# Patient Record
Sex: FEMALE | Race: BLACK OR AFRICAN AMERICAN | NOT HISPANIC OR LATINO | Employment: OTHER | URBAN - METROPOLITAN AREA
[De-identification: names, ages, dates, MRNs, and addresses within clinical notes are randomized per-mention and may not be internally consistent; named-entity substitution may affect disease eponyms.]

---

## 2017-01-04 ENCOUNTER — OFFICE VISIT (OUTPATIENT)
Dept: URGENT CARE | Facility: CLINIC | Age: 82
End: 2017-01-04
Payer: MEDICARE

## 2017-01-04 VITALS
RESPIRATION RATE: 14 BRPM | HEIGHT: 64 IN | HEART RATE: 86 BPM | SYSTOLIC BLOOD PRESSURE: 150 MMHG | DIASTOLIC BLOOD PRESSURE: 86 MMHG | TEMPERATURE: 99.3 F | OXYGEN SATURATION: 98 % | BODY MASS INDEX: 21.85 KG/M2 | WEIGHT: 128 LBS
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
## 2017-01-04 PROCEDURE — 99214 OFFICE O/P EST MOD 30 MIN: CPT | Performed by: FAMILY MEDICINE

## 2017-01-04 RX ORDER — HYDROCHLOROTHIAZIDE 12.5 MG/1
12.5 CAPSULE, GELATIN COATED ORAL DAILY
Status: ON HOLD | COMMUNITY
End: 2018-03-10

## 2017-01-04 RX ORDER — TRIAMCINOLONE ACETONIDE 1 MG/G
OINTMENT TOPICAL
Qty: 45 G | Refills: 0 | Status: ON HOLD | OUTPATIENT
Start: 2017-01-04 | End: 2018-03-10

## 2017-01-04 ASSESSMENT — ENCOUNTER SYMPTOMS
EYE REDNESS: 0
FEVER: 0
SORE THROAT: 0
EYE DISCHARGE: 0
URTICARIA: 1

## 2017-01-04 NOTE — MR AVS SNAPSHOT
"        Livia Corea   2017 12:30 PM   Office Visit   MRN: [id number]    Department:  [facility]   Dept Phone:  [phone]    Description:  Female : 1926   Provider:  Tristan Khoury M.D.           Reason for Visit     Urticaria x 2 days on chest, arms and back      Allergies as of 2017     No Known Allergies      You were diagnosed with     Insect bite, initial encounter   [[id number]]         Vital Signs     Blood Pressure Pulse Temperature Respirations Height Weight    150/86 mmHg 86 37.4 °C (99.3 °F) 14 1.613 m (5' 3.5\") 58.06 kg (128 lb)    Body Mass Index Oxygen Saturation                22.32 kg/m2 98%          Basic Information     Date Of Birth Sex Race Ethnicity Preferred Language    1926 Female Black or  Non- English      Health Maintenance        Date Due Completion Dates    IMM DTaP/Tdap/Td Vaccine (1 - Tdap) 1945 ---    PAP SMEAR 1947 ---    MAMMOGRAM 1966 ---    COLONOSCOPY 1976 ---    IMM ZOSTER VACCINE 1986 ---    BONE DENSITY 1991 ---    IMM PNEUMOCOCCAL 65+ (ADULT) LOW/MEDIUM RISK SERIES (1 of 2 - PCV13) 1991 ---    IMM INFLUENZA (1) 2016 ---            Current Immunizations     No immunizations on file.      Below and/or attached are the medications your provider expects you to take. Review all of your home medications and newly ordered medications with your provider and/or pharmacist. Follow medication instructions as directed by your provider and/or pharmacist. Please keep your medication list with you and share with your provider. Update the information when medications are discontinued, doses are changed, or new medications (including over-the-counter products) are added; and carry medication information at all times in the event of emergency situations     Allergies:  No Known Allergies          Medications  Valid as of: 2017 -  1:22 PM    Generic Name Brand Name Tablet Size " Instructions for use    Albuterol Sulfate (Aero Soln) albuterol 108 (90 BASE) MCG/ACT Inhale 1-2 Puffs by mouth every 6 hours as needed for Shortness of Breath.        AmLODIPine Besylate (Tab) NORVASC 10 MG Take 10 mg by mouth every day.        Aspirin (Tablet Delayed Response) ECOTRIN 81 MG Take 81 mg by mouth every day.        Guaifenesin-Codeine (Solution) ROBITUSSIN -10 mg/5mL Take 5 mL by mouth every four hours as needed for Cough.        HydroCHLOROthiazide (Cap) MICROZIDE 12.5 MG Take 12.5 mg by mouth every day.        LORazepam (Tab) ATIVAN 0.5 MG Take 0.5 mg by mouth every four hours as needed for Anxiety.        Pneumococcal 13-Brittany Conj Vacc (Suspension) PREVNAR 13  0.5 mL by Intramuscular route Once PRN.        Triamcinolone Acetonide (Ointment) KENALOG 0.1 % Apply thin layer to affected area twice daily as needed        .                 Medicines prescribed today were sent to:     [id number] - [place], [place] - [address] [address]    Phone: [phone] Fax: [phone]    Open 24 Hours?: No      Medication refill instructions:       If your prescription bottle indicates you have medication refills left, it is not necessary to call your provider’s office. Please contact your pharmacy and they will refill your medication.    If your prescription bottle indicates you do not have any refills left, you may request refills at any time through one of the following ways: The online [employer] system (except Urgent Care), by calling your provider’s office, or by asking your pharmacy to contact your provider’s office with a refill request. Medication refills are processed only during regular business hours and may not be available until the next business day. Your provider may request additional information or to have a follow-up visit with you prior to refilling your medication.   *Please Note: Medication refills are assigned a new Rx number when refilled  electronically. Your pharmacy may indicate that no refills were authorized even though a new prescription for the same medication is available at the pharmacy. Please request the medicine by name with the pharmacy before contacting your provider for a refill.           [email or web address] Access Code: [email or web address]  Expires: 2/3/2017  1:22 PM    Your email address is not on file at [facility].  Email Addresses are required for you to sign up for [email or web address], please contact [phone] to verify your personal information and to provide your email address prior to attempting to register for [email or web address].    Livia Corea  [address]  [place] [address]    [employer]  A secure, online tool to manage your health information     [facility]’s [employer]® is a secure, online tool that connects you to your personalized health information from the privacy of your home -- day or night - making it very easy for you to manage your healthcare. Once the activation process is completed, you can even access your medical information using the [employer] josiah, which is available for free in the Apple Josiah store or Google Play store.     To learn more about [email or web address], visit [email or web address]    There are two levels of access available (as shown below):   My Chart Features  [facility] Primary Care Doctor [facility]  Specialists [facility]  Urgent  Care Non-[facility] Primary Care Doctor   Email your healthcare team securely and privately 24/7 X X X    Manage appointments: schedule your next appointment; view details of past/upcoming appointments X      Request prescription refills. X      View recent personal medical records, including lab and immunizations X X X X   View health record, including health history, allergies, medications X X X X   Read reports about your outpatient visits, procedures, consult and ER notes X X X X   See your discharge summary, which is a recap of your hospital and/or ER visit that includes your diagnosis, lab results, and care plan X X  X      How to register for [employer]:  Once your e-mail address has been verified, follow the following steps to sign up for [employer].     1. Go to  [email or web address]  2. Click on the Sign Up Now box, which takes you to the New Member Sign Up page. You will need to provide the following information:  a. Enter your [employer] Access Code exactly as it appears at the top of this page. (You will not need to use this code after you’ve completed the sign-up process. If you do not sign up before the expiration date, you must request a new code.)   b. Enter your date of birth.   c. Enter your home email address.   d. Click Submit, and follow the next screen’s instructions.  3. Create a NEHPt ID. This will be your [employer] login ID and cannot be changed, so think of one that is secure and easy to remember.  4. Create a NEHPt password. You can change your password at any time.  5. Enter your Password Reset Question and Answer. This can be used at a later time if you forget your password.   6. Enter your e-mail address. This allows you to receive e-mail notifications when new information is available in [employer].  7. Click Sign Up. You can now view your health information.    For assistance activating your [employer] account, call [phone]

## 2017-01-04 NOTE — PROGRESS NOTES
"Subjective:      Livia Corea is a 89 y.o. female who presents with Urticaria            Urticaria  This is a new problem. Episode onset: 2 days itching rash without clear trigger to trunk/arms/face. No oral swelling. No SOB. No change in medications. No OTC medications. Remote PMH hives.  Pertinent negatives include no fever or sore throat.   No other aggravating or alleviating factors. Recently stayed in a motel.      Review of Systems   Constitutional: Negative for fever and malaise/fatigue.   HENT: Negative for sore throat.    Eyes: Negative for discharge and redness.   Skin: Negative for itching and rash.     .  Medications, Allergies, and current problem list reviewed today in Epic       Objective:     /86 mmHg  Pulse 86  Temp(Src) 37.4 °C (99.3 °F)  Resp 14  Ht 1.613 m (5' 3.5\")  Wt 58.06 kg (128 lb)  BMI 22.32 kg/m2  SpO2 98%     Physical Exam   Constitutional: She appears well-developed and well-nourished. No distress.   HENT:   Head: Normocephalic and atraumatic.   Eyes: Conjunctivae are normal.   Neck: Neck supple.   Cardiovascular: Normal rate, regular rhythm and normal heart sounds.    Pulmonary/Chest: Effort normal and breath sounds normal.   Lymphadenopathy:     She has no cervical adenopathy.   Neurological:   Speech is clear. Patient is appropriate and cooperative.     Skin: Skin is warm and dry.   Multiple pruritic papules in clusters. Round and slightly indurated. No clear central puncture.    No vesicles. No wheals            Assessment/Plan:     1. Insect bite, initial encounter  triamcinolone acetonide (KENALOG) 0.1 % Ointment      High suspicion for bed bug bites   Differential diagnosis, natural history, supportive care, and indications for immediate follow-up discussed at length.     "

## 2017-01-24 ENCOUNTER — HOSPITAL ENCOUNTER (OUTPATIENT)
Dept: RADIOLOGY | Facility: MEDICAL CENTER | Age: 82
End: 2017-01-24
Attending: FAMILY MEDICINE
Payer: MEDICARE

## 2017-01-24 DIAGNOSIS — R60.0 LOCALIZED EDEMA: ICD-10-CM

## 2017-01-24 PROCEDURE — 93971 EXTREMITY STUDY: CPT | Mod: LT

## 2017-09-20 ENCOUNTER — HOSPITAL ENCOUNTER (OUTPATIENT)
Dept: LAB | Facility: MEDICAL CENTER | Age: 82
End: 2017-09-20
Attending: FAMILY MEDICINE
Payer: MEDICARE

## 2017-09-20 PROCEDURE — 82043 UR ALBUMIN QUANTITATIVE: CPT

## 2017-09-20 PROCEDURE — 85025 COMPLETE CBC W/AUTO DIFF WBC: CPT

## 2017-09-20 PROCEDURE — 36415 COLL VENOUS BLD VENIPUNCTURE: CPT

## 2017-09-20 PROCEDURE — 82570 ASSAY OF URINE CREATININE: CPT

## 2017-09-20 PROCEDURE — 80053 COMPREHEN METABOLIC PANEL: CPT

## 2017-09-21 LAB
ALBUMIN SERPL BCP-MCNC: 4.1 G/DL (ref 3.2–4.9)
ALBUMIN/GLOB SERPL: 1.2 G/DL
ALP SERPL-CCNC: 96 U/L (ref 30–99)
ALT SERPL-CCNC: 8 U/L (ref 2–50)
ANION GAP SERPL CALC-SCNC: 12 MMOL/L (ref 0–11.9)
AST SERPL-CCNC: 15 U/L (ref 12–45)
BASOPHILS # BLD AUTO: 1.3 % (ref 0–1.8)
BASOPHILS # BLD: 0.07 K/UL (ref 0–0.12)
BILIRUB SERPL-MCNC: 0.6 MG/DL (ref 0.1–1.5)
BUN SERPL-MCNC: 26 MG/DL (ref 8–22)
CALCIUM SERPL-MCNC: 9.5 MG/DL (ref 8.5–10.5)
CHLORIDE SERPL-SCNC: 105 MMOL/L (ref 96–112)
CO2 SERPL-SCNC: 23 MMOL/L (ref 20–33)
CREAT SERPL-MCNC: 0.98 MG/DL (ref 0.5–1.4)
CREAT UR-MCNC: 156.1 MG/DL
EOSINOPHIL # BLD AUTO: 0.21 K/UL (ref 0–0.51)
EOSINOPHIL NFR BLD: 3.9 % (ref 0–6.9)
ERYTHROCYTE [DISTWIDTH] IN BLOOD BY AUTOMATED COUNT: 45.9 FL (ref 35.9–50)
GFR SERPL CREATININE-BSD FRML MDRD: 53 ML/MIN/1.73 M 2
GLOBULIN SER CALC-MCNC: 3.4 G/DL (ref 1.9–3.5)
GLUCOSE SERPL-MCNC: 86 MG/DL (ref 65–99)
HCT VFR BLD AUTO: 42.5 % (ref 37–47)
HGB BLD-MCNC: 14.1 G/DL (ref 12–16)
IMM GRANULOCYTES # BLD AUTO: 0.02 K/UL (ref 0–0.11)
IMM GRANULOCYTES NFR BLD AUTO: 0.4 % (ref 0–0.9)
LYMPHOCYTES # BLD AUTO: 2.26 K/UL (ref 1–4.8)
LYMPHOCYTES NFR BLD: 42.3 % (ref 22–41)
MCH RBC QN AUTO: 30.3 PG (ref 27–33)
MCHC RBC AUTO-ENTMCNC: 33.2 G/DL (ref 33.6–35)
MCV RBC AUTO: 91.4 FL (ref 81.4–97.8)
MICROALBUMIN UR-MCNC: 3 MG/DL
MICROALBUMIN/CREAT UR: 19 MG/G (ref 0–30)
MONOCYTES # BLD AUTO: 0.58 K/UL (ref 0–0.85)
MONOCYTES NFR BLD AUTO: 10.9 % (ref 0–13.4)
NEUTROPHILS # BLD AUTO: 2.2 K/UL (ref 2–7.15)
NEUTROPHILS NFR BLD: 41.2 % (ref 44–72)
NRBC # BLD AUTO: 0 K/UL
NRBC BLD AUTO-RTO: 0 /100 WBC
PLATELET # BLD AUTO: 234 K/UL (ref 164–446)
PMV BLD AUTO: 10.7 FL (ref 9–12.9)
POTASSIUM SERPL-SCNC: 3.7 MMOL/L (ref 3.6–5.5)
PROT SERPL-MCNC: 7.5 G/DL (ref 6–8.2)
RBC # BLD AUTO: 4.65 M/UL (ref 4.2–5.4)
SODIUM SERPL-SCNC: 140 MMOL/L (ref 135–145)
WBC # BLD AUTO: 5.3 K/UL (ref 4.8–10.8)

## 2018-02-16 ENCOUNTER — HOSPITAL ENCOUNTER (OUTPATIENT)
Dept: LAB | Facility: MEDICAL CENTER | Age: 83
End: 2018-02-16
Attending: ORTHOPAEDIC SURGERY
Payer: MEDICARE

## 2018-02-16 LAB
ALBUMIN SERPL BCP-MCNC: 4.1 G/DL (ref 3.2–4.9)
ALBUMIN/GLOB SERPL: 1.1 G/DL
ALP SERPL-CCNC: 88 U/L (ref 30–99)
ALT SERPL-CCNC: 9 U/L (ref 2–50)
ANION GAP SERPL CALC-SCNC: 9 MMOL/L (ref 0–11.9)
AST SERPL-CCNC: 18 U/L (ref 12–45)
BASOPHILS # BLD AUTO: 1.3 % (ref 0–1.8)
BASOPHILS # BLD: 0.07 K/UL (ref 0–0.12)
BILIRUB SERPL-MCNC: 0.5 MG/DL (ref 0.1–1.5)
BUN SERPL-MCNC: 19 MG/DL (ref 8–22)
CALCIUM SERPL-MCNC: 9.1 MG/DL (ref 8.5–10.5)
CHLORIDE SERPL-SCNC: 106 MMOL/L (ref 96–112)
CO2 SERPL-SCNC: 24 MMOL/L (ref 20–33)
CREAT SERPL-MCNC: 0.83 MG/DL (ref 0.5–1.4)
EOSINOPHIL # BLD AUTO: 0.31 K/UL (ref 0–0.51)
EOSINOPHIL NFR BLD: 5.7 % (ref 0–6.9)
ERYTHROCYTE [DISTWIDTH] IN BLOOD BY AUTOMATED COUNT: 47.7 FL (ref 35.9–50)
EST. AVERAGE GLUCOSE BLD GHB EST-MCNC: 97 MG/DL
GLOBULIN SER CALC-MCNC: 3.9 G/DL (ref 1.9–3.5)
GLUCOSE SERPL-MCNC: 89 MG/DL (ref 65–99)
HBA1C MFR BLD: 5 % (ref 0–5.6)
HCT VFR BLD AUTO: 45.1 % (ref 37–47)
HGB BLD-MCNC: 14.9 G/DL (ref 12–16)
IMM GRANULOCYTES # BLD AUTO: 0.01 K/UL (ref 0–0.11)
IMM GRANULOCYTES NFR BLD AUTO: 0.2 % (ref 0–0.9)
LYMPHOCYTES # BLD AUTO: 2 K/UL (ref 1–4.8)
LYMPHOCYTES NFR BLD: 37 % (ref 22–41)
MCH RBC QN AUTO: 30.1 PG (ref 27–33)
MCHC RBC AUTO-ENTMCNC: 33 G/DL (ref 33.6–35)
MCV RBC AUTO: 91.1 FL (ref 81.4–97.8)
MONOCYTES # BLD AUTO: 0.59 K/UL (ref 0–0.85)
MONOCYTES NFR BLD AUTO: 10.9 % (ref 0–13.4)
NEUTROPHILS # BLD AUTO: 2.42 K/UL (ref 2–7.15)
NEUTROPHILS NFR BLD: 44.9 % (ref 44–72)
NRBC # BLD AUTO: 0 K/UL
NRBC BLD-RTO: 0 /100 WBC
PLATELET # BLD AUTO: 253 K/UL (ref 164–446)
PMV BLD AUTO: 10.1 FL (ref 9–12.9)
POTASSIUM SERPL-SCNC: 3.4 MMOL/L (ref 3.6–5.5)
PROT SERPL-MCNC: 8 G/DL (ref 6–8.2)
RBC # BLD AUTO: 4.95 M/UL (ref 4.2–5.4)
SODIUM SERPL-SCNC: 139 MMOL/L (ref 135–145)
TSH SERPL DL<=0.005 MIU/L-ACNC: 1.5 UIU/ML (ref 0.38–5.33)
WBC # BLD AUTO: 5.4 K/UL (ref 4.8–10.8)

## 2018-02-16 PROCEDURE — 83036 HEMOGLOBIN GLYCOSYLATED A1C: CPT

## 2018-02-16 PROCEDURE — 84443 ASSAY THYROID STIM HORMONE: CPT

## 2018-02-16 PROCEDURE — 85025 COMPLETE CBC W/AUTO DIFF WBC: CPT

## 2018-02-16 PROCEDURE — 80053 COMPREHEN METABOLIC PANEL: CPT

## 2018-02-16 PROCEDURE — 36415 COLL VENOUS BLD VENIPUNCTURE: CPT

## 2018-02-25 ENCOUNTER — HOSPITAL ENCOUNTER (EMERGENCY)
Facility: MEDICAL CENTER | Age: 83
End: 2018-02-26
Attending: EMERGENCY MEDICINE
Payer: MEDICARE

## 2018-02-25 DIAGNOSIS — I10 HYPERTENSION, UNSPECIFIED TYPE: ICD-10-CM

## 2018-02-25 PROCEDURE — 99283 EMERGENCY DEPT VISIT LOW MDM: CPT

## 2018-02-25 RX ORDER — LISINOPRIL 10 MG/1
TABLET ORAL DAILY
Status: ON HOLD | COMMUNITY
End: 2018-03-10

## 2018-02-25 ASSESSMENT — LIFESTYLE VARIABLES
ON A TYPICAL DAY WHEN YOU DRINK ALCOHOL HOW MANY DRINKS DO YOU HAVE: 1
TOTAL SCORE: 0
HAVE PEOPLE ANNOYED YOU BY CRITICIZING YOUR DRINKING: NO
TOTAL SCORE: 0
CONSUMPTION TOTAL: NEGATIVE
AVERAGE NUMBER OF DAYS PER WEEK YOU HAVE A DRINK CONTAINING ALCOHOL: 2
EVER FELT BAD OR GUILTY ABOUT YOUR DRINKING: NO
HAVE YOU EVER FELT YOU SHOULD CUT DOWN ON YOUR DRINKING: NO
EVER HAD A DRINK FIRST THING IN THE MORNING TO STEADY YOUR NERVES TO GET RID OF A HANGOVER: NO
DO YOU DRINK ALCOHOL: YES
TOTAL SCORE: 0
HOW MANY TIMES IN THE PAST YEAR HAVE YOU HAD 5 OR MORE DRINKS IN A DAY: 0

## 2018-02-25 ASSESSMENT — PAIN SCALES - GENERAL
PAINLEVEL_OUTOF10: 0
PAINLEVEL_OUTOF10: 0

## 2018-02-26 VITALS
HEIGHT: 64 IN | SYSTOLIC BLOOD PRESSURE: 183 MMHG | TEMPERATURE: 98.1 F | OXYGEN SATURATION: 94 % | BODY MASS INDEX: 24.28 KG/M2 | RESPIRATION RATE: 16 BRPM | HEART RATE: 79 BPM | WEIGHT: 142.2 LBS | DIASTOLIC BLOOD PRESSURE: 96 MMHG

## 2018-02-26 NOTE — ED PROVIDER NOTES
ED Provider Note    CHIEF COMPLAINT  Chief Complaint   Patient presents with   • Hypertension     pt states BP was 190's systolic around 1800 today; pt has taken lisinopril, norvasc, and hctz today.    • Leg Swelling     bilaterally; pt states both legs started swelling an hour after taking lisinopril; mild swelling noted bilaterally.        HPI  Livia Corea is a 89 y.o. female here for evaluation of guicho blood pressure and leg swelling.  The pt states she added a lisinopril three days ago, to help with her blood pressure.  She states she had some mild ankle swelling, but it did not really help her blood pressure.  She has no cp, no sob, no abdominal pain, no headache, and no vision changes.  She states she recently had 'a lot of blood work done' that was all normal.  Her blood pressure at home was 180 over 90.      PAST MEDICAL HISTORY       SOCIAL HISTORY  Social History     Social History Main Topics   • Smoking status: Former Smoker     Quit date: 2/25/1988   • Smokeless tobacco: Never Used   • Alcohol use Yes      Comment: 1 glass of wine x 2 days a week   • Drug use: No   • Sexual activity: Not on file       SURGICAL HISTORY  patient denies any surgical history    CURRENT MEDICATIONS  Home Medications     Reviewed by Rylee Sweeney, R.N. (Registered Nurse) on 02/25/18 at 2118  Med List Status: Partial   Medication Last Dose Status   albuterol (VENTOLIN OR PROVENTIL) 108 (90 BASE) MCG/ACT Aero Soln inhalation aerosol  Active   amlodipine (NORVASC) 10 MG Tab 2/25/2018 Active   aspirin EC (ECOTRIN) 81 MG Tablet Delayed Response 2/25/2018 Active   guaifenesin-codeine (ROBITUSSIN AC) Solution oral solution 2/13/2016 Active   hydrochlorothiazide (MICROZIDE) 12.5 MG capsule 2/25/2018 Active   lisinopril (PRINIVIL) 10 MG Tab 2/25/2018 Active   lorazepam (ATIVAN) 0.5 MG Tab > Month Active   pneumococcal 13-Brittany Conj Vacc (PREVNAR 13) syringe 2/14/2016 Active   triamcinolone acetonide (KENALOG) 0.1 % Ointment  " Active                ALLERGIES  No Known Allergies    REVIEW OF SYSTEMS  See HPI for further details. Review of systems as above, otherwise all other systems are negative.     PHYSICAL EXAM  VITAL SIGNS: BP (!) 182/90   Pulse 72   Temp 36.7 °C (98.1 °F) (Temporal)   Resp 14   Ht 1.626 m (5' 4\")   Wt 64.5 kg (142 lb 3.2 oz)   SpO2 95%   BMI 24.41 kg/m²     Constitutional: Well developed, well nourished. No acute distress.  HEENT: Normocephalic, atraumatic. MMM  Neck: Supple, Full range of motion   Chest/Pulmonary:  No respiratory distress.  Equal expansion   Musculoskeletal: No deformity, no edema, neurovascular intact.   Neuro: Clear speech, appropriate, cooperative, cranial nerves II-XII grossly intact.  Psych: Normal mood and affect      PROCEDURES     MEDICAL RECORD  I have reviewed patient's medical record and pertinent results are listed above.    COURSE & MEDICAL DECISION MAKING  I have reviewed any medical record information, laboratory studies and radiographic results as noted above.    I you have had any blood pressure issues while here in the emergency department, please see your doctor for a further evaluation or work up.    10:32 PM  The pt is nontoxic appearing, afebrile, and looks well.  She has no medical complaints at this time, and agrees to continue her hctz and  norvasc as prescribed.  She will call her doctor in the am, or return here for any other medical concerns.  Blood work was not done, secondary to the pt having no medical complaints.  She was simply taking her blood pressure at home, and noted it was high.    12:05 AM  Pt has been given her hctz, and we will wait an hour to re check her blood pressure.     This patient presents with high blood pressure.  At this time, I have counseled the patient/family regarding their medications, pain control, and follow up.  They will continue their medications, if any, as prescribed.  They will return immediately for any worsening symptoms " and/or any other medical concerns.  They will see their doctor, or contact the doctor provided, in 1-2 days for follow up.       FINAL IMPRESSION  Hypertension       Electronically signed by: Gabe Hope, 2/25/2018 10:31 PM

## 2018-02-26 NOTE — ED NOTES
All discharge instructions given to patient. Patient verbalized understanding and has no further questions. Patient gathered all belongings and ambulated with steady gait to ER lobby. See vital signs for discharge vitals. Patient educated on dangers of operating a vehicle after administration of narcotics and/or sedatives. Pt to follow up as instructed and to return to the ER if s/sx worsen or do not improve.

## 2018-02-26 NOTE — ED TRIAGE NOTES
Pt amb with walker to room. Agree with triage nurse's assessment. Pt stated that she started taking lisinopril last week after going in to PCP's office concerned about HTN. ERP to see.

## 2018-02-26 NOTE — ED NOTES
Pt sitting in chair. Continues to deny chest pain, SOB, dizziness, headache. NAD noted, resp even and unlabored. Pt updated on POC.

## 2018-02-26 NOTE — DISCHARGE INSTRUCTIONS

## 2018-02-26 NOTE — ED TRIAGE NOTES
.Livia Corea  .89 y.o.  .  Chief Complaint   Patient presents with   • Hypertension     pt states BP was 190's systolic around 1800 today; pt has taken lisinopril, norvasc, and hctz today.    • Leg Swelling     bilaterally; pt states both legs started swelling an hour after taking lisinopril; mild swelling noted bilaterally.      PT ambulatory to triage w/ above complaints; denies numbness/tingling/headache/sob/cp; pt states she has no symptoms at all. Pt A&O X4, speaking in full sentences; responding appropriately to questions.  Pt placed in senior lounge and advised to notify staff of new or worsening symptoms.

## 2018-03-09 PROCEDURE — 99285 EMERGENCY DEPT VISIT HI MDM: CPT

## 2018-03-10 ENCOUNTER — HOSPITAL ENCOUNTER (OUTPATIENT)
Facility: MEDICAL CENTER | Age: 83
End: 2018-03-11
Attending: EMERGENCY MEDICINE | Admitting: HOSPITALIST
Payer: MEDICARE

## 2018-03-10 ENCOUNTER — APPOINTMENT (OUTPATIENT)
Dept: RADIOLOGY | Facility: MEDICAL CENTER | Age: 83
End: 2018-03-10
Attending: EMERGENCY MEDICINE
Payer: MEDICARE

## 2018-03-10 ENCOUNTER — RESOLUTE PROFESSIONAL BILLING HOSPITAL PROF FEE (OUTPATIENT)
Dept: HOSPITALIST | Facility: MEDICAL CENTER | Age: 83
End: 2018-03-10
Payer: MEDICARE

## 2018-03-10 DIAGNOSIS — I16.0 HYPERTENSIVE URGENCY: ICD-10-CM

## 2018-03-10 PROBLEM — Z66 DNR (DO NOT RESUSCITATE): Status: ACTIVE | Noted: 2018-03-10

## 2018-03-10 PROBLEM — Z86.73 HISTORY OF STROKE: Status: ACTIVE | Noted: 2018-03-10

## 2018-03-10 PROBLEM — R79.9 ELEVATED BUN: Status: ACTIVE | Noted: 2018-03-10

## 2018-03-10 PROBLEM — E87.6 HYPOKALEMIA: Status: ACTIVE | Noted: 2018-03-10

## 2018-03-10 LAB
ANION GAP SERPL CALC-SCNC: 12 MMOL/L (ref 0–11.9)
ANION GAP SERPL CALC-SCNC: 9 MMOL/L (ref 0–11.9)
BASOPHILS # BLD AUTO: 0.8 % (ref 0–1.8)
BASOPHILS # BLD: 0.04 K/UL (ref 0–0.12)
BUN SERPL-MCNC: 20 MG/DL (ref 8–22)
BUN SERPL-MCNC: 25 MG/DL (ref 8–22)
CALCIUM SERPL-MCNC: 8.2 MG/DL (ref 8.5–10.5)
CALCIUM SERPL-MCNC: 9.9 MG/DL (ref 8.5–10.5)
CHLORIDE SERPL-SCNC: 101 MMOL/L (ref 96–112)
CHLORIDE SERPL-SCNC: 109 MMOL/L (ref 96–112)
CO2 SERPL-SCNC: 24 MMOL/L (ref 20–33)
CO2 SERPL-SCNC: 25 MMOL/L (ref 20–33)
CREAT SERPL-MCNC: 1.01 MG/DL (ref 0.5–1.4)
CREAT SERPL-MCNC: 1.05 MG/DL (ref 0.5–1.4)
EOSINOPHIL # BLD AUTO: 0.1 K/UL (ref 0–0.51)
EOSINOPHIL NFR BLD: 2.1 % (ref 0–6.9)
ERYTHROCYTE [DISTWIDTH] IN BLOOD BY AUTOMATED COUNT: 44.5 FL (ref 35.9–50)
EST. AVERAGE GLUCOSE BLD GHB EST-MCNC: 105 MG/DL
GLUCOSE SERPL-MCNC: 108 MG/DL (ref 65–99)
GLUCOSE SERPL-MCNC: 93 MG/DL (ref 65–99)
HBA1C MFR BLD: 5.3 % (ref 0–5.6)
HCT VFR BLD AUTO: 43.3 % (ref 37–47)
HGB BLD-MCNC: 14.9 G/DL (ref 12–16)
IMM GRANULOCYTES # BLD AUTO: 0 K/UL (ref 0–0.11)
IMM GRANULOCYTES NFR BLD AUTO: 0 % (ref 0–0.9)
LV EJECT FRACT  99904: 65
LV EJECT FRACT MOD 2C 99903: 56.76
LV EJECT FRACT MOD 4C 99902: 55.19
LV EJECT FRACT MOD BP 99901: 53.98
LYMPHOCYTES # BLD AUTO: 1.47 K/UL (ref 1–4.8)
LYMPHOCYTES NFR BLD: 30.8 % (ref 22–41)
MAGNESIUM SERPL-MCNC: 2.4 MG/DL (ref 1.5–2.5)
MCH RBC QN AUTO: 30.8 PG (ref 27–33)
MCHC RBC AUTO-ENTMCNC: 34.4 G/DL (ref 33.6–35)
MCV RBC AUTO: 89.5 FL (ref 81.4–97.8)
MONOCYTES # BLD AUTO: 0.6 K/UL (ref 0–0.85)
MONOCYTES NFR BLD AUTO: 12.6 % (ref 0–13.4)
NEUTROPHILS # BLD AUTO: 2.56 K/UL (ref 2–7.15)
NEUTROPHILS NFR BLD: 53.7 % (ref 44–72)
NRBC # BLD AUTO: 0 K/UL
NRBC BLD-RTO: 0 /100 WBC
PLATELET # BLD AUTO: 209 K/UL (ref 164–446)
PMV BLD AUTO: 10.8 FL (ref 9–12.9)
POTASSIUM SERPL-SCNC: 3.1 MMOL/L (ref 3.6–5.5)
POTASSIUM SERPL-SCNC: 3.2 MMOL/L (ref 3.6–5.5)
RBC # BLD AUTO: 4.84 M/UL (ref 4.2–5.4)
SODIUM SERPL-SCNC: 138 MMOL/L (ref 135–145)
SODIUM SERPL-SCNC: 142 MMOL/L (ref 135–145)
TROPONIN I SERPL-MCNC: 0.03 NG/ML (ref 0–0.04)
TSH SERPL DL<=0.005 MIU/L-ACNC: 1.55 UIU/ML (ref 0.38–5.33)
WBC # BLD AUTO: 4.8 K/UL (ref 4.8–10.8)

## 2018-03-10 PROCEDURE — G0378 HOSPITAL OBSERVATION PER HR: HCPCS

## 2018-03-10 PROCEDURE — 83735 ASSAY OF MAGNESIUM: CPT

## 2018-03-10 PROCEDURE — 700102 HCHG RX REV CODE 250 W/ 637 OVERRIDE(OP): Performed by: NURSE PRACTITIONER

## 2018-03-10 PROCEDURE — 82088 ASSAY OF ALDOSTERONE: CPT

## 2018-03-10 PROCEDURE — 84484 ASSAY OF TROPONIN QUANT: CPT

## 2018-03-10 PROCEDURE — A9270 NON-COVERED ITEM OR SERVICE: HCPCS | Performed by: NURSE PRACTITIONER

## 2018-03-10 PROCEDURE — 84244 ASSAY OF RENIN: CPT

## 2018-03-10 PROCEDURE — 93306 TTE W/DOPPLER COMPLETE: CPT

## 2018-03-10 PROCEDURE — 93005 ELECTROCARDIOGRAM TRACING: CPT | Performed by: EMERGENCY MEDICINE

## 2018-03-10 PROCEDURE — 96372 THER/PROPH/DIAG INJ SC/IM: CPT | Mod: XU

## 2018-03-10 PROCEDURE — A9270 NON-COVERED ITEM OR SERVICE: HCPCS | Performed by: HOSPITALIST

## 2018-03-10 PROCEDURE — 700111 HCHG RX REV CODE 636 W/ 250 OVERRIDE (IP): Performed by: HOSPITALIST

## 2018-03-10 PROCEDURE — 700101 HCHG RX REV CODE 250: Performed by: HOSPITALIST

## 2018-03-10 PROCEDURE — 93306 TTE W/DOPPLER COMPLETE: CPT | Mod: 26 | Performed by: INTERNAL MEDICINE

## 2018-03-10 PROCEDURE — 700102 HCHG RX REV CODE 250 W/ 637 OVERRIDE(OP): Performed by: HOSPITALIST

## 2018-03-10 PROCEDURE — 80048 BASIC METABOLIC PNL TOTAL CA: CPT

## 2018-03-10 PROCEDURE — 96374 THER/PROPH/DIAG INJ IV PUSH: CPT | Mod: XU

## 2018-03-10 PROCEDURE — 85025 COMPLETE CBC W/AUTO DIFF WBC: CPT

## 2018-03-10 PROCEDURE — 99220 PR INITIAL OBSERVATION CARE,LEVL III: CPT | Performed by: HOSPITALIST

## 2018-03-10 PROCEDURE — 84443 ASSAY THYROID STIM HORMONE: CPT

## 2018-03-10 PROCEDURE — 71046 X-RAY EXAM CHEST 2 VIEWS: CPT

## 2018-03-10 PROCEDURE — 83036 HEMOGLOBIN GLYCOSYLATED A1C: CPT

## 2018-03-10 PROCEDURE — 76770 US EXAM ABDO BACK WALL COMP: CPT

## 2018-03-10 RX ORDER — HYDRALAZINE HYDROCHLORIDE 25 MG/1
50 TABLET, FILM COATED ORAL EVERY 8 HOURS
Status: DISCONTINUED | OUTPATIENT
Start: 2018-03-10 | End: 2018-03-11 | Stop reason: HOSPADM

## 2018-03-10 RX ORDER — LABETALOL HYDROCHLORIDE 5 MG/ML
10 INJECTION, SOLUTION INTRAVENOUS EVERY 4 HOURS PRN
Status: DISCONTINUED | OUTPATIENT
Start: 2018-03-10 | End: 2018-03-11 | Stop reason: HOSPADM

## 2018-03-10 RX ORDER — AMLODIPINE BESYLATE 10 MG/1
10 TABLET ORAL DAILY
Status: DISCONTINUED | OUTPATIENT
Start: 2018-03-11 | End: 2018-03-11 | Stop reason: HOSPADM

## 2018-03-10 RX ORDER — LISINOPRIL 20 MG/1
20 TABLET ORAL DAILY
Status: DISCONTINUED | OUTPATIENT
Start: 2018-03-10 | End: 2018-03-10

## 2018-03-10 RX ORDER — ACETAMINOPHEN 500 MG
500 TABLET ORAL EVERY 6 HOURS PRN
COMMUNITY
End: 2018-05-26

## 2018-03-10 RX ORDER — AMLODIPINE BESYLATE 10 MG/1
10 TABLET ORAL DAILY
Status: DISCONTINUED | OUTPATIENT
Start: 2018-03-10 | End: 2018-03-10

## 2018-03-10 RX ORDER — BISACODYL 10 MG
10 SUPPOSITORY, RECTAL RECTAL
Status: DISCONTINUED | OUTPATIENT
Start: 2018-03-10 | End: 2018-03-11 | Stop reason: HOSPADM

## 2018-03-10 RX ORDER — AMOXICILLIN 250 MG
2 CAPSULE ORAL 2 TIMES DAILY
Status: DISCONTINUED | OUTPATIENT
Start: 2018-03-10 | End: 2018-03-11 | Stop reason: HOSPADM

## 2018-03-10 RX ORDER — ASCORBIC ACID 500 MG
500 TABLET ORAL DAILY
COMMUNITY

## 2018-03-10 RX ORDER — HYDRALAZINE HYDROCHLORIDE 25 MG/1
25 TABLET, FILM COATED ORAL EVERY 8 HOURS
Status: DISCONTINUED | OUTPATIENT
Start: 2018-03-10 | End: 2018-03-10

## 2018-03-10 RX ORDER — POLYETHYLENE GLYCOL 3350 17 G/17G
1 POWDER, FOR SOLUTION ORAL
Status: DISCONTINUED | OUTPATIENT
Start: 2018-03-10 | End: 2018-03-11 | Stop reason: HOSPADM

## 2018-03-10 RX ORDER — HEPARIN SODIUM 5000 [USP'U]/ML
5000 INJECTION, SOLUTION INTRAVENOUS; SUBCUTANEOUS EVERY 8 HOURS
Status: DISCONTINUED | OUTPATIENT
Start: 2018-03-10 | End: 2018-03-11 | Stop reason: HOSPADM

## 2018-03-10 RX ORDER — POTASSIUM CHLORIDE 20 MEQ/1
40 TABLET, EXTENDED RELEASE ORAL ONCE
Status: COMPLETED | OUTPATIENT
Start: 2018-03-10 | End: 2018-03-10

## 2018-03-10 RX ORDER — SODIUM CHLORIDE 9 MG/ML
INJECTION, SOLUTION INTRAVENOUS CONTINUOUS
Status: DISCONTINUED | OUTPATIENT
Start: 2018-03-10 | End: 2018-03-10

## 2018-03-10 RX ORDER — ONDANSETRON 4 MG/1
4 TABLET, ORALLY DISINTEGRATING ORAL EVERY 4 HOURS PRN
Status: DISCONTINUED | OUTPATIENT
Start: 2018-03-10 | End: 2018-03-11 | Stop reason: HOSPADM

## 2018-03-10 RX ORDER — NIFEDIPINE 60 MG/1
60 TABLET, EXTENDED RELEASE ORAL
Status: DISCONTINUED | OUTPATIENT
Start: 2018-03-10 | End: 2018-03-10

## 2018-03-10 RX ORDER — HYDROCHLOROTHIAZIDE 25 MG/1
25 TABLET ORAL DAILY
Status: DISCONTINUED | OUTPATIENT
Start: 2018-03-10 | End: 2018-03-11

## 2018-03-10 RX ORDER — ONDANSETRON 2 MG/ML
4 INJECTION INTRAMUSCULAR; INTRAVENOUS EVERY 4 HOURS PRN
Status: DISCONTINUED | OUTPATIENT
Start: 2018-03-10 | End: 2018-03-11 | Stop reason: HOSPADM

## 2018-03-10 RX ORDER — POTASSIUM CHLORIDE 20 MEQ/1
40 TABLET, EXTENDED RELEASE ORAL ONCE
Status: DISCONTINUED | OUTPATIENT
Start: 2018-03-10 | End: 2018-03-10

## 2018-03-10 RX ORDER — CHOLECALCIFEROL (VITAMIN D3) 125 MCG
500 CAPSULE ORAL DAILY
COMMUNITY

## 2018-03-10 RX ORDER — ENALAPRILAT 1.25 MG/ML
1.25 INJECTION INTRAVENOUS EVERY 6 HOURS PRN
Status: DISCONTINUED | OUTPATIENT
Start: 2018-03-10 | End: 2018-03-11 | Stop reason: HOSPADM

## 2018-03-10 RX ORDER — HYDROCHLOROTHIAZIDE 25 MG/1
25 TABLET ORAL DAILY
Status: ON HOLD | COMMUNITY
End: 2018-03-11

## 2018-03-10 RX ADMIN — METOPROLOL TARTRATE 25 MG: 25 TABLET, FILM COATED ORAL at 12:42

## 2018-03-10 RX ADMIN — HEPARIN SODIUM 5000 UNITS: 5000 INJECTION, SOLUTION INTRAVENOUS; SUBCUTANEOUS at 07:25

## 2018-03-10 RX ADMIN — HYDRALAZINE HYDROCHLORIDE 50 MG: 25 TABLET, FILM COATED ORAL at 14:28

## 2018-03-10 RX ADMIN — METOPROLOL TARTRATE 25 MG: 25 TABLET, FILM COATED ORAL at 21:18

## 2018-03-10 RX ADMIN — HYDRALAZINE HYDROCHLORIDE 25 MG: 25 TABLET, FILM COATED ORAL at 07:20

## 2018-03-10 RX ADMIN — ASPIRIN 81 MG: 81 TABLET, COATED ORAL at 11:19

## 2018-03-10 RX ADMIN — POTASSIUM CHLORIDE 40 MEQ: 1500 TABLET, EXTENDED RELEASE ORAL at 07:26

## 2018-03-10 RX ADMIN — HEPARIN SODIUM 5000 UNITS: 5000 INJECTION, SOLUTION INTRAVENOUS; SUBCUTANEOUS at 21:18

## 2018-03-10 RX ADMIN — LABETALOL HYDROCHLORIDE 10 MG: 5 INJECTION, SOLUTION INTRAVENOUS at 12:06

## 2018-03-10 RX ADMIN — HYDRALAZINE HYDROCHLORIDE 50 MG: 25 TABLET, FILM COATED ORAL at 21:18

## 2018-03-10 RX ADMIN — HYDROCHLOROTHIAZIDE 25 MG: 25 TABLET ORAL at 07:20

## 2018-03-10 RX ADMIN — AMLODIPINE BESYLATE 10 MG: 10 TABLET ORAL at 07:20

## 2018-03-10 ASSESSMENT — PATIENT HEALTH QUESTIONNAIRE - PHQ9
1. LITTLE INTEREST OR PLEASURE IN DOING THINGS: NOT AT ALL
2. FEELING DOWN, DEPRESSED, IRRITABLE, OR HOPELESS: NOT AT ALL
SUM OF ALL RESPONSES TO PHQ9 QUESTIONS 1 AND 2: 0
SUM OF ALL RESPONSES TO PHQ QUESTIONS 1-9: 0

## 2018-03-10 ASSESSMENT — ENCOUNTER SYMPTOMS
BLURRED VISION: 0
DIZZINESS: 0
BRUISES/BLEEDS EASILY: 0
HEARTBURN: 0
FEVER: 0
DOUBLE VISION: 0
COUGH: 0
DEPRESSION: 0
MYALGIAS: 0
HEMOPTYSIS: 0
CHILLS: 0
NAUSEA: 0

## 2018-03-10 ASSESSMENT — COPD QUESTIONNAIRES
DO YOU EVER COUGH UP ANY MUCUS OR PHLEGM?: NO/ONLY WITH OCCASIONAL COLDS OR INFECTIONS
DURING THE PAST 4 WEEKS HOW MUCH DID YOU FEEL SHORT OF BREATH: NONE/LITTLE OF THE TIME
HAVE YOU SMOKED AT LEAST 100 CIGARETTES IN YOUR ENTIRE LIFE: NO/DON'T KNOW
COPD SCREENING SCORE: 2

## 2018-03-10 ASSESSMENT — LIFESTYLE VARIABLES
ALCOHOL_USE: NO
EVER_SMOKED: YES

## 2018-03-10 ASSESSMENT — PAIN SCALES - GENERAL: PAINLEVEL_OUTOF10: 0

## 2018-03-10 NOTE — PROGRESS NOTES
[facility] Hospitalist Progress Note    Date of Service: 3/10/2018    Chief Complaint  89 y.o. female admitted 3/10/2018 with hypertensive urgency.    Interval Problem Update  Feeling okay. Denies any chest pain or shortness of breath. Tolerating by mouth intake well.    Consultants/Specialty  None    Disposition  Likely home        Review of Systems   Constitutional: Negative for chills and fever.   HENT: Negative for hearing loss and tinnitus.    Eyes: Negative for blurred vision and double vision.   Respiratory: Negative for cough and hemoptysis.    Cardiovascular: Negative for chest pain.   Gastrointestinal: Negative for heartburn and nausea.   Genitourinary: Negative for dysuria.   Musculoskeletal: Negative for myalgias.   Skin: Negative for rash.   Neurological: Negative for dizziness.   Endo/Heme/Allergies: Does not bruise/bleed easily.   Psychiatric/Behavioral: Negative for depression.      Physical Exam  Laboratory/Imaging   Hemodynamics  Temp (24hrs), Av.8 °C (98.2 °F), Min:36.6 °C (97.9 °F), Max:37.1 °C (98.8 °F)   Temperature: 37.1 °C (98.8 °F)  Pulse  Av.8  Min: 71  Max: 89 Heart Rate (Monitored): 66  Blood Pressure : (!) 176/78, NIBP: (!) 169/83      Respiratory      Respiration: 19, Pulse Oximetry: 96 %             Fluids  No intake or output data in the 24 hours ending 03/10/18 1258    Nutrition  Orders Placed This Encounter   Procedures   • Diet Order     Standing Status:   Standing     Number of Occurrences:   1     Order Specific Question:   Diet:     Answer:   2 Gram Sodium [7]     Physical Exam   Constitutional: She is oriented to person, place, and time. She appears well-developed. No distress.   HENT:   Head: Normocephalic and atraumatic.   Eyes: Pupils are equal, round, and reactive to light. No scleral icterus.   Neck: Normal range of motion. No thyromegaly present.   Cardiovascular: Normal rate and regular rhythm.  Exam reveals no friction rub.    Pulmonary/Chest: Effort normal. No  respiratory distress.   Abdominal: Soft. She exhibits no distension.   Musculoskeletal: Normal range of motion. She exhibits no edema.   Neurological: She is alert and oriented to person, place, and time.   Skin: Skin is warm. No erythema.   Psychiatric: She has a normal mood and affect.       Recent Labs      03/10/18   0432   WBC  4.8   RBC  4.84   HEMOGLOBIN  14.9   HEMATOCRIT  43.3   MCV  89.5   MCH  30.8   MCHC  34.4   RDW  44.5   PLATELETCT  209   MPV  10.8     Recent Labs      03/10/18   0432   SODIUM  138   POTASSIUM  3.2*   CHLORIDE  101   CO2  25   GLUCOSE  108*   BUN  25*   CREATININE  1.01   CALCIUM  9.9                      Assessment/Plan     * Hypertensive urgency   Assessment & Plan    In setting of stopping clonodine currently asymptomatic  Blood pressure medications adjusted.  Continue to monitor closely.        History of stroke- (present on admission)   Assessment & Plan    In early 2000. Currently not on medications. Use walker for ambulation.        Elevated BUN   Assessment & Plan    IVF repeat lab         DNR (do not resuscitate)   Assessment & Plan    Discussed at the time of admission         Hypokalemia   Assessment & Plan    On replacement.        Plan of care discussed with RN   Quality-Core Measures   Reviewed items::  Labs reviewed, Medications reviewed and Radiology images reviewed  Pulliam catheter::  No Pulliam  DVT prophylaxis - mechanical:  SCDs

## 2018-03-10 NOTE — ED NOTES
Pt resting in room. Pt states feeling anxious. Talked with pt about status so far, and pt verbalizes understanding.

## 2018-03-10 NOTE — ED NOTES
Med rec updated and complete  Allergies reviewed and updated.  Met with pt at bedside and dicussed current medications   And last doses taken.  Pt stopped taking her lisinopril do to swelling in lower extremities .  Lisinopril added to allergy list.  Pt stopped taking her clorpres because her heart  Rate dropped to below 45.  Removed medication from med rec.

## 2018-03-10 NOTE — ED NOTES
Pt transferred to T208 with receiving RN, via wheelchair. All belongings accounted for on transfer.

## 2018-03-10 NOTE — ASSESSMENT & PLAN NOTE
In setting of stopping clonodine currently asymptomatic  Blood pressure medications adjusted.  Continue to monitor closely.

## 2018-03-10 NOTE — ED PROVIDER NOTES
"ED Provider Note    Scribed for Shreyas Tabares M.D. by Josue Polo. 3/10/2018, 4:02 AM.    Primary care provider: Jaye Ibarra M.D.  Means of arrival: walk-in  History obtained from: patient  History limited by: none    CHIEF COMPLAINT  Chief Complaint   Patient presents with   • Blood Pressure Problem     HTN, hx of, asymptomatic        HPI  Livia Corea is a 89 y.o. female with a history of hypertension who presents to the Emergency Department for evaluation of increased blood pressure onset yesterday at 3:00 PM. Patient confirms she takes Norvasc 10 mg and hydrochlorothiazide 25 mg for her hypertension. The patient reports her blood pressures were normal yesterday morning. She states since 3:00 PM, her blood pressure has been increasing beyond her normal 130 systolic blood pressure. She states her maximum systolic blood pressure was 195. The patient adds she checks her blood pressure every 2 hours. She does not report any exacerbating or alleviating factors. Patient notes she stopped taking Lisinopril 1 week ago secondary to leg edema and was switched to Clonidine 0.1 mg BID, but she states she has not taken it since Wednesday because it \"makes [her] heart rate decrease to 46\". She states that since discontinuing her lisinopril, her leg swelling has improved. The patient endorses associated anxiety. She denies shortness of breath, fever, chest pain, dizziness, light headedness.     REVIEW OF SYSTEMS  Pertinent positives include hypertension, anxiety. Pertinent negatives include no shortness of breath, fever, chest pain, dizziness, light-headedness. As above, all other systems reviewed and are negative.   See HPI for further details. C.     PAST MEDICAL HISTORY   has a past medical history of Circulation problem (01/2018) and Hypertension.    SURGICAL HISTORY  patient denies any surgical history    SOCIAL HISTORY  Social History   Substance Use Topics   • Smoking status: Former Smoker     Quit " "date: 2/25/1988   • Smokeless tobacco: Never Used   • Alcohol use Yes      Comment: 1 glass of wine x 2 days a week      History   Drug Use No       FAMILY HISTORY  History reviewed. No pertinent family history.    CURRENT MEDICATIONS  Home Medications     Reviewed by Kimmy Caro R.N. (Registered Nurse) on 03/10/18 at 0307  Med List Status: Not Addressed   Medication Last Dose Status   albuterol (VENTOLIN OR PROVENTIL) 108 (90 BASE) MCG/ACT Aero Soln inhalation aerosol  Active   amlodipine (NORVASC) 10 MG Tab 2/25/2018 Active   aspirin EC (ECOTRIN) 81 MG Tablet Delayed Response 2/25/2018 Active   clonidine-chlorthalidone (CLORPRES) 0.1-15 MG per tablet  Active   guaifenesin-codeine (ROBITUSSIN AC) Solution oral solution 2/13/2016 Active   hydrochlorothiazide (MICROZIDE) 12.5 MG capsule 2/25/2018 Active   lisinopril (PRINIVIL) 10 MG Tab 2/25/2018 Active   lorazepam (ATIVAN) 0.5 MG Tab > Month Active   pneumococcal 13-Brittany Conj Vacc (PREVNAR 13) syringe 2/14/2016 Active   triamcinolone acetonide (KENALOG) 0.1 % Ointment  Active                ALLERGIES  No Known Allergies    PHYSICAL EXAM  VITAL SIGNS: BP (!) 189/74   Pulse 71   Temp 36.7 °C (98 °F)   Resp 18   Ht 1.626 m (5' 4\")   Wt 59.9 kg (132 lb)   SpO2 96%   BMI 22.66 kg/m²   Vitals reviewed.  Constitutional: Alert in no apparent distress.  HENT: No signs of trauma, Bilateral external ears normal, Nose normal.   Eyes: Pupils are equal and reactive, Conjunctiva normal, Non-icteric.   Neck: Normal range of motion, No tenderness, Supple, No stridor.   Lymphatic: No lymphadenopathy noted.   Cardiovascular: Regular rate, irregular rhythm, no murmurs.   Thorax & Lungs: Normal breath sounds, No respiratory distress, No wheezing, No chest tenderness.   Abdomen: Bowel sounds normal, Soft, No tenderness, No peritoneal signs, No masses, No pulsatile masses.   Skin: Warm, Dry, No erythema, No rash.   Back: No bony tenderness, No CVA tenderness.   Extremities: " Intact distal pulses, No edema, No tenderness, No cyanosis  Musculoskeletal: Good range of motion in all major joints. No tenderness to palpation or major deformities noted.   Neurologic: Alert , Normal motor function, Normal sensory function, No focal deficits noted.   Psychiatric: Affect normal, Judgment normal, Mood normal.     DIAGNOSTIC STUDIES / PROCEDURES    LABS  Labs Reviewed   CBC WITH DIFFERENTIAL   BASIC METABOLIC PANEL   TROPONIN      All labs reviewed by me.    EKG Interpretation:  Interpreted by me    12 Lead EKG interpreted by me to show:  Normal sinus rhythm  Rate 68  Axis: Normal  Intervals: Prolonged QRS, otherwise normal intervals  T wave inversions in leads III, aVF, and V1  Normal ST segments  My impression of this EKG: No STEMI. Potential ischemia.     RADIOLOGY  DX-CHEST-2 VIEWS    (Results Pending)     The radiologist's interpretation of all radiological studies have been reviewed by me.    COURSE & MEDICAL DECISION MAKING  Nursing notes, VS, PMSFHx reviewed in chart.  Differential diagnoses include but not limited to: hypertensive urgency, hypertensive emergency, medication noncompliance, less likely aortic dissection or ACS.      4:02 AM Patient seen and examined at bedside. Patient arrives hypertensive with otherwise normal vital signs. Patient appears well hydrated and non-toxic. The physical exam is largely unremarkable. Ordered for chest x-ray, BMP, Troponin, CBC with differential, EKG to evaluate.  I discussed the plan of care with the patient. She understood and verbalized agreement.      EKG suggests potential ischemia with TWI in contiguous leads III and avF. Troponin detectable but not technically positive. Blood pressure remains elevated in the ER to the 160s to 170s. Given the concerning EKG with equivocal labs and the fact that her PCP is out of town until 6 days from now, she requires an impatient admission to trend her troponin and manage her BP meds.    Discussed with  hospitalist, Dr. Marcano, who agrees to accept the patient. Admitted to the hospitalist in guarded condition.      FINAL IMPRESSION  1. Hypertensive urgency          IJosue (Scribe), am scribing for, and in the presence of, Shreyas Tabares M.D..    Electronically signed by: Josue Polo (Scribe), 3/10/2018    IShreyas M.D. personally performed the services described in this documentation, as scribed by Josue Polo in my presence, and it is both accurate and complete.    The note accurately reflects work and decisions made by me.  Shreyas Tabares  3/10/2018  7:17 AM

## 2018-03-10 NOTE — H&P
Hospital Medicine History and Physical    Date of Service  3/10/2018    Chief Complaint  Chief Complaint   Patient presents with   • Blood Pressure Problem     HTN, hx of, asymptomatic        History of Presenting Illness  89 y.o. female who presented 3/10/2018 with hypertension because of increased Bp's. Patient is asymptomatic. Yesterday noted elevated blood pressure around 3 PM he takes Norvasc hydrochlorothiazide for hypertension. Her blood pressures were normal yesterday morning she states since 3 PM. Increasing blood pressure with systolic blood pressure 195. She takes her blood pressure every 2 hours unknown reasons. No alleviating or exacerbating factors. She stopped taking lisinopril 1 week ago secondary to leg edema and was switched to clonidine 0.1 mg twice a day she has not taken this since Wednesday because it makes her heart race. Since then her leg swelling has improved and blood pressure has been uncontrolled.    Primary Care Physician  Jaye Ibarra M.D.    Consultants  none    Code Status  dnr    Review of Systems  ROS  Pertinent positives include hypertension, anxiety. Pertinent negatives include no shortness of breath, fever, chest pain, dizziness, light-headedness. As above, all other systems reviewed and are negative.   See HPI for further details.    Past Medical History  Past Medical History:   Diagnosis Date   • Circulation problem 01/2018   • Hypertension        Surgical History  Reviewed and noncontributory    Medications  No current facility-administered medications on file prior to encounter.      Current Outpatient Prescriptions on File Prior to Encounter   Medication Sig Dispense Refill   • lisinopril (PRINIVIL) 10 MG Tab Take  by mouth every day.     • hydrochlorothiazide (MICROZIDE) 12.5 MG capsule Take 12.5 mg by mouth every day.     • triamcinolone acetonide (KENALOG) 0.1 % Ointment Apply thin layer to affected area twice daily as needed 45 g 0   • guaifenesin-codeine  (ROBITUSSIN AC) Solution oral solution Take 5 mL by mouth every four hours as needed for Cough.     • pneumococcal 13-Brittany Conj Vacc (PREVNAR 13) syringe 0.5 mL by Intramuscular route Once PRN.     • amlodipine (NORVASC) 10 MG Tab Take 10 mg by mouth every day.     • lorazepam (ATIVAN) 0.5 MG Tab Take 0.5 mg by mouth every four hours as needed for Anxiety.     • aspirin EC (ECOTRIN) 81 MG Tablet Delayed Response Take 81 mg by mouth every day.     • albuterol (VENTOLIN OR PROVENTIL) 108 (90 BASE) MCG/ACT Aero Soln inhalation aerosol Inhale 1-2 Puffs by mouth every 6 hours as needed for Shortness of Breath. 8.5 g 0       Family History  History reviewed. No pertinent family history.    Social History  Social History   Substance Use Topics   • Smoking status: Former Smoker     Quit date: 1988   • Smokeless tobacco: Never Used   • Alcohol use Yes      Comment: 1 glass of wine x 2 days a week       Allergies  No Known Allergies     Physical Exam  Laboratory   Hemodynamics  Temp (24hrs), Av.7 °C (98 °F), Min:36.6 °C (97.9 °F), Max:36.7 °C (98 °F)   Temperature: 36.7 °C (98 °F)  Pulse  Av.3  Min: 71  Max: 89 Heart Rate (Monitored): 66  Blood Pressure : (!) 189/74, NIBP: (!) 169/83      Respiratory      Respiration: 17, Pulse Oximetry: 95 %             Physical Exam  Vitals reviewed.  Constitutional: Alert in no apparent distress.  HENT: No signs of trauma, Bilateral external ears normal, Nose normal.   Eyes: Pupils are equal and reactive, Conjunctiva normal, Non-icteric.   Neck: Normal range of motion, No tenderness, Supple, No stridor.   Lymphatic: No lymphadenopathy noted.   Cardiovascular: Regular rate, irregular rhythm, no murmurs.   Thorax & Lungs: Normal breath sounds, No respiratory distress, No wheezing, No chest tenderness.   Abdomen: Bowel sounds normal, Soft, No tenderness, No peritoneal signs, No masses, No pulsatile masses.   Skin: Warm, Dry, No erythema, No rash.   Back: No bony tenderness, No  CVA tenderness.   Extremities: Intact distal pulses, No edema, No tenderness, No cyanosis  Musculoskeletal: Good range of motion in all major joints. No tenderness to palpation or major deformities noted.   Neurologic: Alert , Normal motor function, Normal sensory function, No focal deficits noted.   Psychiatric: Affect normal, Judgment normal, Mood normal.   Recent Labs      03/10/18   0432   WBC  4.8   RBC  4.84   HEMOGLOBIN  14.9   HEMATOCRIT  43.3   MCV  89.5   MCH  30.8   MCHC  34.4   RDW  44.5   PLATELETCT  209   MPV  10.8     Recent Labs      03/10/18   0432   SODIUM  138   POTASSIUM  3.2*   CHLORIDE  101   CO2  25   GLUCOSE  108*   BUN  25*   CREATININE  1.01   CALCIUM  9.9     Recent Labs      03/10/18   0432   GLUCOSE  108*                 Lab Results   Component Value Date    TROPONINI 0.03 03/10/2018     Urinalysis:    Lab Results  Component Value Date/Time   SPECGRAVITY 1.015 09/13/2016 1441   GLUCOSEUR Negative 09/13/2016 1441   KETONES Negative 09/13/2016 1441   NITRITE Negative 09/13/2016 1441   WBCURINE 2-5 09/13/2016 1441   BACTERIA Rare (A) 09/13/2016 1441   EPITHELCELL Few 09/13/2016 1441        Imaging  reviewed   Assessment/Plan     I anticipate this patient is appropriate for observation status at this time.    * Hypertensive urgency   Assessment & Plan    In setting of stopping clonodine currently asymptomatic  Will add Prn medications for sbp>180  Continue home amlodipine and hctz  Will add hydralazine to med regimine   Monitor for symptomatic hypertension   Check aldosterone renin ratio  Renal doppler for stenosis        Elevated BUN   Assessment & Plan    IVF repeat lab         DNR (do not resuscitate)   Assessment & Plan    Discussed at the time of admission         Hypokalemia   Assessment & Plan    kdur   Repeat lab             VTE prophylaxis: heparin

## 2018-03-10 NOTE — PROGRESS NOTES
Patient admitted earlier this morning with hypertensive urgency with /81. Patient has been adjusting/eliminating her BP meds at home. After discussion with her, this is what she has been doing the past couple of days:  NO Clonidine since Wednesday 3/7  NO Lisinopril for about a week - it was making her legs swell  HCTZ 12.5 mg PO BID  She has been taking Norvasc for 7-8 years  She no longer takes Ativan  Renal artery ultrasound shows atherosclerotic change of the aorta without aneurysm and no elevated flow velocities within the renal arteries bilaterally to suggest renal artery stenosis.  ECHO pending  She has no chest pain, SOB, or neurological changes.   She walks with a walker at baseline.  DEVIKA Fry.

## 2018-03-10 NOTE — ED TRIAGE NOTES
"Chief Complaint   Patient presents with   • Blood Pressure Problem     HTN, hx of, asymptomatic      Pt ambulatory to triage w/ walker, Pt states she recently stopped taking a blood pressure medication due to having a low HR, BP began to creep back up. Pt has no symptoms or complaints at this time. Pt is scheduled for a renal US on the 14th. Pt placed in [facility].     Blood pressure (!) 209/81, pulse 88, temperature 36.6 °C (97.9 °F), temperature source Temporal, resp. rate 18, height 1.626 m (5' 4\"), weight 59.9 kg (132 lb), SpO2 99 %.      "

## 2018-03-10 NOTE — DISCHARGE PLANNING
Care Transition Team Assessment    Information Source  Information Given By: Patient         Elopement Risk  Legal Hold: No  Ambulatory or Self Mobile in Wheelchair: No-Not an Elopement Risk  Elopement Risk: Not at Risk for Elopement    Interdisciplinary Discharge Planning  Does Admitting Nurse Feel This Could be a Complex Discharge?: No  Lives with - Patient's Self Care Capacity: Alone and Able to Care For Self  Patient or legal guardian wants to designate a caregiver (see row info): No  Support Systems: Friends / Neighbors  Housing / Facility: [address]  Do You Take your Prescribed Medications Regularly: Yes  Able to Return to Previous ADL's: Yes  Mobility Issues: Yes  Prior Services: None  Patient Expects to be Discharged to:: home  Assistance Needed: No  Durable Medical Equipment: Walker                   Vision / Hearing Impairment  Vision Impairment : Yes  Hearing Impairment : No    Values / Beliefs / Concerns  Values / Beliefs Concerns : No  Special Hospitalization Concerns: none         Domestic Abuse  Have you ever been the victim of abuse or violence?: No  Physical Abuse or Sexual Abuse: No  Verbal Abuse or Emotional Abuse: No  Possible Abuse Reported to:: Not Applicable

## 2018-03-10 NOTE — PROGRESS NOTES
Received pt from ED, in bed awake a&ox4,no c/o chest pain,head ache or sob,tele with multiple pvc's,occational bigeminies,pt with high blood pressure,no iv access,will place an iv,pt stated did not take her evening dose of medicines,am antihypertensives given early,POC explained to pt,will recheck the blood pressure.

## 2018-03-11 ENCOUNTER — PATIENT OUTREACH (OUTPATIENT)
Dept: HEALTH INFORMATION MANAGEMENT | Facility: OTHER | Age: 83
End: 2018-03-11

## 2018-03-11 VITALS
HEART RATE: 60 BPM | RESPIRATION RATE: 18 BRPM | HEIGHT: 64 IN | DIASTOLIC BLOOD PRESSURE: 70 MMHG | BODY MASS INDEX: 23.11 KG/M2 | OXYGEN SATURATION: 96 % | SYSTOLIC BLOOD PRESSURE: 159 MMHG | TEMPERATURE: 98 F | WEIGHT: 135.36 LBS

## 2018-03-11 PROBLEM — E87.6 HYPOKALEMIA: Status: RESOLVED | Noted: 2018-03-10 | Resolved: 2018-03-11

## 2018-03-11 PROBLEM — I16.0 HYPERTENSIVE URGENCY: Status: RESOLVED | Noted: 2018-03-10 | Resolved: 2018-03-11

## 2018-03-11 LAB
ALBUMIN SERPL BCP-MCNC: 3.9 G/DL (ref 3.2–4.9)
ALBUMIN/GLOB SERPL: 1.1 G/DL
ALP SERPL-CCNC: 83 U/L (ref 30–99)
ALT SERPL-CCNC: 5 U/L (ref 2–50)
ANION GAP SERPL CALC-SCNC: 9 MMOL/L (ref 0–11.9)
AST SERPL-CCNC: 13 U/L (ref 12–45)
BASOPHILS # BLD AUTO: 1.2 % (ref 0–1.8)
BASOPHILS # BLD: 0.07 K/UL (ref 0–0.12)
BILIRUB SERPL-MCNC: 0.6 MG/DL (ref 0.1–1.5)
BUN SERPL-MCNC: 25 MG/DL (ref 8–22)
CALCIUM SERPL-MCNC: 9.6 MG/DL (ref 8.5–10.5)
CHLORIDE SERPL-SCNC: 105 MMOL/L (ref 96–112)
CHOLEST SERPL-MCNC: 184 MG/DL (ref 100–199)
CO2 SERPL-SCNC: 24 MMOL/L (ref 20–33)
CREAT SERPL-MCNC: 1.13 MG/DL (ref 0.5–1.4)
EOSINOPHIL # BLD AUTO: 0.2 K/UL (ref 0–0.51)
EOSINOPHIL NFR BLD: 3.3 % (ref 0–6.9)
ERYTHROCYTE [DISTWIDTH] IN BLOOD BY AUTOMATED COUNT: 46.2 FL (ref 35.9–50)
GLOBULIN SER CALC-MCNC: 3.4 G/DL (ref 1.9–3.5)
GLUCOSE SERPL-MCNC: 107 MG/DL (ref 65–99)
HCT VFR BLD AUTO: 42.3 % (ref 37–47)
HDLC SERPL-MCNC: 54 MG/DL
HGB BLD-MCNC: 14.1 G/DL (ref 12–16)
IMM GRANULOCYTES # BLD AUTO: 0.01 K/UL (ref 0–0.11)
IMM GRANULOCYTES NFR BLD AUTO: 0.2 % (ref 0–0.9)
LDLC SERPL CALC-MCNC: 113 MG/DL
LYMPHOCYTES # BLD AUTO: 2.48 K/UL (ref 1–4.8)
LYMPHOCYTES NFR BLD: 41.1 % (ref 22–41)
MCH RBC QN AUTO: 29.9 PG (ref 27–33)
MCHC RBC AUTO-ENTMCNC: 33.3 G/DL (ref 33.6–35)
MCV RBC AUTO: 89.8 FL (ref 81.4–97.8)
MONOCYTES # BLD AUTO: 0.66 K/UL (ref 0–0.85)
MONOCYTES NFR BLD AUTO: 10.9 % (ref 0–13.4)
NEUTROPHILS # BLD AUTO: 2.62 K/UL (ref 2–7.15)
NEUTROPHILS NFR BLD: 43.3 % (ref 44–72)
NRBC # BLD AUTO: 0 K/UL
NRBC BLD-RTO: 0 /100 WBC
PLATELET # BLD AUTO: 257 K/UL (ref 164–446)
PMV BLD AUTO: 10.1 FL (ref 9–12.9)
POTASSIUM SERPL-SCNC: 3.4 MMOL/L (ref 3.6–5.5)
PROT SERPL-MCNC: 7.3 G/DL (ref 6–8.2)
RBC # BLD AUTO: 4.71 M/UL (ref 4.2–5.4)
SODIUM SERPL-SCNC: 138 MMOL/L (ref 135–145)
TRIGL SERPL-MCNC: 87 MG/DL (ref 0–149)
WBC # BLD AUTO: 6 K/UL (ref 4.8–10.8)

## 2018-03-11 PROCEDURE — 700102 HCHG RX REV CODE 250 W/ 637 OVERRIDE(OP): Performed by: HOSPITALIST

## 2018-03-11 PROCEDURE — 700111 HCHG RX REV CODE 636 W/ 250 OVERRIDE (IP): Performed by: HOSPITALIST

## 2018-03-11 PROCEDURE — G0378 HOSPITAL OBSERVATION PER HR: HCPCS

## 2018-03-11 PROCEDURE — A9270 NON-COVERED ITEM OR SERVICE: HCPCS | Performed by: NURSE PRACTITIONER

## 2018-03-11 PROCEDURE — A9270 NON-COVERED ITEM OR SERVICE: HCPCS | Performed by: HOSPITALIST

## 2018-03-11 PROCEDURE — 700102 HCHG RX REV CODE 250 W/ 637 OVERRIDE(OP): Performed by: NURSE PRACTITIONER

## 2018-03-11 PROCEDURE — 96372 THER/PROPH/DIAG INJ SC/IM: CPT

## 2018-03-11 PROCEDURE — 99217 PR OBSERVATION CARE DISCHARGE: CPT | Performed by: HOSPITALIST

## 2018-03-11 PROCEDURE — 80061 LIPID PANEL: CPT

## 2018-03-11 PROCEDURE — 85025 COMPLETE CBC W/AUTO DIFF WBC: CPT

## 2018-03-11 PROCEDURE — 96375 TX/PRO/DX INJ NEW DRUG ADDON: CPT

## 2018-03-11 PROCEDURE — 80053 COMPREHEN METABOLIC PANEL: CPT

## 2018-03-11 RX ORDER — HYDROCHLOROTHIAZIDE 12.5 MG/1
12.5 TABLET ORAL DAILY
Status: DISCONTINUED | OUTPATIENT
Start: 2018-03-12 | End: 2018-03-11 | Stop reason: HOSPADM

## 2018-03-11 RX ORDER — HYDROCHLOROTHIAZIDE 12.5 MG/1
12.5 CAPSULE, GELATIN COATED ORAL DAILY
Qty: 30 CAP | Refills: 3 | Status: ON HOLD | OUTPATIENT
Start: 2018-03-11 | End: 2018-03-19

## 2018-03-11 RX ORDER — HYDROCHLOROTHIAZIDE 12.5 MG/1
12.5 TABLET ORAL DAILY
Qty: 30 TAB | Refills: 3 | Status: SHIPPED | OUTPATIENT
Start: 2018-03-12 | End: 2018-03-11

## 2018-03-11 RX ORDER — CHLORAL HYDRATE 500 MG
1000 CAPSULE ORAL
Qty: 90 CAP | Refills: 3 | Status: SHIPPED | OUTPATIENT
Start: 2018-03-11 | End: 2018-03-11

## 2018-03-11 RX ORDER — HYDROCHLOROTHIAZIDE 25 MG/1
12.5 TABLET ORAL DAILY
Qty: 30 TAB | Refills: 3 | Status: SHIPPED | OUTPATIENT
Start: 2018-03-11 | End: 2018-03-11

## 2018-03-11 RX ORDER — HYDRALAZINE HYDROCHLORIDE 50 MG/1
50 TABLET, FILM COATED ORAL EVERY 8 HOURS
Qty: 90 TAB | Refills: 3 | Status: ON HOLD | OUTPATIENT
Start: 2018-03-11 | End: 2018-03-19

## 2018-03-11 RX ORDER — POTASSIUM CHLORIDE 20 MEQ/1
40 TABLET, EXTENDED RELEASE ORAL ONCE
Status: COMPLETED | OUTPATIENT
Start: 2018-03-11 | End: 2018-03-11

## 2018-03-11 RX ORDER — CHLORAL HYDRATE 500 MG
1000 CAPSULE ORAL
Status: DISCONTINUED | OUTPATIENT
Start: 2018-03-11 | End: 2018-03-11

## 2018-03-11 RX ORDER — CHLORAL HYDRATE 500 MG
2000 CAPSULE ORAL DAILY
Status: DISCONTINUED | OUTPATIENT
Start: 2018-03-11 | End: 2018-03-11 | Stop reason: HOSPADM

## 2018-03-11 RX ORDER — CHLORAL HYDRATE 500 MG
2000 CAPSULE ORAL DAILY
Qty: 90 CAP | Refills: 3 | Status: SHIPPED | OUTPATIENT
Start: 2018-03-11 | End: 2019-06-05

## 2018-03-11 RX ORDER — POTASSIUM CHLORIDE 20 MEQ/1
20 TABLET, EXTENDED RELEASE ORAL DAILY
Status: DISCONTINUED | OUTPATIENT
Start: 2018-03-11 | End: 2018-03-11

## 2018-03-11 RX ADMIN — HYDRALAZINE HYDROCHLORIDE 50 MG: 25 TABLET, FILM COATED ORAL at 06:26

## 2018-03-11 RX ADMIN — ASPIRIN 81 MG: 81 TABLET, COATED ORAL at 08:20

## 2018-03-11 RX ADMIN — POTASSIUM CHLORIDE 40 MEQ: 1500 TABLET, EXTENDED RELEASE ORAL at 10:24

## 2018-03-11 RX ADMIN — HEPARIN SODIUM 5000 UNITS: 5000 INJECTION, SOLUTION INTRAVENOUS; SUBCUTANEOUS at 06:26

## 2018-03-11 RX ADMIN — Medication 2000 MG: at 10:25

## 2018-03-11 RX ADMIN — AMLODIPINE BESYLATE 10 MG: 10 TABLET ORAL at 08:20

## 2018-03-11 RX ADMIN — METOPROLOL TARTRATE 25 MG: 25 TABLET, FILM COATED ORAL at 08:20

## 2018-03-11 RX ADMIN — HYDROCHLOROTHIAZIDE 25 MG: 25 TABLET ORAL at 08:20

## 2018-03-11 RX ADMIN — STANDARDIZED SENNA CONCENTRATE AND DOCUSATE SODIUM 2 TABLET: 8.6; 5 TABLET, FILM COATED ORAL at 08:20

## 2018-03-11 RX ADMIN — ENALAPRILAT 1.25 MG: 1.25 INJECTION INTRAVENOUS at 04:33

## 2018-03-11 ASSESSMENT — LIFESTYLE VARIABLES: EVER_SMOKED: YES

## 2018-03-11 ASSESSMENT — PAIN SCALES - GENERAL: PAINLEVEL_OUTOF10: 0

## 2018-03-11 NOTE — PROGRESS NOTES
Pt's blood pressure was taken on both the right and left arms. RN notified.   Left arm: 158/69  Right arm: 193/81

## 2018-03-11 NOTE — DISCHARGE SUMMARY
CHIEF COMPLAINT ON ADMISSION  Chief Complaint   Patient presents with   • Blood Pressure Problem     HTN, hx of, asymptomatic        CODE STATUS  Prior    HPI & HOSPITAL COURSE  This is a 89 y.o. female with past medical history significant for hypertension here with hypertensive urgency. Her initial blood pressure on admission was 209/81. She reports taking her blood pressure every 2 hours at home. She was recently seen by her PCP and was started on Lisinopril that she took for a brief time, but stopped it due to leg swelling. She was given Clonidine to take twice a day, but stopped taking it because it was making her heart race. She was prescribed to take HCTZ 12.5 daily, but states she has been taking it BID. She has been on Norvasc for about 7-8 years.  Hospital workup included CBC that was unremarkable. BMP initially showed hypokalemia with a potassium of 3.2 and magnesium 2.4. She was given KCL replacement. TSH was 1.55. Renal artery duplex showed atherosclerotic change of the aorta without aneurysm and no elevated flow velocities within the renal arteries bilaterally to suggest renal artery stenosis.  Her blood pressure at discharge was 154/70. She is ambulatory independently with no chest pain, shortness of breath, dizziness, palpitations or weakness. She is tolerating a diet without any nausea, vomiting or diarrhea. She was educated on her new antihypertensive medication regimen which includes: Norvasc 10 mg by mouth daily, Hydralazine 50 mg PO TID, HCTZ 12.5 mg PO daily and Metoprolol 12.5 PO BID. She will also take Fish Oil 2,000 mg PO daily.     Therefore, she is discharged in good and stable condition with close outpatient follow-up.      DISCHARGE PROBLEM LIST  Principal Problem (Resolved):    Hypertensive urgency POA: Yes  Active Problems:    DNR (do not resuscitate) POA: Yes    Elevated BUN POA: Yes    History of stroke POA: Yes  Resolved Problems:    Hypokalemia POA: Yes      FOLLOW UP  Future  Appointments  Date Time Provider Department Center   3/14/2018 10:45 AM [facility]  [address]     Jaye Ibarra M.D.  [address] [facility] [address]  [place] [address]  [phone]    Call on 3/12/2018  [facility]  unable to [place]a office due to weekend. Please call to schedule your appointment. Thank you       MEDICATIONS ON DISCHARGE     Medication List      START taking these medications      Instructions   fish oil 1000 MG Caps capsule   Take 2 Caps by mouth every day.  Dose:  2000 mg     hydrALAZINE 50 MG Tabs  Commonly known as:  APRESOLINE   Take 1 Tab by mouth every 8 hours.  Dose:  50 mg     metoprolol 25 MG Tabs  Commonly known as:  LOPRESSOR   Take 0.5 Tabs by mouth 2 Times a Day.  Dose:  12.5 mg        CHANGE how you take these medications      Instructions   hydrochlorothiazide 12.5 MG capsule  What changed:  Another medication with the same name was removed. Continue taking this medication, and follow the directions you see here.  Commonly known as:  MICROZIDE   Take 1 Cap by mouth every day.  Dose:  12.5 mg        CONTINUE taking these medications      Instructions   acetaminophen 500 MG Tabs  Commonly known as:  TYLENOL   Take 1,000 mg by mouth every 48 hours.  Dose:  1000 mg     amLODIPine 10 MG Tabs  Commonly known as:  NORVASC   Take 10 mg by mouth every day.  Dose:  10 mg     ascorbic acid 500 MG Tabs  Commonly known as:  ascorbic acid   Take 500 mg by mouth every day.  Dose:  500 mg     aspirin EC 81 MG Tbec  Commonly known as:  ECOTRIN   Take 81 mg by mouth every day.  Dose:  81 mg     cyanocobalamin 500 MCG Tabs  Commonly known as:  VITAMIN B-12   Take 500 mcg by mouth every day.  Dose:  500 mcg          DIET  As tolerated.    ACTIVITY  As tolerated.  Weight bearing as tolerated      CONSULTATIONS  NA    PROCEDURES  NA    LABORATORY  Lab Results   Component Value Date/Time    SODIUM 138 03/11/2018 04:27 AM    POTASSIUM 3.4 (L) 03/11/2018 04:27 AM    CHLORIDE 105 03/11/2018 04:27 AM     CO2 24 03/11/2018 04:27 AM    GLUCOSE 107 (H) 03/11/2018 04:27 AM    BUN 25 (H) 03/11/2018 04:27 AM    CREATININE 1.13 03/11/2018 04:27 AM        Lab Results   Component Value Date/Time    WBC 6.0 03/11/2018 04:27 AM    HEMOGLOBIN 14.1 03/11/2018 04:27 AM    HEMATOCRIT 42.3 03/11/2018 04:27 AM    PLATELETCT 257 03/11/2018 04:27 AM        Total time of the discharge process exceeds 34 minutes.  DEVIKA Fry.

## 2018-03-11 NOTE — PROGRESS NOTES
Assumed patient care, very pleasant. Bed alarm is on. Discuss to patient about plan of care. Call light with in reach.

## 2018-03-11 NOTE — DISCHARGE INSTRUCTIONS
Discharge Instructions    Discharged to home by car with self. Discharged via walking, hospital escort: Yes.  Special equipment needed: Walker    Be sure to schedule a follow-up appointment with your primary care doctor or any specialists as instructed.     Discharge Plan:   Diet Plan: Discussed  Activity Level: Discussed  Confirmed Follow up Appointment: Patient to Call and Schedule Appointment  Confirmed Symptoms Management: Discussed  Medication Reconciliation Updated: Yes  Influenza Vaccine Indication: Not indicated: Previously immunized this influenza season and > 8 years of age    I understand that a diet low in cholesterol, fat, and sodium is recommended for good health. Unless I have been given specific instructions below for another diet, I accept this instruction as my diet prescription.   Other diet: Regular Diet    Special Instructions: None    · Is patient discharged on Warfarin / Coumadin?   No     Depression / Suicide Risk    As you are discharged from this [facility] facility, it is important to learn how to keep safe from harming yourself.    Recognize the warning signs:  · Abrupt changes in personality, positive or negative- including increase in energy   · Giving away possessions  · Change in eating patterns- significant weight changes-  positive or negative  · Change in sleeping patterns- unable to sleep or sleeping all the time   · Unwillingness or inability to communicate  · Depression  · Unusual sadness, discouragement and loneliness  · Talk of wanting to die  · Neglect of personal appearance   · Rebelliousness- reckless behavior  · Withdrawal from people/activities they love  · Confusion- inability to concentrate     If you or a loved one observes any of these behaviors or has concerns about self-harm, here's what you can do:  · Talk about it- your feelings and reasons for harming yourself  · Remove any means that you might use to hurt yourself (examples: pills, rope, extension cords,  firearm)  · Get professional help from the community (Mental Health, Substance Abuse, psychological counseling)  · Do not be alone:Call your Safe Contact- someone whom you trust who will be there for you.  · Call your local CRISIS HOTLINE 662-7157 or 555-074-4249  · Call your local Children's Mobile Crisis Response Team [place] (585) 517-3887 or www.Placemeter  · Call the toll free National Suicide Prevention Hotlines   · National Suicide Prevention Lifeline 840-511-QYWS (5137)  · National Hope Line Network 800-SUICIDE (779-9741)

## 2018-03-12 LAB — ALDOST SERPL-MCNC: 13.3 NG/DL

## 2018-03-13 LAB — RENIN PLAS-CCNC: 0.7 NG/ML/HR

## 2018-03-14 ENCOUNTER — APPOINTMENT (OUTPATIENT)
Dept: RADIOLOGY | Facility: MEDICAL CENTER | Age: 83
End: 2018-03-14
Attending: FAMILY MEDICINE
Payer: MEDICARE

## 2018-03-17 ENCOUNTER — RESOLUTE PROFESSIONAL BILLING HOSPITAL PROF FEE (OUTPATIENT)
Dept: HOSPITALIST | Facility: MEDICAL CENTER | Age: 83
End: 2018-03-17
Payer: MEDICARE

## 2018-03-17 ENCOUNTER — APPOINTMENT (OUTPATIENT)
Dept: RADIOLOGY | Facility: MEDICAL CENTER | Age: 83
End: 2018-03-17
Attending: EMERGENCY MEDICINE
Payer: MEDICARE

## 2018-03-17 ENCOUNTER — HOSPITAL ENCOUNTER (OUTPATIENT)
Facility: MEDICAL CENTER | Age: 83
End: 2018-03-19
Attending: EMERGENCY MEDICINE | Admitting: INTERNAL MEDICINE
Payer: MEDICARE

## 2018-03-17 DIAGNOSIS — R06.00 DYSPNEA AND RESPIRATORY ABNORMALITIES: ICD-10-CM

## 2018-03-17 DIAGNOSIS — R06.89 DYSPNEA AND RESPIRATORY ABNORMALITIES: ICD-10-CM

## 2018-03-17 PROBLEM — R06.02 SOB (SHORTNESS OF BREATH): Status: ACTIVE | Noted: 2018-03-17

## 2018-03-17 PROBLEM — R53.1 WEAKNESS: Status: ACTIVE | Noted: 2018-03-17

## 2018-03-17 PROBLEM — I10 HYPERTENSION: Status: ACTIVE | Noted: 2018-03-17

## 2018-03-17 LAB
ALBUMIN SERPL BCP-MCNC: 4.4 G/DL (ref 3.2–4.9)
ALBUMIN/GLOB SERPL: 1.3 G/DL
ALP SERPL-CCNC: 89 U/L (ref 30–99)
ALT SERPL-CCNC: 9 U/L (ref 2–50)
ANION GAP SERPL CALC-SCNC: 10 MMOL/L (ref 0–11.9)
APTT PPP: 33 SEC (ref 24.7–36)
AST SERPL-CCNC: 16 U/L (ref 12–45)
BASOPHILS # BLD AUTO: 1 % (ref 0–1.8)
BASOPHILS # BLD: 0.05 K/UL (ref 0–0.12)
BILIRUB SERPL-MCNC: 0.8 MG/DL (ref 0.1–1.5)
BNP SERPL-MCNC: 24 PG/ML (ref 0–100)
BUN SERPL-MCNC: 27 MG/DL (ref 8–22)
CALCIUM SERPL-MCNC: 9.8 MG/DL (ref 8.5–10.5)
CHLORIDE SERPL-SCNC: 105 MMOL/L (ref 96–112)
CO2 SERPL-SCNC: 22 MMOL/L (ref 20–33)
CREAT SERPL-MCNC: 0.88 MG/DL (ref 0.5–1.4)
EOSINOPHIL # BLD AUTO: 0.22 K/UL (ref 0–0.51)
EOSINOPHIL NFR BLD: 4.4 % (ref 0–6.9)
ERYTHROCYTE [DISTWIDTH] IN BLOOD BY AUTOMATED COUNT: 45.8 FL (ref 35.9–50)
GLOBULIN SER CALC-MCNC: 3.5 G/DL (ref 1.9–3.5)
GLUCOSE SERPL-MCNC: 105 MG/DL (ref 65–99)
HCT VFR BLD AUTO: 44.5 % (ref 37–47)
HGB BLD-MCNC: 14.9 G/DL (ref 12–16)
IMM GRANULOCYTES # BLD AUTO: 0.01 K/UL (ref 0–0.11)
IMM GRANULOCYTES NFR BLD AUTO: 0.2 % (ref 0–0.9)
INR PPP: 0.96 (ref 0.87–1.13)
LIPASE SERPL-CCNC: 22 U/L (ref 11–82)
LYMPHOCYTES # BLD AUTO: 1.99 K/UL (ref 1–4.8)
LYMPHOCYTES NFR BLD: 39.5 % (ref 22–41)
MCH RBC QN AUTO: 30.5 PG (ref 27–33)
MCHC RBC AUTO-ENTMCNC: 33.5 G/DL (ref 33.6–35)
MCV RBC AUTO: 91 FL (ref 81.4–97.8)
MONOCYTES # BLD AUTO: 0.65 K/UL (ref 0–0.85)
MONOCYTES NFR BLD AUTO: 12.9 % (ref 0–13.4)
NEUTROPHILS # BLD AUTO: 2.12 K/UL (ref 2–7.15)
NEUTROPHILS NFR BLD: 42 % (ref 44–72)
NRBC # BLD AUTO: 0 K/UL
NRBC BLD-RTO: 0 /100 WBC
PLATELET # BLD AUTO: 245 K/UL (ref 164–446)
PMV BLD AUTO: 9.9 FL (ref 9–12.9)
POTASSIUM SERPL-SCNC: 3.1 MMOL/L (ref 3.6–5.5)
PROT SERPL-MCNC: 7.9 G/DL (ref 6–8.2)
PROTHROMBIN TIME: 12.5 SEC (ref 12–14.6)
RBC # BLD AUTO: 4.89 M/UL (ref 4.2–5.4)
SODIUM SERPL-SCNC: 137 MMOL/L (ref 135–145)
TROPONIN I SERPL-MCNC: <0.01 NG/ML (ref 0–0.04)
WBC # BLD AUTO: 5 K/UL (ref 4.8–10.8)

## 2018-03-17 PROCEDURE — 700102 HCHG RX REV CODE 250 W/ 637 OVERRIDE(OP): Performed by: EMERGENCY MEDICINE

## 2018-03-17 PROCEDURE — 71045 X-RAY EXAM CHEST 1 VIEW: CPT

## 2018-03-17 PROCEDURE — 99285 EMERGENCY DEPT VISIT HI MDM: CPT

## 2018-03-17 PROCEDURE — 83690 ASSAY OF LIPASE: CPT

## 2018-03-17 PROCEDURE — 84484 ASSAY OF TROPONIN QUANT: CPT

## 2018-03-17 PROCEDURE — A9270 NON-COVERED ITEM OR SERVICE: HCPCS | Performed by: INTERNAL MEDICINE

## 2018-03-17 PROCEDURE — 80053 COMPREHEN METABOLIC PANEL: CPT

## 2018-03-17 PROCEDURE — G0378 HOSPITAL OBSERVATION PER HR: HCPCS

## 2018-03-17 PROCEDURE — 93005 ELECTROCARDIOGRAM TRACING: CPT | Performed by: EMERGENCY MEDICINE

## 2018-03-17 PROCEDURE — 83880 ASSAY OF NATRIURETIC PEPTIDE: CPT

## 2018-03-17 PROCEDURE — A9270 NON-COVERED ITEM OR SERVICE: HCPCS | Performed by: EMERGENCY MEDICINE

## 2018-03-17 PROCEDURE — 700102 HCHG RX REV CODE 250 W/ 637 OVERRIDE(OP): Performed by: INTERNAL MEDICINE

## 2018-03-17 PROCEDURE — 85730 THROMBOPLASTIN TIME PARTIAL: CPT

## 2018-03-17 PROCEDURE — 85610 PROTHROMBIN TIME: CPT

## 2018-03-17 PROCEDURE — 85025 COMPLETE CBC W/AUTO DIFF WBC: CPT

## 2018-03-17 PROCEDURE — 99220 PR INITIAL OBSERVATION CARE,LEVL III: CPT | Performed by: INTERNAL MEDICINE

## 2018-03-17 RX ORDER — ACETAMINOPHEN 325 MG/1
650 TABLET ORAL EVERY 6 HOURS PRN
Status: DISCONTINUED | OUTPATIENT
Start: 2018-03-17 | End: 2018-03-19 | Stop reason: HOSPADM

## 2018-03-17 RX ORDER — ENALAPRILAT 1.25 MG/ML
1.25 INJECTION INTRAVENOUS EVERY 6 HOURS PRN
Status: DISCONTINUED | OUTPATIENT
Start: 2018-03-17 | End: 2018-03-19 | Stop reason: HOSPADM

## 2018-03-17 RX ORDER — ONDANSETRON 2 MG/ML
4 INJECTION INTRAMUSCULAR; INTRAVENOUS EVERY 4 HOURS PRN
Status: DISCONTINUED | OUTPATIENT
Start: 2018-03-17 | End: 2018-03-19 | Stop reason: HOSPADM

## 2018-03-17 RX ORDER — POLYETHYLENE GLYCOL 3350 17 G/17G
1 POWDER, FOR SOLUTION ORAL
Status: DISCONTINUED | OUTPATIENT
Start: 2018-03-17 | End: 2018-03-19 | Stop reason: HOSPADM

## 2018-03-17 RX ORDER — ASCORBIC ACID 500 MG
500 TABLET ORAL DAILY
Status: DISCONTINUED | OUTPATIENT
Start: 2018-03-18 | End: 2018-03-19 | Stop reason: HOSPADM

## 2018-03-17 RX ORDER — HYDROCHLOROTHIAZIDE 12.5 MG/1
12.5 TABLET ORAL
Status: DISCONTINUED | OUTPATIENT
Start: 2018-03-18 | End: 2018-03-19

## 2018-03-17 RX ORDER — LABETALOL HYDROCHLORIDE 5 MG/ML
10 INJECTION, SOLUTION INTRAVENOUS EVERY 4 HOURS PRN
Status: DISCONTINUED | OUTPATIENT
Start: 2018-03-17 | End: 2018-03-19 | Stop reason: HOSPADM

## 2018-03-17 RX ORDER — ASPIRIN 81 MG/1
81 TABLET, CHEWABLE ORAL ONCE
Status: COMPLETED | OUTPATIENT
Start: 2018-03-17 | End: 2018-03-17

## 2018-03-17 RX ORDER — POTASSIUM CHLORIDE 20 MEQ/1
40 TABLET, EXTENDED RELEASE ORAL DAILY
Status: DISCONTINUED | OUTPATIENT
Start: 2018-03-17 | End: 2018-03-19 | Stop reason: HOSPADM

## 2018-03-17 RX ORDER — CHOLECALCIFEROL (VITAMIN D3) 125 MCG
500 CAPSULE ORAL DAILY
Status: DISCONTINUED | OUTPATIENT
Start: 2018-03-18 | End: 2018-03-19 | Stop reason: HOSPADM

## 2018-03-17 RX ORDER — CHLORAL HYDRATE 500 MG
2000 CAPSULE ORAL DAILY
Status: DISCONTINUED | OUTPATIENT
Start: 2018-03-18 | End: 2018-03-19 | Stop reason: HOSPADM

## 2018-03-17 RX ORDER — POTASSIUM CHLORIDE 20 MEQ/1
10 TABLET, EXTENDED RELEASE ORAL ONCE
Status: COMPLETED | OUTPATIENT
Start: 2018-03-17 | End: 2018-03-17

## 2018-03-17 RX ORDER — POTASSIUM CHLORIDE 7.45 MG/ML
10 INJECTION INTRAVENOUS ONCE
Status: DISCONTINUED | OUTPATIENT
Start: 2018-03-17 | End: 2018-03-17

## 2018-03-17 RX ORDER — POTASSIUM CHLORIDE 20 MEQ/1
20 TABLET, EXTENDED RELEASE ORAL ONCE
Status: COMPLETED | OUTPATIENT
Start: 2018-03-17 | End: 2018-03-17

## 2018-03-17 RX ORDER — HYDRALAZINE HYDROCHLORIDE 25 MG/1
50 TABLET, FILM COATED ORAL EVERY 8 HOURS
Status: DISCONTINUED | OUTPATIENT
Start: 2018-03-17 | End: 2018-03-19

## 2018-03-17 RX ORDER — AMLODIPINE BESYLATE 10 MG/1
10 TABLET ORAL DAILY
Status: DISCONTINUED | OUTPATIENT
Start: 2018-03-18 | End: 2018-03-19 | Stop reason: HOSPADM

## 2018-03-17 RX ORDER — BISACODYL 10 MG
10 SUPPOSITORY, RECTAL RECTAL
Status: DISCONTINUED | OUTPATIENT
Start: 2018-03-17 | End: 2018-03-19 | Stop reason: HOSPADM

## 2018-03-17 RX ORDER — ONDANSETRON 4 MG/1
4 TABLET, ORALLY DISINTEGRATING ORAL EVERY 4 HOURS PRN
Status: DISCONTINUED | OUTPATIENT
Start: 2018-03-17 | End: 2018-03-19 | Stop reason: HOSPADM

## 2018-03-17 RX ORDER — AMOXICILLIN 250 MG
2 CAPSULE ORAL 2 TIMES DAILY
Status: DISCONTINUED | OUTPATIENT
Start: 2018-03-17 | End: 2018-03-19 | Stop reason: HOSPADM

## 2018-03-17 RX ADMIN — HYDRALAZINE HYDROCHLORIDE 50 MG: 50 TABLET, FILM COATED ORAL at 21:10

## 2018-03-17 RX ADMIN — POTASSIUM CHLORIDE 20 MEQ: 1500 TABLET, EXTENDED RELEASE ORAL at 11:15

## 2018-03-17 RX ADMIN — POTASSIUM CHLORIDE 10 MEQ: 1500 TABLET, EXTENDED RELEASE ORAL at 11:30

## 2018-03-17 RX ADMIN — HYDRALAZINE HYDROCHLORIDE 50 MG: 50 TABLET, FILM COATED ORAL at 12:57

## 2018-03-17 RX ADMIN — METOPROLOL TARTRATE 12.5 MG: 25 TABLET, FILM COATED ORAL at 20:06

## 2018-03-17 RX ADMIN — POTASSIUM CHLORIDE 40 MEQ: 1500 TABLET, EXTENDED RELEASE ORAL at 20:07

## 2018-03-17 RX ADMIN — ASPIRIN 81 MG: 81 TABLET, CHEWABLE ORAL at 22:20

## 2018-03-17 ASSESSMENT — PATIENT HEALTH QUESTIONNAIRE - PHQ9
2. FEELING DOWN, DEPRESSED, IRRITABLE, OR HOPELESS: NOT AT ALL
1. LITTLE INTEREST OR PLEASURE IN DOING THINGS: NOT AT ALL
SUM OF ALL RESPONSES TO PHQ QUESTIONS 1-9: 0
2. FEELING DOWN, DEPRESSED, IRRITABLE, OR HOPELESS: NOT AT ALL
1. LITTLE INTEREST OR PLEASURE IN DOING THINGS: NOT AT ALL
SUM OF ALL RESPONSES TO PHQ9 QUESTIONS 1 AND 2: 0
SUM OF ALL RESPONSES TO PHQ9 QUESTIONS 1 AND 2: 0
SUM OF ALL RESPONSES TO PHQ QUESTIONS 1-9: 0

## 2018-03-17 ASSESSMENT — PAIN SCALES - GENERAL
PAINLEVEL_OUTOF10: 0
PAINLEVEL_OUTOF10: 0

## 2018-03-17 ASSESSMENT — ENCOUNTER SYMPTOMS
FOCAL WEAKNESS: 0
WEIGHT LOSS: 0
COUGH: 0
DIARRHEA: 0
DIZZINESS: 0
VOMITING: 0
HEADACHES: 0
WEAKNESS: 1
DOUBLE VISION: 0
BLURRED VISION: 0
ABDOMINAL PAIN: 0
PALPITATIONS: 1
NAUSEA: 0
SHORTNESS OF BREATH: 1
CHILLS: 0
FEVER: 0

## 2018-03-17 ASSESSMENT — LIFESTYLE VARIABLES
ALCOHOL_USE: NO
EVER_SMOKED: YES
DO YOU DRINK ALCOHOL: NO

## 2018-03-17 NOTE — ED NOTES
"Per ERP request, the patient walked around [place] with a walker. Initially the patient stated she felt great and was walking \"normal, like at home.\" Half way around the pod the patient stated she started to feel \"dizzy and short of breath, similar to when she called 9-1-1.\" The patient was sat down and a repeat EKG was performed per ERP request.  "

## 2018-03-17 NOTE — PROGRESS NOTES
Picked patient from ER via [place], did ambulate with a walker when we transfer from [place] to bed. Patient is pleasant. Discuss about Fall precaution.

## 2018-03-17 NOTE — DISCHARGE PLANNING
Care Transition Team Assessment    Information Source  Orientation : Oriented x 4  Information Given By: Patient  Who is responsible for making decisions for patient? : Patient    Readmission Evaluation  Is this a readmission?: Yes - unplanned readmission  Why do you think you were readmitted?: weak  Was an appointment arranged for you prior to discharge?: No  Were there new prescriptions you were supposed to fill after you were discharged?: Yes, prescriptions filled  Did you understand your discharge instructions?: Yes  Did you have enough support after your last discharge?: Yes    Elopement Risk  Legal Hold: No    Interdisciplinary Discharge Planning  Does Admitting Nurse Feel This Could be a Complex Discharge?: No  Primary Care Physician: [place] with [facility]  Lives with - Patient's Self Care Capacity: Alone and Able to Care For Self  Support Systems: Friends / Neighbors  Housing / Facility: 1 Story Apartment / Condo  Do You Take your Prescribed Medications Regularly: Yes  Able to Return to Previous ADL's: Future Time w/Therapy  Mobility Issues: Yes  Prior Services: None  Patient Expects to be Discharged to:: home  Assistance Needed: No  Durable Medical Equipment: Not Applicable    Discharge Preparedness  What are your discharge supports?: Other (comment)  Prior Functional Level: Uses Walker  Difficulity with ADLs: Walking  Difficulity with IADLs: None    Functional Assesment  Prior Functional Level: Uses Walker    Finances  Prescription Coverage: Yes                             Discharge Risks or Barriers  Discharge risks or barriers?: No    Anticipated Discharge Information  Anticipated discharge disposition: Home  Discharge Address: [address]  Discharge Contact Phone Number: [phone]

## 2018-03-17 NOTE — ED NOTES
Med rec updated and complete.  Allergies reviewed.  Interviewed pt at bedside.  Pt only took some of her medications  This morning.  No antibiotic use since last admit to hospital  1 week ago.

## 2018-03-17 NOTE — H&P
Hospital Medicine History and Physical    Date of Service  3/17/2018    Chief Complaint  Chief Complaint   Patient presents with   • Shortness of Breath       History of Presenting Illness  89 y.o. female who presented 3/17/2018 with shortness of breath.     Patient was home this morning sitting on her bed [place] collar on her dog when she developed acute shortness of breath and palpitations. She tried walking to her couch but felt very weak. She continued to feel shortness of breath and weakness and called for EMS. She denies dizziness, vision changes, chest pain, nausea, diaphoresis, focal weakness. Shortness of breath resolved in the ED. Her workup showed normal troponin, normal BNP, potassium was 3.1. Chest x-ray was negative for significant acute changes. EKG showed sinus rhythm. She ambulated in the ED but continued to feel weak. She was called consulted for hypokalemia and weakness. Patient was recently hospitalized for hypertension and adjustment of her medications. She currently takes HCTZ 12.5 mg once a day.    Priconsulted for hypokalemia. [facility] Physician  Jaye Ibarra M.D.    Consultants  none    Code Status  DNR    Review of Systems  Review of Systems   Constitutional: Negative for chills, fever and weight loss.   Eyes: Negative for blurred vision and double vision.   Respiratory: Positive for shortness of breath. Negative for cough.    Cardiovascular: Positive for palpitations. Negative for chest pain and leg swelling.   Gastrointestinal: Negative for abdominal pain, diarrhea, nausea and vomiting.   Genitourinary: Negative for dysuria.   Skin: Negative for rash.   Neurological: Positive for weakness. Negative for dizziness, focal weakness and headaches.        Past Medical History  Past Medical History:   Diagnosis Date   • Circulation problem 01/2018   • Hypertension        Surgical History  No past surgical history on file.    Medications  No current facility-administered medications  on file prior to encounter.      Current Outpatient Prescriptions on File Prior to Encounter   Medication Sig Dispense Refill   • hydrALAZINE (APRESOLINE) 50 MG Tab Take 1 Tab by mouth every 8 hours. 90 Tab 3   • metoprolol (LOPRESSOR) 25 MG Tab Take 0.5 Tabs by mouth 2 Times a Day. 60 Tab 3   • Omega-3 Fatty Acids (FISH OIL) 1000 MG Cap capsule Take 2 Caps by mouth every day. 90 Cap 3   • hydrochlorothiazide (MICROZIDE) 12.5 MG capsule Take 1 Cap by mouth every day. 30 Cap 3   • ascorbic acid (ASCORBIC ACID) 500 MG Tab Take 500 mg by mouth every day.     • cyanocobalamin (VITAMIN B-12) 500 MCG Tab Take 500 mcg by mouth every day.     • acetaminophen (TYLENOL) 500 MG Tab Take 1,000 mg by mouth every 48 hours.     • amlodipine (NORVASC) 10 MG Tab Take 10 mg by mouth every day.     • aspirin EC (ECOTRIN) 81 MG Tablet Delayed Response Take 81 mg by mouth every day.         Family History  History reviewed. No pertinent family history.    Social History  Social History   Substance Use Topics   • Smoking status: Former Smoker     Quit date: 1988   • Smokeless tobacco: Never Used   • Alcohol use Yes      Comment: 1 glass of wine x 2 days a week       Allergies  Allergies   Allergen Reactions   • Lisinopril Swelling     Of lower extremities         Physical Exam  Laboratory   Hemodynamics  Temp (24hrs), Av.5 °C (97.7 °F), Min:36.5 °C (97.7 °F), Max:36.5 °C (97.7 °F)   Temperature: 36.5 °C (97.7 °F)  Pulse  Av.3  Min: 72  Max: 79 Heart Rate (Monitored): 73  NIBP: (!) 184/87      Respiratory      Respiration: 18, Pulse Oximetry: 95 %             Physical Exam   Constitutional: She is oriented to person, place, and time. She is cooperative.   Thin, pleasant   HENT:   Head: Normocephalic and atraumatic.   Eyes: Conjunctivae and EOM are normal.   Cardiovascular: Normal rate, regular rhythm and normal heart sounds.    Pulmonary/Chest: Effort normal and breath sounds normal. She has no wheezes. She has no rales.    Abdominal: Soft. She exhibits no distension. There is no tenderness. There is no rebound and no guarding.   Musculoskeletal: She exhibits no edema.   Neurological: She is alert and oriented to person, place, and time.   Bilateral resting and intention tremors to hands. 5/5 strength UE and LE . No facial droop, normal speech.   Skin: Skin is warm and dry.   Nursing note and vitals reviewed.      Recent Labs      03/17/18   1002   WBC  5.0   RBC  4.89   HEMOGLOBIN  14.9   HEMATOCRIT  44.5   MCV  91.0   MCH  30.5   MCHC  33.5*   RDW  45.8   PLATELETCT  245   MPV  9.9     Recent Labs      03/17/18   1002   SODIUM  137   POTASSIUM  3.1*   CHLORIDE  105   CO2  22   GLUCOSE  105*   BUN  27*   CREATININE  0.88   CALCIUM  9.8     Recent Labs      03/17/18   1002   ALTSGPT  9   ASTSGOT  16   ALKPHOSPHAT  89   TBILIRUBIN  0.8   LIPASE  22   GLUCOSE  105*     Recent Labs      03/17/18   1002   APTT  33.0   INR  0.96     Recent Labs      03/17/18   1002   BNPBTYPENAT  24         Lab Results   Component Value Date    TROPONINI <0.01 03/17/2018     Urinalysis:    Lab Results  Component Value Date/Time   SPECGRAVITY 1.015 09/13/2016 1441   GLUCOSEUR Negative 09/13/2016 1441   KETONES Negative 09/13/2016 1441   NITRITE Negative 09/13/2016 1441   WBCURINE 2-5 09/13/2016 1441   BACTERIA Rare (A) 09/13/2016 1441   EPITHELCELL Few 09/13/2016 1441        Imaging  DX-CHEST-LIMITED (1 VIEW)   Final Result      Minimal basilar atelectasis.      Stable mild cardiomegaly.      Prominent atherosclerotic plaque.              Assessment/Plan     I anticipate this patient is appropriate for observation status at this time.    * SOB (shortness of breath)   Assessment & Plan    Associated with palpitations and generalized weakness. Shortness of breath is currently resolved. Chest x-ray did not show pneumonia or pulmonary edema. BNP and troponin were normal. Patient is not hypoxic. Well score is low for PE . No wheezing on exam.  She was  hypokalemic which can predispose her to a arrhythmia. Will monitor on telemetry.  Given she is on HCTZ will start her on daily potassium  Recheck BMP tomorrow   Re-ambulate in the morning          Hypertension   Assessment & Plan    Patient is hypertensive in the ED. She was recently hospitalized for poorly controlled hypertension. Will continue her home medications for now.  IV PRNS ordered        History of stroke- (present on admission)   Assessment & Plan    No focal neuro deficits noted. Stable. Continue aspirin        DNR (do not resuscitate)- (present on admission)   Assessment & Plan    DNR            VTE prophylaxis: lovenox.

## 2018-03-17 NOTE — ASSESSMENT & PLAN NOTE
Associated with palpitations and generalized weakness. Recurrent episodes of shortness of breath. Chest x-ray did not show pneumonia or pulmonary edema.   BNP and troponin were normal.   Well score is low for PE .   Hypokalemia with replacemtn  Recheck BMP tomorrow   Tele monitoring, EKG and troponins  Stress test this am

## 2018-03-17 NOTE — ASSESSMENT & PLAN NOTE
Patient is hypertensive in the ED. She was recently hospitalized for poorly controlled hypertension. Will continue her home medications for now.  IV PRNS ordered

## 2018-03-17 NOTE — ED PROVIDER NOTES
ED Provider Note    Scribed for Ronald Russell M.D. by Damion Andres. 3/17/2018  10:02 AM    Primary care provider: Jaye Ibarra M.D.  Means of arrival: EMS  History obtained from: Patient  History limited by: None    CHIEF COMPLAINT  Chief Complaint   Patient presents with   • Shortness of Breath       HPI  Livia Corea is a 89 y.o. female who presents to the Emergency Department complaining of palpitations and shortness of breath that began this morning while sitting on the bed putting a collar on her dog. Patient was unable to stand or ambulate secondary to the severity of her symptoms. This episode only lasted for short period of time and her symptoms have resolved. Patient also experienced a short episode of shortness of breath yesterday. She denies chest pain. The patient reports a two inch limb length discrepancy of the left lower extremity, but she already opted out of an operation. The patient lives alone.    REVIEW OF SYSTEMS  Pertinent positives include palpitations, shortness of breath, and difficulty standing and ambulating. Pertinent negatives include no chest pain.  All other systems reviewed and negative.  C    PAST MEDICAL HISTORY   has a past medical history of Circulation problem (01/2018) and Hypertension. limb length discrepancy of the left lower extremity    SURGICAL HISTORY  patient denies any surgical history    SOCIAL HISTORY  Social History   Substance Use Topics   • Smoking status: Former Smoker     Quit date: 2/25/1988   • Smokeless tobacco: Never Used   • Alcohol use Yes      Comment: 1 glass of wine x 2 days a week      History   Drug Use No       FAMILY HISTORY  History reviewed. No pertinent family history.    CURRENT MEDICATIONS  No current facility-administered medications on file prior to encounter.      Current Outpatient Prescriptions on File Prior to Encounter   Medication Sig Dispense Refill   • hydrALAZINE (APRESOLINE) 50 MG Tab Take 1 Tab by mouth  "every 8 hours. 90 Tab 3   • metoprolol (LOPRESSOR) 25 MG Tab Take 0.5 Tabs by mouth 2 Times a Day. 60 Tab 3   • Omega-3 Fatty Acids (FISH OIL) 1000 MG Cap capsule Take 2 Caps by mouth every day. 90 Cap 3   • hydrochlorothiazide (MICROZIDE) 12.5 MG capsule Take 1 Cap by mouth every day. 30 Cap 3   • ascorbic acid (ASCORBIC ACID) 500 MG Tab Take 500 mg by mouth every day.     • cyanocobalamin (VITAMIN B-12) 500 MCG Tab Take 500 mcg by mouth every day.     • acetaminophen (TYLENOL) 500 MG Tab Take 1,000 mg by mouth every 48 hours.     • amlodipine (NORVASC) 10 MG Tab Take 10 mg by mouth every day.     • aspirin EC (ECOTRIN) 81 MG Tablet Delayed Response Take 81 mg by mouth every day.         ALLERGIES  Allergies   Allergen Reactions   • Lisinopril Swelling     Of lower extremities        PHYSICAL EXAM  VITAL SIGNS: Pulse 72   Temp 36.5 °C (97.7 °F)   Resp 20   Ht 1.626 m (5' 4\")   Wt 59.9 kg (132 lb)   BMI 22.66 kg/m²     Vital signs reviewed.  Constitutional:  Pleasant elderly female  Head: Atraumatic  Eyes: EOMs intact. Conjunctiva normal.  Mouth/Throat: Oropharynx is moist and pink  Neck: Normal range of motion, non tender  Cardiovascular: Regular rate and rhythm. Pulses intact distally.  Pulmonary/Chest: Clear to auscultation bilaterally  Abdominal: Soft, non tender  Musculoskeletal: No edema. left lower extremity   Lymphadenopathy: No lymphadenopathy noted.  Neurological: Strength and sensation intact. Cranial nerves II-XII intact.  Skin: Warm and dry    LABS  Results for orders placed or performed during the hospital encounter of 03/17/18   Troponin   Result Value Ref Range    Troponin I <0.01 0.00 - 0.04 ng/mL   Btype Natriuretic Peptide   Result Value Ref Range    B Natriuretic Peptide 24 0 - 100 pg/mL   CBC with Differential   Result Value Ref Range    WBC 5.0 4.8 - 10.8 K/uL    RBC 4.89 4.20 - 5.40 M/uL    Hemoglobin 14.9 12.0 - 16.0 g/dL    Hematocrit 44.5 37.0 - 47.0 %    MCV 91.0 81.4 - 97.8 fL    MCH " 30.5 27.0 - 33.0 pg    MCHC 33.5 (L) 33.6 - 35.0 g/dL    RDW 45.8 35.9 - 50.0 fL    Platelet Count 245 164 - 446 K/uL    MPV 9.9 9.0 - 12.9 fL    Neutrophils-Polys 42.00 (L) 44.00 - 72.00 %    Lymphocytes 39.50 22.00 - 41.00 %    Monocytes 12.90 0.00 - 13.40 %    Eosinophils 4.40 0.00 - 6.90 %    Basophils 1.00 0.00 - 1.80 %    Immature Granulocytes 0.20 0.00 - 0.90 %    Nucleated RBC 0.00 /100 WBC    Neutrophils (Absolute) 2.12 2.00 - 7.15 K/uL    Lymphs (Absolute) 1.99 1.00 - 4.80 K/uL    Monos (Absolute) 0.65 0.00 - 0.85 K/uL    Eos (Absolute) 0.22 0.00 - 0.51 K/uL    Baso (Absolute) 0.05 0.00 - 0.12 K/uL    Immature Granulocytes (abs) 0.01 0.00 - 0.11 K/uL    NRBC (Absolute) 0.00 K/uL   Complete Metabolic Panel (CMP)   Result Value Ref Range    Sodium 137 135 - 145 mmol/L    Potassium 3.1 (L) 3.6 - 5.5 mmol/L    Chloride 105 96 - 112 mmol/L    Co2 22 20 - 33 mmol/L    Anion Gap 10.0 0.0 - 11.9    Glucose 105 (H) 65 - 99 mg/dL    Bun 27 (H) 8 - 22 mg/dL    Creatinine 0.88 0.50 - 1.40 mg/dL    Calcium 9.8 8.5 - 10.5 mg/dL    AST(SGOT) 16 12 - 45 U/L    ALT(SGPT) 9 2 - 50 U/L    Alkaline Phosphatase 89 30 - 99 U/L    Total Bilirubin 0.8 0.1 - 1.5 mg/dL    Albumin 4.4 3.2 - 4.9 g/dL    Total Protein 7.9 6.0 - 8.2 g/dL    Globulin 3.5 1.9 - 3.5 g/dL    A-G Ratio 1.3 g/dL   Prothrombin Time   Result Value Ref Range    PT 12.5 12.0 - 14.6 sec    INR 0.96 0.87 - 1.13   APTT   Result Value Ref Range    APTT 33.0 24.7 - 36.0 sec   Lipase   Result Value Ref Range    Lipase 22 11 - 82 U/L   ESTIMATED GFR   Result Value Ref Range    GFR If African American >60 >60 mL/min/1.73 m 2    GFR If Non African American >60 >60 mL/min/1.73 m 2   EKG (ER)   Result Value Ref Range    Report       [facility]    Test Date:  2018-03-17  Pt Name:    GOE MIESHA                 Department: ER  MRN:        [id number]                      Room:        06  Gender:     Female                       Technician:  [id number]  :        [id number]                   Requested By:JAZMINE ORELLANA  Order #:    [id number]                    Reading MD:    Measurements  Intervals                                Axis  Rate:       72                           P:  MT:                                      QRS:        35  QRSD:       118                          T:          167  QT:         424  QTc:        465    Interpretive Statements  ATRIAL FIBRILLATION  NONSPECIFIC INTRAVENTRICULAR CONDUCTION DELAY  MINIMAL ST DEPRESSION, LATERAL LEADS  Compared to ECG 03/10/2018 04:53:57  ST (T wave) deviation now present  Sinus rhythm no longer present     EKG (NOW)   Result Value Ref Range    Report       [facility] Emergency Dept.    Test Date:  2018  Pt Name:    GEO WHITESIDE                 Department: ER  MRN:        [id number]                      Room:       [facility]  Gender:     Female                       Technician: [id number]  :        [id number]                   Requested By:JAZMINE ORELLANA  Order #:    [id number]                    Reading MD:    Measurements  Intervals                                Axis  Rate:       71                           P:          55  MT:         164                          QRS:        25  QRSD:       118                          T:          -41  QT:         408  QTc:        444    Interpretive Statements  SINUS RHYTHM  PROBABLE LEFT ATRIAL ABNORMALITY  NONSPECIFIC INTRAVENTRICULAR CONDUCTION DELAY  Compared to ECG 2018 09:58:10  Atrial fibrillation no longer present  ST (T wave) deviation no longer present       All labs reviewed by me.    EKG  12 Lead EKG interpreted by me to show Normal sinus rhythm at 70. Normal P waves. Abnormal QRS with non specific intraventricular conduction delay. Wandering baseline. Normal ST segments. Normal T waves. Abnormal EKG. No acute change compared to EKG taken on 3/10/2018.    Repeat EKG taken at 1157 reveals:  12 Lead EKG interpreted by me to show  Normal sinus rhythm at 70. Normal P waves. Abnormal QRS with non specific intraventricular conduction delay. Normal ST segments. Normal T waves. Abnormal EKG. No acute change compared to prior EKG.    RADIOLOGY  DX-CHEST-LIMITED (1 VIEW)   Final Result      Minimal basilar atelectasis.      Stable mild cardiomegaly.      Prominent atherosclerotic plaque.           The radiologist's interpretation of all radiological studies have been reviewed by me.    COURSE & MEDICAL DECISION MAKING  Pertinent Labs & Imaging studies reviewed. (See chart for details) The patient's [facility] and past medical records were reviewed and revealed that she was seen here on 3/10/2018 for a hypertensive urgency.     10:02 AM - Patient seen and examined at bedside. Ordered DX chest, Troponin, BNP, CBC with differential, CMP, PTT, APTT, Lipase, and EKG to evaluate her symptoms. The differential diagnoses include but are not limited to: atrial fibrillation vs SVT vs arrhythmia    10:56 AM Review of laboratory results reveal a potassium of 3.1. I ordered potassium chloride SA tablet 20 mEq.    11:25 AM - Re-examined; The patient is resting in bed comfortably. I discussed her above findings and plans for discharge after a road test. Patient understands and agrees.    11:53 AM Nursing informed me that the patient felt very dizzy during her road test and experienced similar symptoms to those experienced this morning. I ordered a repeat EKG. patient was unable to ambulate. She feels quite weak. This may be her hypokalemia at this point    11:57 AM Paged [facility] Hospitalist.    12:07 PM I discussed the patient's case and the above findings with Dr. Ram ([facility] Hospitalist) who agrees to admit and will transfer care of the patient at this time.    DISPOSITION:  Patient will be admitted to Dr. Ram ([facility] Hospitalist) in guarded condition.    FINAL IMPRESSION  1. Dyspnea and respiratory abnormalities    2. Hypokalemia  3. Rule out arrhythmia     I,  Damion Andres (Scribe), am scribing for, and in the presence of, Ronald Russell M.D..    Electronically signed by: Damion Andres (Kevin), 3/17/2018    I, Ronald Russell M.D. personally performed the services described in this documentation, as scribed by Damion Andres in my presence, and it is both accurate and complete.    The note accurately reflects work and decisions made by me.  Ronald Russell  3/17/2018  1:23 PM

## 2018-03-17 NOTE — PROGRESS NOTES
Called [employer] for assistance. Patient denies any chest pain. Will check BP after giving normal Oral medication.

## 2018-03-17 NOTE — ED TRIAGE NOTES
"Pt presents to ED from home via EMS with c/o SOB and \"chest pounding\"; pt reports sitting when sxs occurred; pt reports \"chest pounding\" resolved on arrival to ED  "

## 2018-03-18 ENCOUNTER — APPOINTMENT (OUTPATIENT)
Dept: RADIOLOGY | Facility: MEDICAL CENTER | Age: 83
End: 2018-03-18
Attending: INTERNAL MEDICINE
Payer: MEDICARE

## 2018-03-18 ENCOUNTER — PATIENT OUTREACH (OUTPATIENT)
Dept: HEALTH INFORMATION MANAGEMENT | Facility: OTHER | Age: 83
End: 2018-03-18

## 2018-03-18 LAB
ANION GAP SERPL CALC-SCNC: 7 MMOL/L (ref 0–11.9)
BASOPHILS # BLD AUTO: 1.5 % (ref 0–1.8)
BASOPHILS # BLD: 0.08 K/UL (ref 0–0.12)
BUN SERPL-MCNC: 22 MG/DL (ref 8–22)
CALCIUM SERPL-MCNC: 9.2 MG/DL (ref 8.5–10.5)
CHLORIDE SERPL-SCNC: 110 MMOL/L (ref 96–112)
CO2 SERPL-SCNC: 21 MMOL/L (ref 20–33)
CREAT SERPL-MCNC: 0.93 MG/DL (ref 0.5–1.4)
EKG IMPRESSION: NORMAL
EKG IMPRESSION: NORMAL
EOSINOPHIL # BLD AUTO: 0.17 K/UL (ref 0–0.51)
EOSINOPHIL NFR BLD: 3.3 % (ref 0–6.9)
ERYTHROCYTE [DISTWIDTH] IN BLOOD BY AUTOMATED COUNT: 47.2 FL (ref 35.9–50)
GLUCOSE SERPL-MCNC: 102 MG/DL (ref 65–99)
HCT VFR BLD AUTO: 39 % (ref 37–47)
HGB BLD-MCNC: 12.9 G/DL (ref 12–16)
IMM GRANULOCYTES # BLD AUTO: 0.01 K/UL (ref 0–0.11)
IMM GRANULOCYTES NFR BLD AUTO: 0.2 % (ref 0–0.9)
LYMPHOCYTES # BLD AUTO: 2.01 K/UL (ref 1–4.8)
LYMPHOCYTES NFR BLD: 38.7 % (ref 22–41)
MCH RBC QN AUTO: 30.2 PG (ref 27–33)
MCHC RBC AUTO-ENTMCNC: 33.1 G/DL (ref 33.6–35)
MCV RBC AUTO: 91.3 FL (ref 81.4–97.8)
MONOCYTES # BLD AUTO: 0.54 K/UL (ref 0–0.85)
MONOCYTES NFR BLD AUTO: 10.4 % (ref 0–13.4)
NEUTROPHILS # BLD AUTO: 2.39 K/UL (ref 2–7.15)
NEUTROPHILS NFR BLD: 45.9 % (ref 44–72)
NRBC # BLD AUTO: 0 K/UL
NRBC BLD-RTO: 0 /100 WBC
PLATELET # BLD AUTO: 229 K/UL (ref 164–446)
PMV BLD AUTO: 9.8 FL (ref 9–12.9)
POTASSIUM SERPL-SCNC: 3.6 MMOL/L (ref 3.6–5.5)
RBC # BLD AUTO: 4.27 M/UL (ref 4.2–5.4)
SODIUM SERPL-SCNC: 138 MMOL/L (ref 135–145)
TROPONIN I SERPL-MCNC: 0.01 NG/ML (ref 0–0.04)
WBC # BLD AUTO: 5.2 K/UL (ref 4.8–10.8)

## 2018-03-18 PROCEDURE — 84484 ASSAY OF TROPONIN QUANT: CPT

## 2018-03-18 PROCEDURE — 96374 THER/PROPH/DIAG INJ IV PUSH: CPT

## 2018-03-18 PROCEDURE — 93010 ELECTROCARDIOGRAM REPORT: CPT | Performed by: INTERNAL MEDICINE

## 2018-03-18 PROCEDURE — 80048 BASIC METABOLIC PNL TOTAL CA: CPT

## 2018-03-18 PROCEDURE — 700111 HCHG RX REV CODE 636 W/ 250 OVERRIDE (IP): Performed by: INTERNAL MEDICINE

## 2018-03-18 PROCEDURE — 700111 HCHG RX REV CODE 636 W/ 250 OVERRIDE (IP)

## 2018-03-18 PROCEDURE — A9502 TC99M TETROFOSMIN: HCPCS

## 2018-03-18 PROCEDURE — 36415 COLL VENOUS BLD VENIPUNCTURE: CPT

## 2018-03-18 PROCEDURE — A9270 NON-COVERED ITEM OR SERVICE: HCPCS | Performed by: INTERNAL MEDICINE

## 2018-03-18 PROCEDURE — 96372 THER/PROPH/DIAG INJ SC/IM: CPT

## 2018-03-18 PROCEDURE — 99226 PR SUBSEQUENT OBSERVATION CARE,LEVEL III: CPT | Performed by: INTERNAL MEDICINE

## 2018-03-18 PROCEDURE — G0378 HOSPITAL OBSERVATION PER HR: HCPCS

## 2018-03-18 PROCEDURE — 93005 ELECTROCARDIOGRAM TRACING: CPT | Performed by: INTERNAL MEDICINE

## 2018-03-18 PROCEDURE — 85025 COMPLETE CBC W/AUTO DIFF WBC: CPT

## 2018-03-18 PROCEDURE — 700102 HCHG RX REV CODE 250 W/ 637 OVERRIDE(OP): Performed by: INTERNAL MEDICINE

## 2018-03-18 RX ORDER — LORAZEPAM 1 MG/1
0.5 TABLET ORAL ONCE
Status: DISCONTINUED | OUTPATIENT
Start: 2018-03-18 | End: 2018-03-19 | Stop reason: HOSPADM

## 2018-03-18 RX ORDER — REGADENOSON 0.08 MG/ML
INJECTION, SOLUTION INTRAVENOUS
Status: COMPLETED
Start: 2018-03-18 | End: 2018-03-18

## 2018-03-18 RX ADMIN — ENALAPRILAT 1.25 MG: 1.25 INJECTION INTRAVENOUS at 09:31

## 2018-03-18 RX ADMIN — METOPROLOL TARTRATE 12.5 MG: 25 TABLET, FILM COATED ORAL at 08:30

## 2018-03-18 RX ADMIN — Medication 500 MCG: at 08:30

## 2018-03-18 RX ADMIN — OMEGA-3 FATTY ACIDS CAP 1000 MG 2000 MG: 1000 CAP at 08:30

## 2018-03-18 RX ADMIN — OXYCODONE HYDROCHLORIDE AND ACETAMINOPHEN 500 MG: 500 TABLET ORAL at 08:31

## 2018-03-18 RX ADMIN — REGADENOSON 0.4 MG: 0.08 INJECTION, SOLUTION INTRAVENOUS at 10:36

## 2018-03-18 RX ADMIN — HYDRALAZINE HYDROCHLORIDE 50 MG: 50 TABLET, FILM COATED ORAL at 21:12

## 2018-03-18 RX ADMIN — STANDARDIZED SENNA CONCENTRATE AND DOCUSATE SODIUM 2 TABLET: 8.6; 5 TABLET, FILM COATED ORAL at 08:30

## 2018-03-18 RX ADMIN — ENOXAPARIN SODIUM 40 MG: 100 INJECTION SUBCUTANEOUS at 08:30

## 2018-03-18 RX ADMIN — AMLODIPINE BESYLATE 10 MG: 10 TABLET ORAL at 08:30

## 2018-03-18 RX ADMIN — POTASSIUM CHLORIDE 40 MEQ: 1500 TABLET, EXTENDED RELEASE ORAL at 20:06

## 2018-03-18 RX ADMIN — HYDRALAZINE HYDROCHLORIDE 50 MG: 50 TABLET, FILM COATED ORAL at 13:28

## 2018-03-18 RX ADMIN — HYDROCHLOROTHIAZIDE 12.5 MG: 12.5 TABLET ORAL at 08:30

## 2018-03-18 RX ADMIN — HYDRALAZINE HYDROCHLORIDE 50 MG: 50 TABLET, FILM COATED ORAL at 05:23

## 2018-03-18 RX ADMIN — ASPIRIN 81 MG: 81 TABLET, COATED ORAL at 08:30

## 2018-03-18 ASSESSMENT — ENCOUNTER SYMPTOMS
COUGH: 0
BLURRED VISION: 0
NAUSEA: 0
FEVER: 0
NECK PAIN: 0
PALPITATIONS: 1
ABDOMINAL PAIN: 0
VOMITING: 0
DOUBLE VISION: 0
DIZZINESS: 0
SINUS PAIN: 0
DEPRESSION: 0
WEAKNESS: 1
HEADACHES: 0
SHORTNESS OF BREATH: 1
SORE THROAT: 0
DIAPHORESIS: 1
MYALGIAS: 0

## 2018-03-18 ASSESSMENT — PAIN SCALES - GENERAL
PAINLEVEL_OUTOF10: 0
PAINLEVEL_OUTOF10: 0

## 2018-03-18 NOTE — PROGRESS NOTES
"A/o,respirations are even and unlabored, per pt she states\" I am feeling on and off feeling of hot sensation in my back, legs, chest , stomach and my neck feel clammy\". Denies any chest pain , numbness, tingling, and n/v, assessment completed, vital signs stable except elevated /69, medicated per MAR, will recheck later,   SR on the monitor, updated communication board,  poc discussed and understood, verbalized understanding, box meal given, all questions answered at this time , fall precautions in place, call button within reach, will continue to monitor  "

## 2018-03-18 NOTE — PROGRESS NOTES
Pt requesting to take home med Aspirin, per pt she did not take aspirin dose  today. Spoke to K[place]on, per Pharmacist okay to take now.

## 2018-03-18 NOTE — PROGRESS NOTES
"[facility] Hospitalist Progress Note    Date of Service: 3/18/2018    Chief Complaint  89 y.o. female admitted 3/17/2018 with Shortness of breath. Patient states she noticed sudden onset of shortness of breath while at home at rest accompanied with palpitations and weakness.     Interval Problem Update  3/18- Patient continues to have intermittent episodes of shortness of breath accompanied by a \"hot feeling\". Ekg have remained normal, troponin negative. Patient to have stress test. Blood pressure continues to be elevated, will treat with PRN antihypertensives. Continue to monitor on tele. Patient hypokalemic, replacement given, recheck labs in am. PT/OT pending     Consultants/Specialty  None    Disposition  Likely home tomorrow if stable         Review of Systems   Constitutional: Positive for diaphoresis and malaise/fatigue. Negative for fever.   HENT: Negative for congestion, sinus pain and sore throat.    Eyes: Negative for blurred vision and double vision.   Respiratory: Positive for shortness of breath. Negative for cough.    Cardiovascular: Positive for palpitations. Negative for chest pain.   Gastrointestinal: Negative for abdominal pain, nausea and vomiting.   Genitourinary: Negative for dysuria, frequency and urgency.   Musculoskeletal: Negative for myalgias and neck pain.   Skin: Negative for rash.   Neurological: Positive for weakness. Negative for dizziness and headaches.   Psychiatric/Behavioral: Negative for depression.      Physical Exam  Laboratory/Imaging   Hemodynamics  Temp (24hrs), Av.9 °C (98.5 °F), Min:36.5 °C (97.7 °F), Max:37.3 °C (99.2 °F)   Temperature: 37 °C (98.6 °F)  Pulse  Av.1  Min: 55  Max: 79 Heart Rate (Monitored): 73  Blood Pressure : (!) 194/78, NIBP: (!) 179/76      Respiratory      Respiration: 20, Pulse Oximetry: 90 %             Fluids  No intake or output data in the 24 hours ending 18 0942    Nutrition  Orders Placed This Encounter   Procedures   • DIET ORDER    "  Standing Status:   Standing     Number of Occurrences:   1     Order Specific Question:   Diet:     Answer:   Regular [1]     Order Specific Question:   Miscellaneous modifications:     Answer:   No Decaf, No Caffeine(for test) [11]     Comments:   Protocol 1313 Patient to have no caffeine for 12 hours prior to exam (decaf, coffee, cola, tea, chocolate)     Physical Exam   Constitutional: She is oriented to person, place, and time. She appears well-developed. She is cooperative. No distress. Nasal cannula in place.   Thin frail    HENT:   Head: Normocephalic and atraumatic.   Eyes: Conjunctivae and lids are normal.   Neck: Neck supple. No tracheal tenderness present.   Cardiovascular: Regular rhythm and normal heart sounds.  Bradycardia present.    Pulmonary/Chest: Effort normal and breath sounds normal. No respiratory distress. She has no decreased breath sounds. She has no wheezes.   Abdominal: Soft. Normal appearance and bowel sounds are normal. She exhibits no distension. There is no tenderness.   Musculoskeletal:   Strength 5/5 all extremities    Neurological: She is alert and oriented to person, place, and time.   Skin: Skin is warm and dry. She is not diaphoretic.   Psychiatric: Her speech is normal and behavior is normal. Her mood appears anxious.   Nursing note and vitals reviewed.      Recent Labs      03/17/18   1002  03/18/18   0153   WBC  5.0  5.2   RBC  4.89  4.27   HEMOGLOBIN  14.9  12.9   HEMATOCRIT  44.5  39.0   MCV  91.0  91.3   MCH  30.5  30.2   MCHC  33.5*  33.1*   RDW  45.8  47.2   PLATELETCT  245  229   MPV  9.9  9.8     Recent Labs      03/17/18   1002  03/18/18   0153   SODIUM  137  138   POTASSIUM  3.1*  3.6   CHLORIDE  105  110   CO2  22  21   GLUCOSE  105*  102*   BUN  27*  22   CREATININE  0.88  0.93   CALCIUM  9.8  9.2     Recent Labs      03/17/18   1002   APTT  33.0   INR  0.96     Recent Labs      03/17/18   1002   BNPBTYPENAT  24              Assessment/Plan     * SOB (shortness of  breath)- (present on admission)   Assessment & Plan    Associated with palpitations and generalized weakness. Recurrent episodes of shortness of breath. Chest x-ray did not show pneumonia or pulmonary edema.   BNP and troponin were normal.   Well score is low for PE .   Hypokalemia with replacemtn  Recheck BMP tomorrow   Tele monitoring, EKG and troponins  Stress test this am           Hypertension- (present on admission)   Assessment & Plan    Patient is hypertensive in the ED. She was recently hospitalized for poorly controlled hypertension. Will continue her home medications for now.  IV PRNS ordered        History of stroke- (present on admission)   Assessment & Plan    No focal neuro deficits noted. Stable. Continue aspirin        DNR (do not resuscitate)- (present on admission)   Assessment & Plan    DNR          Quality-Core Measures   Reviewed items::  Labs reviewed and Medications reviewed  Pulliam catheter::  No Pulliam  DVT prophylaxis pharmacological::  Enoxaparin (Lovenox)  Ulcer Prophylaxis::  Not indicated  Assessed for rehabilitation services:  Patient was assess for and/or received rehabilitation services during this hospitalization

## 2018-03-19 ENCOUNTER — PATIENT OUTREACH (OUTPATIENT)
Dept: HEALTH INFORMATION MANAGEMENT | Facility: OTHER | Age: 83
End: 2018-03-19

## 2018-03-19 VITALS
OXYGEN SATURATION: 95 % | RESPIRATION RATE: 20 BRPM | BODY MASS INDEX: 22.88 KG/M2 | DIASTOLIC BLOOD PRESSURE: 61 MMHG | TEMPERATURE: 98 F | WEIGHT: 134.04 LBS | HEIGHT: 64 IN | SYSTOLIC BLOOD PRESSURE: 109 MMHG | HEART RATE: 85 BPM

## 2018-03-19 PROBLEM — R06.02 SOB (SHORTNESS OF BREATH): Status: RESOLVED | Noted: 2018-03-17 | Resolved: 2018-03-19

## 2018-03-19 LAB
ANION GAP SERPL CALC-SCNC: 8 MMOL/L (ref 0–11.9)
BUN SERPL-MCNC: 17 MG/DL (ref 8–22)
CALCIUM SERPL-MCNC: 9.7 MG/DL (ref 8.5–10.5)
CHLORIDE SERPL-SCNC: 108 MMOL/L (ref 96–112)
CO2 SERPL-SCNC: 20 MMOL/L (ref 20–33)
CREAT SERPL-MCNC: 1.04 MG/DL (ref 0.5–1.4)
ERYTHROCYTE [DISTWIDTH] IN BLOOD BY AUTOMATED COUNT: 46.4 FL (ref 35.9–50)
GLUCOSE SERPL-MCNC: 107 MG/DL (ref 65–99)
HCT VFR BLD AUTO: 41.8 % (ref 37–47)
HGB BLD-MCNC: 14.1 G/DL (ref 12–16)
MCH RBC QN AUTO: 30.7 PG (ref 27–33)
MCHC RBC AUTO-ENTMCNC: 33.7 G/DL (ref 33.6–35)
MCV RBC AUTO: 91.1 FL (ref 81.4–97.8)
PLATELET # BLD AUTO: 229 K/UL (ref 164–446)
PMV BLD AUTO: 9.7 FL (ref 9–12.9)
POTASSIUM SERPL-SCNC: 3.9 MMOL/L (ref 3.6–5.5)
RBC # BLD AUTO: 4.59 M/UL (ref 4.2–5.4)
SODIUM SERPL-SCNC: 136 MMOL/L (ref 135–145)
WBC # BLD AUTO: 5.7 K/UL (ref 4.8–10.8)

## 2018-03-19 PROCEDURE — G8987 SELF CARE CURRENT STATUS: HCPCS | Mod: CH

## 2018-03-19 PROCEDURE — 97165 OT EVAL LOW COMPLEX 30 MIN: CPT

## 2018-03-19 PROCEDURE — G0378 HOSPITAL OBSERVATION PER HR: HCPCS

## 2018-03-19 PROCEDURE — G8988 SELF CARE GOAL STATUS: HCPCS | Mod: CH

## 2018-03-19 PROCEDURE — A9270 NON-COVERED ITEM OR SERVICE: HCPCS | Performed by: NURSE PRACTITIONER

## 2018-03-19 PROCEDURE — G8978 MOBILITY CURRENT STATUS: HCPCS | Mod: CI

## 2018-03-19 PROCEDURE — A9270 NON-COVERED ITEM OR SERVICE: HCPCS | Performed by: INTERNAL MEDICINE

## 2018-03-19 PROCEDURE — G8980 MOBILITY D/C STATUS: HCPCS | Mod: CI

## 2018-03-19 PROCEDURE — G8979 MOBILITY GOAL STATUS: HCPCS | Mod: CI

## 2018-03-19 PROCEDURE — 36415 COLL VENOUS BLD VENIPUNCTURE: CPT

## 2018-03-19 PROCEDURE — 80048 BASIC METABOLIC PNL TOTAL CA: CPT

## 2018-03-19 PROCEDURE — 700111 HCHG RX REV CODE 636 W/ 250 OVERRIDE (IP): Performed by: INTERNAL MEDICINE

## 2018-03-19 PROCEDURE — 96372 THER/PROPH/DIAG INJ SC/IM: CPT

## 2018-03-19 PROCEDURE — 99217 PR OBSERVATION CARE DISCHARGE: CPT | Performed by: INTERNAL MEDICINE

## 2018-03-19 PROCEDURE — 85027 COMPLETE CBC AUTOMATED: CPT

## 2018-03-19 PROCEDURE — 700102 HCHG RX REV CODE 250 W/ 637 OVERRIDE(OP): Performed by: INTERNAL MEDICINE

## 2018-03-19 PROCEDURE — G8989 SELF CARE D/C STATUS: HCPCS | Mod: CH

## 2018-03-19 PROCEDURE — 700102 HCHG RX REV CODE 250 W/ 637 OVERRIDE(OP): Performed by: NURSE PRACTITIONER

## 2018-03-19 PROCEDURE — 97161 PT EVAL LOW COMPLEX 20 MIN: CPT

## 2018-03-19 RX ORDER — HYDRALAZINE HYDROCHLORIDE 25 MG/1
75 TABLET, FILM COATED ORAL EVERY 8 HOURS
Qty: 90 TAB | Refills: 1 | Status: SHIPPED | OUTPATIENT
Start: 2018-03-19 | End: 2018-04-03

## 2018-03-19 RX ORDER — HYDRALAZINE HYDROCHLORIDE 25 MG/1
25 TABLET, FILM COATED ORAL ONCE
Status: COMPLETED | OUTPATIENT
Start: 2018-03-19 | End: 2018-03-19

## 2018-03-19 RX ORDER — ISOSORBIDE MONONITRATE 30 MG/1
30 TABLET, EXTENDED RELEASE ORAL
Status: DISCONTINUED | OUTPATIENT
Start: 2018-03-19 | End: 2018-03-19 | Stop reason: HOSPADM

## 2018-03-19 RX ORDER — HYDRALAZINE HYDROCHLORIDE 25 MG/1
75 TABLET, FILM COATED ORAL EVERY 8 HOURS
Status: DISCONTINUED | OUTPATIENT
Start: 2018-03-19 | End: 2018-03-19 | Stop reason: HOSPADM

## 2018-03-19 RX ORDER — ISOSORBIDE MONONITRATE 30 MG/1
30 TABLET, EXTENDED RELEASE ORAL DAILY
Qty: 30 TAB | Refills: 1 | Status: SHIPPED | OUTPATIENT
Start: 2018-03-20 | End: 2018-04-12

## 2018-03-19 RX ADMIN — ISOSORBIDE MONONITRATE 30 MG: 30 TABLET ORAL at 10:09

## 2018-03-19 RX ADMIN — Medication 500 MCG: at 07:36

## 2018-03-19 RX ADMIN — HYDRALAZINE HYDROCHLORIDE 25 MG: 25 TABLET, FILM COATED ORAL at 09:12

## 2018-03-19 RX ADMIN — METOPROLOL TARTRATE 12.5 MG: 25 TABLET, FILM COATED ORAL at 07:37

## 2018-03-19 RX ADMIN — ENOXAPARIN SODIUM 40 MG: 100 INJECTION SUBCUTANEOUS at 07:35

## 2018-03-19 RX ADMIN — HYDROCHLOROTHIAZIDE 12.5 MG: 12.5 TABLET ORAL at 07:41

## 2018-03-19 RX ADMIN — AMLODIPINE BESYLATE 10 MG: 10 TABLET ORAL at 07:36

## 2018-03-19 RX ADMIN — STANDARDIZED SENNA CONCENTRATE AND DOCUSATE SODIUM 2 TABLET: 8.6; 5 TABLET, FILM COATED ORAL at 07:36

## 2018-03-19 RX ADMIN — OMEGA-3 FATTY ACIDS CAP 1000 MG 2000 MG: 1000 CAP at 07:36

## 2018-03-19 RX ADMIN — HYDRALAZINE HYDROCHLORIDE 50 MG: 50 TABLET, FILM COATED ORAL at 05:45

## 2018-03-19 RX ADMIN — ASPIRIN 81 MG: 81 TABLET, COATED ORAL at 07:37

## 2018-03-19 RX ADMIN — OXYCODONE HYDROCHLORIDE AND ACETAMINOPHEN 500 MG: 500 TABLET ORAL at 07:37

## 2018-03-19 ASSESSMENT — GAIT ASSESSMENTS
DEVIATION: DECREASED BASE OF SUPPORT;BRADYKINETIC;DECREASED HEEL STRIKE;DECREASED TOE OFF
ASSISTIVE DEVICE: FRONT WHEEL WALKER
DISTANCE (FEET): 150
GAIT LEVEL OF ASSIST: STAND BY ASSIST

## 2018-03-19 ASSESSMENT — COGNITIVE AND FUNCTIONAL STATUS - GENERAL
SUGGESTED CMS G CODE MODIFIER DAILY ACTIVITY: CH
SUGGESTED CMS G CODE MODIFIER MOBILITY: CJ
CLIMB 3 TO 5 STEPS WITH RAILING: A LITTLE
WALKING IN HOSPITAL ROOM: A LITTLE
MOBILITY SCORE: 22
DAILY ACTIVITIY SCORE: 24

## 2018-03-19 ASSESSMENT — LIFESTYLE VARIABLES: EVER_SMOKED: YES

## 2018-03-19 ASSESSMENT — ACTIVITIES OF DAILY LIVING (ADL): TOILETING: INDEPENDENT

## 2018-03-19 ASSESSMENT — PAIN SCALES - GENERAL: PAINLEVEL_OUTOF10: 0

## 2018-03-19 NOTE — PROGRESS NOTES
"Pt feeling anxious again, offered ativan, per pt  She states, \" I decided not to take that med\". Pt educated.   "

## 2018-03-19 NOTE — PROGRESS NOTES
A/o,  respirations are even and unlabored ,assessment completed, vital signs stable except for elevated /70,  SB on the monitor, denies any chest pain , n/v, dizziness, numbness and tingling, updated communication board,  poc discussed and understood, verbalized understanding, box meal given, all questions answered at this time , fall precautions in place, call button within reach, will continue to monitor

## 2018-03-19 NOTE — DISCHARGE PLANNING
When discharged home, patient's friend will provide ride.  Patient will be picked up then off to [facility] to  her pet.  Patient eager to go home, states she is feeling much better.

## 2018-03-19 NOTE — THERAPY
"Occupational Therapy Evaluation completed.   Functional Status:  Supervision supine to sit, sit to stand.  Pt walked to bathroom using FWW supervised.  Pt completed toilet transfer and toileting supervised.  Pt able to complete LB dressing mod I/supervised.  Pt reports she feels back to baseline and has no concerns with self-care at this time.  Plan of Care: Patient with no further skilled OT needs in the acute care setting at this time  Discharge Recommendations:  Equipment: No Equipment Needed. Post-acute therapy Currently anticipate no further skilled therapy needs once patient is discharged from the inpatient setting.    See \"Rehab Therapy-Acute\" Patient Summary Report for complete documentation.    "

## 2018-03-19 NOTE — DISCHARGE INSTRUCTIONS
Discharge Instructions    Discharged to home by car with friend. Discharged via walking, hospital escort: Yes.  Special equipment needed: Walker    Be sure to schedule a follow-up appointment with your primary care doctor or any specialists as instructed.     Discharge Plan:   Diet Plan: Discussed  Activity Level: Discussed  Confirmed Follow up Appointment: Patient to Call and Schedule Appointment  Confirmed Symptoms Management: Discussed  Medication Reconciliation Updated: Yes  Influenza Vaccine Indication: Not indicated: Previously immunized this influenza season and > 8 years of age    I understand that a diet low in cholesterol, fat, and sodium is recommended for good health. Unless I have been given specific instructions below for another diet, I accept this instruction as my diet prescription.   Other diet: Regular Diet    Special Instructions: None    · Is patient discharged on Warfarin / Coumadin?   No     Depression / Suicide Risk    As you are discharged from this [facility] facility, it is important to learn how to keep safe from harming yourself.    Recognize the warning signs:  · Abrupt changes in personality, positive or negative- including increase in energy   · Giving away possessions  · Change in eating patterns- significant weight changes-  positive or negative  · Change in sleeping patterns- unable to sleep or sleeping all the time   · Unwillingness or inability to communicate  · Depression  · Unusual sadness, discouragement and loneliness  · Talk of wanting to die  · Neglect of personal appearance   · Rebelliousness- reckless behavior  · Withdrawal from people/activities they love  · Confusion- inability to concentrate     If you or a loved one observes any of these behaviors or has concerns about self-harm, here's what you can do:  · Talk about it- your feelings and reasons for harming yourself  · Remove any means that you might use to hurt yourself (examples: pills, rope, extension cords,  firearm)  · Get professional help from the community (Mental Health, Substance Abuse, psychological counseling)  · Do not be alone:Call your Safe Contact- someone whom you trust who will be there for you.  · Call your local CRISIS HOTLINE 709-2143 or 404-549-7543  · Call your local Children's Mobile Crisis Response Team [place] (746) 259-1079 or www.[employer]  · Call the toll free National Suicide Prevention Hotlines   · National Suicide Prevention Lifeline 965-629-OUHK (1258)  · National Hope Line Network 800-SUICIDE (980-2920)

## 2018-03-19 NOTE — DISCHARGE SUMMARY
Hospital Medicine Discharge Note     Patient ID:  Livia Corea  [id number]  89 y.o.female  11/21/1926    Admit Date:  3/17/2018       Discharge Date:   3/19/2018    Primary Care Provider: Estuardo Carrero M.D.    Admitting Physician: Marco A Perdue M.D.  Discharging Physician: Marco A Perdue M.D.    Chief Complaint: Shortness of breath     Discharge Diagnoses:   Principal Problem:    SOB (shortness of breath)- resolved   Active Problems:    DNR (do not resuscitate)- continue     History of stroke- history of, follow up with PCP     Hypertension- Change in medications please follow up with PCP, keep log of BP at home      Chronic Medical Problems:  Past Medical History:   Diagnosis Date   • Circulation problem 01/2018   • Hypertension        Code Status: DNR    Hospital Summary:       Please refer to H&P by Marco A Perdue M.D. on 3/17/2018 for full details.      In brief, Livia Corea is a 89 y.o. female who was admitted 3/17/2018 for shortness of breath. Patient is very pleasant young appearing 89-year-old with only known medical history circulation issues and hypertension. Patient states she was trying to place without color on him sitting on the side of the bed and realized that she was extremely short of breath and felt very weak with a warm sensation. When this episode continued she called EMS and presented to the emergency room for further evaluation.  Patient was admitted to CDU for further workup and monitoring. Patient was placed on oxygen therapy as needed. Patient was placed on telemetry monitoring with no acute cardiac events noted. EKG obtained shows sinus bradycardia with no acute ST changes or T-wave abnormalities. Chest x-ray was obtained which shows minimal basilar atelectasis with stable mild cardiomegaly no acute findings noted. CBC and BMP were essentially benign with the exception of hypokalemia and elevated glucose on arrival. Patient was given potassium supplementation. BNP was  within normal limits troponins remain negative. Patient underwent nuclear medicine cardiac stress test. Patient had one episode of shortness of breath with associated warm feeling while in hospital and was significantly less then she experienced at home. Patient was hypertensive at the time, her hypertensive medications have been adjusted. Patient states no further episodes.   On exam today patient states she has no chest pain, shortness of breath, dizziness, nausea or vomiting. Patient is maintaining oxygen saturations on room air. Patient's lungs are clear bilaterally on auscultation. Patient states no pain on palpation of chest wall. Patient is able to ambulate on her own independently. Patient's blood pressure medications have been adjusted and she verbalized understanding of this. Patient will follow-up with PCP as well as cardiology for continued management of her blood pressure medicines. I suggested patient to keep a log of her blood pressures at home and take this with her to her appointment.     Therefore, she is discharged in good and stable condition with close outpatient follow-up.    Consultants:      None     Studies:    Imaging/ Testing:      NM-CARDIAC STRESS TEST   Final Result   Report   NUCLEAR IMAGING INTERPRETATION   Small area of mild apical ischemia is not excluded.    Fixed inferior wall photopenia may be related to attenuation artifact or    prior infarction.    There is quite mild inferior wall hypokinesis.    Left ventricle ejection fraction is 62%   ECG INTERPRETATION   Negative stress ECG for ischemia.   DX-CHEST-LIMITED (1 VIEW)   Final Result      Minimal basilar atelectasis.      Stable mild cardiomegaly.      Prominent atherosclerotic plaque.               Laboratory:   Recent Labs      03/17/18   1002  03/18/18   0153  03/19/18   0236   WBC  5.0  5.2  5.7   RBC  4.89  4.27  4.59   HEMOGLOBIN  14.9  12.9  14.1   HEMATOCRIT  44.5  39.0  41.8   MCV  91.0  91.3  91.1   MCH  30.5  30.2   30.7   MCHC  33.5*  33.1*  33.7   RDW  45.8  47.2  46.4   PLATELETCT  245  229  229   MPV  9.9  9.8  9.7       Recent Labs      03/17/18   1002  03/18/18   0153  03/19/18   0236   SODIUM  137  138  136   POTASSIUM  3.1*  3.6  3.9   CHLORIDE  105  110  108   CO2  22  21  20   GLUCOSE  105*  102*  107*   BUN  27*  22  17   CREATININE  0.88  0.93  1.04   CALCIUM  9.8  9.2  9.7       Recent Labs      03/17/18   1002  03/18/18   0153  03/19/18   0236   ALTSGPT  9   --    --    ASTSGOT  16   --    --    ALKPHOSPHAT  89   --    --    TBILIRUBIN  0.8   --    --    LIPASE  22   --    --    GLUCOSE  105*  102*  107*        Recent Labs      03/17/18   1002   BNPBTYPENAT  24             Recent Labs      03/17/18   1002  03/18/18   0810   TROPONINI  <0.01  0.01       Procedures/Surgeries:        NM Cardiac Stress Test     Disposition:    Discharge home    Condition:  Stable    Instructions:   Activity: As tolerated.  Diet: Cardiac   Followup: With PCP   Instructions:  -Please try to stay hydrated and continue adequate food intake   -Given instructions to return to the ER if any new or worsening symptoms, worsening condition, or failure to improve  -Call PCP for followup  -No smoking, no alcohol, no caffeine  -Encourage risk factor reduction with tobacco and alcohol abstinence, diet changes, weight loss, and exercise.     Follow-Up  Estuardo Carrero M.D.  [address] [place] [id number]  [phone]    Go on 3/26/2018  Please arrive at 9:50 AM for your appointment. If you are uable to make it please call to cancel or reschedule. Thank you         Discharge Medications:           Medication List      START taking these medications      Instructions   isosorbide mononitrate SR 30 MG Tb24  Start taking on:  3/20/2018  Commonly known as:  IMDUR   Take 1 Tab by mouth every day.  Dose:  30 mg        CHANGE how you take these medications      Instructions   hydrALAZINE 25 MG Tabs  What changed:  · medication strength  · how  much to take  Commonly known as:  APRESOLINE   Take 3 Tabs by mouth every 8 hours.  Dose:  75 mg        CONTINUE taking these medications      Instructions   acetaminophen 500 MG Tabs  Commonly known as:  TYLENOL   Take 1,000 mg by mouth every 48 hours.  Dose:  1000 mg     amLODIPine 10 MG Tabs  Commonly known as:  NORVASC   Take 10 mg by mouth every day.  Dose:  10 mg     ascorbic acid 500 MG Tabs  Commonly known as:  ascorbic acid   Take 500 mg by mouth every day.  Dose:  500 mg     aspirin EC 81 MG Tbec  Commonly known as:  ECOTRIN   Take 81 mg by mouth every day.  Dose:  81 mg     cyanocobalamin 500 MCG Tabs  Commonly known as:  VITAMIN B-12   Take 500 mcg by mouth every day.  Dose:  500 mcg     fish oil 1000 MG Caps capsule   Take 2 Caps by mouth every day.  Dose:  2000 mg        STOP taking these medications    hydrochlorothiazide 12.5 MG capsule  Commonly known as:  MICROZIDE     metoprolol 25 MG Tabs  Commonly known as:  LOPRESSOR                Please CC the above physicians    PATITO [email or web address]  3/19/2018  11:01 AM        '

## 2018-03-19 NOTE — PROGRESS NOTES
Pt anxious,per pt she is having the episode of feeling hot in neck, back and stomach, on call paged, waiting for call back

## 2018-03-19 NOTE — PROGRESS NOTES
Ate well for dinner. Discuss to patient about plan of care. Stay over night and Lab for tomorrow. BP much better.

## 2018-03-19 NOTE — PROGRESS NOTES
Seen by MD this morning and this afternoon. Discharged to home. Calling her friend for her . discuss discharged instruction.

## 2018-03-19 NOTE — THERAPY
"Physical Therapy Evaluation completed.   Bed Mobility:  Supine to Sit: Supervised  Transfers: Sit to Stand: Supervised  Gait: Level Of Assist: Stand by Assist with Front-Wheel Walker       Plan of Care: Patient with no further skilled PT needs in the acute care setting at this time  Discharge Recommendations: Equipment: No Equipment Needed. Post-acute therapy Discharge to home with outpatient or home health for additional skilled therapy services.    Pt is a 89 year old female admitted to the hospital for heart palpitations and SOB. Pt found to have hypokalemia and weakness. Pt reports this is the first time she has experienced these symptoms, normally pt active and independent with all mobility and ADL's. At time of initial evaluation, pt's B UE and LE strength WNL's. Pt does state L LE is \"my bad leg\" stating she suffered a fracture of the L hip last year but refused surgery. Pt states that she now has a leg length discrepancy and uses 4WW or FWW to ambulate at all times. Pt performed bed mobility and transfers at [facility] level. Pt ambulated around unit with FWW, SBA. Multiple gait deviations present including short step and stride length, inconsistent step through gait pattern, maintaining L hip and knee flexed throughout gait cycle due to leg length discrepancy, and no heel strike or toe off present on the L. Pt did not have any overt LOB with ambulation. Pt denies wearing custom shoe or having lift tin shoe to accommodate for leg length discrepancy and reports she does not fee like she needs one. At this time, pt is at baseline in terms of mobility. Pt educated on basic cardiac precautions with activity including \"talk test\" and monitoring symptoms of heart palpitations and SOB. Pt advised to stop activity if she is experiencing any of these symptoms. At this time, pt does not demonstrate need for ongoing PT intervention while in the acute care setting. Pt clear for DC home, pt does have appropriate equipment at " "home but would benefit from post acute transitional care with home health     See \"Rehab Therapy-Acute\" Patient Summary Report for complete documentation.     "

## 2018-03-19 NOTE — PROGRESS NOTES
Discuss to patient about her medications and marked on her paper. 3 BP medications and instructions.

## 2018-03-29 LAB
EKG IMPRESSION: NORMAL
EKG IMPRESSION: NORMAL

## 2018-03-30 ENCOUNTER — HOSPITAL ENCOUNTER (OUTPATIENT)
Dept: LAB | Facility: MEDICAL CENTER | Age: 83
End: 2018-03-30
Attending: FAMILY MEDICINE
Payer: MEDICARE

## 2018-03-30 PROCEDURE — 36415 COLL VENOUS BLD VENIPUNCTURE: CPT

## 2018-03-30 PROCEDURE — 82088 ASSAY OF ALDOSTERONE: CPT

## 2018-03-30 PROCEDURE — 84244 ASSAY OF RENIN: CPT

## 2018-04-01 LAB — ALDOST SERPL-MCNC: 30 NG/DL

## 2018-04-02 LAB — RENIN PLAS-CCNC: 1.5 NG/ML/HR

## 2018-04-03 ENCOUNTER — HOSPITAL ENCOUNTER (EMERGENCY)
Facility: MEDICAL CENTER | Age: 83
End: 2018-04-03
Attending: EMERGENCY MEDICINE
Payer: MEDICARE

## 2018-04-03 VITALS
OXYGEN SATURATION: 94 % | WEIGHT: 131.84 LBS | TEMPERATURE: 97.6 F | SYSTOLIC BLOOD PRESSURE: 150 MMHG | HEART RATE: 65 BPM | HEIGHT: 65 IN | RESPIRATION RATE: 27 BRPM | BODY MASS INDEX: 21.97 KG/M2 | DIASTOLIC BLOOD PRESSURE: 83 MMHG

## 2018-04-03 DIAGNOSIS — I10 ASYMPTOMATIC HYPERTENSION: ICD-10-CM

## 2018-04-03 LAB
ALBUMIN SERPL BCP-MCNC: 4.3 G/DL (ref 3.2–4.9)
ALBUMIN/GLOB SERPL: 1 G/DL
ALP SERPL-CCNC: 103 U/L (ref 30–99)
ALT SERPL-CCNC: 9 U/L (ref 2–50)
ANION GAP SERPL CALC-SCNC: 12 MMOL/L (ref 0–11.9)
AST SERPL-CCNC: 17 U/L (ref 12–45)
BASOPHILS # BLD AUTO: 1 % (ref 0–1.8)
BASOPHILS # BLD: 0.05 K/UL (ref 0–0.12)
BILIRUB SERPL-MCNC: 0.7 MG/DL (ref 0.1–1.5)
BUN SERPL-MCNC: 21 MG/DL (ref 8–22)
CALCIUM SERPL-MCNC: 10 MG/DL (ref 8.5–10.5)
CHLORIDE SERPL-SCNC: 99 MMOL/L (ref 96–112)
CO2 SERPL-SCNC: 27 MMOL/L (ref 20–33)
CREAT SERPL-MCNC: 1.02 MG/DL (ref 0.5–1.4)
EOSINOPHIL # BLD AUTO: 0.07 K/UL (ref 0–0.51)
EOSINOPHIL NFR BLD: 1.5 % (ref 0–6.9)
ERYTHROCYTE [DISTWIDTH] IN BLOOD BY AUTOMATED COUNT: 45 FL (ref 35.9–50)
GLOBULIN SER CALC-MCNC: 4.2 G/DL (ref 1.9–3.5)
GLUCOSE SERPL-MCNC: 102 MG/DL (ref 65–99)
HCT VFR BLD AUTO: 41.9 % (ref 37–47)
HGB BLD-MCNC: 14.3 G/DL (ref 12–16)
IMM GRANULOCYTES # BLD AUTO: 0.01 K/UL (ref 0–0.11)
IMM GRANULOCYTES NFR BLD AUTO: 0.2 % (ref 0–0.9)
LYMPHOCYTES # BLD AUTO: 1.68 K/UL (ref 1–4.8)
LYMPHOCYTES NFR BLD: 34.9 % (ref 22–41)
MCH RBC QN AUTO: 30.9 PG (ref 27–33)
MCHC RBC AUTO-ENTMCNC: 34.1 G/DL (ref 33.6–35)
MCV RBC AUTO: 90.5 FL (ref 81.4–97.8)
MONOCYTES # BLD AUTO: 0.68 K/UL (ref 0–0.85)
MONOCYTES NFR BLD AUTO: 14.1 % (ref 0–13.4)
NEUTROPHILS # BLD AUTO: 2.33 K/UL (ref 2–7.15)
NEUTROPHILS NFR BLD: 48.3 % (ref 44–72)
NRBC # BLD AUTO: 0 K/UL
NRBC BLD-RTO: 0 /100 WBC
PLATELET # BLD AUTO: 283 K/UL (ref 164–446)
PMV BLD AUTO: 9.9 FL (ref 9–12.9)
POTASSIUM SERPL-SCNC: 3.1 MMOL/L (ref 3.6–5.5)
PROT SERPL-MCNC: 8.5 G/DL (ref 6–8.2)
RBC # BLD AUTO: 4.63 M/UL (ref 4.2–5.4)
SODIUM SERPL-SCNC: 138 MMOL/L (ref 135–145)
TROPONIN I SERPL-MCNC: 0.01 NG/ML (ref 0–0.04)
WBC # BLD AUTO: 4.8 K/UL (ref 4.8–10.8)

## 2018-04-03 PROCEDURE — 700102 HCHG RX REV CODE 250 W/ 637 OVERRIDE(OP): Performed by: EMERGENCY MEDICINE

## 2018-04-03 PROCEDURE — 84484 ASSAY OF TROPONIN QUANT: CPT

## 2018-04-03 PROCEDURE — 80053 COMPREHEN METABOLIC PANEL: CPT

## 2018-04-03 PROCEDURE — 85025 COMPLETE CBC W/AUTO DIFF WBC: CPT

## 2018-04-03 PROCEDURE — 99284 EMERGENCY DEPT VISIT MOD MDM: CPT

## 2018-04-03 PROCEDURE — A9270 NON-COVERED ITEM OR SERVICE: HCPCS | Performed by: EMERGENCY MEDICINE

## 2018-04-03 RX ORDER — HYDRALAZINE HYDROCHLORIDE 25 MG/1
50 TABLET, FILM COATED ORAL EVERY 8 HOURS
Qty: 60 TAB | Refills: 1 | Status: ON HOLD | OUTPATIENT
Start: 2018-04-03 | End: 2018-04-14

## 2018-04-03 RX ORDER — HYDRALAZINE HYDROCHLORIDE 50 MG/1
50 TABLET, FILM COATED ORAL ONCE
Status: COMPLETED | OUTPATIENT
Start: 2018-04-03 | End: 2018-04-03

## 2018-04-03 RX ORDER — SPIRONOLACTONE 25 MG/1
25 TABLET ORAL DAILY
COMMUNITY
End: 2018-04-03

## 2018-04-03 RX ORDER — HYDROCHLOROTHIAZIDE 12.5 MG/1
12.5 CAPSULE, GELATIN COATED ORAL DAILY
Status: ON HOLD | COMMUNITY
End: 2018-04-14

## 2018-04-03 RX ADMIN — HYDRALAZINE HYDROCHLORIDE 50 MG: 50 TABLET, FILM COATED ORAL at 14:55

## 2018-04-03 ASSESSMENT — PAIN SCALES - GENERAL: PAINLEVEL_OUTOF10: 0

## 2018-04-03 NOTE — DISCHARGE INSTRUCTIONS
Hypertension  Hypertension, commonly called high blood pressure, is when the force of blood pumping through your arteries is too strong. Your arteries are the blood vessels that carry blood from your heart throughout your body. A blood pressure reading consists of a higher number over a lower number, such as 110/72. The higher number (systolic) is the pressure inside your arteries when your heart pumps. The lower number (diastolic) is the pressure inside your arteries when your heart relaxes. Ideally you want your blood pressure below 120/80.  Hypertension forces your heart to work harder to pump blood. Your arteries may become narrow or stiff. Having untreated or uncontrolled hypertension can cause heart attack, stroke, kidney disease, and other problems.  What increases the risk?  Some risk factors for high blood pressure are controllable. Others are not.  Risk factors you cannot control include:  · Race. You may be at higher risk if you are .  · Age. Risk increases with age.  · Gender. Men are at higher risk than women before age 45 years. After age 65, women are at higher risk than men.  Risk factors you can control include:  · Not getting enough exercise or physical activity.  · Being overweight.  · Getting too much fat, sugar, calories, or salt in your diet.  · Drinking too much alcohol.  What are the signs or symptoms?  Hypertension does not usually cause signs or symptoms. Extremely high blood pressure (hypertensive crisis) may cause headache, anxiety, shortness of breath, and nosebleed.  How is this diagnosed?  To check if you have hypertension, your health care provider will measure your blood pressure while you are seated, with your arm held at the level of your heart. It should be measured at least twice using the same arm. Certain conditions can cause a difference in blood pressure between your right and left arms. A blood pressure reading that is higher than normal on one occasion does  not mean that you need treatment. If it is not clear whether you have high blood pressure, you may be asked to return on a different day to have your blood pressure checked again. Or, you may be asked to monitor your blood pressure at home for 1 or more weeks.  How is this treated?  Treating high blood pressure includes making lifestyle changes and possibly taking medicine. Living a healthy lifestyle can help lower high blood pressure. You may need to change some of your habits.  Lifestyle changes may include:  · Following the DASH diet. This diet is high in fruits, vegetables, and whole grains. It is low in salt, red meat, and added sugars.  · Keep your sodium intake below 2,300 mg per day.  · Getting at least 30-45 minutes of aerobic exercise at least 4 times per week.  · Losing weight if necessary.  · Not smoking.  · Limiting alcoholic beverages.  · Learning ways to reduce stress.  Your health care provider may prescribe medicine if lifestyle changes are not enough to get your blood pressure under control, and if one of the following is true:  · You are 18-59 years of age and your systolic blood pressure is above 140.  · You are 60 years of age or older, and your systolic blood pressure is above 150.  · Your diastolic blood pressure is above 90.  · You have diabetes, and your systolic blood pressure is over 140 or your diastolic blood pressure is over 90.  · You have kidney disease and your blood pressure is above 140/90.  · You have heart disease and your blood pressure is above 140/90.  Your personal target blood pressure may vary depending on your medical conditions, your age, and other factors.  Follow these instructions at home:  · Have your blood pressure rechecked as directed by your health care provider.  · Take medicines only as directed by your health care provider. Follow the directions carefully. Blood pressure medicines must be taken as prescribed. The medicine does not work as well when you skip  doses. Skipping doses also puts you at risk for problems.  · Do not smoke.  · Monitor your blood pressure at home as directed by your health care provider.  Contact a health care provider if:  · You think you are having a reaction to medicines taken.  · You have recurrent headaches or feel dizzy.  · You have swelling in your ankles.  · You have trouble with your vision.  Get help right away if:  · You develop a severe headache or confusion.  · You have unusual weakness, numbness, or feel faint.  · You have severe chest or abdominal pain.  · You vomit repeatedly.  · You have trouble breathing.  This information is not intended to replace advice given to you by your health care provider. Make sure you discuss any questions you have with your health care provider.  Document Released: 12/18/2006 Document Revised: 05/25/2017 Document Reviewed: 10/10/2014  [employer] Interactive Patient Education © 2017 [employer]

## 2018-04-03 NOTE — ED PROVIDER NOTES
ED Provider Note    CHIEF COMPLAINT  Chief Complaint   Patient presents with   • Hypertension     Between 190-225 systolic at home since yesterday, 197/78 in triage, seen here two weeks ago with same complaint, recent medication changes, denies HA, denies chest pain       HPI  Livia Corea is a 89 y.o. female who presents for evaluation of hypertension, history of the same with recent admissions in the hospital which time she had shortness of breath. This time she states she has no symptoms whatsoever, no chest pain, no headache no shortness of breath. The patient routinely checks her blood pressure many times a day, upon waking up this morning it was 225 systolic. Throughout the day she states it never got below 195. Recent medication changes, she tells me that while she was in the hospital couple weeks ago she was treated with oral hydralazine successfully but she states that she was not prescribed this medication, and states takes Norvasc, hydrochlorothiazide and a newly added spironolactone. No abdominal pain, no back pain    REVIEW OF SYSTEMS  Negative for fever, rash, chest pain, dyspnea, abdominal pain, nausea, vomiting, diarrhea, headache, focal weakness, focal numbness, focal tingling, back pain. All other systems are negative.     PAST MEDICAL HISTORY  Past Medical History:   Diagnosis Date   • Circulation problem 01/2018   • Hypertension        FAMILY HISTORY  No family history on file.    SOCIAL HISTORY  Social History   Substance Use Topics   • Smoking status: Former Smoker     Quit date: 2/25/1988   • Smokeless tobacco: Never Used   • Alcohol use Yes      Comment: 1 glass of wine x 2 days a week       SURGICAL HISTORY  No past surgical history on file.    CURRENT MEDICATIONS  I personally reviewed the medication list in the charting documentation.     ALLERGIES  Allergies   Allergen Reactions   • Lisinopril Swelling     Of lower extremities        MEDICAL RECORD  I have reviewed patient's  "medical record and pertinent results are listed above.      PHYSICAL EXAM  VITAL SIGNS: /83   Pulse (!) 59   Temp 36.4 °C (97.6 °F) (Temporal)   Resp 17   Ht 1.638 m (5' 4.5\")   Wt 59.8 kg (131 lb 13.4 oz)   SpO2 95%   BMI 22.28 kg/m²    Constitutional: Elderly but overall well appearing patient in no acute distress.  Not toxic, nor ill in appearance.  HENT: Mucus membranes moist.    Eyes: No scleral icterus. Normal conjunctiva   Neck: Supple, comfortable, nonpainful range of motion.   Cardiovascular: Regular heart rate and rhythm.   Thorax & Lungs: Chest is nontender.  Lungs are clear to auscultation with good air movement bilaterally.  No wheeze, rhonchi, nor rales.   Abdomen: Soft, with no tenderness, rebound nor guarding.  No mass or pulsatile mass appreciated.  Skin: Warm, dry. No rash appreciated  Extremities/Musculoskeletal: No sign of trauma. No asymmetric calf tenderness, erythema or edema. Normal range of motion   Neurologic: Alert & oriented. No focal deficits observed.   Psychiatric: Normal affect appropriate for the clinical situation.    DIAGNOSTIC STUDIES / PROCEDURES    LABS  Results for orders placed or performed during the hospital encounter of 04/03/18   CBC w/ Differential   Result Value Ref Range    WBC 4.8 4.8 - 10.8 K/uL    RBC 4.63 4.20 - 5.40 M/uL    Hemoglobin 14.3 12.0 - 16.0 g/dL    Hematocrit 41.9 37.0 - 47.0 %    MCV 90.5 81.4 - 97.8 fL    MCH 30.9 27.0 - 33.0 pg    MCHC 34.1 33.6 - 35.0 g/dL    RDW 45.0 35.9 - 50.0 fL    Platelet Count 283 164 - 446 K/uL    MPV 9.9 9.0 - 12.9 fL    Neutrophils-Polys 48.30 44.00 - 72.00 %    Lymphocytes 34.90 22.00 - 41.00 %    Monocytes 14.10 (H) 0.00 - 13.40 %    Eosinophils 1.50 0.00 - 6.90 %    Basophils 1.00 0.00 - 1.80 %    Immature Granulocytes 0.20 0.00 - 0.90 %    Nucleated RBC 0.00 /100 WBC    Neutrophils (Absolute) 2.33 2.00 - 7.15 K/uL    Lymphs (Absolute) 1.68 1.00 - 4.80 K/uL    Monos (Absolute) 0.68 0.00 - 0.85 K/uL    Eos " (Absolute) 0.07 0.00 - 0.51 K/uL    Baso (Absolute) 0.05 0.00 - 0.12 K/uL    Immature Granulocytes (abs) 0.01 0.00 - 0.11 K/uL    NRBC (Absolute) 0.00 K/uL   Complete Metabolic Panel (CMP)   Result Value Ref Range    Sodium 138 135 - 145 mmol/L    Potassium 3.1 (L) 3.6 - 5.5 mmol/L    Chloride 99 96 - 112 mmol/L    Co2 27 20 - 33 mmol/L    Anion Gap 12.0 (H) 0.0 - 11.9    Glucose 102 (H) 65 - 99 mg/dL    Bun 21 8 - 22 mg/dL    Creatinine 1.02 0.50 - 1.40 mg/dL    Calcium 10.0 8.5 - 10.5 mg/dL    AST(SGOT) 17 12 - 45 U/L    ALT(SGPT) 9 2 - 50 U/L    Alkaline Phosphatase 103 (H) 30 - 99 U/L    Total Bilirubin 0.7 0.1 - 1.5 mg/dL    Albumin 4.3 3.2 - 4.9 g/dL    Total Protein 8.5 (H) 6.0 - 8.2 g/dL    Globulin 4.2 (H) 1.9 - 3.5 g/dL    A-G Ratio 1.0 g/dL   Troponin STAT   Result Value Ref Range    Troponin I 0.01 0.00 - 0.04 ng/mL   ESTIMATED GFR   Result Value Ref Range    GFR If African American >60 >60 mL/min/1.73 m 2    GFR If Non  51 (A) >60 mL/min/1.73 m 2       COURSE & MEDICAL DECISION MAKING  I have reviewed any medical record information, laboratory studies and radiographic results as noted above.    Encounter Summary: This is a 89 y.o. female with history of hypertension, difficult to control with multiple medication changes recently, comes in with asymptomatic hypertension. Looks well on exam, troponin negative. Her EKG is nonischemic. He tells he lost she was in the hospital she was taking 75 mg of hydralazine with successful resolution of her blood pressure but apparently was never prescribed this. She recently was started on spironolactone with no improvement in her blood pressure. Will treat her with oral hydralazine and wait for a reduction in her blood pressure, will prescribe her a 50 mg dose of hydralazine and she will continue to follow-up with her primary care physician for further evaluation  /83   Pulse 65   Temp 36.4 °C (97.6 °F) (Temporal)   Resp (!) 27   Ht 1.638 m  "(5' 4.5\")   Wt 59.8 kg (131 lb 13.4 oz)   SpO2 94%   BMI 22.28 kg/m²       DISPOSITION: Discharged home in stable condition      FINAL IMPRESSION  1. Asymptomatic hypertension           This dictation was created using voice recognition software. The accuracy of the dictation is limited to the abilities of the software. I expect there may be some errors of grammar and possibly content. The nursing notes were reviewed and certain aspects of this information were incorporated into this note.    Electronically signed by: Nicko Temple, 4/3/2018 2:29 PM      "

## 2018-04-03 NOTE — ED TRIAGE NOTES
".  Chief Complaint   Patient presents with   • Hypertension     Between 190-225 systolic at home since yesterday, 197/78 in triage, seen here two weeks ago with same complaint, recent medication changes, denies HA, denies chest pain     .BP (!) 197/78   Pulse 66   Temp 36.8 °C (98.2 °F) (Temporal)   Resp 17   Ht 1.638 m (5' 4.5\")   Wt 59.8 kg (131 lb 13.4 oz)   SpO2 97%   BMI 22.28 kg/m²     Ambulatory to triage with above complaints, educated on triage process, placed in lobby, told to inform staff of any changes in condition.    "

## 2018-04-03 NOTE — PROGRESS NOTES
Pt verbalized understanding of discharge instructions.   Pt ambulated from ED without complications.   Pt scripts sent to pharmacy.

## 2018-04-03 NOTE — ED NOTES
"V/s reassessed and PT  Rounded on. Pt advised about time and  Acuity. Pt stated \"my blood pressure when I came here is 190/58. Pt was advised that blood pressure is now 150/83. Pt denies any tinnitus,n/v, -blurred vision at this time.  "

## 2018-04-08 ENCOUNTER — HOSPITAL ENCOUNTER (EMERGENCY)
Facility: MEDICAL CENTER | Age: 83
End: 2018-04-08
Attending: EMERGENCY MEDICINE
Payer: MEDICARE

## 2018-04-08 VITALS
WEIGHT: 130.07 LBS | HEIGHT: 64 IN | TEMPERATURE: 97.5 F | DIASTOLIC BLOOD PRESSURE: 59 MMHG | RESPIRATION RATE: 16 BRPM | BODY MASS INDEX: 22.21 KG/M2 | HEART RATE: 94 BPM | OXYGEN SATURATION: 98 % | SYSTOLIC BLOOD PRESSURE: 172 MMHG

## 2018-04-08 DIAGNOSIS — S50.02XA CONTUSION OF LEFT ELBOW, INITIAL ENCOUNTER: ICD-10-CM

## 2018-04-08 DIAGNOSIS — S50.11XA CONTUSION OF RIGHT FOREARM, INITIAL ENCOUNTER: ICD-10-CM

## 2018-04-08 LAB
APTT PPP: 35.6 SEC (ref 24.7–36)
ERYTHROCYTE [DISTWIDTH] IN BLOOD BY AUTOMATED COUNT: 45.1 FL (ref 35.9–50)
HCT VFR BLD AUTO: 39.2 % (ref 37–47)
HGB BLD-MCNC: 13.6 G/DL (ref 12–16)
INR PPP: 1.02 (ref 0.87–1.13)
MCH RBC QN AUTO: 31.1 PG (ref 27–33)
MCHC RBC AUTO-ENTMCNC: 34.7 G/DL (ref 33.6–35)
MCV RBC AUTO: 89.5 FL (ref 81.4–97.8)
PLATELET # BLD AUTO: 255 K/UL (ref 164–446)
PMV BLD AUTO: 9.7 FL (ref 9–12.9)
PROTHROMBIN TIME: 13.1 SEC (ref 12–14.6)
RBC # BLD AUTO: 4.38 M/UL (ref 4.2–5.4)
WBC # BLD AUTO: 3.7 K/UL (ref 4.8–10.8)

## 2018-04-08 PROCEDURE — 99283 EMERGENCY DEPT VISIT LOW MDM: CPT

## 2018-04-08 PROCEDURE — 85027 COMPLETE CBC AUTOMATED: CPT

## 2018-04-08 PROCEDURE — 36415 COLL VENOUS BLD VENIPUNCTURE: CPT

## 2018-04-08 PROCEDURE — 85730 THROMBOPLASTIN TIME PARTIAL: CPT

## 2018-04-08 PROCEDURE — 85610 PROTHROMBIN TIME: CPT

## 2018-04-08 NOTE — ED TRIAGE NOTES
"Chief Complaint   Patient presents with   • Bleeding/Bruising     Pt states she noticed some bruising on her arms. Denies trauma.      Pt denies being on blood thinners.   BP (!) 172/59   Pulse 94   Temp 36.4 °C (97.5 °F) (Temporal)   Resp 16   Ht 1.626 m (5' 4\")   Wt 59 kg (130 lb 1.1 oz)   SpO2 98%   BMI 22.33 kg/m²   Pt placed back in lobby, educated on triage process, and told to inform staff of any change in condition.     "

## 2018-04-08 NOTE — ED NOTES
Pt not in room when enter to discharge.  Pt not found in unit. Assume pt left after told by ERP she can go home.

## 2018-04-08 NOTE — ED NOTES
Pt ambulated to room using FWW w/steady gait. No active bleeding noted in her forearm. States that she just noticed it this morning and not exactly sure what happened. Denies fall/trauma.

## 2018-04-09 NOTE — ED PROVIDER NOTES
ED Provider Note    CHIEF COMPLAINT  Chief Complaint   Patient presents with   • Bleeding/Bruising     Pt states she noticed some bruising on her arms. Denies trauma.        HPI  Livia Corea is a 89 y.o. female who presents to the emergency department complaining of bruises on the right forearm and left elbow. The patient says that she just noticed these this morning she has no idea what happened and denies any recent trauma. She has no history of easy bleeding or bruisability she does take aspirin but no other anticoagulant medications.    REVIEW OF SYSTEMS the patient denies any trauma no headache no chest pain no back pain or black or bloody stool or emesis    PAST MEDICAL HISTORY  Past Medical History:   Diagnosis Date   • Circulation problem 01/2018   • Hypertension        FAMILY HISTORY  No family history on file.    SOCIAL HISTORY  Social History     Social History   • Marital status: Single     Spouse name: N/A   • Number of children: N/A   • Years of education: N/A     Social History Main Topics   • Smoking status: Former Smoker     Quit date: 2/25/1988   • Smokeless tobacco: Never Used   • Alcohol use Yes      Comment: 1 glass of wine x 2 days a week   • Drug use: No   • Sexual activity: Not on file     Other Topics Concern   • Not on file     Social History Narrative   • No narrative on file       SURGICAL HISTORY  History reviewed. No pertinent surgical history.    CURRENT MEDICATIONS  Home Medications     Reviewed by Vanessa Pitt R.N. (Registered Nurse) on 04/08/18 at 1051  Med List Status: Complete   Medication Last Dose Status   acetaminophen (TYLENOL) 500 MG Tab PRN Active   amlodipine (NORVASC) 10 MG Tab taking Active   ascorbic acid (ASCORBIC ACID) 500 MG Tab taking Active   aspirin EC (ECOTRIN) 81 MG Tablet Delayed Response taking Active   cyanocobalamin (VITAMIN B-12) 500 MCG Tab taking Active   hydrALAZINE (APRESOLINE) 25 MG Tab taking Active   hydrochlorothiazide (MICROZIDE) 12.5  "MG capsule taking Active   isosorbide mononitrate SR (IMDUR) 30 MG TABLET SR 24 HR NOT TAKING Active   Omega-3 Fatty Acids (FISH OIL) 1000 MG Cap capsule taking Active                ALLERGIES  Allergies   Allergen Reactions   • Lisinopril Swelling     Of lower extremities        PHYSICAL EXAM  VITAL SIGNS: BP (!) 172/59   Pulse 94   Temp 36.4 °C (97.5 °F) (Temporal)   Resp 16   Ht 1.626 m (5' 4\")   Wt 59 kg (130 lb 1.1 oz)   SpO2 98%   BMI 22.33 kg/m²    Oxygen saturation is interpreted as adequate  Constitutional: Awake verbal anxious but otherwise nontoxic-appearing  HENT: I do not see any sign of trauma to the head  Eyes: No erythema or discharge or jaundice  Neck: Taking midline no JVD C-spine nontender  Cardiovascular: Regular rate and rhythm  Lungs: Clear and equal bilaterally  Abdomen/Back: I do not see any bruising or contusions on the torso or back  Skin: Warm and dry  Musculoskeletal: There are 2 bruises on the right forearm there both about 2 inches in diameter and they appear old and they have a variation of color indicating that there has been some time since the initial bruising to place there's no breaks in the skin or acute bony deformity. There is a similar old looking bruise over the posterior aspect of the left elbow there is no bony deformity the patient has good range of motion in the upper extremities  Neurologic: Awake verbal moving all extremities and she is ambulatory with her walker without difficulty    Laboratory  CBC showed a normal platelet count of 255 the INR is normal at 1.02 and the PTT is normal at 36.6    MEDICAL DECISION MAKING and DISPOSITION  I have advised the patient that these bruises do not look acute, her platelet count and coags are normal. She does not appear to have any fracture or orthopedic injury. The patient is quite upset that I cannot reconstruct exactly why she has the bruises and she seems quite anxious but I tried to reassure her that I don't think " anything dangerous is happening. The patient is instructed to follow-up with her primary care doctor during the week for recheck and she may return here if she feels that she is experiencing new or worsening symptoms    IMPRESSION  1. Subacute bruises to the right forearm and left elbow      Electronically signed by: Jose Raul Ram, 4/9/2018 11:14 AM

## 2018-04-12 ENCOUNTER — APPOINTMENT (OUTPATIENT)
Dept: RADIOLOGY | Facility: MEDICAL CENTER | Age: 83
End: 2018-04-12
Attending: EMERGENCY MEDICINE
Payer: MEDICARE

## 2018-04-12 ENCOUNTER — HOSPITAL ENCOUNTER (OUTPATIENT)
Facility: MEDICAL CENTER | Age: 83
End: 2018-04-14
Attending: EMERGENCY MEDICINE | Admitting: FAMILY MEDICINE
Payer: MEDICARE

## 2018-04-12 DIAGNOSIS — I10 HYPERTENSION, UNSPECIFIED TYPE: ICD-10-CM

## 2018-04-12 DIAGNOSIS — R55 NEAR SYNCOPE: ICD-10-CM

## 2018-04-12 LAB
ALBUMIN SERPL BCP-MCNC: 3.9 G/DL (ref 3.2–4.9)
ALBUMIN/GLOB SERPL: 1.1 G/DL
ALP SERPL-CCNC: 77 U/L (ref 30–99)
ALT SERPL-CCNC: 5 U/L (ref 2–50)
ANION GAP SERPL CALC-SCNC: 9 MMOL/L (ref 0–11.9)
APPEARANCE UR: CLEAR
AST SERPL-CCNC: 16 U/L (ref 12–45)
BASOPHILS # BLD AUTO: 1.3 % (ref 0–1.8)
BASOPHILS # BLD: 0.07 K/UL (ref 0–0.12)
BILIRUB SERPL-MCNC: 0.4 MG/DL (ref 0.1–1.5)
BILIRUB UR QL STRIP.AUTO: NEGATIVE
BNP SERPL-MCNC: 44 PG/ML (ref 0–100)
BUN SERPL-MCNC: 24 MG/DL (ref 8–22)
CALCIUM SERPL-MCNC: 9.6 MG/DL (ref 8.5–10.5)
CHLORIDE SERPL-SCNC: 104 MMOL/L (ref 96–112)
CO2 SERPL-SCNC: 24 MMOL/L (ref 20–33)
COLOR UR: YELLOW
CREAT SERPL-MCNC: 0.92 MG/DL (ref 0.5–1.4)
CULTURE IF INDICATED INDCX: NO UA CULTURE
EKG IMPRESSION: NORMAL
EOSINOPHIL # BLD AUTO: 0.21 K/UL (ref 0–0.51)
EOSINOPHIL NFR BLD: 3.9 % (ref 0–6.9)
ERYTHROCYTE [DISTWIDTH] IN BLOOD BY AUTOMATED COUNT: 46.5 FL (ref 35.9–50)
GLOBULIN SER CALC-MCNC: 3.6 G/DL (ref 1.9–3.5)
GLUCOSE SERPL-MCNC: 90 MG/DL (ref 65–99)
GLUCOSE UR STRIP.AUTO-MCNC: NEGATIVE MG/DL
HCT VFR BLD AUTO: 39 % (ref 37–47)
HGB BLD-MCNC: 13.3 G/DL (ref 12–16)
IMM GRANULOCYTES # BLD AUTO: 0 K/UL (ref 0–0.11)
IMM GRANULOCYTES NFR BLD AUTO: 0 % (ref 0–0.9)
KETONES UR STRIP.AUTO-MCNC: NEGATIVE MG/DL
LACTATE BLD-SCNC: 2.2 MMOL/L (ref 0.5–2)
LEUKOCYTE ESTERASE UR QL STRIP.AUTO: NEGATIVE
LYMPHOCYTES # BLD AUTO: 2.16 K/UL (ref 1–4.8)
LYMPHOCYTES NFR BLD: 39.8 % (ref 22–41)
MCH RBC QN AUTO: 31 PG (ref 27–33)
MCHC RBC AUTO-ENTMCNC: 34.1 G/DL (ref 33.6–35)
MCV RBC AUTO: 90.9 FL (ref 81.4–97.8)
MICRO URNS: ABNORMAL
MONOCYTES # BLD AUTO: 0.54 K/UL (ref 0–0.85)
MONOCYTES NFR BLD AUTO: 9.9 % (ref 0–13.4)
NEUTROPHILS # BLD AUTO: 2.45 K/UL (ref 2–7.15)
NEUTROPHILS NFR BLD: 45.1 % (ref 44–72)
NITRITE UR QL STRIP.AUTO: NEGATIVE
NRBC # BLD AUTO: 0 K/UL
NRBC BLD-RTO: 0 /100 WBC
PH UR STRIP.AUTO: 8.5 [PH]
PLATELET # BLD AUTO: 269 K/UL (ref 164–446)
PMV BLD AUTO: 9.7 FL (ref 9–12.9)
POTASSIUM SERPL-SCNC: 3.9 MMOL/L (ref 3.6–5.5)
PROT SERPL-MCNC: 7.5 G/DL (ref 6–8.2)
PROT UR QL STRIP: NEGATIVE MG/DL
RBC # BLD AUTO: 4.29 M/UL (ref 4.2–5.4)
RBC UR QL AUTO: NEGATIVE
SODIUM SERPL-SCNC: 137 MMOL/L (ref 135–145)
SP GR UR STRIP.AUTO: 1.01
TROPONIN I SERPL-MCNC: 0.01 NG/ML (ref 0–0.04)
UROBILINOGEN UR STRIP.AUTO-MCNC: 0.2 MG/DL
WBC # BLD AUTO: 5.4 K/UL (ref 4.8–10.8)

## 2018-04-12 PROCEDURE — 700105 HCHG RX REV CODE 258: Performed by: FAMILY MEDICINE

## 2018-04-12 PROCEDURE — 36415 COLL VENOUS BLD VENIPUNCTURE: CPT

## 2018-04-12 PROCEDURE — A9270 NON-COVERED ITEM OR SERVICE: HCPCS | Performed by: FAMILY MEDICINE

## 2018-04-12 PROCEDURE — 96374 THER/PROPH/DIAG INJ IV PUSH: CPT

## 2018-04-12 PROCEDURE — 93005 ELECTROCARDIOGRAM TRACING: CPT | Performed by: EMERGENCY MEDICINE

## 2018-04-12 PROCEDURE — 81003 URINALYSIS AUTO W/O SCOPE: CPT

## 2018-04-12 PROCEDURE — 93005 ELECTROCARDIOGRAM TRACING: CPT

## 2018-04-12 PROCEDURE — G0378 HOSPITAL OBSERVATION PER HR: HCPCS

## 2018-04-12 PROCEDURE — 80053 COMPREHEN METABOLIC PANEL: CPT

## 2018-04-12 PROCEDURE — 99285 EMERGENCY DEPT VISIT HI MDM: CPT

## 2018-04-12 PROCEDURE — 84484 ASSAY OF TROPONIN QUANT: CPT

## 2018-04-12 PROCEDURE — 87040 BLOOD CULTURE FOR BACTERIA: CPT | Mod: 91

## 2018-04-12 PROCEDURE — 700102 HCHG RX REV CODE 250 W/ 637 OVERRIDE(OP): Performed by: FAMILY MEDICINE

## 2018-04-12 PROCEDURE — 700111 HCHG RX REV CODE 636 W/ 250 OVERRIDE (IP): Performed by: FAMILY MEDICINE

## 2018-04-12 PROCEDURE — 85025 COMPLETE CBC W/AUTO DIFF WBC: CPT

## 2018-04-12 PROCEDURE — 71045 X-RAY EXAM CHEST 1 VIEW: CPT

## 2018-04-12 PROCEDURE — 83880 ASSAY OF NATRIURETIC PEPTIDE: CPT

## 2018-04-12 PROCEDURE — 83605 ASSAY OF LACTIC ACID: CPT

## 2018-04-12 RX ORDER — LOSARTAN POTASSIUM 50 MG/1
25 TABLET ORAL
Status: DISCONTINUED | OUTPATIENT
Start: 2018-04-12 | End: 2018-04-14 | Stop reason: HOSPADM

## 2018-04-12 RX ORDER — ISOSORBIDE MONONITRATE 30 MG/1
30 TABLET, EXTENDED RELEASE ORAL
Status: DISCONTINUED | OUTPATIENT
Start: 2018-04-12 | End: 2018-04-14 | Stop reason: HOSPADM

## 2018-04-12 RX ORDER — AMOXICILLIN 250 MG
2 CAPSULE ORAL 2 TIMES DAILY
Status: DISCONTINUED | OUTPATIENT
Start: 2018-04-12 | End: 2018-04-14 | Stop reason: HOSPADM

## 2018-04-12 RX ORDER — ISOSORBIDE MONONITRATE 30 MG/1
30 TABLET, EXTENDED RELEASE ORAL
Status: ON HOLD | COMMUNITY
End: 2018-05-28

## 2018-04-12 RX ORDER — AMLODIPINE BESYLATE 10 MG/1
10 TABLET ORAL DAILY
Status: DISCONTINUED | OUTPATIENT
Start: 2018-04-13 | End: 2018-04-14 | Stop reason: HOSPADM

## 2018-04-12 RX ORDER — ACETAMINOPHEN 325 MG/1
650 TABLET ORAL EVERY 6 HOURS PRN
Status: DISCONTINUED | OUTPATIENT
Start: 2018-04-12 | End: 2018-04-14 | Stop reason: HOSPADM

## 2018-04-12 RX ORDER — BISACODYL 10 MG
10 SUPPOSITORY, RECTAL RECTAL
Status: DISCONTINUED | OUTPATIENT
Start: 2018-04-12 | End: 2018-04-14 | Stop reason: HOSPADM

## 2018-04-12 RX ORDER — POLYETHYLENE GLYCOL 3350 17 G/17G
1 POWDER, FOR SOLUTION ORAL
Status: DISCONTINUED | OUTPATIENT
Start: 2018-04-12 | End: 2018-04-14 | Stop reason: HOSPADM

## 2018-04-12 RX ORDER — HYDRALAZINE HYDROCHLORIDE 50 MG/1
50 TABLET, FILM COATED ORAL EVERY 8 HOURS
Status: DISCONTINUED | OUTPATIENT
Start: 2018-04-12 | End: 2018-04-12

## 2018-04-12 RX ORDER — HYDRALAZINE HYDROCHLORIDE 20 MG/ML
10 INJECTION INTRAMUSCULAR; INTRAVENOUS EVERY 4 HOURS PRN
Status: DISCONTINUED | OUTPATIENT
Start: 2018-04-12 | End: 2018-04-13

## 2018-04-12 RX ORDER — ENALAPRILAT 1.25 MG/ML
1.25 INJECTION INTRAVENOUS EVERY 6 HOURS PRN
Status: DISCONTINUED | OUTPATIENT
Start: 2018-04-12 | End: 2018-04-14 | Stop reason: HOSPADM

## 2018-04-12 RX ORDER — SODIUM CHLORIDE 9 MG/ML
500 INJECTION, SOLUTION INTRAVENOUS ONCE
Status: COMPLETED | OUTPATIENT
Start: 2018-04-12 | End: 2018-04-12

## 2018-04-12 RX ADMIN — LOSARTAN POTASSIUM 25 MG: 50 TABLET, FILM COATED ORAL at 21:26

## 2018-04-12 RX ADMIN — ISOSORBIDE MONONITRATE 30 MG: 30 TABLET ORAL at 23:22

## 2018-04-12 RX ADMIN — SODIUM CHLORIDE 500 ML: 9 INJECTION, SOLUTION INTRAVENOUS at 21:26

## 2018-04-12 RX ADMIN — ASPIRIN 81 MG: 81 TABLET, COATED ORAL at 21:26

## 2018-04-12 RX ADMIN — ENALAPRILAT 1.25 MG: 1.25 INJECTION INTRAVENOUS at 23:07

## 2018-04-12 ASSESSMENT — LIFESTYLE VARIABLES: DO YOU DRINK ALCOHOL: NO

## 2018-04-12 ASSESSMENT — PAIN SCALES - GENERAL: PAINLEVEL_OUTOF10: 0

## 2018-04-13 ENCOUNTER — APPOINTMENT (OUTPATIENT)
Dept: RADIOLOGY | Facility: MEDICAL CENTER | Age: 83
End: 2018-04-13
Attending: FAMILY MEDICINE
Payer: MEDICARE

## 2018-04-13 LAB
LACTATE BLD-SCNC: 1.3 MMOL/L (ref 0.5–2)
TROPONIN I SERPL-MCNC: 0.01 NG/ML (ref 0–0.04)

## 2018-04-13 PROCEDURE — 700102 HCHG RX REV CODE 250 W/ 637 OVERRIDE(OP): Performed by: FAMILY MEDICINE

## 2018-04-13 PROCEDURE — G0378 HOSPITAL OBSERVATION PER HR: HCPCS

## 2018-04-13 PROCEDURE — 84484 ASSAY OF TROPONIN QUANT: CPT

## 2018-04-13 PROCEDURE — 74150 CT ABDOMEN W/O CONTRAST: CPT

## 2018-04-13 PROCEDURE — 82384 ASSAY THREE CATECHOLAMINES: CPT

## 2018-04-13 PROCEDURE — A9270 NON-COVERED ITEM OR SERVICE: HCPCS | Performed by: FAMILY MEDICINE

## 2018-04-13 PROCEDURE — 83605 ASSAY OF LACTIC ACID: CPT

## 2018-04-13 RX ORDER — DOXAZOSIN MESYLATE 1 MG/1
1 TABLET ORAL
Status: DISCONTINUED | OUTPATIENT
Start: 2018-04-13 | End: 2018-04-14 | Stop reason: HOSPADM

## 2018-04-13 RX ADMIN — DOXAZOSIN 1 MG: 1 TABLET ORAL at 20:06

## 2018-04-13 RX ADMIN — AMLODIPINE BESYLATE 10 MG: 10 TABLET ORAL at 09:21

## 2018-04-13 RX ADMIN — LOSARTAN POTASSIUM 25 MG: 50 TABLET, FILM COATED ORAL at 09:21

## 2018-04-13 RX ADMIN — ISOSORBIDE MONONITRATE 30 MG: 30 TABLET ORAL at 20:06

## 2018-04-13 RX ADMIN — ASPIRIN 81 MG: 81 TABLET, COATED ORAL at 09:21

## 2018-04-13 ASSESSMENT — PAIN SCALES - GENERAL
PAINLEVEL_OUTOF10: 0
PAINLEVEL_OUTOF10: 0

## 2018-04-13 NOTE — DISCHARGE PLANNING
This CM spoke to the pt, and pt's friend was in the room at the time.  Advance Directives packet was given.  No safety or other issues identified at this time.  Pt's friend did state that pt sometimes gets anxiety and asked if an anxiolytic could be written upon d/c.  I told her that her PCP should be the one ordering these medications, as pt may need to be titrated.  This does not appear to be a difficult discharge.    Care Transition Team Assessment    Information Source  Orientation : Oriented x 4  Information Given By: Patient    Readmission Evaluation  Is this a readmission?: No    Elopement Risk  Legal Hold: No  Ambulatory or Self Mobile in Wheelchair: Yes  Elopement Risk: Not at Risk for Elopement    Interdisciplinary Discharge Planning  Does Admitting Nurse Feel This Could be a Complex Discharge?: No  Lives with - Patient's Self Care Capacity: Alone and Able to Care For Self  Support Systems: Friends / Neighbors  Housing / Facility: 1 Story Apartment / Condo  Do You Take your Prescribed Medications Regularly: Yes  Able to Return to Previous ADL's: Yes  Mobility Issues: No  Patient Expects to be Discharged to:: Home  Assistance Needed: No  Durable Medical Equipment: Walker    Discharge Preparedness  What is your plan after discharge?: Home with help  What are your discharge supports?: Other (comment) (Friends)  Prior Functional Level: Ambulatory, Independent with Activities of Daily Living  Difficulity with ADLs: None    Functional Assesment  Prior Functional Level: Ambulatory, Independent with Activities of Daily Living    Finances  Financial Barriers to Discharge: No  Prescription Coverage: Yes         Values / Beliefs / Concerns  Values / Beliefs Concerns : No    Advance Directive  Advance Directive?: None  Advance Directive offered?: AD Booklet given              Discharge Risks or Barriers  Discharge risks or barriers?: No    Anticipated Discharge Information  Anticipated discharge disposition:  Home

## 2018-04-13 NOTE — FACE TO FACE
Face to Face Supporting Documentation - Home Health    The encounter with this patient was in whole or in part the primary reason for home health admission.    Date of encounter:   Patient:                    MRN:                       YOB: 2018  Livia Corea  [id number]  11/21/1926     Home health to see patient for:  Home health aide    Skilled need for:  Medication Management of blood pressure    Skilled nursing interventions to include:  Comment: Assistance with managment of blood pressure    Homebound status evidenced by:  Needs the assistance of another person in order to leave the home. Leaving home requires a considerable and taxing effort. There is a normal inability to leave the home.    Community Physician to provide follow up care: Estuardo Carrero M.D.     Optional Interventions? No      I certify the face to face encounter for this home health care referral meets the CMS requirements and the encounter/clinical assessment with the patient was, in whole, or in part, for the medical condition(s) listed above, which is the primary reason for home health care. Based on my clinical findings: the service(s) are medically necessary, support the need for home health care, and the homebound criteria are met.  I certify that this patient has had a face to face encounter by myself.  Molly Oliver M.D. - NPI: [id number]

## 2018-04-13 NOTE — H&P
"[facility] [place] [facility] [place] [facility] [place]     PATIENT ID:  NAME:  Livia Corea  MRN:               [id number]  YOB: 1926    Date of Admission: 4/12/2018     Attending: Alka Campa MD    Resident: Susie Xavier MD    Primary Care Physician:  Estuardo Carrero MD    CC:  Shortness of breath    HPI: Livia Corea is a 89 y.o. female who presented with sudden onset of shortness of breath while she was sitting up from a supine position on the couch earlier this evening.  She states she was resting on the couch watching TV, when she went to stand up and suddenly felt very short of breath.  She says she felt a little dizzy and that she \"might pass out\", but did not lose consciousness at any point.  She also felt a \"ping\" under her left breast that resolved instantaneously, but she is unable to state if this was a palpitation.  She denies chest pain during, before or after the event.  She called EMS and the episode resolved prior to arrival in the ED.  She reports that she came to the ER 3 weeks ago with a similar symptom.  Per chart review, she was admitted by the hospitalist for the exact symptom.  She had a stress test which was normal.  She experienced her symptom while she was inpatient and this was associated with an episode of high blood pressure.  She had her medication regimen adjusted at that time.    Workup in the ED, including CXR, EKG, numerous labs were all within normal limits.  Slight elevation of lactic acid, but patient denies recent illness, fevers, nausea, vomiting, cough, abdominal pain.  At this point in time, she reports feeling back to baseline.    Of note, patient is very anxious about her blood pressures.  She checks them very regularly and reports 190s systolic almost every morning.  She regularly runs in the 160s systolic.  She states she regularly stops taking medications due to various side effects.  This was most recently with hydralazine, which she states caused palpitations, and " "lisinopril, which she states caused leg swelling.  During the history and exam, her blood pressure was 160/66.    REVIEW OF SYSTEMS:   Ten systems reviewed and were negative except as noted in the HPI.                PAST MEDICAL HISTORY:  Past Medical History:   Diagnosis Date   • Circulation problem 01/2018   • Hypertension        PAST SURGICAL HISTORY:  No past surgical history on file.    FAMILY HISTORY:  No family history on file.    SOCIAL HISTORY:   Pt lives in [place] and lives alone.  Has family/friends nearby who check on her.  Smoking: Remote history  Etoh use: \"2 glasses of wine twice per week.\"  Drug use None    DIET:   Orders Placed This Encounter   Procedures   • Diet Order     Standing Status:   Standing     Number of Occurrences:   1     Order Specific Question:   Diet:     Answer:   Regular [1]       ALLERGIES:  Allergies   Allergen Reactions   • Lisinopril Swelling     Of lower extremities        OUTPATIENT MEDICATIONS:    Current Facility-Administered Medications:   •  amLODIPine (NORVASC) tablet 10 mg, 10 mg, Oral, DAILY, Susie Xavier M.D.  •  aspirin EC (ECOTRIN) tablet 81 mg, 81 mg, Oral, DAILY, Susie Xavier M.D.  •  isosorbide mononitrate SR (IMDUR) tablet 30 mg, 30 mg, Oral, QHS, Susie Xavier M.D.  •  senna-docusate (PERICOLACE or SENOKOT S) 8.6-50 MG per tablet 2 Tab, 2 Tab, Oral, BID **AND** polyethylene glycol/lytes (MIRALAX) PACKET 1 Packet, 1 Packet, Oral, QDAY PRN **AND** magnesium hydroxide (MILK OF MAGNESIA) suspension 30 mL, 30 mL, Oral, QDAY PRN **AND** bisacodyl (DULCOLAX) suppository 10 mg, 10 mg, Rectal, QDAY PRN, Suise Xavier M.D.  •  acetaminophen (TYLENOL) tablet 650 mg, 650 mg, Oral, Q6HRS PRN, Susie Xavier M.D.  •  NS (BOLUS) infusion 500 mL, 500 mL, Intravenous, Once, Susie Xavier M.D.    Current Outpatient Prescriptions:   •  isosorbide mononitrate SR (IMDUR) 30 MG TABLET SR 24 HR, Take 30 mg by mouth every bedtime., Disp: , Rfl:   •  Garlic 10 MG Cap, Take 2 Caps by " "mouth every day., Disp: , Rfl:   •  hydrochlorothiazide (MICROZIDE) 12.5 MG capsule, Take 12.5 mg by mouth every day., Disp: , Rfl:   •  hydrALAZINE (APRESOLINE) 25 MG Tab, Take 2 Tabs by mouth every 8 hours., Disp: 60 Tab, Rfl: 1  •  Omega-3 Fatty Acids (FISH OIL) 1000 MG Cap capsule, Take 2 Caps by mouth every day., Disp: 90 Cap, Rfl: 3  •  ascorbic acid (ASCORBIC ACID) 500 MG Tab, Take 500 mg by mouth every day., Disp: , Rfl:   •  cyanocobalamin (VITAMIN B-12) 500 MCG Tab, Take 500 mcg by mouth every day., Disp: , Rfl:   •  acetaminophen (TYLENOL) 500 MG Tab, Take 500 mg by mouth every 6 hours as needed for Mild Pain., Disp: , Rfl:   •  amlodipine (NORVASC) 10 MG Tab, Take 10 mg by mouth every day., Disp: , Rfl:   •  aspirin EC (ECOTRIN) 81 MG Tablet Delayed Response, Take 81 mg by mouth every day., Disp: , Rfl:     PHYSICAL EXAM:  Vitals:    18 1734 18 1736 18 1800 18 1900   BP: (!) 176/81      Pulse: 81 80 76 62   Resp: (!) 24      Temp: 37.1 °C (98.8 °F)      SpO2: 96% 95% 95% 92%   Weight: 60.5 kg (133 lb 6.1 oz)      Height: 1.638 m (5' 4.5\")      , Temp (24hrs), Av.1 °C (98.8 °F), Min:37.1 °C (98.8 °F), Max:37.1 °C (98.8 °F)  , Pulse Oximetry: 92 %    General: Pt resting in NAD, cooperative   Skin:  Warm and dry.  No rashes.  Few old bruises noted on forearms.  HEENT: NC/AT. PERRL. EOMI. MMM. No nasal discharge. Oropharynx nonerythematous without exudate/plaques  Neck:  Supple without lymphadenopathy or rigidity. No JVD   Lungs:  Symmetrical.  CTAB with no W/R/R.  Good air movement   Cardiovascular:  S1/S2 RRR without M/R/G.  Abdomen:  Abdomen is soft NT/ND. +BS. No masses noted.  Extremities:  Full range of motion. No gross deformities noted. 2+ pulses in all extremities. No C/C/E   Spine:  Straight without vertebral anomalies.  CNS:  Muscle tone is normal. Cranial nerves II-XII grossly intact. 2+ DTRs.       LAB TESTS:   Recent Labs      18   1735   WBC  5.4   RBC  4.29 " "  HEMOGLOBIN  13.3   HEMATOCRIT  39.0   MCV  90.9   MCH  31.0   RDW  46.5   PLATELETCT  269   MPV  9.7   NEUTSPOLYS  45.10   LYMPHOCYTES  39.80   MONOCYTES  9.90   EOSINOPHILS  3.90   BASOPHILS  1.30     Recent Labs      04/12/18   1735   TROPONINI  0.01   BNPBTYPENAT  44     Recent Labs      04/12/18   1735   SODIUM  137   POTASSIUM  3.9   CHLORIDE  104   CO2  24   BUN  24*   CREATININE  0.92   CALCIUM  9.6   ALBUMIN  3.9       CULTURES:   Results     Procedure Component Value Units Date/Time    URINALYSIS CULTURE, IF INDICATED [[id number]]  (Abnormal) Collected:  04/12/18 1844    Order Status:  Completed Specimen:  Urine Updated:  04/12/18 1851     Color Yellow     Character Clear     Specific Gravity 1.009     Ph 8.5 (A)     Glucose Negative mg/dL      Ketones Negative mg/dL      Protein Negative mg/dL      Bilirubin Negative     Urobilinogen, Urine 0.2     Nitrite Negative     Leukocyte Esterase Negative     Occult Blood Negative     Micro Urine Req see below     Comment: Microscopic examination not performed when specimen is clear  and chemically negative for protein, blood, leukocyte esterase  and nitrite.          Culture Indicated No UA Culture     BLOOD CULTURE [[id number]] Collected:  04/12/18 1755    Order Status:  Completed Specimen:  Blood from Peripheral Updated:  04/12/18 1820    Narrative:       Per Hospital Policy: Only change Specimen Src: to \"Line\" if  specified by physician order.    BLOOD CULTURE [[id number]] Collected:  04/12/18 1750    Order Status:  Completed Specimen:  Blood from Peripheral Updated:  04/12/18 1806    Narrative:       Per Hospital Policy: Only change Specimen Src: to \"Line\" if  specified by physician order.          IMAGES:  CXR: WNL    CONSULTS:   None    ASSESSMENT/PLAN: 89 y.o. female admitted for shortness of breath and hypertension.    #Shortness of breath: Most likely etiology based on recent inpatient stay and extensive workup is uncontrolled hypertension with possible " "anxiety component.  From history, not on very stable regimen of medications.  Normal stress test during last hospitalization.  -Tele monitoring/admit for observation  -Will restart home meds  -Enalapril and hydralazine ordered IV PRN--likely very safe considering reactions not true allergies  -Will likely need adjustment to home medication list, but adherence may be an issue  -Will repeat one additional troponin    #Hypertension  -Continue home Norvasc and isosorbide  -Per patient, she did not like taking hydrochlorothiazide, so she stopped taking it, along with lisinopril and hydralazine.  -Will try low-dose losartan since lisinopril \"leg swelling\" not a true allergy.  -PRNs per above    #Lactic acidosis: Very unlikely from infectious process...possible dehydration?  -Will give small bolus of IVF--500cc  -Repeat in AM    FEN/GI  -Regular diet  -Small bolus per above, no MIV at this time    "

## 2018-04-13 NOTE — ED TRIAGE NOTES
Liviaalonso LucasNolasco Wilson  89 y.o. Female  BIB EMS with   Chief Complaint   Patient presents with   • Shortness of Breath     Pt c/o sudden onset of SOB at 1645. Pt was laying down when episode occured. Pt states s/s are better than when she called EMS. Pt reports she was seen in ED 3wks ago for same CC but unsure of diagnosis.     Pt in NAD, speaking full sentences. Pt amb to restroom using personal FWW upon arrival to ED. No increased SOB noted with ambulation.  Labs drawn, labeled at bedside and sent to lab.   Bedside report to Rosio ARANA.   Call light within reach.

## 2018-04-13 NOTE — PROGRESS NOTES
Assessment done, Pt educated on plan of care. Waiting on dr rounds  Patient resting in bed, no signs of distress,  no complains of pain at this time. Call light within reach,  side rails up, will monitor. Condition stable

## 2018-04-13 NOTE — ED NOTES
Pt in nad. resp equal and unlabored. Denies current sob. Denies pain. Pt notified of plans for admission. Pt verbalized understanding and agreement. Pt denies any further needs. A &o x3.

## 2018-04-13 NOTE — ED NOTES
Pt in nad. Up, ambulatory to bathroom with walker. Pt denies any further needs. Awaiting bed to be cleaned upstairs for admit.

## 2018-04-13 NOTE — ED NOTES
Pt ambulatory to bathroom with walker with steady gait. Cleansed self off with wipes and urine collected in hat. Sample sent per orders. Ongoing monitoring.

## 2018-04-13 NOTE — PROGRESS NOTES
"[facility] [place] [facility] [place]GRESS NOTE     Attending: Dr. Campa  Resident: Dr. Oliver    PATIENT: Livia Corea; [id number]; 11/21/1926; Hospital Day: 2    ID: 89 y.o. female admitted for shortness of breath and HTN    SUBJECTIVE: No acute events overnight. CM met with patient and friend yesterday evening and patients friend states she often gets anxious and requested an anxiolytic be written for patient upon discharge. Pt  denies symptoms of chest pain, palpitations, SOB since admission. She has been up out of bed independently but is unsteady. She has a normal appetite, is voiding and stooling appropriately.    OBJECTIVE: /61   Pulse 67   Temp 36.7 °C (98 °F)   Resp 15   Ht 1.638 m (5' 4.5\")   Wt 60.4 kg (133 lb 2.5 oz)   SpO2 95%   Breastfeeding? No   BMI 22.50 kg/m²   Vitals:    04/12/18 2310 04/13/18 0011 04/13/18 0127 04/13/18 0400   BP: (!) 167/124 140/65 129/60 125/61   Pulse: 64 62 (!) 53 67   Resp:  17  15   Temp:  36.7 °C (98 °F)  36.7 °C (98 °F)   SpO2:  94%  95%   Weight:       Height:       Pulse Oximetry: 95 %, O2 (LPM): 0, O2 Delivery: None (Room Air)    No intake or output data in the 24 hours ending 04/13/18 0634   DIET:   Orders Placed This Encounter   Procedures   • Diet Order     Standing Status:   Standing     Number of Occurrences:   1     Order Specific Question:   Diet:     Answer:   Regular [1]       MEDS:  Medications   amLODIPine (NORVASC) tablet 10 mg (not administered)   aspirin EC (ECOTRIN) tablet 81 mg (81 mg Oral Given 4/12/18 2126)   isosorbide mononitrate SR (IMDUR) tablet 30 mg (30 mg Oral Given 4/12/18 2322)   senna-docusate (PERICOLACE or SENOKOT S) 8.6-50 MG per tablet 2 Tab (2 Tabs Oral Refused 4/12/18 2100)     And   polyethylene glycol/lytes (MIRALAX) PACKET 1 Packet (not administered)     And   magnesium hydroxide (MILK OF MAGNESIA) suspension 30 mL (not administered)     And   bisacodyl (DULCOLAX) suppository 10 mg (not administered)   acetaminophen (TYLENOL) " tablet 650 mg (not administered)   hydrALAZINE (APRESOLINE) injection 10 mg ( Intravenous Canceled Entry 4/12/18 2257)   enalaprilat (VASOTEC) injection 1.25 mg (1.25 mg Intravenous Given 4/12/18 2307)   losartan (COZAAR) tablet 25 mg (25 mg Oral Given 4/12/18 2126)   NS (BOLUS) infusion 500 mL (500 mL Intravenous Given 4/12/18 2126)       PE:  General: Pt resting in NAD, cooperative   Skin:  Warm and dry.  No rashes.  Few old bruises noted on forearms.  HEENT: NC/AT. PERRL. EOMI. MMM. No nasal discharge. Oropharynx nonerythematous without exudate/plaques  Neck:  Supple without lymphadenopathy or rigidity. No JVD   Lungs:  Symmetrical.  CTAB with no W/R/R.  Good air movement   Cardiovascular:  S1/S2 RRR without M/R/G.  Abdomen:  Abdomen is soft NT/ND. +BS. No masses noted.  Extremities:  Full range of motion. No gross deformities noted. 2+ pulses in all extremities. No C/C/E   Spine:  Straight without vertebral anomalies.  CNS:  Muscle tone is normal. Cranial nerves II-XII grossly intact. 2+ DTRs.       LABS:  Lab Results   Component Value Date    WBC 5.4 04/12/2018    HEMOGLOBIN 13.3 04/12/2018    HEMATOCRIT 39.0 04/12/2018    PLATELETCT 269 04/12/2018    MCV 90.9 04/12/2018     Lab Results   Component Value Date    SODIUM 137 04/12/2018    POTASSIUM 3.9 04/12/2018    CHLORIDE 104 04/12/2018    CO2 24 04/12/2018    BUN 24 (H) 04/12/2018    CREATININE 0.92 04/12/2018    GLUCOSE 90 04/12/2018     No results for input(s): POCGLUCOSE in the last 72 hours.    MICROBIOLOGY:  No results for input(s): BLOODCULTU in the last 72 hours.     IMAGING:  DX-CHEST-PORTABLE (1 VIEW)   Final Result      1.  No evidence of acute cardiopulmonary process.            ASSESSMENT/PLAN:  Livia Corea is a 89 y.o. female with admitted for shortness of breath and hypertension.     #Shortness of breath  #Hypertension  -Most likely etiology is uncontrolled hypertension with an associated anxiety component   -Home regimen is Imdur 30  mg qd, Amlodipine 10 mg qd, and HCTZ 12.5 mg daily  -D/C'ed from admittance in 3/18 for same. At that visit she had a normal stress test and renal vascular US that was significant for no renal artery stenosis  -Most recently had renin and aldosterone levels ordered by PCP which showed a slightly elevated aldosterone level. PAC/PRA 20. Thus, an adrenal CT to evaluate for hyperaldosteronism is pending.     Plan:  -Tele monitoring/admit for observation  -Continue Amlodipine 10 mg qd and Imdur 30 mg qd, will trial Losartan 25 mg qd  -Enalapril and hydralazine ordered IV PRN-  -consider work-up for secondary causes of hypertension with plasma and urine metanephrine's, and adrenal CT    #Lactic acidosis, resolved  -Improved after saline bolus    Core Measures:  Lines: PIV  Tubes: none  Diet: regular  ABX: none  DVT PPX: SCD's  CODE STATUS: Full code     Dispo: Maintain inpatient for further evaluation of secondary causes of HTN. Consider discharging to group home.

## 2018-04-13 NOTE — ED PROVIDER NOTES
"ED Provider Note    CHIEF COMPLAINT  Chief Complaint   Patient presents with   • Shortness of Breath     Pt c/o sudden onset of SOB at 1645. Pt was laying down when episode occured. Pt states s/s are better than when she called EMS. Pt reports she was seen in ED 3wks ago for same CC but unsure of diagnosis.       HPI  Livia Corea is a 89 y.o. female who presents to the emergency department stating that she is feeling weak and at home tonight she felt short of breath and thought that she might pass out. The patient says she is now feeling much much better now that she is \"around people who can take care of me\" the patient was just here a couple of days ago with bruises on her right forearm and left elbow and was evaluated and no etiology was discovered and she was discharged home.    REVIEW OF SYSTEMS no fever no hemoptysis no vomiting. All other systems negative    PAST MEDICAL HISTORY  Past Medical History:   Diagnosis Date   • Circulation problem 01/2018   • Hypertension        FAMILY HISTORY  No family history on file.    SOCIAL HISTORY  Social History     Social History   • Marital status: Single     Spouse name: N/A   • Number of children: N/A   • Years of education: N/A     Social History Main Topics   • Smoking status: Former Smoker     Quit date: 2/25/1988   • Smokeless tobacco: Never Used   • Alcohol use Yes      Comment: 1 glass of wine x 2 days a week   • Drug use: No   • Sexual activity: Not on file     Other Topics Concern   • Not on file     Social History Narrative   • No narrative on file       SURGICAL HISTORY  No past surgical history on file.    CURRENT MEDICATIONS  Home Medications     Reviewed by Mikki Sims, Pharmacy Intern (Pharmacy Intern) on 04/12/18 at 1915  Med List Status: Complete   Medication Last Dose Status   acetaminophen (TYLENOL) 500 MG Tab WEEK AGO Active   amlodipine (NORVASC) 10 MG Tab 4/12/2018 Active   ascorbic acid (ASCORBIC ACID) 500 MG Tab 4/11/2018 Active " "  aspirin EC (ECOTRIN) 81 MG Tablet Delayed Response 4/11/2018 Active   cyanocobalamin (VITAMIN B-12) 500 MCG Tab 4/11/2018 Active   Garlic 10 MG Cap 4/12/2018 Active   hydrALAZINE (APRESOLINE) 25 MG Tab STOPPED TAKING Active   hydrochlorothiazide (MICROZIDE) 12.5 MG capsule 4/12/2018 Active   isosorbide mononitrate SR (IMDUR) 30 MG TABLET SR 24 HR 4/11/2018 Active   Omega-3 Fatty Acids (FISH OIL) 1000 MG Cap capsule 4/11/2018 Active                ALLERGIES  Allergies   Allergen Reactions   • Lisinopril Swelling     Of lower extremities        PHYSICAL EXAM  VITAL SIGNS: BP (!) 176/81   Pulse 62   Temp 37.1 °C (98.8 °F)   Resp (!) 24   Ht 1.638 m (5' 4.5\")   Wt 60.5 kg (133 lb 6.1 oz)   SpO2 92%   BMI 22.54 kg/m²    Oxygen saturation is interpreted as adequate  Constitutional: Awake and talkative and well-appearing individual in no distress  HENT: No sign of trauma to the head  Eyes: Pupils round extraocular motion present  Neck: Trachea midline no JVD  Cardiovascular: Regular rate and rhythm  Lungs: Clear and equal bilaterally  Abdomen/Back: Soft nondistended nontender no peritoneal findings  Skin: Warm and dry  Musculoskeletal: No acute bony deformity no leg edema or calf tenderness  Neurologic: Awake verbal moving all extremities    Laboratory  TBC shows white blood cell count of 5.4 hemoglobin and crit 13.3 patient metabolic panels unremarkable troponin is normal BMP is normal LFTs are unremarkable lactic acid level is minimally elevated at 2.2 urinalysis is negative for nitrite and leukocyte esterase and blood    EKG interpretation  SINUS RHYTHM  NONSPECIFIC INTRAVENTRICULAR CONDUCTION DELAY  Compared to ECG 03/18/2018 08:13:44  Sinus bradycardia no longer present      Radiology  DX-CHEST-PORTABLE (1 VIEW)   Final Result      1.  No evidence of acute cardiopulmonary process.            MEDICAL DECISION MAKING and DISPOSITION  In the emergency department patient clinically looks well however she has had " 2 ER visits in the last week and says that she felt like she might pass out tonight and therefore I have reviewed the case with the family practice resident and the patient will be admitted for further evaluation and treatment    IMPRESSION  1. Near syncopal episode  2. Minimally elevated lactic acid level         Electronically signed by: Jose Raul Ram, 4/12/2018 7:44 PM

## 2018-04-13 NOTE — ED NOTES
Assumed care of pt from SUMIT Burk. Pt changed to gowtushar, hooked to monitor - VSS. Lungs clear. Labs sent. Fall precautions in place. Call bell in reach. Awaiting MD eval/orders.

## 2018-04-14 ENCOUNTER — PATIENT OUTREACH (OUTPATIENT)
Dept: HEALTH INFORMATION MANAGEMENT | Facility: OTHER | Age: 83
End: 2018-04-14

## 2018-04-14 VITALS
SYSTOLIC BLOOD PRESSURE: 128 MMHG | WEIGHT: 133.16 LBS | BODY MASS INDEX: 22.19 KG/M2 | OXYGEN SATURATION: 94 % | RESPIRATION RATE: 20 BRPM | HEIGHT: 65 IN | DIASTOLIC BLOOD PRESSURE: 58 MMHG | TEMPERATURE: 97.8 F | HEART RATE: 60 BPM

## 2018-04-14 PROCEDURE — A9270 NON-COVERED ITEM OR SERVICE: HCPCS | Performed by: FAMILY MEDICINE

## 2018-04-14 PROCEDURE — G0378 HOSPITAL OBSERVATION PER HR: HCPCS

## 2018-04-14 PROCEDURE — 700102 HCHG RX REV CODE 250 W/ 637 OVERRIDE(OP): Performed by: FAMILY MEDICINE

## 2018-04-14 RX ORDER — LOSARTAN POTASSIUM 25 MG/1
25 TABLET ORAL DAILY
Qty: 30 TAB | Refills: 0 | Status: SHIPPED | OUTPATIENT
Start: 2018-04-15 | End: 2018-05-15

## 2018-04-14 RX ORDER — DOXAZOSIN MESYLATE 1 MG/1
1 TABLET ORAL
Qty: 30 TAB | Refills: 0 | Status: SHIPPED | OUTPATIENT
Start: 2018-04-14 | End: 2018-05-14

## 2018-04-14 RX ADMIN — ASPIRIN 81 MG: 81 TABLET, COATED ORAL at 09:28

## 2018-04-14 RX ADMIN — LOSARTAN POTASSIUM 25 MG: 50 TABLET, FILM COATED ORAL at 09:28

## 2018-04-14 RX ADMIN — AMLODIPINE BESYLATE 10 MG: 10 TABLET ORAL at 09:28

## 2018-04-14 ASSESSMENT — PAIN SCALES - GENERAL: PAINLEVEL_OUTOF10: 0

## 2018-04-14 NOTE — DISCHARGE PLANNING
Choice form faxed to [facility] [place] for [facility].  Pt verbalizes no other needs for dc. Pt lives alone independently and is having a friend pick her up on dc.

## 2018-04-14 NOTE — DISCHARGE INSTRUCTIONS
Discharge Instructions    Discharged to home by car with friend. Discharged via wheelchair, hospital escort: Yes.  Special equipment needed: Not Applicable    Be sure to schedule a follow-up appointment with your primary care doctor or any specialists as instructed.     Discharge Plan:   Diet Plan: Discussed  Activity Level: Discussed  Confirmed Follow up Appointment: Patient to Call and Schedule Appointment  Confirmed Symptoms Management: Discussed  Medication Reconciliation Updated: Yes  Influenza Vaccine Indication: Not indicated: Previously immunized this influenza season and > 8 years of age    I understand that a diet low in cholesterol, fat, and sodium is recommended for good health. Unless I have been given specific instructions below for another diet, I accept this instruction as my diet prescription.   Other diet: LOW FAT    Special Instructions: None    · Is patient discharged on Warfarin / Coumadin?   No     Depression / Suicide Risk    As you are discharged from this [facility] facility, it is important to learn how to keep safe from harming yourself.    Recognize the warning signs:  · Abrupt changes in personality, positive or negative- including increase in energy   · Giving away possessions  · Change in eating patterns- significant weight changes-  positive or negative  · Change in sleeping patterns- unable to sleep or sleeping all the time   · Unwillingness or inability to communicate  · Depression  · Unusual sadness, discouragement and loneliness  · Talk of wanting to die  · Neglect of personal appearance   · Rebelliousness- reckless behavior  · Withdrawal from people/activities they love  · Confusion- inability to concentrate     If you or a loved one observes any of these behaviors or has concerns about self-harm, here's what you can do:  · Talk about it- your feelings and reasons for harming yourself  · Remove any means that you might use to hurt yourself (examples: pills, rope, extension  cords, firearm)  · Get professional help from the community (Mental Health, Substance Abuse, psychological counseling)  · Do not be alone:Call your Safe Contact- someone whom you trust who will be there for you.  · Call your local CRISIS HOTLINE 733-3614 or 954-458-7153  · Call your local Children's Mobile Crisis Response Team [place] (439) 645-2803 or www.EpiGaN  · Call the toll free National Suicide Prevention Hotlines   · National Suicide Prevention Lifeline 026-659-YCUX (0545)  · National Hope Line Network 800-SUICIDE (861-1458)

## 2018-04-14 NOTE — NON-PROVIDER
[facility] FAMILY MEDICINE PROGRESS NOTE     Attending: Michael     Resident: Jose Raul    PATIENT: Livia Corea; [id number]; 11/21/1926    ID: 89 y.o. female admitted for SOB + HTN    SUBJECTIVE: No acute events overnight. BP improved w/ range of 116-159/60-79 (except for one measurement of 183/32 at 0717) w/ pt on amlodipine, doxazosin, Imdur, and losartan. Pt tolerating med changes well. Home health ordered for pt. Reports to be asymptomatic.    OBJECTIVE:     Vitals:    04/13/18 2034 04/13/18 2304 04/14/18 0409 04/14/18 0743   BP: 125/61 142/67 138/61 134/62   Pulse: 60 73 71 65   Resp: 16 18 16 20   Temp: 37 °C (98.6 °F) 36.7 °C (98.1 °F) 37.4 °C (99.3 °F) 36.9 °C (98.4 °F)   SpO2: 95% 93% 95% 93%   Weight:       Height:         No intake or output data in the 24 hours ending 04/14/18 0846    PE:  General: No acute distress, resting comfortably in bed.  HEENT: NC/AT. EOMI. MMM. Oropharynx clear. No carotid bruits appreciated  Cardiovascular: RRR with no M/R/G.  Respiratory: Symmetrical chest. CTAB with no W/R/R  Abdomen: BS normoactive  EXT:  GAMBINO, %/5 strength, No C/C/E 2+ radial pulses, DP Pulses 1+  Neuro: non focal with no numbness, tingling or changes in sensation    LABS:  Recent Labs      04/12/18   1735   WBC  5.4   RBC  4.29   HEMOGLOBIN  13.3   HEMATOCRIT  39.0   MCV  90.9   MCH  31.0   RDW  46.5   PLATELETCT  269   MPV  9.7   NEUTSPOLYS  45.10   LYMPHOCYTES  39.80   MONOCYTES  9.90   EOSINOPHILS  3.90   BASOPHILS  1.30     Recent Labs      04/12/18   1735   SODIUM  137   POTASSIUM  3.9   CHLORIDE  104   CO2  24   BUN  24*   CREATININE  0.92   CALCIUM  9.6   ALBUMIN  3.9     Estimated GFR/CRCL = Estimated Creatinine Clearance: 35.1 mL/min (by C-G formula based on SCr of 0.92 mg/dL).  Recent Labs      04/12/18   1735   GLUCOSE  90     Recent Labs      04/12/18   1735   ASTSGOT  16   ALTSGPT  5   TBILIRUBIN  0.4   ALKPHOSPHAT  77   GLOBULIN  3.6*     Recent Labs      04/12/18   1735  04/13/18   0411   TROPONINI   0.01  0.01   BNPBTYPENAT  44   --            Invalid input(s): [id number]  No results for input(s): INR, APTT, FIBRINOGEN in the last 72 hours.    Invalid input(s): DIMER    MICROBIOLOGY: BCx NGTD    IMAGING: CT abdomen:   1.  Minimal enlargement of the body of the left adrenal gland with mean Hounsfield unit density = 15 consistent with benign adrenal lipophilic adenoma.    2.  No retroperitoneal mass.    3.  Echogenic sludge or small calcified gallstones in the dependent portion of the gallbladder. No biliary dilatation.    4.  Minimal bilateral lower lobe atelectasis or pneumonia and minimal bilateral pleural effusions.  MEDS:  Current Facility-Administered Medications   Medication Last Dose   • doxazosin (CARDURA) tablet 1 mg 1 mg at 04/13/18 2006   • amLODIPine (NORVASC) tablet 10 mg 10 mg at 04/13/18 0921   • aspirin EC (ECOTRIN) tablet 81 mg 81 mg at 04/13/18 0921   • isosorbide mononitrate SR (IMDUR) tablet 30 mg 30 mg at 04/13/18 2006   • senna-docusate (PERICOLACE or SENOKOT S) 8.6-50 MG per tablet 2 Tab      And   • polyethylene glycol/lytes (MIRALAX) PACKET 1 Packet      And   • magnesium hydroxide (MILK OF MAGNESIA) suspension 30 mL      And   • bisacodyl (DULCOLAX) suppository 10 mg     • acetaminophen (TYLENOL) tablet 650 mg     • enalaprilat (VASOTEC) injection 1.25 mg 1.25 mg at 04/12/18 2307   • losartan (COZAAR) tablet 25 mg 25 mg at 04/13/18 0921       PROBLEM LIST:  No problems updated.    ASSESSMENT/PLAN: Livia Corea is a 89 yo F admitted w/ SOB and HTN    #SOB  #HTN  -Pt reports w/ incident of sudden onset SOB upon sitting up, along with BP elevated readings in the 190's every morning  -Home regimen is Imdur 30 mg qd, Amlodipine 10 mg qd, and HCTZ 12.5 mg daily; changed while inpatient  -Admitted 3/18 for similar event. At that time stress test and renal vascular US were wnl  -Renin and aldosterone levels ordered by PCP which showed a slightly elevated aldosterone level. PAC/PRA 20. So  ordered adrenal CT to evaluate for hyperaldosteronism. It showed minimal Left adrenal gland body enlargement c/w benign adrenal lipophilic adenoma, no retroperitoneal mass.   -Continue Amlodipine 10 mg qd and Imdur 30 mg qd, will trial Losartan 25 mg qd and Doxazosin 1mg qhs  - Home health ordered  - Urine/plasma metanephrines pending  - F/U outpatient    #Lactic Acidosis, resolved  -In ED LA 2.2, yesterday after bolus of 1.3      CODE STATUS: FULL    Dispo: DC in pm w/ home health f/u and to f/u with PCP on metanephrine studies

## 2018-04-14 NOTE — PROGRESS NOTES
[facility] FAMILY MEDICINE PROGRESS NOTE     Attending: Dr. Rodriguez    Resident: Dr. Blunt    PATIENT: Livia Corea; [id number]; 11/21/1926    ID: 89 y.o. female admitted for HTN and shortness of breath    SUBJECTIVE: No acute events overnight, blood pressures improved, remained wnl except 1 elevated reading 183/83. Home health ordered yesterday and remains to be set up. Patient tolerating medication changes well.     OBJECTIVE:     Vitals:    04/13/18 2001 04/13/18 2034 04/13/18 2304 04/14/18 0409   BP: 129/61 125/61 142/67 138/61   Pulse: 66 60 73 71   Resp: 16 16 18 16   Temp:  37 °C (98.6 °F) 36.7 °C (98.1 °F) 37.4 °C (99.3 °F)   SpO2: 96% 95% 93% 95%   Weight:       Height:         No intake or output data in the 24 hours ending 04/14/18 0646    PE:  General: No acute distress, resting comfortably in bed.  HEENT: NC/AT. PERRLA. EOMI. MMM  Cardiovascular: RRR with no M/R/G.  Respiratory: Symmetrical chest. CTAB with no W/R/R  Abdomen: soft, NT/ND, no masses, +BS   EXT:  GAMBINO, 5/5 strength, No C/C/E 2+ pulses   Neuro: non focal with no numbness, tingling or changes in sensation    LABS:  Recent Labs      04/12/18   1735   WBC  5.4   RBC  4.29   HEMOGLOBIN  13.3   HEMATOCRIT  39.0   MCV  90.9   MCH  31.0   RDW  46.5   PLATELETCT  269   MPV  9.7   NEUTSPOLYS  45.10   LYMPHOCYTES  39.80   MONOCYTES  9.90   EOSINOPHILS  3.90   BASOPHILS  1.30     Recent Labs      04/12/18   1735   SODIUM  137   POTASSIUM  3.9   CHLORIDE  104   CO2  24   BUN  24*   CREATININE  0.92   CALCIUM  9.6   ALBUMIN  3.9     Estimated GFR/CRCL = Estimated Creatinine Clearance: 35.1 mL/min (by C-G formula based on SCr of 0.92 mg/dL).  Recent Labs      04/12/18   1735   GLUCOSE  90     Recent Labs      04/12/18   1735   ASTSGOT  16   ALTSGPT  5   TBILIRUBIN  0.4   ALKPHOSPHAT  77   GLOBULIN  3.6*     Recent Labs      04/12/18   1735  04/13/18   0411   TROPONINI  0.01  0.01   BNPBTYPENAT  44   --            Invalid input(s): [id number]  No results  for input(s): INR, APTT, FIBRINOGEN in the last 72 hours.    Invalid input(s): DIMER    MICROBIOLOGY: 2/2 blood cx pending    IMAGING: CT abdomen:   1.  Minimal enlargement of the body of the left adrenal gland with mean Hounsfield unit density = 15 consistent with benign adrenal lipophilic adenoma.    2.  No retroperitoneal mass.    3.  Echogenic sludge or small calcified gallstones in the dependent portion of the gallbladder. No biliary dilatation.    4.  Minimal bilateral lower lobe atelectasis or pneumonia and minimal bilateral pleural effusions.    MEDS:  Current Facility-Administered Medications   Medication Last Dose   • doxazosin (CARDURA) tablet 1 mg 1 mg at 04/13/18 2006   • amLODIPine (NORVASC) tablet 10 mg 10 mg at 04/13/18 0921   • aspirin EC (ECOTRIN) tablet 81 mg 81 mg at 04/13/18 0921   • isosorbide mononitrate SR (IMDUR) tablet 30 mg 30 mg at 04/13/18 2006   • senna-docusate (PERICOLACE or SENOKOT S) 8.6-50 MG per tablet 2 Tab      And   • polyethylene glycol/lytes (MIRALAX) PACKET 1 Packet      And   • magnesium hydroxide (MILK OF MAGNESIA) suspension 30 mL      And   • bisacodyl (DULCOLAX) suppository 10 mg     • acetaminophen (TYLENOL) tablet 650 mg     • enalaprilat (VASOTEC) injection 1.25 mg 1.25 mg at 04/12/18 2307   • losartan (COZAAR) tablet 25 mg 25 mg at 04/13/18 0921       PROBLEM LIST:  No problems updated.    ASSESSMENT/PLAN: Livia Corea is a 89 y.o. female with admitted for shortness of breath and hypertension.     #Shortness of breath  #Hypertension  -Most likely etiology is uncontrolled hypertension with an associated anxiety component   -Home regimen is Imdur 30 mg qd, Amlodipine 10 mg qd, and HCTZ 12.5 mg daily; changed while inpatient  -D/C'ed from admittance in 3/18 for same. At that visit she had a normal stress test and renal vascular US that was significant for no renal artery stenosis  -Most recently had renin and aldosterone levels ordered by PCP which showed a  slightly elevated aldosterone level. PAC/PRA 20. Thus, an adrenal CT to evaluate for hyperaldosteronism showed minimal Left adrenal gland body enlargement c/w benign adrenal lipophilic adenoma, no retroperitoneal mass.      Plan:  - Continue Amlodipine 10 mg qd and Imdur 30 mg qd, will trial Losartan 25 mg qd and Doxazosin 1mg qhs  - discharge home today as BP's wnl  - Home health order placed  - plasma and urine metanephrine results to be followed outpatient     Core Measures:  Lines: PIV  Tubes: none  Diet: regular  ABX: none  DVT PPX: SCD's    CODE STATUS: Full code      Dispo:  Discharge today with detailed list of new medications and when they should be taken after home health set up.  Advised to return to hospital if worsening symptoms, fainting, persistent fever or any further concerns.

## 2018-04-16 LAB
DOPAMINE SERPL-MCNC: <20 PG/ML (ref 0–20)
EPINEPH PLAS-MCNC: 37 PG/ML (ref 10–200)
NOREPINEPH PLAS-MCNC: 420 PG/ML (ref 80–520)

## 2018-04-17 LAB
BACTERIA BLD CULT: NORMAL
BACTERIA BLD CULT: NORMAL
CATECHOLS UR-IMP: NORMAL
COLLECT DURATION TIME SPEC: NORMAL HRS
CREAT 24H UR-MCNC: 51 MG/DL
CREAT 24H UR-MRATE: NORMAL MG/D (ref 400–1300)
DOPAMINE 24H UR-MRATE: NORMAL UG/D (ref 56–272)
DOPAMINE UR-MCNC: 123 UG/L
DOPAMINE/CREAT UR: 241 UG/G CRT (ref 0–250)
EPINEPH 24H UR-MRATE: NORMAL UG/D (ref 1–5)
EPINEPH UR-MCNC: 2 UG/L
EPINEPH/CREAT UR: 4 UG/G CRT (ref 0–20)
NOREPINEPH 24H UR-MRATE: NORMAL UG/D (ref 11–60)
NOREPINEPH UR-MCNC: 21 UG/L
NOREPINEPH/CREAT UR: 41 UG/G CRT (ref 0–45)
SIGNIFICANT IND 70042: NORMAL
SIGNIFICANT IND 70042: NORMAL
SITE SITE: NORMAL
SITE SITE: NORMAL
SOURCE SOURCE: NORMAL
SOURCE SOURCE: NORMAL
SPECIMEN VOL ?TM UR: NORMAL ML

## 2018-04-30 ENCOUNTER — HOSPITAL ENCOUNTER (OUTPATIENT)
Dept: LAB | Facility: MEDICAL CENTER | Age: 83
End: 2018-04-30
Attending: FAMILY MEDICINE
Payer: MEDICARE

## 2018-04-30 LAB — URATE SERPL-MCNC: 6.1 MG/DL (ref 1.9–8.2)

## 2018-04-30 PROCEDURE — 36415 COLL VENOUS BLD VENIPUNCTURE: CPT

## 2018-04-30 PROCEDURE — 84550 ASSAY OF BLOOD/URIC ACID: CPT

## 2018-05-16 ENCOUNTER — HOSPITAL ENCOUNTER (EMERGENCY)
Facility: MEDICAL CENTER | Age: 83
End: 2018-05-16
Attending: EMERGENCY MEDICINE
Payer: MEDICARE

## 2018-05-16 ENCOUNTER — APPOINTMENT (OUTPATIENT)
Dept: RADIOLOGY | Facility: MEDICAL CENTER | Age: 83
End: 2018-05-16
Attending: EMERGENCY MEDICINE
Payer: MEDICARE

## 2018-05-16 VITALS
BODY MASS INDEX: 22.47 KG/M2 | RESPIRATION RATE: 18 BRPM | TEMPERATURE: 96.6 F | WEIGHT: 132.94 LBS | DIASTOLIC BLOOD PRESSURE: 63 MMHG | OXYGEN SATURATION: 96 % | SYSTOLIC BLOOD PRESSURE: 158 MMHG | HEART RATE: 79 BPM

## 2018-05-16 DIAGNOSIS — I10 HYPERTENSION, UNSPECIFIED TYPE: ICD-10-CM

## 2018-05-16 LAB
ALBUMIN SERPL BCP-MCNC: 4.3 G/DL (ref 3.2–4.9)
ALBUMIN/GLOB SERPL: 1.1 G/DL
ALP SERPL-CCNC: 103 U/L (ref 30–99)
ALT SERPL-CCNC: 6 U/L (ref 2–50)
ANION GAP SERPL CALC-SCNC: 10 MMOL/L (ref 0–11.9)
AST SERPL-CCNC: 16 U/L (ref 12–45)
BASOPHILS # BLD AUTO: 1 % (ref 0–1.8)
BASOPHILS # BLD: 0.05 K/UL (ref 0–0.12)
BILIRUB SERPL-MCNC: 0.6 MG/DL (ref 0.1–1.5)
BUN SERPL-MCNC: 19 MG/DL (ref 8–22)
CALCIUM SERPL-MCNC: 9.9 MG/DL (ref 8.5–10.5)
CHLORIDE SERPL-SCNC: 108 MMOL/L (ref 96–112)
CO2 SERPL-SCNC: 20 MMOL/L (ref 20–33)
CREAT SERPL-MCNC: 0.99 MG/DL (ref 0.5–1.4)
EKG IMPRESSION: NORMAL
EKG IMPRESSION: NORMAL
EOSINOPHIL # BLD AUTO: 0.17 K/UL (ref 0–0.51)
EOSINOPHIL NFR BLD: 3.3 % (ref 0–6.9)
ERYTHROCYTE [DISTWIDTH] IN BLOOD BY AUTOMATED COUNT: 49.1 FL (ref 35.9–50)
GLOBULIN SER CALC-MCNC: 3.8 G/DL (ref 1.9–3.5)
GLUCOSE SERPL-MCNC: 97 MG/DL (ref 65–99)
HCT VFR BLD AUTO: 40.4 % (ref 37–47)
HGB BLD-MCNC: 12.9 G/DL (ref 12–16)
IMM GRANULOCYTES # BLD AUTO: 0.01 K/UL (ref 0–0.11)
IMM GRANULOCYTES NFR BLD AUTO: 0.2 % (ref 0–0.9)
LYMPHOCYTES # BLD AUTO: 1.98 K/UL (ref 1–4.8)
LYMPHOCYTES NFR BLD: 38 % (ref 22–41)
MCH RBC QN AUTO: 29.9 PG (ref 27–33)
MCHC RBC AUTO-ENTMCNC: 31.9 G/DL (ref 33.6–35)
MCV RBC AUTO: 93.5 FL (ref 81.4–97.8)
MONOCYTES # BLD AUTO: 0.48 K/UL (ref 0–0.85)
MONOCYTES NFR BLD AUTO: 9.2 % (ref 0–13.4)
NEUTROPHILS # BLD AUTO: 2.52 K/UL (ref 2–7.15)
NEUTROPHILS NFR BLD: 48.3 % (ref 44–72)
NRBC # BLD AUTO: 0 K/UL
NRBC BLD-RTO: 0 /100 WBC
PLATELET # BLD AUTO: 231 K/UL (ref 164–446)
PMV BLD AUTO: 9.7 FL (ref 9–12.9)
POTASSIUM SERPL-SCNC: 4.2 MMOL/L (ref 3.6–5.5)
PROT SERPL-MCNC: 8.1 G/DL (ref 6–8.2)
RBC # BLD AUTO: 4.32 M/UL (ref 4.2–5.4)
SODIUM SERPL-SCNC: 138 MMOL/L (ref 135–145)
TROPONIN I SERPL-MCNC: 0.03 NG/ML (ref 0–0.04)
WBC # BLD AUTO: 5.2 K/UL (ref 4.8–10.8)

## 2018-05-16 PROCEDURE — 93005 ELECTROCARDIOGRAM TRACING: CPT | Performed by: EMERGENCY MEDICINE

## 2018-05-16 PROCEDURE — 700102 HCHG RX REV CODE 250 W/ 637 OVERRIDE(OP): Performed by: EMERGENCY MEDICINE

## 2018-05-16 PROCEDURE — 84484 ASSAY OF TROPONIN QUANT: CPT

## 2018-05-16 PROCEDURE — 99284 EMERGENCY DEPT VISIT MOD MDM: CPT

## 2018-05-16 PROCEDURE — 81002 URINALYSIS NONAUTO W/O SCOPE: CPT

## 2018-05-16 PROCEDURE — 80053 COMPREHEN METABOLIC PANEL: CPT

## 2018-05-16 PROCEDURE — 85025 COMPLETE CBC W/AUTO DIFF WBC: CPT

## 2018-05-16 PROCEDURE — 70450 CT HEAD/BRAIN W/O DYE: CPT

## 2018-05-16 PROCEDURE — A9270 NON-COVERED ITEM OR SERVICE: HCPCS | Performed by: EMERGENCY MEDICINE

## 2018-05-16 RX ORDER — VERAPAMIL HYDROCHLORIDE 80 MG/1
80 TABLET ORAL 3 TIMES DAILY
Qty: 9 TAB | Refills: 0 | Status: SHIPPED | OUTPATIENT
Start: 2018-05-16 | End: 2018-05-19

## 2018-05-16 RX ORDER — VERAPAMIL HYDROCHLORIDE 80 MG/1
80 TABLET ORAL ONCE
Status: COMPLETED | OUTPATIENT
Start: 2018-05-16 | End: 2018-05-16

## 2018-05-16 RX ADMIN — VERAPAMIL HYDROCHLORIDE 80 MG: 80 TABLET, FILM COATED ORAL at 23:02

## 2018-05-16 NOTE — ED TRIAGE NOTES
"89 y/o female ambulatory to triage with c/o HTN uncontrolled by her at home hypertensive medications. Pt was advised by her pcp to come to the ED. Denies headache but states she is feeling \"a little\" dizzy because of her elevated bp.   "

## 2018-05-16 NOTE — ED NOTES
Pt reassessed in triage.  Pt symptoms are unchanged.  RN apologizes for wait.  Protocol started.

## 2018-05-17 LAB
APPEARANCE UR: CLEAR
COLOR UR AUTO: YELLOW
GLUCOSE UR QL STRIP.AUTO: NEGATIVE MG/DL
KETONES UR QL STRIP.AUTO: ABNORMAL MG/DL
LEUKOCYTE ESTERASE UR QL STRIP.AUTO: NEGATIVE
NITRITE UR QL STRIP.AUTO: NEGATIVE
PH UR STRIP.AUTO: 7 [PH]
PROT UR QL STRIP: NEGATIVE MG/DL
RBC UR QL AUTO: ABNORMAL
SP GR UR: 1.02

## 2018-05-17 NOTE — ED PROVIDER NOTES
ED Provider Note    ER PROVIDER NOTE        CHIEF COMPLAINT  Chief Complaint   Patient presents with   • Sent by MD   • Hypertension       HPI  Livia Corea is a 89 y.o. female who presents to the emergency department complaining of difficulty controlling her blood pressure.  Patient reports that over the last 6 months, and especially the last 3 months her blood pressure has been difficult to control and she has been on multiple different medications.  Over the last week she has had readings 180s-200.  She denies any chest pain or shortness of breath, does have some intermittent bilateral lower extremity swelling but states this has been ongoing for months as well.  States she has a slight headache now.  No visual symptoms, no focal weakness numbness or tingling.  No nausea or vomiting, no other complaints    Patient is currently on losartan 25 and spironolactone    REVIEW OF SYSTEMS  Pertinent positives include headache. Pertinent negatives include no chest pain. See HPI for details. All other systems reviewed and are negative.    PAST MEDICAL HISTORY   has a past medical history of Circulation problem (01/2018) and Hypertension.    SURGICAL HISTORY   has a past surgical history that includes gyn surgery.    FAMILY HISTORY  History reviewed. No pertinent family history.    SOCIAL HISTORY  Social History     Social History   • Marital status: Single     Spouse name: N/A   • Number of children: N/A   • Years of education: N/A     Social History Main Topics   • Smoking status: Former Smoker     Quit date: 2/25/1988   • Smokeless tobacco: Never Used   • Alcohol use Yes      Comment: 1 glass of wine x 2 days a week   • Drug use: No   • Sexual activity: Not on file     Other Topics Concern   • Not on file     Social History Narrative   • No narrative on file      History   Drug Use No       CURRENT MEDICATIONS  Home Medications    **Home medications have not yet been reviewed for this encounter**          ALLERGIES  Allergies   Allergen Reactions   • Lisinopril Swelling     Of lower extremities        PHYSICAL EXAM  VITAL SIGNS: /63   Pulse 79   Temp 35.9 °C (96.6 °F)   Resp 18   Wt 60.3 kg (132 lb 15 oz)   SpO2 96%   BMI 22.47 kg/m²   Pulse ox interpretation: I interpret this pulse ox as normal.    Constitutional: Alert in no apparent distress.  HENT: No signs of trauma, Bilateral external ears normal, Nose normal.   Eyes: Pupils are equal and reactive, Conjunctiva normal, Non-icteric.   Neck: Normal range of motion, No tenderness, Supple, No stridor.   Lymphatic: No lymphadenopathy noted.   Cardiovascular: Regular rate and rhythm, no murmurs.   Thorax & Lungs: Normal breath sounds, No respiratory distress, No wheezing, No chest tenderness.   Abdomen: Bowel sounds normal, Soft, No tenderness, No masses, No pulsatile masses. No peritoneal signs.  Skin: Warm, Dry, No erythema, No rash.   Back: No bony tenderness, No CVA tenderness.   Extremities: Intact distal pulses, trace edema, No tenderness, No cyanosis, .  Musculoskeletal: Good range of motion in all major joints. No tenderness to palpation or major deformities noted.   Neurologic: Alert , Normal motor function, Normal sensory function, No focal deficits noted.   Psychiatric: Affect normal, Judgment normal, Mood normal.     DIAGNOSTIC STUDIES / PROCEDURES    Results for orders placed or performed during the hospital encounter of 05/16/18   CBC WITH DIFFERENTIAL   Result Value Ref Range    WBC 5.2 4.8 - 10.8 K/uL    RBC 4.32 4.20 - 5.40 M/uL    Hemoglobin 12.9 12.0 - 16.0 g/dL    Hematocrit 40.4 37.0 - 47.0 %    MCV 93.5 81.4 - 97.8 fL    MCH 29.9 27.0 - 33.0 pg    MCHC 31.9 (L) 33.6 - 35.0 g/dL    RDW 49.1 35.9 - 50.0 fL    Platelet Count 231 164 - 446 K/uL    MPV 9.7 9.0 - 12.9 fL    Neutrophils-Polys 48.30 44.00 - 72.00 %    Lymphocytes 38.00 22.00 - 41.00 %    Monocytes 9.20 0.00 - 13.40 %    Eosinophils 3.30 0.00 - 6.90 %    Basophils 1.00  0.00 - 1.80 %    Immature Granulocytes 0.20 0.00 - 0.90 %    Nucleated RBC 0.00 /100 WBC    Neutrophils (Absolute) 2.52 2.00 - 7.15 K/uL    Lymphs (Absolute) 1.98 1.00 - 4.80 K/uL    Monos (Absolute) 0.48 0.00 - 0.85 K/uL    Eos (Absolute) 0.17 0.00 - 0.51 K/uL    Baso (Absolute) 0.05 0.00 - 0.12 K/uL    Immature Granulocytes (abs) 0.01 0.00 - 0.11 K/uL    NRBC (Absolute) 0.00 K/uL   COMP METABOLIC PANEL   Result Value Ref Range    Sodium 138 135 - 145 mmol/L    Potassium 4.2 3.6 - 5.5 mmol/L    Chloride 108 96 - 112 mmol/L    Co2 20 20 - 33 mmol/L    Anion Gap 10.0 0.0 - 11.9    Glucose 97 65 - 99 mg/dL    Bun 19 8 - 22 mg/dL    Creatinine 0.99 0.50 - 1.40 mg/dL    Calcium 9.9 8.5 - 10.5 mg/dL    AST(SGOT) 16 12 - 45 U/L    ALT(SGPT) 6 2 - 50 U/L    Alkaline Phosphatase 103 (H) 30 - 99 U/L    Total Bilirubin 0.6 0.1 - 1.5 mg/dL    Albumin 4.3 3.2 - 4.9 g/dL    Total Protein 8.1 6.0 - 8.2 g/dL    Globulin 3.8 (H) 1.9 - 3.5 g/dL    A-G Ratio 1.1 g/dL   ESTIMATED GFR   Result Value Ref Range    GFR If African American >60 >60 mL/min/1.73 m 2    GFR If Non  53 (A) >60 mL/min/1.73 m 2   TROPONIN   Result Value Ref Range    Troponin I 0.03 0.00 - 0.04 ng/mL   EKG   Result Value Ref Range    Report       [facility] Emergency Dept.    Test Date:  2018  Pt Name:    GEO WHITESIDE                 Department: ER  MRN:        [id number]                      Room:        22  Gender:     Female                       Technician: [id number]  :        [id number]                   Requested By:JAZMINE LOZA  Order #:    [id number]                    Erica MD: JAZMINE LOZA MD    Measurements  Intervals                                Axis  Rate:       69                           P:          57  GA:         192                          QRS:        45  QRSD:       116                          T:          13  QT:         424  QTc:        455    Interpretive Statements  SINUS RHYTHM  INCOMPLETE  LEFT BUNDLE BRANCH BLOCK  Compared to ECG 04/12/2018 17:29:29  Left bundle-branch block now present  Intraventricular conduction delay no longer present    Electronically Signed On 5- 21:06:29 PDT by JAZMINE LOZA MD         All labs reviewed by me.    RADIOLOGY  CT-HEAD W/O   Final Result      1.  Diffuse atrophy and white matter changes.   2.  No acute intracranial hemorrhage or territorial infarct.           The radiologist's interpretation of all radiological studies have been reviewed by me.    COURSE & MEDICAL DECISION MAKING  Nursing notes, VS, PMSFHx reviewed in chart.    8:30 PM Patient seen and examined at bedside.. Ordered for ECG, CT, labs to evaluate her symptoms.     930 PMPatient reevaluated, asymptomatic at this time, pending results    Discussed case with [facility] family medicine.  Will arrange for clinic follow-up tomorrow    11:16 PM  Patient reevaluated, she is asymptomatic at this time.  Updated on results and plan for discharge        Decision Making:  This is a 89 y.o. female presenting with poorly controlled hypertension. No findings consistent with hypertensive emergency.  There is no chest pain, shortness of breath, palpitations, ANN or other cardiopulmonary sx and along with the unremarkable ECG there is no evidence of end organ damage.  There is no neurologic deficit or CT findings or other neurologic symptoms to suggest end organ damage.  Additionally normal creatinine without evidence of renal injury from hypertension.  Given recommendations for triple therapy will start on verapamil in consultation with her primary care as well, and she will follow-up in the clinic tomorrow        The patient will return for new or worsening symptoms and is stable at the time of discharge.    The patient is referred to a primary physician for blood pressure management, diabetic screening, and for all other preventative health concerns.      DISPOSITION:  Patient will be discharged home in stable  condition.    FOLLOW UP:  Estuardo Carrero M.D.  [address] [address]  [phone]    In 1 day        OUTPATIENT MEDICATIONS:  New Prescriptions    VERAPAMIL (ISOPTIN) 80 MG TAB    Take 1 Tab by mouth 3 times a day for 3 days.         FINAL IMPRESSION  1. Hypertension, unspecified type      The note accurately reflects work and decisions made by me.  Ronald Rodriguez  5/16/2018  11:28 PM

## 2018-05-17 NOTE — ED NOTES
Pt c/o HTN, says BP medications have not been working for last 6 months and BP got worse over last 2 weeks

## 2018-05-17 NOTE — ED NOTES
Discharge instructions given to patient, prescriptions provided, a verbal understanding of all instructions was stated. IV removed, cathlon intact, site without s/s of infection. Pt preferred to walk out accompanied by self VSS,  all belongings accounted for.

## 2018-05-26 ENCOUNTER — HOSPITAL ENCOUNTER (OUTPATIENT)
Facility: MEDICAL CENTER | Age: 83
End: 2018-05-28
Attending: EMERGENCY MEDICINE | Admitting: INTERNAL MEDICINE
Payer: MEDICARE

## 2018-05-26 ENCOUNTER — APPOINTMENT (OUTPATIENT)
Dept: RADIOLOGY | Facility: MEDICAL CENTER | Age: 83
End: 2018-05-26
Attending: EMERGENCY MEDICINE
Payer: MEDICARE

## 2018-05-26 DIAGNOSIS — R07.9 CHEST PAIN, UNSPECIFIED TYPE: ICD-10-CM

## 2018-05-26 LAB
ALBUMIN SERPL BCP-MCNC: 4.1 G/DL (ref 3.2–4.9)
ALBUMIN/GLOB SERPL: 1.1 G/DL
ALP SERPL-CCNC: 84 U/L (ref 30–99)
ALT SERPL-CCNC: 7 U/L (ref 2–50)
ANION GAP SERPL CALC-SCNC: 10 MMOL/L (ref 0–11.9)
APTT PPP: 34.9 SEC (ref 24.7–36)
AST SERPL-CCNC: 14 U/L (ref 12–45)
BASOPHILS # BLD AUTO: 1.2 % (ref 0–1.8)
BASOPHILS # BLD: 0.05 K/UL (ref 0–0.12)
BILIRUB SERPL-MCNC: 0.6 MG/DL (ref 0.1–1.5)
BNP SERPL-MCNC: 24 PG/ML (ref 0–100)
BUN SERPL-MCNC: 21 MG/DL (ref 8–22)
CALCIUM SERPL-MCNC: 9.9 MG/DL (ref 8.5–10.5)
CHLORIDE SERPL-SCNC: 103 MMOL/L (ref 96–112)
CO2 SERPL-SCNC: 22 MMOL/L (ref 20–33)
CREAT SERPL-MCNC: 1.08 MG/DL (ref 0.5–1.4)
EOSINOPHIL # BLD AUTO: 0.13 K/UL (ref 0–0.51)
EOSINOPHIL NFR BLD: 3.1 % (ref 0–6.9)
ERYTHROCYTE [DISTWIDTH] IN BLOOD BY AUTOMATED COUNT: 49.1 FL (ref 35.9–50)
GLOBULIN SER CALC-MCNC: 3.7 G/DL (ref 1.9–3.5)
GLUCOSE SERPL-MCNC: 93 MG/DL (ref 65–99)
HCT VFR BLD AUTO: 39.6 % (ref 37–47)
HGB BLD-MCNC: 12.8 G/DL (ref 12–16)
IMM GRANULOCYTES # BLD AUTO: 0.01 K/UL (ref 0–0.11)
IMM GRANULOCYTES NFR BLD AUTO: 0.2 % (ref 0–0.9)
INR PPP: 0.96 (ref 0.87–1.13)
LIPASE SERPL-CCNC: 20 U/L (ref 11–82)
LYMPHOCYTES # BLD AUTO: 1.73 K/UL (ref 1–4.8)
LYMPHOCYTES NFR BLD: 41.1 % (ref 22–41)
MCH RBC QN AUTO: 29.7 PG (ref 27–33)
MCHC RBC AUTO-ENTMCNC: 32.3 G/DL (ref 33.6–35)
MCV RBC AUTO: 91.9 FL (ref 81.4–97.8)
MONOCYTES # BLD AUTO: 0.44 K/UL (ref 0–0.85)
MONOCYTES NFR BLD AUTO: 10.5 % (ref 0–13.4)
NEUTROPHILS # BLD AUTO: 1.85 K/UL (ref 2–7.15)
NEUTROPHILS NFR BLD: 43.9 % (ref 44–72)
NRBC # BLD AUTO: 0 K/UL
NRBC BLD-RTO: 0 /100 WBC
PLATELET # BLD AUTO: 248 K/UL (ref 164–446)
PMV BLD AUTO: 10 FL (ref 9–12.9)
POTASSIUM SERPL-SCNC: 4 MMOL/L (ref 3.6–5.5)
PROT SERPL-MCNC: 7.8 G/DL (ref 6–8.2)
PROTHROMBIN TIME: 12.5 SEC (ref 12–14.6)
RBC # BLD AUTO: 4.31 M/UL (ref 4.2–5.4)
SODIUM SERPL-SCNC: 135 MMOL/L (ref 135–145)
TROPONIN I SERPL-MCNC: 0.01 NG/ML (ref 0–0.04)
TROPONIN I SERPL-MCNC: <0.01 NG/ML (ref 0–0.04)
WBC # BLD AUTO: 4.2 K/UL (ref 4.8–10.8)

## 2018-05-26 PROCEDURE — 99220 PR INITIAL OBSERVATION CARE,LEVL III: CPT | Performed by: INTERNAL MEDICINE

## 2018-05-26 PROCEDURE — 80053 COMPREHEN METABOLIC PANEL: CPT

## 2018-05-26 PROCEDURE — G0378 HOSPITAL OBSERVATION PER HR: HCPCS

## 2018-05-26 PROCEDURE — 93005 ELECTROCARDIOGRAM TRACING: CPT | Performed by: EMERGENCY MEDICINE

## 2018-05-26 PROCEDURE — 71045 X-RAY EXAM CHEST 1 VIEW: CPT

## 2018-05-26 PROCEDURE — 700111 HCHG RX REV CODE 636 W/ 250 OVERRIDE (IP): Performed by: INTERNAL MEDICINE

## 2018-05-26 PROCEDURE — 85025 COMPLETE CBC W/AUTO DIFF WBC: CPT

## 2018-05-26 PROCEDURE — 83690 ASSAY OF LIPASE: CPT

## 2018-05-26 PROCEDURE — 93005 ELECTROCARDIOGRAM TRACING: CPT

## 2018-05-26 PROCEDURE — 700102 HCHG RX REV CODE 250 W/ 637 OVERRIDE(OP): Performed by: INTERNAL MEDICINE

## 2018-05-26 PROCEDURE — 84484 ASSAY OF TROPONIN QUANT: CPT

## 2018-05-26 PROCEDURE — A9270 NON-COVERED ITEM OR SERVICE: HCPCS | Performed by: INTERNAL MEDICINE

## 2018-05-26 PROCEDURE — 85730 THROMBOPLASTIN TIME PARTIAL: CPT

## 2018-05-26 PROCEDURE — 83880 ASSAY OF NATRIURETIC PEPTIDE: CPT

## 2018-05-26 PROCEDURE — 85610 PROTHROMBIN TIME: CPT

## 2018-05-26 PROCEDURE — 99285 EMERGENCY DEPT VISIT HI MDM: CPT

## 2018-05-26 RX ORDER — BISACODYL 10 MG
10 SUPPOSITORY, RECTAL RECTAL
Status: DISCONTINUED | OUTPATIENT
Start: 2018-05-26 | End: 2018-05-28 | Stop reason: HOSPADM

## 2018-05-26 RX ORDER — AMLODIPINE BESYLATE 10 MG/1
10 TABLET ORAL DAILY
Status: DISCONTINUED | OUTPATIENT
Start: 2018-05-27 | End: 2018-05-28 | Stop reason: HOSPADM

## 2018-05-26 RX ORDER — ASPIRIN 81 MG/1
324 TABLET, CHEWABLE ORAL DAILY
Status: DISCONTINUED | OUTPATIENT
Start: 2018-05-26 | End: 2018-05-28 | Stop reason: HOSPADM

## 2018-05-26 RX ORDER — CHOLECALCIFEROL (VITAMIN D3) 125 MCG
500 CAPSULE ORAL DAILY
Status: DISCONTINUED | OUTPATIENT
Start: 2018-05-27 | End: 2018-05-28 | Stop reason: HOSPADM

## 2018-05-26 RX ORDER — SPIRONOLACTONE 50 MG/1
50 TABLET, FILM COATED ORAL DAILY
COMMUNITY
End: 2018-07-26

## 2018-05-26 RX ORDER — AMOXICILLIN 250 MG
2 CAPSULE ORAL 2 TIMES DAILY
Status: DISCONTINUED | OUTPATIENT
Start: 2018-05-26 | End: 2018-05-28 | Stop reason: HOSPADM

## 2018-05-26 RX ORDER — ASCORBIC ACID 500 MG
500 TABLET ORAL DAILY
Status: DISCONTINUED | OUTPATIENT
Start: 2018-05-27 | End: 2018-05-28 | Stop reason: HOSPADM

## 2018-05-26 RX ORDER — LISINOPRIL 20 MG/1
20 TABLET ORAL DAILY
COMMUNITY
End: 2018-05-26

## 2018-05-26 RX ORDER — CHLORAL HYDRATE 500 MG
2000 CAPSULE ORAL DAILY
Status: DISCONTINUED | OUTPATIENT
Start: 2018-05-27 | End: 2018-05-28 | Stop reason: HOSPADM

## 2018-05-26 RX ORDER — ACETAMINOPHEN 500 MG
500 TABLET ORAL EVERY 6 HOURS PRN
Status: DISCONTINUED | OUTPATIENT
Start: 2018-05-26 | End: 2018-05-26

## 2018-05-26 RX ORDER — ISOSORBIDE MONONITRATE 30 MG/1
30 TABLET, EXTENDED RELEASE ORAL
Status: DISCONTINUED | OUTPATIENT
Start: 2018-05-26 | End: 2018-05-27

## 2018-05-26 RX ORDER — POLYETHYLENE GLYCOL 3350 17 G/17G
1 POWDER, FOR SOLUTION ORAL
Status: DISCONTINUED | OUTPATIENT
Start: 2018-05-26 | End: 2018-05-28 | Stop reason: HOSPADM

## 2018-05-26 RX ORDER — ASPIRIN 325 MG
325 TABLET ORAL DAILY
Status: DISCONTINUED | OUTPATIENT
Start: 2018-05-26 | End: 2018-05-28 | Stop reason: HOSPADM

## 2018-05-26 RX ORDER — ACETAMINOPHEN 325 MG/1
650 TABLET ORAL EVERY 6 HOURS PRN
Status: DISCONTINUED | OUTPATIENT
Start: 2018-05-26 | End: 2018-05-28 | Stop reason: HOSPADM

## 2018-05-26 RX ORDER — HYDRALAZINE HYDROCHLORIDE 20 MG/ML
10 INJECTION INTRAMUSCULAR; INTRAVENOUS EVERY 6 HOURS PRN
Status: DISCONTINUED | OUTPATIENT
Start: 2018-05-26 | End: 2018-05-28 | Stop reason: HOSPADM

## 2018-05-26 RX ORDER — ASPIRIN 300 MG/1
300 SUPPOSITORY RECTAL DAILY
Status: DISCONTINUED | OUTPATIENT
Start: 2018-05-26 | End: 2018-05-28 | Stop reason: HOSPADM

## 2018-05-26 RX ADMIN — HYDRALAZINE HYDROCHLORIDE 10 MG: 20 INJECTION INTRAMUSCULAR; INTRAVENOUS at 23:05

## 2018-05-26 RX ADMIN — ISOSORBIDE MONONITRATE 30 MG: 30 TABLET ORAL at 22:33

## 2018-05-26 ASSESSMENT — LIFESTYLE VARIABLES
EVER FELT BAD OR GUILTY ABOUT YOUR DRINKING: NO
ALCOHOL_USE: YES
HAVE YOU EVER FELT YOU SHOULD CUT DOWN ON YOUR DRINKING: NO
TOTAL SCORE: 0
CONSUMPTION TOTAL: NEGATIVE
TOTAL SCORE: 0
EVER HAD A DRINK FIRST THING IN THE MORNING TO STEADY YOUR NERVES TO GET RID OF A HANGOVER: NO
AVERAGE NUMBER OF DAYS PER WEEK YOU HAVE A DRINK CONTAINING ALCOHOL: 2
ON A TYPICAL DAY WHEN YOU DRINK ALCOHOL HOW MANY DRINKS DO YOU HAVE: 1
HAVE PEOPLE ANNOYED YOU BY CRITICIZING YOUR DRINKING: NO
TOTAL SCORE: 0
EVER_SMOKED: NEVER
HOW MANY TIMES IN THE PAST YEAR HAVE YOU HAD 5 OR MORE DRINKS IN A DAY: 0

## 2018-05-26 ASSESSMENT — PATIENT HEALTH QUESTIONNAIRE - PHQ9
1. LITTLE INTEREST OR PLEASURE IN DOING THINGS: NOT AT ALL
2. FEELING DOWN, DEPRESSED, IRRITABLE, OR HOPELESS: NOT AT ALL
SUM OF ALL RESPONSES TO PHQ9 QUESTIONS 1 AND 2: 0

## 2018-05-26 ASSESSMENT — PAIN SCALES - GENERAL
PAINLEVEL_OUTOF10: 0
PAINLEVEL_OUTOF10: 0

## 2018-05-26 NOTE — ED TRIAGE NOTES
EMS sts, Pt c/o left sided chest pain x15 minutes while she was watching TV. Pt was given  mg prior to arrival. No chest pain at this time. Pt did c/o high blood pressure as well.

## 2018-05-27 ENCOUNTER — PATIENT OUTREACH (OUTPATIENT)
Dept: HEALTH INFORMATION MANAGEMENT | Facility: OTHER | Age: 83
End: 2018-05-27

## 2018-05-27 PROBLEM — R07.9 CHEST PAIN: Status: ACTIVE | Noted: 2018-05-27

## 2018-05-27 LAB
ANION GAP SERPL CALC-SCNC: 8 MMOL/L (ref 0–11.9)
BUN SERPL-MCNC: 17 MG/DL (ref 8–22)
CALCIUM SERPL-MCNC: 9.1 MG/DL (ref 8.5–10.5)
CHLORIDE SERPL-SCNC: 110 MMOL/L (ref 96–112)
CO2 SERPL-SCNC: 21 MMOL/L (ref 20–33)
CREAT SERPL-MCNC: 0.86 MG/DL (ref 0.5–1.4)
ERYTHROCYTE [DISTWIDTH] IN BLOOD BY AUTOMATED COUNT: 48.1 FL (ref 35.9–50)
GLUCOSE SERPL-MCNC: 94 MG/DL (ref 65–99)
HCT VFR BLD AUTO: 34.1 % (ref 37–47)
HGB BLD-MCNC: 11.4 G/DL (ref 12–16)
MCH RBC QN AUTO: 30.4 PG (ref 27–33)
MCHC RBC AUTO-ENTMCNC: 33.4 G/DL (ref 33.6–35)
MCV RBC AUTO: 90.9 FL (ref 81.4–97.8)
PLATELET # BLD AUTO: 219 K/UL (ref 164–446)
PMV BLD AUTO: 9.9 FL (ref 9–12.9)
POTASSIUM SERPL-SCNC: 4 MMOL/L (ref 3.6–5.5)
RBC # BLD AUTO: 3.75 M/UL (ref 4.2–5.4)
SODIUM SERPL-SCNC: 139 MMOL/L (ref 135–145)
WBC # BLD AUTO: 3.8 K/UL (ref 4.8–10.8)

## 2018-05-27 PROCEDURE — 700102 HCHG RX REV CODE 250 W/ 637 OVERRIDE(OP): Performed by: INTERNAL MEDICINE

## 2018-05-27 PROCEDURE — G0378 HOSPITAL OBSERVATION PER HR: HCPCS

## 2018-05-27 PROCEDURE — 80048 BASIC METABOLIC PNL TOTAL CA: CPT

## 2018-05-27 PROCEDURE — A9270 NON-COVERED ITEM OR SERVICE: HCPCS | Performed by: INTERNAL MEDICINE

## 2018-05-27 PROCEDURE — 700111 HCHG RX REV CODE 636 W/ 250 OVERRIDE (IP): Performed by: INTERNAL MEDICINE

## 2018-05-27 PROCEDURE — 85027 COMPLETE CBC AUTOMATED: CPT

## 2018-05-27 PROCEDURE — 96374 THER/PROPH/DIAG INJ IV PUSH: CPT

## 2018-05-27 PROCEDURE — 99225 PR SUBSEQUENT OBSERVATION CARE,LEVEL II: CPT | Performed by: INTERNAL MEDICINE

## 2018-05-27 RX ORDER — ISOSORBIDE MONONITRATE 60 MG/1
60 TABLET, EXTENDED RELEASE ORAL
Status: DISCONTINUED | OUTPATIENT
Start: 2018-05-27 | End: 2018-05-28 | Stop reason: HOSPADM

## 2018-05-27 RX ORDER — CHLORTHALIDONE 25 MG/1
25 TABLET ORAL
Status: DISCONTINUED | OUTPATIENT
Start: 2018-05-27 | End: 2018-05-28 | Stop reason: HOSPADM

## 2018-05-27 RX ORDER — SPIRONOLACTONE 50 MG/1
50 TABLET, FILM COATED ORAL DAILY
Status: DISCONTINUED | OUTPATIENT
Start: 2018-05-27 | End: 2018-05-28 | Stop reason: HOSPADM

## 2018-05-27 RX ADMIN — ENOXAPARIN SODIUM 30 MG: 100 INJECTION SUBCUTANEOUS at 10:55

## 2018-05-27 RX ADMIN — CHLORTHALIDONE 25 MG: 25 TABLET ORAL at 20:16

## 2018-05-27 RX ADMIN — HYDRALAZINE HYDROCHLORIDE 10 MG: 20 INJECTION INTRAMUSCULAR; INTRAVENOUS at 08:10

## 2018-05-27 RX ADMIN — ISOSORBIDE MONONITRATE 60 MG: 60 TABLET, EXTENDED RELEASE ORAL at 20:16

## 2018-05-27 RX ADMIN — Medication 500 MCG: at 10:55

## 2018-05-27 RX ADMIN — AMLODIPINE BESYLATE 10 MG: 10 TABLET ORAL at 10:55

## 2018-05-27 RX ADMIN — OMEGA-3 FATTY ACIDS CAP 1000 MG 2000 MG: 1000 CAP at 10:55

## 2018-05-27 RX ADMIN — OXYCODONE HYDROCHLORIDE AND ACETAMINOPHEN 500 MG: 500 TABLET ORAL at 10:56

## 2018-05-27 RX ADMIN — ASPIRIN 324 MG: 81 TABLET, CHEWABLE ORAL at 10:55

## 2018-05-27 ASSESSMENT — ENCOUNTER SYMPTOMS
PALPITATIONS: 0
WEAKNESS: 0
NERVOUS/ANXIOUS: 0
NAUSEA: 0
BLOOD IN STOOL: 0
FALLS: 0
DIARRHEA: 0
HEMOPTYSIS: 0
HEADACHES: 0
FOCAL WEAKNESS: 0
LOSS OF CONSCIOUSNESS: 0
COUGH: 0
DIZZINESS: 0
FEVER: 0
EYE REDNESS: 0
TREMORS: 0
MYALGIAS: 0
ABDOMINAL PAIN: 0
SEIZURES: 0
WHEEZING: 0
VOMITING: 0
SHORTNESS OF BREATH: 1
CHILLS: 0
CONSTIPATION: 0
INSOMNIA: 0
EYE PAIN: 0

## 2018-05-27 ASSESSMENT — PAIN SCALES - GENERAL
PAINLEVEL_OUTOF10: 0

## 2018-05-27 NOTE — PROGRESS NOTES
A/o,assessment completed,poc discussed,verbalized understanding,denies CP,sob, SB on tele hr=59,elevated BP, Dr. Mack paged,notified,new orders received,will continue to monitor.

## 2018-05-27 NOTE — PROGRESS NOTES
Late Entry: prn hydralazine administered, bp recheck 175/75,  Pt has  lump on her right lower leg,per pt new, will take pictures and upload.

## 2018-05-27 NOTE — ASSESSMENT & PLAN NOTE
- Had recent negative ischemic workup. Troponin negative.  - Controlled blood pressure as above. Continue aspirin.  - Continue to monitor on telemetry.

## 2018-05-27 NOTE — PROGRESS NOTES
[facility] Hospitalist Progress Note    Date of Service: 2018    Chief Complaint  89 y.o. Female with HTN, admitted 2018 with chest pain. Noted to have elevated blood pressure, 194/83. Had recent chest pain workup in March with negative stress test, normal echo. Chest x-ray was clear, troponin was negative. Labs were unimpressive. Started on Norvasc and imdur.    Interval Problem Update  2018 - uneventful night. VSS. Afebrile. Saturating well on RA.  Blood pressure trend better but still significantly elevated. Labs showing unimpressive. Troponin negative ×2.     > Seen and examined. No current chest pain, shortness of breath, nausea or vomiting, or leg swelling. Anxious. States that her blood pressure shoots up mostly at night. Wants to make sure that her blood pressure stays controlled before going home.      Consultants/Specialty  none    Disposition  Monitor in the CDU.         ROS     Pertinent positives/negatives as mentioned above.     A complete review of systems was done. All other systems were negative.      Physical Exam  Laboratory/Imaging   Hemodynamics  Temp (24hrs), Av.8 °C (98.3 °F), Min:36.2 °C (97.1 °F), Max:37.2 °C (98.9 °F)   Temperature: 36.2 °C (97.1 °F)  Pulse  Av.8  Min: 54  Max: 79 Heart Rate (Monitored): (!) 57  Blood Pressure : 151/68, NIBP: 160/58      Respiratory      Respiration: 18, Pulse Oximetry: 94 %     Work Of Breathing / Effort: Mild       Fluids  No intake or output data in the 24 hours ending 18 0700    Nutrition  Orders Placed This Encounter   Procedures   • Diet Order     Standing Status:   Standing     Number of Occurrences:   1     Order Specific Question:   Diet:     Answer:   Cardiac [6]     Physical Exam   Constitutional: She is oriented to person, place, and time. She appears well-developed and well-nourished. No distress.   HENT:   Head: Normocephalic and atraumatic.   Mouth/Throat: No oropharyngeal exudate.   Eyes: Conjunctivae are normal.  Pupils are equal, round, and reactive to light. No scleral icterus.   Neck: Normal range of motion. Neck supple.   Cardiovascular: Normal rate and regular rhythm.  Exam reveals no gallop and no friction rub.    No murmur heard.  Pulmonary/Chest: Effort normal and breath sounds normal. No respiratory distress. She has no wheezes. She has no rales. She exhibits no tenderness.   Abdominal: Soft. Bowel sounds are normal. She exhibits no distension. There is no tenderness. There is no rebound and no guarding.   Musculoskeletal: Normal range of motion. She exhibits no edema or tenderness.   Lymphadenopathy:     She has no cervical adenopathy.   Neurological: She is alert and oriented to person, place, and time. No cranial nerve deficit. Coordination normal.   Skin: Skin is warm and dry. No rash noted. She is not diaphoretic. No erythema. No pallor.   Psychiatric: She has a normal mood and affect. Her behavior is normal. Judgment and thought content normal.   Nursing note and vitals reviewed.      Recent Labs      05/26/18   1649  05/27/18   0543   WBC  4.2*  3.8*   RBC  4.31  3.75*   HEMOGLOBIN  12.8  11.4*   HEMATOCRIT  39.6  34.1*   MCV  91.9  90.9   MCH  29.7  30.4   MCHC  32.3*  33.4*   RDW  49.1  48.1   PLATELETCT  248  219   MPV  10.0  9.9     Recent Labs      05/26/18   1649  05/27/18   0543   SODIUM  135  139   POTASSIUM  4.0  4.0   CHLORIDE  103  110   CO2  22  21   GLUCOSE  93  94   BUN  21  17   CREATININE  1.08  0.86   CALCIUM  9.9  9.1     Recent Labs      05/26/18   1649   APTT  34.9   INR  0.96     Recent Labs      05/26/18   1649   BNPBTYPENAT  24              Assessment/Plan     * Hypertensive urgency- (present on admission)   Assessment & Plan    - Blood pressure trend better, but still remaining elevated. She states that her blood pressure is mostly elevated at night.  - I will increase her Imdur to 60mg daily, and add chlorthalidone 25 mg at at bedtime. Continue home dose Norvasc, and Aldactone.  Continue to monitor blood pressure trend, with as needed IV hydralazine for significant hypertension.        Chest pain due to hypertensive urgency- (present on admission)   Assessment & Plan    - Had recent negative ischemic workup. Troponin negative.  - Controlled blood pressure as above. Continue aspirin.  - Continue to monitor on telemetry.          History of stroke- (present on admission)   Assessment & Plan    - continue ASA, statin.        DNR (do not resuscitate)- (present on admission)   Assessment & Plan    - per pt wishes.            Quality-Core Measures   Reviewed items::  Labs reviewed, Medications reviewed and Radiology images reviewed  Pulliam catheter::  No Pulliam  DVT prophylaxis - mechanical:  SCDs  Ulcer Prophylaxis::  Not indicated  Assessed for rehabilitation services:  Patient returned to prior level of function, rehabilitation not indicated at this time

## 2018-05-27 NOTE — ED NOTES
Med rec updated and complete.  Allergies reviewed.  Pt denies antibiotic use in last 30 days. Recent start   Thursday of spironolactone. Pt stated it has not helped   Keep her blood pressure down.

## 2018-05-27 NOTE — H&P
"Hospital Medicine History and Physical    Date of Service  5/26/2018    Chief Complaint  Chief Complaint   Patient presents with   • Chest Pain       History of Presenting Illness  89 y.o. female who presented 5/26/2018 with Chest Pain  Around 330PM, she had 5/10 intensity sharp pressure sternum, nonradiating, not pleuritic, not reproducible by palpation, mild SOB, feeling flushed. No nausea or diaphoresis. She however has high blood pressure and tells me it is not well controlled. At home she measures it to go up to \"200\" sometimes. In March she had a chest pain work-up resulting in negative stress test and normal EF on echo.  At the ED, she is afebrile, and hypertensive. CXR clear. EKG sinus. Trop x 1 negative.  When I saw her at ED, no acute distres. Mentation sharp. Auscultation clear.   Primary Care Physician  Estuardo Carrero M.D.    Consultants      Code Status  She indicates she is DNR.    Review of Systems  Review of Systems   Constitutional: Negative for chills and fever.   HENT: Negative for congestion, hearing loss and nosebleeds.    Eyes: Negative for pain and redness.   Respiratory: Positive for shortness of breath. Negative for cough, hemoptysis and wheezing.    Cardiovascular: Positive for chest pain. Negative for palpitations.   Gastrointestinal: Negative for abdominal pain, blood in stool, constipation, diarrhea, nausea and vomiting.   Genitourinary: Negative for dysuria, frequency and hematuria.   Musculoskeletal: Negative for falls, joint pain and myalgias.   Skin: Negative for rash.   Neurological: Negative for dizziness, tremors, focal weakness, seizures, loss of consciousness, weakness and headaches.   Psychiatric/Behavioral: The patient is not nervous/anxious and does not have insomnia.    All other systems reviewed and are negative.       Past Medical History  Past Medical History:   Diagnosis Date   • Circulation problem 01/2018   • Hypertension        Surgical History  Past Surgical History: "   Procedure Laterality Date   • GYN SURGERY      hysterectomy       Medications  No current facility-administered medications on file prior to encounter.      Current Outpatient Prescriptions on File Prior to Encounter   Medication Sig Dispense Refill   • isosorbide mononitrate SR (IMDUR) 30 MG TABLET SR 24 HR Take 30 mg by mouth every bedtime.     • Garlic 10 MG Cap Take 2 Caps by mouth 3 times a day, with meals.     • Omega-3 Fatty Acids (FISH OIL) 1000 MG Cap capsule Take 2 Caps by mouth every day. 90 Cap 3   • ascorbic acid (ASCORBIC ACID) 500 MG Tab Take 500 mg by mouth every day.     • cyanocobalamin (VITAMIN B-12) 500 MCG Tab Take 500 mcg by mouth every day.     • amlodipine (NORVASC) 10 MG Tab Take 10 mg by mouth every day.     • aspirin EC (ECOTRIN) 81 MG Tablet Delayed Response Take 81 mg by mouth every day.         Family History  No family history on file.    Social History  Social History   Substance Use Topics   • Smoking status: Former Smoker     Quit date: 1988   • Smokeless tobacco: Never Used   • Alcohol use Yes      Comment: 1 glass of wine x 2 days a week       Allergies  Allergies   Allergen Reactions   • Lisinopril Swelling     Of lower extremities         Physical Exam  Laboratory   Hemodynamics  Temp (24hrs), Av.2 °C (98.9 °F), Min:37.1 °C (98.8 °F), Max:37.2 °C (98.9 °F)   Temperature: 37.1 °C (98.8 °F)  Pulse  Av  Min: 54  Max: 79 Heart Rate (Monitored): (!) 57  Blood Pressure : (!) 175/74, NIBP: 160/58      Respiratory      Respiration: 20, Pulse Oximetry: 98 %             Physical Exam   Constitutional: She appears well-developed and well-nourished.   Elderly   HENT:   Head: Normocephalic and atraumatic.   Eyes: Conjunctivae and EOM are normal. No scleral icterus.   Neck: Normal range of motion. Neck supple.   Cardiovascular: Normal rate and regular rhythm.  Exam reveals no gallop and no friction rub.    No murmur heard.  Pulmonary/Chest: Effort normal and breath sounds  normal. No respiratory distress. She has no wheezes. She has no rales.   Abdominal: Soft. Bowel sounds are normal. She exhibits no distension. There is no tenderness. There is no rebound and no guarding.   Musculoskeletal: She exhibits no edema or tenderness.   Neurological: She is alert.   Mentation intact   Skin: Skin is warm.   Psychiatric: She has a normal mood and affect. Her behavior is normal.       Recent Labs      05/26/18   1649   WBC  4.2*   RBC  4.31   HEMOGLOBIN  12.8   HEMATOCRIT  39.6   MCV  91.9   MCH  29.7   MCHC  32.3*   RDW  49.1   PLATELETCT  248   MPV  10.0     Recent Labs      05/26/18   1649   SODIUM  135   POTASSIUM  4.0   CHLORIDE  103   CO2  22   GLUCOSE  93   BUN  21   CREATININE  1.08   CALCIUM  9.9     Recent Labs      05/26/18   1649   ALTSGPT  7   ASTSGOT  14   ALKPHOSPHAT  84   TBILIRUBIN  0.6   LIPASE  20   GLUCOSE  93     Recent Labs      05/26/18   1649   APTT  34.9   INR  0.96     Recent Labs      05/26/18   1649   BNPBTYPENAT  24         Lab Results   Component Value Date    TROPONINI <0.01 05/26/2018     Urinalysis:    Lab Results  Component Value Date/Time   SPECGRAVITY 1.009 04/12/2018 1844   GLUCOSEUR Negative 04/12/2018 1844   KETONES Negative 04/12/2018 1844   NITRITE Negative 04/12/2018 1844   WBCURINE 2-5 09/13/2016 1441   BACTERIA Rare (A) 09/13/2016 1441   EPITHELCELL Few 09/13/2016 1441        Imaging  Ct-head W/o    Result Date: 5/16/2018 5/16/2018 8:43 PM HISTORY/REASON FOR EXAM:  Headache. Hypertension.  Dizziness. TECHNIQUE/EXAM DESCRIPTION AND NUMBER OF VIEWS: CT of the head without contrast. The study was performed on a helical multidetector CT scanner. Contiguous 2.5 mm axial sections were obtained from the skull base through the vertex. Up to date radiation dose reduction adjustments have been utilized to meet ALARA standards for radiation dose reduction. COMPARISON:  None available FINDINGS: Lateral ventricles are moderately enlarged but symmetric Cortical  "sulci are enlarged. Patchy areas of low attenuation in the white matter bilaterally. No significant mass effect or midline shift. Basal cisterns are patent. Small chronic RIGHT caudate lacunar infarct. Calvaria are intact. Visualized orbits are unremarkable. Visualized mastoid air cells are clear. Sphenoid sinus mucosal thickening. Calcifications of the carotid arteries noted.     1.  Diffuse atrophy and white matter changes. 2.  No acute intracranial hemorrhage or territorial infarct.     Dx-chest-portable (1 View)    Result Date: 5/26/2018 5/26/2018 6:06 PM HISTORY/REASON FOR EXAM:  Chest Pain TECHNIQUE/EXAM DESCRIPTION AND NUMBER OF VIEWS: Single portable view of the chest. COMPARISON:  4/12/2018 FINDINGS: The cardiac silhouette is within normal limits. No infiltrates or consolidations are noted. No pleural effusion is noted.     No acute cardiac or pulmonary abnormality is noted.     Assessment/Plan     I anticipate this patient is appropriate for observation status at this time.    * Hypertensive urgency- (present on admission)   Assessment & Plan    She has been tried on seevral medications but did not help  PRN IV antihypertensives keep SBP<175  Amlodipine        Chest pain   Assessment & Plan    Very functional and sharp 89 year old  Already had workup in March 2018; negative stress test with fixed inf wall hypokinesis  Echo normal EF  Likely secondary to hypertensive urgency  Trend troponins, ASA          History of stroke- (present on admission)   Assessment & Plan    ASA, statin        DNR (do not resuscitate)- (present on admission)   Assessment & Plan    She wants to be let go if her heart stops beating or lungs stop breathing  She mentioned if even she was brought back she knows her \"quality of life will be less'.              VTE prophylaxis: SCDs.    I spent 71 minutes, reviewing the chart, notes, vitals, labs, imaging, ordering labs, evaluating Livia Nolasco Anmol for assessment, enacting the " plan above. 50% of the time was spent in counseling Livia Corea and answering questions. Discussed with ED physician. Treating HTN. Medical decision making is therefore complex. Time was devoted to counseling and coordinating care including review of records, pertinent lab data and studies, as well as discussing diagnostic evaluation and work up, planned therapeutic interventions and future disposition of care. Where indicated, the assessment and plan reflect discussion of patient with consultants, other healthcare providers, family members, and additional research needed to obtain further information in formulating the plan of care for Livia Corea.

## 2018-05-27 NOTE — ASSESSMENT & PLAN NOTE
- She probably is noncompliant with medications, and takes which medications to take. She is counseled extensively on compliance with all her medications, and that it's the combination of medication effects that controls her blood pressure rather than single medications. I will give her prescription for as needed clonidine for significant blood pressure about 180/100.  - I will continue her on increased Imdur 60 mg daily, and chlorthalidone 25 mg at bedtime, and continue her home dose Norvasc and Aldactone. Continue outpatient blood pressure monitoring, and follow-up with PCP closely.

## 2018-05-27 NOTE — ED NOTES
[facility] 1 Fall Risk Assessment Tool    Present to ED because of fall  NO  (Syncope, seizure, or ALOC)  Age>70   YES  Altered Mental Status:  (Intoxicated with Alcohol or Substance Confusion,  Inability to follow instructions, disorientation)   NO  Impaired Mobility:  Ambulates or transfers with assistive devices or assist  Ambulates with unsteady gait and no assistance  Unable to ambulate or transfer   YES  Nursing Judgement  (Bowel or bladder incontinence, diarrhea, urinary   frequency or urgency, leg weakness, orthostatic   hypotension, dizziness or vertigo, narcotic use).   NO    Fall Risk Interventions    Move the patient closer to the nurse's station  Familiarize the patient with environment.  Place call light within reach and demonstrate call light use.  Keep patient's personal possessions within patient safe reach  (if appropriate.)  Place stretcher in low position and brakes locked  Place yellow socks and armband on patient  Place green triangle on patient's door  Keep floor surfaces clean and dry.  Keep patient care areas uncluttered.  Assess patient hourly for: Pain, Personal Needs, Position change, and call   light access

## 2018-05-27 NOTE — ED PROVIDER NOTES
"ED Provider Note    CHIEF COMPLAINT  Chief Complaint   Patient presents with   • Chest Pain       HPI  Liviaalonso Corea is a 89 y.o. female who presents with chest pain, at 3 PM this afternoon, lasted about 15 minutes, moderate pain, constant, no radiation. Did have some shortness of breath diaphoresis no nausea. Noted her blood pressure to be markedly elevated. Did take aspirin did not take nitroglycerin. Never gets chest pain with exercise. A constant dull without radiation no other modifiers    REVIEW OF SYSTEMS  See HPI for further details. History of hypertension, some adrenal abnormalityDenies other G.I., G.U.. endrocine, cardiovascular, respriatory or neurological problems.  All other systems are negative.     PAST MEDICAL HISTORY  Past Medical History:   Diagnosis Date   • Circulation problem 01/2018   • Hypertension        FAMILY HISTORY  No family history on file.    SOCIAL HISTORY she is a former smoker  Social History     Social History   • Marital status: Single     Spouse name: N/A   • Number of children: N/A   • Years of education: N/A     Social History Main Topics   • Smoking status: Former Smoker     Quit date: 2/25/1988   • Smokeless tobacco: Never Used   • Alcohol use Yes      Comment: 1 glass of wine x 2 days a week   • Drug use: No   • Sexual activity: Not on file     Other Topics Concern   • Not on file     Social History Narrative   • No narrative on file       SURGICAL HISTORY  Past Surgical History:   Procedure Laterality Date   • GYN SURGERY      hysterectomy       CURRENT MEDICATIONS  Home Medications    **Home medications have not yet been reviewed for this encounter**         ALLERGIES  Allergies   Allergen Reactions   • Lisinopril Swelling     Of lower extremities        PHYSICAL EXAM  VITAL SIGNS: BP (!) 194/83   Pulse 60   Temp 37.2 °C (98.9 °F)   Resp 17   Ht 1.626 m (5' 4\")   Wt 59 kg (130 lb)   SpO2 93%   BMI 22.31 kg/m²    Constitutional: Well developed, Well " nourished, No acute distress, Non-toxic appearance.   HENT: Normocephalic, Atraumatic, Bilateral external ears normal, Oropharynx moist, No oral exudates, Nose normal.   Eyes: PERRL, EOMI, Conjunctiva normal, No discharge.   Neck: Normal range of motion, No tenderness, Supple, No stridor.   Lymphatic: No lymphadenopathy noted.   Cardiovascular: Normal heart rate, Normal rhythm, No murmurs, No rubs, No gallops.   Thorax & Lungs: Normal breath sounds, No respiratory distress, No wheezing, No chest tenderness.   Abdomen:  No tenderness, no guarding no rigidity and the abdomen is soft.  No masses, No pulsatile masses.  Skin: Warm, Dry, No erythema, No rash.   Back: No tenderness, No CVA tenderness.   Extremities: Intact distal pulses, No edema, No tenderness, No cyanosis, No clubbing.   Musculoskeletal: Good range of motion in all major joints. No tenderness to palpation or major deformities noted.   Neurologic: Alert & oriented x 3, Normal motor function, Normal sensory function, No focal deficits noted.   Psychiatric: Affect normal, Judgment normal, Mood normal.   EKG Interpretation    Interpreted by me    Rhythm: normal sinus   Rate: normal  Axis: normal  Ectopy: none  Conduction: normal  ST Segments: no acute change  T Waves: no acute change  Q Waves: none    Clinical Impression: I do not have an old EKG to compare to      RADIOLOGY/PROCEDURES      COURSE & MEDICAL DECISION MAKING  Pertinent Labs & Imaging studies reviewed. (See chart for details) white count normal hematocrit and normal platelet count is normal. Electrolytes normal renal function normal liver function normal  She has already had aspirin.  She is having chest pain, has some risk factors for heart disease. I will talk with the hospitalist about admission. Patient has [facility] [place] however hospital will be admitting her  FINAL IMPRESSION  1.   1. Chest pain, unspecified type        2.   3.     Disposition  Hospitalist  Electronically  signed by: Familia Anthony, 5/26/2018 5:32 PM

## 2018-05-27 NOTE — PROGRESS NOTES
Report received, assumed patient care.  Pt A&OX4.  Assessment completed.  Call light within reach, personal belongings available, bed in lowest position, pt calling for assistance.  Pt reports no pain.  High bp, prn given, see MAR.  Communication board updated, POC discussed.  Monitors reapplied, VSS.  No additional needs at this time.

## 2018-05-28 VITALS
HEART RATE: 71 BPM | HEIGHT: 64 IN | TEMPERATURE: 98.5 F | SYSTOLIC BLOOD PRESSURE: 187 MMHG | DIASTOLIC BLOOD PRESSURE: 78 MMHG | RESPIRATION RATE: 17 BRPM | OXYGEN SATURATION: 95 % | WEIGHT: 130.51 LBS | BODY MASS INDEX: 22.28 KG/M2

## 2018-05-28 PROBLEM — R07.9 CHEST PAIN: Status: RESOLVED | Noted: 2018-05-27 | Resolved: 2018-05-28

## 2018-05-28 PROBLEM — I10 ESSENTIAL HYPERTENSION: Status: ACTIVE | Noted: 2018-05-28

## 2018-05-28 PROBLEM — I16.0 HYPERTENSIVE URGENCY: Status: RESOLVED | Noted: 2018-03-10 | Resolved: 2018-05-28

## 2018-05-28 PROCEDURE — 99217 PR OBSERVATION CARE DISCHARGE: CPT | Performed by: INTERNAL MEDICINE

## 2018-05-28 PROCEDURE — A9270 NON-COVERED ITEM OR SERVICE: HCPCS | Performed by: INTERNAL MEDICINE

## 2018-05-28 PROCEDURE — G0378 HOSPITAL OBSERVATION PER HR: HCPCS

## 2018-05-28 PROCEDURE — 700102 HCHG RX REV CODE 250 W/ 637 OVERRIDE(OP): Performed by: INTERNAL MEDICINE

## 2018-05-28 PROCEDURE — 96372 THER/PROPH/DIAG INJ SC/IM: CPT

## 2018-05-28 PROCEDURE — 96376 TX/PRO/DX INJ SAME DRUG ADON: CPT

## 2018-05-28 RX ORDER — CHLORTHALIDONE 25 MG/1
25 TABLET ORAL
Qty: 30 TAB | Refills: 2 | Status: SHIPPED | OUTPATIENT
Start: 2018-05-28 | End: 2018-06-19

## 2018-05-28 RX ORDER — CLONIDINE HYDROCHLORIDE 0.1 MG/1
0.1 TABLET ORAL 4 TIMES DAILY PRN
Qty: 60 TAB | Refills: 1 | Status: SHIPPED | OUTPATIENT
Start: 2018-05-28 | End: 2018-07-20

## 2018-05-28 RX ORDER — CLONIDINE HYDROCHLORIDE 0.1 MG/1
0.1 TABLET ORAL 4 TIMES DAILY PRN
Status: DISCONTINUED | OUTPATIENT
Start: 2018-05-28 | End: 2018-05-28 | Stop reason: HOSPADM

## 2018-05-28 RX ORDER — ISOSORBIDE MONONITRATE 60 MG/1
60 TABLET, EXTENDED RELEASE ORAL
Qty: 30 TAB | Refills: 2 | Status: SHIPPED | OUTPATIENT
Start: 2018-05-28 | End: 2018-05-29

## 2018-05-28 RX ADMIN — ASPIRIN 325 MG: 325 TABLET ORAL at 08:25

## 2018-05-28 RX ADMIN — OMEGA-3 FATTY ACIDS CAP 1000 MG 2000 MG: 1000 CAP at 08:25

## 2018-05-28 RX ADMIN — SPIRONOLACTONE 50 MG: 50 TABLET ORAL at 08:25

## 2018-05-28 RX ADMIN — OXYCODONE HYDROCHLORIDE AND ACETAMINOPHEN 500 MG: 500 TABLET ORAL at 08:25

## 2018-05-28 RX ADMIN — Medication 500 MCG: at 08:25

## 2018-05-28 ASSESSMENT — PAIN SCALES - GENERAL: PAINLEVEL_OUTOF10: 0

## 2018-05-28 NOTE — PROGRESS NOTES
Report received, assumed patient care.  Pt A&OX4.  Assessment completed.  Call light within reach, personal belongings available, bed in lowest position, pt calling for assistance.  Pt reports no pain.  Communication board updated, POC discussed.  Monitors reapplied, VSS.  No additional needs at this time.  Pt will d/c soon, calling ride.

## 2018-05-28 NOTE — PROGRESS NOTES
Pt assisted up to the bsc. Pt still worried and anxious  About  her HR goes up when she moves around,explained to the pt that it is normal with activity.

## 2018-05-28 NOTE — DISCHARGE SUMMARY
HOSPITAL MEDICINE DISCHARGE SUMMARY    Name: Livia Corea  MRN: [id number]  : 1926  Admit Date: 2018  Discharge Date: 2018  Attending Provider: Srinath Gray M.D.  Admitting Provider: Rah Mack M.D.  Primary Care Physician: Estuardo Carrero M.D.    DISCHARGE DIAGNOSES:   Principal Problem (Resolved):    Hypertensive urgency POA: Yes  Active Problems:    DNR (do not resuscitate) POA: Yes    History of stroke POA: Yes    Essential hypertension POA: Yes  Resolved Problems:    Chest pain due to hypertensive urgency POA: Yes      SUMMARY OF EVENTS LEADING TO ADMISSION:   89 y.o. Female with HTN, admitted 2018 with chest pain.     For further details of history of present illness, past medical/social/family histories, allergies and medication, please refer to copy of admission note by Dr. Rah Mack M.D.    HOSPITAL COURSE:   The patient was admitted to the hospitalist service after being initially evaluated in the ED where her blood pressure was significantly elevated at 194/83. Chest x-ray was clear, troponin was negative. Labs were unimpressive.She already had recent chest pain workup in March with negative stress test, and normal echo. Her troponins remained negative. She was restarted on her home dose Norvasc, and Aldactone, and her Imdur was increased to 60 mg daily, and a nighttime chlorthalidone was added. Her blood pressure trend improved. She did not have any further recurrence of her chest pain.    She was counseled extensively on the importance of medication compliance, and not to cherrypick which medications to take.    With  her clinical improvement, she was deemed ready to be discharged from the hospital as she did not have any further inpatient needs. Patient felt comfortable going home. The discharge plan was discussed with the patient, and she was agreeable to it.     The patient was subsequently sent home in improved and stable  condition.     DISCHARGE DISPOSITION: recovered as expected.     FOLLOW-UP ISSUES:   * Hypertensive urgency- (present on admission)   Assessment & Plan    - She probably is noncompliant with medications, and takes which medications to take. She is counseled extensively on compliance with all her medications, and that it's the combination of medication effects that controls her blood pressure rather than single medications. I will give her prescription for as needed clonidine for significant blood pressure about 180/100.  - I will continue her on increased Imdur 60 mg daily, and chlorthalidone 25 mg at bedtime, and continue her home dose Norvasc and Aldactone. Continue outpatient blood pressure monitoring, and follow-up with PCP closely.        History of stroke- (present on admission)   Assessment & Plan    - continue ASA, statin.        DNR (do not resuscitate)- (present on admission)   Assessment & Plan    - per pt wishes.                 CHANGES IN MANAGEMENT OF CHRONIC CONDITION: As above.     PENDING TESTS: none    FOLLOW-UP TESTS ORDERED POST DISCHARGE: none    DISCHARGE MEDICATIONS:   Prior to Admission medications    Medication Sig Start Date End Date Taking? Authorizing Provider   chlorthalidone (HYGROTON) 25 MG Tab Take 1 Tab by mouth every bedtime. 5/28/18  Yes Srinath Gray M.D.   cloNIDine (CATAPRES) 0.1 MG Tab Take 1 Tab by mouth 4 times a day as needed (Systolic BP >180, Diastolic BP>100). 5/28/18  Yes Srinath Gray M.D.   isosorbide mononitrate SR (IMDUR) 60 MG TABLET SR 24 HR Take 1 Tab by mouth every bedtime. 5/28/18  Yes Srinath Gray M.D.   spironolactone (ALDACTONE) 50 MG Tab Take 50 mg by mouth every day.   Yes Physician Outpatient   Garlic 10 MG Cap Take 2 Caps by mouth 3 times a day, with meals.    Physician Outpatient   Omega-3 Fatty Acids (FISH OIL) 1000 MG Cap capsule Take 2 Caps by mouth every day. 3/11/18   JEN Fry   ascorbic acid (ASCORBIC ACID) 500 MG Tab Take  500 mg by mouth every day.    Physician Outpatient   cyanocobalamin (VITAMIN B-12) 500 MCG Tab Take 500 mcg by mouth every day.    Physician Outpatient   amlodipine (NORVASC) 10 MG Tab Take 10 mg by mouth every day.    Physician Outpatient   aspirin EC (ECOTRIN) 81 MG Tablet Delayed Response Take 81 mg by mouth every day.    Physician Outpatient          FOLLOW-UP APPOINTMENTS:   Future Appointments  Date Time Provider Department Center   5/29/2018 3:00 PM Napoleon Eden M.D. [facility] None       Estuardo Carrero M.D.  [address] [place] [id number]  [phone]      Please call to schedule your appointment. Thank you        For further details on discharge medications, patient education, diet, and activity, please refer to electronic copy of discharge instructions.       TIME SPENT: I spent 45 minutes, with greater than 50% of the time spent on face-to-face encounter, addressing medical issues, coordination of care, counseling, discharge planning, medication reconciliation, and documentation.

## 2018-05-28 NOTE — PROGRESS NOTES
A/o,assessment completed,poc discussed,verbalized understanding,pt reported a little CP for  A few seconds,now gone, elevated bz=691/81, SB on tele  hr=58 , scheduled meds administered, informed will recheck  bp,will continue to monitor.

## 2018-05-28 NOTE — PROGRESS NOTES
Pt discharged to home. IV d/c'd, pt armband removed. Pt understands written and verbal d/c instructions.  Prescriptions given.  Regular diet, activity as tolerated.  Pt to follow up with PCP and cardiology.  Pt verbalized understanding.

## 2018-05-28 NOTE — DISCHARGE INSTRUCTIONS
Discharge Instructions    Discharged to home by car with relative. Discharged via wheelchair, hospital escort: Yes.  Special equipment needed: Not Applicable    Be sure to schedule a follow-up appointment with your primary care doctor or any specialists as instructed.     Discharge Plan:   Diet Plan: Discussed (Cardiac/ low salt)  Activity Level: Discussed (As tolerated)  Confirmed Follow up Appointment: Patient to Call and Schedule Appointment  Confirmed Symptoms Management: Discussed  Medication Reconciliation Updated: Yes  Influenza Vaccine Indication: Not indicated: Previously immunized this influenza season and > 8 years of age    I understand that a diet low in cholesterol, fat, and sodium is recommended for good health. Unless I have been given specific instructions below for another diet, I accept this instruction as my diet prescription.   Other diet: Cardiac/ low salt    Special Instructions: None    · Is patient discharged on Warfarin / Coumadin?   No     Depression / Suicide Risk    As you are discharged from this [facility] facility, it is important to learn how to keep safe from harming yourself.    Recognize the warning signs:  · Abrupt changes in personality, positive or negative- including increase in energy   · Giving away possessions  · Change in eating patterns- significant weight changes-  positive or negative  · Change in sleeping patterns- unable to sleep or sleeping all the time   · Unwillingness or inability to communicate  · Depression  · Unusual sadness, discouragement and loneliness  · Talk of wanting to die  · Neglect of personal appearance   · Rebelliousness- reckless behavior  · Withdrawal from people/activities they love  · Confusion- inability to concentrate     If you or a loved one observes any of these behaviors or has concerns about self-harm, here's what you can do:  · Talk about it- your feelings and reasons for harming yourself  · Remove any means that you might use to hurt  yourself (examples: pills, rope, extension cords, firearm)  · Get professional help from the community (Mental Health, Substance Abuse, psychological counseling)  · Do not be alone:Call your Safe Contact- someone whom you trust who will be there for you.  · Call your local CRISIS HOTLINE 945-7937 or 979-211-5784  · Call your local Children's Mobile Crisis Response Team [place] (002) 292-8852 or www.[facility]  · Call the toll free National Suicide Prevention Hotlines   · National Suicide Prevention Lifeline 634-176-ZLQG (7194)  · National Hope Line Network 800-SUICIDE (979-1912)

## 2018-05-29 ENCOUNTER — OFFICE VISIT (OUTPATIENT)
Dept: CARDIOLOGY | Facility: MEDICAL CENTER | Age: 83
End: 2018-05-29
Payer: MEDICARE

## 2018-05-29 VITALS
SYSTOLIC BLOOD PRESSURE: 152 MMHG | WEIGHT: 129.8 LBS | OXYGEN SATURATION: 95 % | DIASTOLIC BLOOD PRESSURE: 68 MMHG | BODY MASS INDEX: 22.16 KG/M2 | HEART RATE: 86 BPM | HEIGHT: 64 IN

## 2018-05-29 DIAGNOSIS — R09.89 BRUIT OF LEFT CAROTID ARTERY: ICD-10-CM

## 2018-05-29 DIAGNOSIS — I10 ESSENTIAL HYPERTENSION: ICD-10-CM

## 2018-05-29 DIAGNOSIS — R09.89 ABDOMINAL BRUIT: ICD-10-CM

## 2018-05-29 DIAGNOSIS — R94.39 ABNORMAL MYOCARDIAL PERFUSION STUDY: ICD-10-CM

## 2018-05-29 DIAGNOSIS — E78.5 DYSLIPIDEMIA: ICD-10-CM

## 2018-05-29 PROCEDURE — 99204 OFFICE O/P NEW MOD 45 MIN: CPT | Performed by: INTERNAL MEDICINE

## 2018-05-29 ASSESSMENT — ENCOUNTER SYMPTOMS
HEMOPTYSIS: 0
NERVOUS/ANXIOUS: 1
ABDOMINAL PAIN: 0
NAUSEA: 0
FOCAL WEAKNESS: 1
CHILLS: 0
LOSS OF CONSCIOUSNESS: 0
DEPRESSION: 0
PALPITATIONS: 1
VOMITING: 0
SPEECH CHANGE: 0
EYE PAIN: 0
WHEEZING: 0
BLURRED VISION: 0
EYE DISCHARGE: 0
COUGH: 0
MYALGIAS: 1
BRUISES/BLEEDS EASILY: 0
FEVER: 0

## 2018-05-29 NOTE — PROGRESS NOTES
Chief Complaint   Patient presents with   • HTN (Uncontrolled)       Subjective:   Livia Corea is a 89 y.o. female who presents today for a new patient evaluation because of difficult to control hypertension.  She also has history of chest discomfort with fairly recent evaluation.    She has had hypertension for many years.  She has been on antihypertensive therapy for the last 18-20 years.  However, over the last few months, she has had a significant increase in her blood pressure.  It has been about as high as 210/100.  She has been hospitalized for chest discomfort and for hypertension in the last few months.    Her chest pain was a localized left parasternal discomfort which was quite sharp and lasted about 8 or 9 minutes at a time.  She only had this discomfort on one occasion.  Her perfusion study showed a small reversible apical defect and a mild fixed inferior defect which may have been artifact.    She has had no dyspnea on exertion, PND orthopnea.  She has had swelling in her left foot over the last couple of months.  It was quite a bit worse but seems to be improving.  She states that her heart beats fast and she started 1 of her medications. She feels isosorbide mononitrate and/or chlorthalidone may be the cause.  She is noted no lightheadedness or syncope.    Past Medical History:   Diagnosis Date   • Circulation problem 01/2018   • Hyperlipidemia    • Hypertension      Past Surgical History:   Procedure Laterality Date   • CAROTID ENDARTERECTOMY Right 2000   • GYN SURGERY      hysterectomy     Family History   Problem Relation Age of Onset   • Heart Attack Mother 64   • Heart Attack Father 72     Social History     Social History   • Marital status: Single     Spouse name: N/A   • Number of children: N/A   • Years of education: N/A     Occupational History   • Not on file.     Social History Main Topics   • Smoking status: Former Smoker     Quit date: 2/25/1988   • Smokeless tobacco: Never  Used   • Alcohol use Yes      Comment: 1 glass of wine x 2 days a week   • Drug use: No   • Sexual activity: Not on file     Other Topics Concern   • Not on file     Social History Narrative   • No narrative on file     Allergies   Allergen Reactions   • Lisinopril Swelling     Of lower extremities      Outpatient Encounter Prescriptions as of 5/29/2018   Medication Sig Dispense Refill   • chlorthalidone (HYGROTON) 25 MG Tab Take 1 Tab by mouth every bedtime. 30 Tab 2   • isosorbide mononitrate SR (IMDUR) 60 MG TABLET SR 24 HR Take 1 Tab by mouth every bedtime. 30 Tab 2   • spironolactone (ALDACTONE) 50 MG Tab Take 50 mg by mouth every day.     • Garlic 10 MG Cap Take 2 Caps by mouth 3 times a day, with meals.     • Omega-3 Fatty Acids (FISH OIL) 1000 MG Cap capsule Take 2 Caps by mouth every day. 90 Cap 3   • ascorbic acid (ASCORBIC ACID) 500 MG Tab Take 500 mg by mouth every day.     • cyanocobalamin (VITAMIN B-12) 500 MCG Tab Take 500 mcg by mouth every day.     • amlodipine (NORVASC) 10 MG Tab Take 10 mg by mouth every day.     • aspirin EC (ECOTRIN) 81 MG Tablet Delayed Response Take 81 mg by mouth every day.     • cloNIDine (CATAPRES) 0.1 MG Tab Take 1 Tab by mouth 4 times a day as needed (Systolic BP >180, Diastolic BP>100). 60 Tab 1     No facility-administered encounter medications on file as of 5/29/2018.      Review of Systems   Constitutional: Negative for chills and fever.   HENT: Negative for congestion.    Eyes: Negative for blurred vision, pain and discharge.   Respiratory: Negative for cough, hemoptysis and wheezing.    Cardiovascular: Positive for chest pain, palpitations and leg swelling.   Gastrointestinal: Negative for abdominal pain, nausea and vomiting.   Musculoskeletal: Positive for myalgias. Negative for joint pain.   Skin: Negative for itching and rash.   Neurological: Positive for focal weakness. Negative for speech change and loss of consciousness.   Endo/Heme/Allergies: Does not  "bruise/bleed easily.   Psychiatric/Behavioral: Negative for depression. The patient is nervous/anxious.    All other systems reviewed and are negative.       Objective:   /68   Pulse 86   Ht 1.626 m (5' 4\")   Wt 58.9 kg (129 lb 12.8 oz)   SpO2 95%   BMI 22.28 kg/m²     Physical Exam   Constitutional: She is oriented to person, place, and time. She appears well-developed and well-nourished. No distress.   HENT:   Head: Normocephalic and atraumatic.   Mouth/Throat: Mucous membranes are normal.   Neck: No JVD present. No thyromegaly present.   Cardiovascular: Normal rate, regular rhythm and intact distal pulses.  Exam reveals no gallop.    No murmur (2/6 to 3/6 systolic at the base) heard.  Pulses:       Carotid pulses are 2+ on the right side, and 2+ on the left side with bruit.       Femoral pulses are 2+ on the right side with bruit, and 2+ on the left side.       Dorsalis pedis pulses are 1+ on the right side, and 1+ on the left side.   Patient has a left carotid bruit versus a transmitted murmur.  However no bruits heard on the right side.  She does have an abdominal bruit which may be radiating to the right femoral region versus  right femoral bruit.   Pulmonary/Chest: Effort normal and breath sounds normal. She has no rales.   Abdominal: Soft. There is no splenomegaly or hepatomegaly.   Musculoskeletal: Normal range of motion. She exhibits edema (She has some swelling of the left ankle foot).   Lymphadenopathy:     She has no cervical adenopathy.   Neurological: She is alert and oriented to person, place, and time. She has normal strength. She exhibits normal muscle tone.   Skin: Skin is warm and dry. No rash noted.   Psychiatric: She has a normal mood and affect. Her behavior is normal.     Lab Results   Component Value Date/Time    CHOLSTRLTOT 184 03/11/2018 04:27 AM     (H) 03/11/2018 04:27 AM    HDL 54 03/11/2018 04:27 AM    TRIGLYCERIDE 87 03/11/2018 04:27 AM       Lab Results   Component " Value Date/Time    SODIUM 139 05/27/2018 05:43 AM    POTASSIUM 4.0 05/27/2018 05:43 AM    CHLORIDE 110 05/27/2018 05:43 AM    CO2 21 05/27/2018 05:43 AM    GLUCOSE 94 05/27/2018 05:43 AM    BUN 17 05/27/2018 05:43 AM    CREATININE 0.86 05/27/2018 05:43 AM     Lab Results   Component Value Date/Time    ALKPHOSPHAT 84 05/26/2018 04:49 PM    ASTSGOT 14 05/26/2018 04:49 PM    ALTSGPT 7 05/26/2018 04:49 PM    TBILIRUBIN 0.6 05/26/2018 04:49 PM      Lab Results   Component Value Date/Time    BNPBTYPENAT 24 05/26/2018 04:49 PM      Renal artery ultrasound:  FINDINGS   Aorta   Limited visualization secondary to overlying bowel gas and calcification.   Atherosclerotic calcification of the aorta visualized. No evidence of   abdominal    aortic aneurysm.    Common iliac arteries are not well visualized.     The superior mesenteric artery demonstrated increased diastolic flow that is    consistent with post prandial.      Transthoracic  Echo Report  CONCLUSIONS  No prior study is available for comparison.   Normal left ventricular systolic function.  Left ventricular ejection fraction is visually estimated to be 65%.  Mild left ventricular hypertrophy.  Mild aortic stenosis.  Right heart pressures are consistent with moderate pulmonary   hypertension.    GEO WHITESIDE  Exam Date:         03/10/2018     Assessment:     1. Essential hypertension     2. Dyslipidemia     3. Abnormal myocardial perfusion study: Small reversible apical abnormality         Medical Decision Making:  Today's Assessment / Status / Plan:     Ms. Whiteside is having difficulty with her blood pressure medications.  At this time, she will change chlorthalidone to morning dosing and discontinue isosorbide mononitrate.  She also feels that spironolactone makes her feel funny.  We will continue this for now but decide if we should discontinue it when we see her back in follow-up.  We might consider changing amlodipine to diltiazem but we will defer this for now.   She will have a carotid ultrasound and abdominal ultrasound to evaluate her bruits.  She had no evidence of renal artery stenosis on her renal arterial study.  She will follow-up in a couple of weeks.  She does have a minimally abnormal myocardial perfusion scan and atherosclerotic disease.  We will start her on atorvastatin 20 mg daily.

## 2018-05-29 NOTE — LETTER
[facility]   [address] [place] [address] [address]  [place] [address]  Phone: [phone]  Fax: [phone]              Livia Corea  11/21/1926    Encounter Date: 5/29/2018    Napoleon Eden M.D.          PROGRESS NOTE:  Chief Complaint   Patient presents with   • HTN (Uncontrolled)       Subjective:   Livia Corea is a 89 y.o. female who presents today for a new patient evaluation because of difficult to control hypertension.  She also has history of chest discomfort with fairly recent evaluation.    She has had hypertension for many years.  She has been on antihypertensive therapy for the last 18-20 years.  However, over the last few months, she has had a significant increase in her blood pressure.  It has been about as high as 210/100.  She has been hospitalized for chest discomfort and for hypertension in the last few months.    Her chest pain was a localized left parasternal discomfort which was quite sharp and lasted about 8 or 9 minutes at a time.  She only had this discomfort on one occasion.  Her perfusion study showed a small reversible apical defect and a mild fixed inferior defect which may have been artifact.    She has had no dyspnea on exertion, PND orthopnea.  She has had swelling in her left foot over the last couple of months.  It was quite a bit worse but seems to be improving.  She states that her heart beats fast and she started 1 of her medications. She feels isosorbide mononitrate and/or chlorthalidone may be the cause.  She is noted no lightheadedness or syncope.    Past Medical History:   Diagnosis Date   • Circulation problem 01/2018   • Hyperlipidemia    • Hypertension      Past Surgical History:   Procedure Laterality Date   • CAROTID ENDARTERECTOMY Right 2000   • GYN SURGERY      hysterectomy     Family History   Problem Relation Age of Onset   • Heart Attack Mother 64   • Heart Attack Father 72     Social History     Social  History   • Marital status: Single     Spouse name: N/A   • Number of children: N/A   • Years of education: N/A     Occupational History   • Not on file.     Social History Main Topics   • Smoking status: Former Smoker     Quit date: 2/25/1988   • Smokeless tobacco: Never Used   • Alcohol use Yes      Comment: 1 glass of wine x 2 days a week   • Drug use: No   • Sexual activity: Not on file     Other Topics Concern   • Not on file     Social History Narrative   • No narrative on file     Allergies   Allergen Reactions   • Lisinopril Swelling     Of lower extremities      Outpatient Encounter Prescriptions as of 5/29/2018   Medication Sig Dispense Refill   • chlorthalidone (HYGROTON) 25 MG Tab Take 1 Tab by mouth every bedtime. 30 Tab 2   • isosorbide mononitrate SR (IMDUR) 60 MG TABLET SR 24 HR Take 1 Tab by mouth every bedtime. 30 Tab 2   • spironolactone (ALDACTONE) 50 MG Tab Take 50 mg by mouth every day.     • Garlic 10 MG Cap Take 2 Caps by mouth 3 times a day, with meals.     • Omega-3 Fatty Acids (FISH OIL) 1000 MG Cap capsule Take 2 Caps by mouth every day. 90 Cap 3   • ascorbic acid (ASCORBIC ACID) 500 MG Tab Take 500 mg by mouth every day.     • cyanocobalamin (VITAMIN B-12) 500 MCG Tab Take 500 mcg by mouth every day.     • amlodipine (NORVASC) 10 MG Tab Take 10 mg by mouth every day.     • aspirin EC (ECOTRIN) 81 MG Tablet Delayed Response Take 81 mg by mouth every day.     • cloNIDine (CATAPRES) 0.1 MG Tab Take 1 Tab by mouth 4 times a day as needed (Systolic BP >180, Diastolic BP>100). 60 Tab 1     No facility-administered encounter medications on file as of 5/29/2018.      Review of Systems   Constitutional: Negative for chills and fever.   HENT: Negative for congestion.    Eyes: Negative for blurred vision, pain and discharge.   Respiratory: Negative for cough, hemoptysis and wheezing.    Cardiovascular: Positive for chest pain, palpitations and leg swelling.   Gastrointestinal: Negative for  "abdominal pain, nausea and vomiting.   Musculoskeletal: Positive for myalgias. Negative for joint pain.   Skin: Negative for itching and rash.   Neurological: Positive for focal weakness. Negative for speech change and loss of consciousness.   Endo/Heme/Allergies: Does not bruise/bleed easily.   Psychiatric/Behavioral: Negative for depression. The patient is nervous/anxious.    All other systems reviewed and are negative.       Objective:   /68   Pulse 86   Ht 1.626 m (5' 4\")   Wt 58.9 kg (129 lb 12.8 oz)   SpO2 95%   BMI 22.28 kg/m²      Physical Exam   Constitutional: She is oriented to person, place, and time. She appears well-developed and well-nourished. No distress.   HENT:   Head: Normocephalic and atraumatic.   Mouth/Throat: Mucous membranes are normal.   Neck: No JVD present. No thyromegaly present.   Cardiovascular: Normal rate, regular rhythm and intact distal pulses.  Exam reveals no gallop.    No murmur (2/6 to 3/6 systolic at the base) heard.  Pulses:       Carotid pulses are 2+ on the right side, and 2+ on the left side with bruit.       Femoral pulses are 2+ on the right side with bruit, and 2+ on the left side.       Dorsalis pedis pulses are 1+ on the right side, and 1+ on the left side.   Patient has a left carotid bruit versus a transmitted murmur.  However no bruits heard on the right side.  She does have an abdominal bruit which may be radiating to the right femoral region versus  right femoral bruit.   Pulmonary/Chest: Effort normal and breath sounds normal. She has no rales.   Abdominal: Soft. There is no splenomegaly or hepatomegaly.   Musculoskeletal: Normal range of motion. She exhibits edema (She has some swelling of the left ankle foot).   Lymphadenopathy:     She has no cervical adenopathy.   Neurological: She is alert and oriented to person, place, and time. She has normal strength. She exhibits normal muscle tone.   Skin: Skin is warm and dry. No rash noted.   Psychiatric: " She has a normal mood and affect. Her behavior is normal.     Lab Results   Component Value Date/Time    CHOLSTRLTOT 184 03/11/2018 04:27 AM     (H) 03/11/2018 04:27 AM    HDL 54 03/11/2018 04:27 AM    TRIGLYCERIDE 87 03/11/2018 04:27 AM       Lab Results   Component Value Date/Time    SODIUM 139 05/27/2018 05:43 AM    POTASSIUM 4.0 05/27/2018 05:43 AM    CHLORIDE 110 05/27/2018 05:43 AM    CO2 21 05/27/2018 05:43 AM    GLUCOSE 94 05/27/2018 05:43 AM    BUN 17 05/27/2018 05:43 AM    CREATININE 0.86 05/27/2018 05:43 AM     Lab Results   Component Value Date/Time    ALKPHOSPHAT 84 05/26/2018 04:49 PM    ASTSGOT 14 05/26/2018 04:49 PM    ALTSGPT 7 05/26/2018 04:49 PM    TBILIRUBIN 0.6 05/26/2018 04:49 PM      Lab Results   Component Value Date/Time    BNPBTYPENAT 24 05/26/2018 04:49 PM      Renal artery ultrasound:  FINDINGS   Aorta   Limited visualization secondary to overlying bowel gas and calcification.   Atherosclerotic calcification of the aorta visualized. No evidence of   abdominal    aortic aneurysm.    Common iliac arteries are not well visualized.     The superior mesenteric artery demonstrated increased diastolic flow that is    consistent with post prandial.      Transthoracic  Echo Report  CONCLUSIONS  No prior study is available for comparison.   Normal left ventricular systolic function.  Left ventricular ejection fraction is visually estimated to be 65%.  Mild left ventricular hypertrophy.  Mild aortic stenosis.  Right heart pressures are consistent with moderate pulmonary   hypertension.    GEO WHITESIDE  Exam Date:         03/10/2018     Assessment:     1. Essential hypertension     2. Dyslipidemia     3. Abnormal myocardial perfusion study: Small reversible apical abnormality         Medical Decision Making:  Today's Assessment / Status / Plan:     Ms. Whiteside is having difficulty with her blood pressure medications.  At this time, she will change chlorthalidone to morning dosing and  discontinue isosorbide mononitrate.  She also feels that spironolactone makes her feel funny.  We will continue this for now but decide if we should discontinue it when we see her back in follow-up.  We might consider changing amlodipine to diltiazem but we will defer this for now.  She will have a carotid ultrasound and abdominal ultrasound to evaluate her bruits.  She had no evidence of renal artery stenosis on her renal arterial study.  She will follow-up in a couple of weeks.  She does have a minimally abnormal myocardial perfusion scan and atherosclerotic disease.  We will start her on atorvastatin 20 mg daily.      Estuardo Carrero M.D.  [address]  [address] [address]  VIA Mail

## 2018-05-29 NOTE — PATIENT INSTRUCTIONS
Discontinue isosorbide mononitrate/Imdur    Take chlorthalidone and Spironolactone in the morning    Take amlodipine/Norvasc at any time.

## 2018-05-30 ENCOUNTER — TELEPHONE (OUTPATIENT)
Dept: CARDIOLOGY | Facility: MEDICAL CENTER | Age: 83
End: 2018-05-30

## 2018-05-30 NOTE — TELEPHONE ENCOUNTER
"Pt presents to office with c/o intermittent leg cramping, with bilateral alternating after one night use of chlorthalidone.    Pt encouraged to increase hydration to 2L. Pt encouraged to take medication for 10-14 days to allow body to adjust to medication before stating \"I am never taking that again\".    Pt states understanding of above and states she will work on hydration and will continue with medication.   "

## 2018-06-01 ENCOUNTER — HOSPITAL ENCOUNTER (EMERGENCY)
Facility: MEDICAL CENTER | Age: 83
End: 2018-06-01
Attending: EMERGENCY MEDICINE
Payer: MEDICARE

## 2018-06-01 ENCOUNTER — APPOINTMENT (OUTPATIENT)
Dept: RADIOLOGY | Facility: MEDICAL CENTER | Age: 83
End: 2018-06-01
Attending: EMERGENCY MEDICINE
Payer: MEDICARE

## 2018-06-01 ENCOUNTER — TELEPHONE (OUTPATIENT)
Dept: CARDIOLOGY | Facility: MEDICAL CENTER | Age: 83
End: 2018-06-01

## 2018-06-01 VITALS
HEART RATE: 49 BPM | RESPIRATION RATE: 16 BRPM | HEIGHT: 64 IN | TEMPERATURE: 98.6 F | OXYGEN SATURATION: 97 % | BODY MASS INDEX: 22.02 KG/M2 | SYSTOLIC BLOOD PRESSURE: 173 MMHG | DIASTOLIC BLOOD PRESSURE: 75 MMHG | WEIGHT: 129 LBS

## 2018-06-01 DIAGNOSIS — I10 ESSENTIAL HYPERTENSION: ICD-10-CM

## 2018-06-01 LAB
ALBUMIN SERPL BCP-MCNC: 4 G/DL (ref 3.2–4.9)
ALBUMIN/GLOB SERPL: 1.1 G/DL
ALP SERPL-CCNC: 85 U/L (ref 30–99)
ALT SERPL-CCNC: 7 U/L (ref 2–50)
ANION GAP SERPL CALC-SCNC: 9 MMOL/L (ref 0–11.9)
AST SERPL-CCNC: 16 U/L (ref 12–45)
BASOPHILS # BLD AUTO: 0.9 % (ref 0–1.8)
BASOPHILS # BLD: 0.04 K/UL (ref 0–0.12)
BILIRUB SERPL-MCNC: 0.4 MG/DL (ref 0.1–1.5)
BNP SERPL-MCNC: 44 PG/ML (ref 0–100)
BUN SERPL-MCNC: 28 MG/DL (ref 8–22)
CALCIUM SERPL-MCNC: 9.8 MG/DL (ref 8.5–10.5)
CHLORIDE SERPL-SCNC: 106 MMOL/L (ref 96–112)
CO2 SERPL-SCNC: 21 MMOL/L (ref 20–33)
CREAT SERPL-MCNC: 1.08 MG/DL (ref 0.5–1.4)
EOSINOPHIL # BLD AUTO: 0.16 K/UL (ref 0–0.51)
EOSINOPHIL NFR BLD: 3.6 % (ref 0–6.9)
ERYTHROCYTE [DISTWIDTH] IN BLOOD BY AUTOMATED COUNT: 48.5 FL (ref 35.9–50)
GLOBULIN SER CALC-MCNC: 3.7 G/DL (ref 1.9–3.5)
GLUCOSE SERPL-MCNC: 95 MG/DL (ref 65–99)
HCT VFR BLD AUTO: 38.4 % (ref 37–47)
HGB BLD-MCNC: 12.6 G/DL (ref 12–16)
IMM GRANULOCYTES # BLD AUTO: 0 K/UL (ref 0–0.11)
IMM GRANULOCYTES NFR BLD AUTO: 0 % (ref 0–0.9)
LYMPHOCYTES # BLD AUTO: 1.59 K/UL (ref 1–4.8)
LYMPHOCYTES NFR BLD: 36.2 % (ref 22–41)
MCH RBC QN AUTO: 29.9 PG (ref 27–33)
MCHC RBC AUTO-ENTMCNC: 32.8 G/DL (ref 33.6–35)
MCV RBC AUTO: 91.2 FL (ref 81.4–97.8)
MONOCYTES # BLD AUTO: 0.5 K/UL (ref 0–0.85)
MONOCYTES NFR BLD AUTO: 11.4 % (ref 0–13.4)
NEUTROPHILS # BLD AUTO: 2.1 K/UL (ref 2–7.15)
NEUTROPHILS NFR BLD: 47.9 % (ref 44–72)
NRBC # BLD AUTO: 0 K/UL
NRBC BLD-RTO: 0 /100 WBC
PLATELET # BLD AUTO: 261 K/UL (ref 164–446)
PMV BLD AUTO: 9.8 FL (ref 9–12.9)
POTASSIUM SERPL-SCNC: 4.7 MMOL/L (ref 3.6–5.5)
PROT SERPL-MCNC: 7.7 G/DL (ref 6–8.2)
RBC # BLD AUTO: 4.21 M/UL (ref 4.2–5.4)
SODIUM SERPL-SCNC: 136 MMOL/L (ref 135–145)
TROPONIN I SERPL-MCNC: <0.01 NG/ML (ref 0–0.04)
TSH SERPL DL<=0.005 MIU/L-ACNC: 1.48 UIU/ML (ref 0.38–5.33)
WBC # BLD AUTO: 4.4 K/UL (ref 4.8–10.8)

## 2018-06-01 PROCEDURE — 84484 ASSAY OF TROPONIN QUANT: CPT

## 2018-06-01 PROCEDURE — 84443 ASSAY THYROID STIM HORMONE: CPT

## 2018-06-01 PROCEDURE — 80053 COMPREHEN METABOLIC PANEL: CPT

## 2018-06-01 PROCEDURE — 99284 EMERGENCY DEPT VISIT MOD MDM: CPT

## 2018-06-01 PROCEDURE — 85025 COMPLETE CBC W/AUTO DIFF WBC: CPT

## 2018-06-01 PROCEDURE — 93005 ELECTROCARDIOGRAM TRACING: CPT | Performed by: EMERGENCY MEDICINE

## 2018-06-01 PROCEDURE — 71045 X-RAY EXAM CHEST 1 VIEW: CPT

## 2018-06-01 PROCEDURE — 83880 ASSAY OF NATRIURETIC PEPTIDE: CPT

## 2018-06-01 RX ORDER — LOSARTAN POTASSIUM 25 MG/1
25 TABLET ORAL DAILY
COMMUNITY
End: 2018-06-19

## 2018-06-01 ASSESSMENT — PAIN SCALES - GENERAL: PAINLEVEL_OUTOF10: 0

## 2018-06-01 NOTE — TELEPHONE ENCOUNTER
Pt called in severely short of breath asked what she should do. She told the  she felt like she could pass out. Pt could speak 3-4 words between breaths. She reports increased SOB for the last 20 minutes. Recent blood pressures have been high 168/131 and 184/130. Pt has no one with her. Advised pt to call 911. She agreed to call.

## 2018-06-01 NOTE — ED PROVIDER NOTES
ED Provider Note    CHIEF COMPLAINT  Chief Complaint   Patient presents with   • Lightheadedness   • Shortness of Breath   • Blood Pressure Problem       HPI  Livia Corea is a 89 y.o. female who presents for evaluation of anxiety high blood pressure transient shortness of breath. The patient has an extensive history of hypertension. She was recently hospitalized last week and describe discharge. They did change some of her blood pressure regimen. She is followed by Dr. Concepcion with cardiology. She currently denies any chest pain and leg swelling productive cough fevers or chills. She checks her blood pressure several times a day and noted that it was systolic 200 this morning and that made her very anxious. She called the nurses hotline and they referred her to the emergency department. She denies any strokelike symptoms chest pain shortness of breath or leg swelling    REVIEW OF SYSTEMS  See HPI for further details. No high fevers chills night sweats as well as numbness tingling or weakness All other systems are negative.     PAST MEDICAL HISTORY  Past Medical History:   Diagnosis Date   • Circulation problem 01/2018   • Hyperlipidemia    • Hypertension        FAMILY HISTORY  Noncontributory    SOCIAL HISTORY  Social History     Social History   • Marital status: Single     Spouse name: N/A   • Number of children: N/A   • Years of education: N/A     Social History Main Topics   • Smoking status: Former Smoker     Quit date: 2/25/1988   • Smokeless tobacco: Never Used   • Alcohol use Yes      Comment: 1 glass of wine x 2 days a week   • Drug use: No   • Sexual activity: Not on file     Other Topics Concern   • Not on file     Social History Narrative   • No narrative on file     Former smoker  SURGICAL HISTORY  Past Surgical History:   Procedure Laterality Date   • CAROTID ENDARTERECTOMY Right 2000   • GYN SURGERY      hysterectomy       CURRENT MEDICATIONS  Home Medications     Reviewed by Blanca AQUINO  "Richard Rosales (Pharmacy Tech) on 06/01/18 at 1459  Med List Status: Complete   Medication Last Dose Status   amlodipine (NORVASC) 10 MG Tab 6/1/2018 Active   ascorbic acid (ASCORBIC ACID) 500 MG Tab 5/31/2018 Active   aspirin EC (ECOTRIN) 81 MG Tablet Delayed Response 5/31/2018 Active   chlorthalidone (HYGROTON) 25 MG Tab 5/31/2018 Active   cloNIDine (CATAPRES) 0.1 MG Tab 6/1/2018 Active   cyanocobalamin (VITAMIN B-12) 500 MCG Tab 5/31/2018 Active   Garlic 10 MG Cap 5/31/2018 Active   losartan (COZAAR) 25 MG Tab 10 DAYS Active   Omega-3 Fatty Acids (FISH OIL) 1000 MG Cap capsule 5/31/2018 Active   spironolactone (ALDACTONE) 50 MG Tab 6/1/2018 Active                ALLERGIES  Allergies   Allergen Reactions   • Lisinopril Swelling     Of lower extremities        PHYSICAL EXAM  VITAL SIGNS: BP (!) 173/75   Pulse (!) 47   Temp 37.1 °C (98.7 °F)   Resp 16   Ht 1.626 m (5' 4\")   Wt 58.5 kg (129 lb)   SpO2 94%   BMI 22.14 kg/m²  Room air O2: 96    Constitutional: Well developed, Well nourished, No acute distress, Non-toxic appearance.   HENT: Normocephalic, Atraumatic, Bilateral external ears normal, Oropharynx moist, No oral exudates, Nose normal.   Eyes: PERRLA, EOMI, Conjunctiva normal, No discharge.   Cardiovascular: Normal heart rate, Normal rhythm, No murmurs, No rubs, No gallops.   Thorax & Lungs: Normal breath sounds, No respiratory distress, No wheezing, No chest tenderness.   Abdomen: Bowel sounds normal, Soft, No tenderness, No masses, No pulsatile masses.   Skin: Warm, Dry, No erythema, No rash.   Back: No tenderness, No CVA tenderness.   Extremities: Intact distal pulses, No edema, No tenderness, No cyanosis, No clubbing.   Musculoskeletal: Good range of motion in all major joints. No tenderness to palpation or major deformities noted.   Neurologic: Alert & oriented x 3, Normal motor function, Normal sensory function, No focal deficits noted.   Psychiatric: Affect normal, Judgment normal, Mood normal. "     EKG  EKG Interpretation    Interpreted by me    Rhythm: normal sinus   Rate: normal  Axis: normal  Ectopy: none  Conduction: normal  ST Segments: no acute change  T Waves: no acute change  Q Waves: none    Clinical Impression: no acute changes and normal EKG    RADIOLOGY/PROCEDURES  DX-CHEST-PORTABLE (1 VIEW)   Final Result      No acute cardiopulmonary abnormality identified.        Results for orders placed or performed during the hospital encounter of 06/01/18   CBC WITH DIFFERENTIAL   Result Value Ref Range    WBC 4.4 (L) 4.8 - 10.8 K/uL    RBC 4.21 4.20 - 5.40 M/uL    Hemoglobin 12.6 12.0 - 16.0 g/dL    Hematocrit 38.4 37.0 - 47.0 %    MCV 91.2 81.4 - 97.8 fL    MCH 29.9 27.0 - 33.0 pg    MCHC 32.8 (L) 33.6 - 35.0 g/dL    RDW 48.5 35.9 - 50.0 fL    Platelet Count 261 164 - 446 K/uL    MPV 9.8 9.0 - 12.9 fL    Neutrophils-Polys 47.90 44.00 - 72.00 %    Lymphocytes 36.20 22.00 - 41.00 %    Monocytes 11.40 0.00 - 13.40 %    Eosinophils 3.60 0.00 - 6.90 %    Basophils 0.90 0.00 - 1.80 %    Immature Granulocytes 0.00 0.00 - 0.90 %    Nucleated RBC 0.00 /100 WBC    Neutrophils (Absolute) 2.10 2.00 - 7.15 K/uL    Lymphs (Absolute) 1.59 1.00 - 4.80 K/uL    Monos (Absolute) 0.50 0.00 - 0.85 K/uL    Eos (Absolute) 0.16 0.00 - 0.51 K/uL    Baso (Absolute) 0.04 0.00 - 0.12 K/uL    Immature Granulocytes (abs) 0.00 0.00 - 0.11 K/uL    NRBC (Absolute) 0.00 K/uL   COMP METABOLIC PANEL   Result Value Ref Range    Sodium 136 135 - 145 mmol/L    Potassium 4.7 3.6 - 5.5 mmol/L    Chloride 106 96 - 112 mmol/L    Co2 21 20 - 33 mmol/L    Anion Gap 9.0 0.0 - 11.9    Glucose 95 65 - 99 mg/dL    Bun 28 (H) 8 - 22 mg/dL    Creatinine 1.08 0.50 - 1.40 mg/dL    Calcium 9.8 8.5 - 10.5 mg/dL    AST(SGOT) 16 12 - 45 U/L    ALT(SGPT) 7 2 - 50 U/L    Alkaline Phosphatase 85 30 - 99 U/L    Total Bilirubin 0.4 0.1 - 1.5 mg/dL    Albumin 4.0 3.2 - 4.9 g/dL    Total Protein 7.7 6.0 - 8.2 g/dL    Globulin 3.7 (H) 1.9 - 3.5 g/dL    A-G Ratio 1.1  g/dL   TROPONIN   Result Value Ref Range    Troponin I <0.01 0.00 - 0.04 ng/mL   BTYPE NATRIURETIC PEPTIDE   Result Value Ref Range    B Natriuretic Peptide 44 0 - 100 pg/mL   TSH   Result Value Ref Range    TSH 1.480 0.380 - 5.330 uIU/mL   ESTIMATED GFR   Result Value Ref Range    GFR If  57 (A) >60 mL/min/1.73 m 2    GFR If Non  47 (A) >60 mL/min/1.73 m 2   EKG (ER)   Result Value Ref Range    Report       [facility] Emergency Dept.    Test Date:  2018  Pt Name:    GEO WHITESIDE                 Department: ER  MRN:        [id number]                      Room:       RD 10  Gender:     Female                       Technician: [id number]  :        [id number]                   Requested By:AZAEL POZO  Order #:    [id number]                    Reading MD:    Measurements  Intervals                                Axis  Rate:       52                           P:          37  NV:         184                          QRS:        40  QRSD:       116                          T:          7  QT:         452  QTc:        421    Interpretive Statements  SINUS BRADYCARDIA  INCOMPLETE LEFT BUNDLE BRANCH BLOCK  Compared to ECG 2018 16:27:55  Left bundle-branch block now present  Sinus rhythm no longer present  Intraventricular conduction delay no longer present          COURSE & MEDICAL DECISION MAKING  Pertinent Labs & Imaging studies reviewed. (See chart for details)  I reviewed the patient's records and her recent medication changes. Here she has no evidence of stroke heart failure or ischemia. She actually did not have her blood pressure treated all and came down to 160/63. She already has clonidine when necessary but has been inconsistently taking this. I advised her that she can continue using as needed clonidine and follow-up with her cardiologist in 3-4 days     FINAL IMPRESSION  1.   1. Essential hypertension               Electronically signed by: Azael DOS SANTOS  Brannon, 6/1/2018 3:40 PM

## 2018-06-01 NOTE — ED NOTES
MD at bedside prior to d/c. Pt given d/c instruction and verbalized understanding. No rx's given. Pt ambulatory to lobby with walker, gait steady. Pt took all belongings from room.

## 2018-06-01 NOTE — ED NOTES
"Pt arrived via [facility] from home. Per EMS pt had sudden onset of lightheadedness/SOB/weakness. Pt states she took her BP after symptoms started and BP was 206/102. Pt states after symptoms started to subside she took one PO clonidine prior to EMS arrival. Pt states symptoms are intermittent, denies any pain.     Pt has cardiology appt on May 29th with MD Eden and per MD Eden's note it states \"She will change chlorthalidone to morning dosing and discontinue isosorbide mononitrate.  She will have a carotid ultrasound and abdominal ultrasound to evaluate her bruits.\"     Pt a/o x4, speaking in full sentences.   "

## 2018-06-05 ENCOUNTER — TELEPHONE (OUTPATIENT)
Dept: CARDIOLOGY | Facility: MEDICAL CENTER | Age: 83
End: 2018-06-05

## 2018-06-06 NOTE — TELEPHONE ENCOUNTER
"issues with her blood pressure medication   Received: Today   Message Contents   Nina Gunter R.N.             NLE/Alka       Patient says she's having problems with her blood pressure medication. She can be reached at [phone].      Returned patient call. Pt c/o low heart rate and dry mouth x 2 evenings. HR 47. No present symptoms. Pt denies dizziness or lightheadedness at those times. Pt states \"I'm feeling pretty good\". Pt was very pleased with MD visit with FK. She felt confident with his skills. Pt reassured to keep up her fluids, I would look out for her lab results she plans to complete in the next 1-2 days and she will call back with any worsening symptoms.   "

## 2018-06-07 ENCOUNTER — HOSPITAL ENCOUNTER (OUTPATIENT)
Dept: LAB | Facility: MEDICAL CENTER | Age: 83
End: 2018-06-07
Attending: INTERNAL MEDICINE
Payer: MEDICARE

## 2018-06-07 DIAGNOSIS — I10 ESSENTIAL HYPERTENSION: ICD-10-CM

## 2018-06-07 LAB
ANION GAP SERPL CALC-SCNC: 8 MMOL/L (ref 0–11.9)
BUN SERPL-MCNC: 24 MG/DL (ref 8–22)
CALCIUM SERPL-MCNC: 10.2 MG/DL (ref 8.5–10.5)
CHLORIDE SERPL-SCNC: 104 MMOL/L (ref 96–112)
CO2 SERPL-SCNC: 23 MMOL/L (ref 20–33)
CREAT SERPL-MCNC: 1.1 MG/DL (ref 0.5–1.4)
GLUCOSE SERPL-MCNC: 94 MG/DL (ref 65–99)
POTASSIUM SERPL-SCNC: 4.5 MMOL/L (ref 3.6–5.5)
SODIUM SERPL-SCNC: 135 MMOL/L (ref 135–145)

## 2018-06-07 PROCEDURE — 80048 BASIC METABOLIC PNL TOTAL CA: CPT

## 2018-06-07 PROCEDURE — 36415 COLL VENOUS BLD VENIPUNCTURE: CPT

## 2018-06-11 ENCOUNTER — HOSPITAL ENCOUNTER (OUTPATIENT)
Dept: RADIOLOGY | Facility: MEDICAL CENTER | Age: 83
End: 2018-06-11
Attending: INTERNAL MEDICINE
Payer: MEDICARE

## 2018-06-11 DIAGNOSIS — R09.89 BRUIT OF LEFT CAROTID ARTERY: ICD-10-CM

## 2018-06-11 DIAGNOSIS — R09.89 ABDOMINAL BRUIT: ICD-10-CM

## 2018-06-11 DIAGNOSIS — I10 ESSENTIAL HYPERTENSION: ICD-10-CM

## 2018-06-11 PROCEDURE — 93978 VASCULAR STUDY: CPT | Mod: 26 | Performed by: INTERNAL MEDICINE

## 2018-06-11 PROCEDURE — 93880 EXTRACRANIAL BILAT STUDY: CPT

## 2018-06-11 PROCEDURE — 93978 VASCULAR STUDY: CPT

## 2018-06-11 PROCEDURE — 93880 EXTRACRANIAL BILAT STUDY: CPT | Mod: 26 | Performed by: INTERNAL MEDICINE

## 2018-06-14 NOTE — PROGRESS NOTES
Chief Complaint   Patient presents with   • HTN (Controlled)     follow up carotid results       Subjective:   Livia Corea is a 89 y.o. pleasant retired  for [employer] who presents today for follow up regarding a recent visit to the ER on 6/17/18 for complaints of headache, shortness of breath and palpitations. Work up was negative including a CT of the head and chest x-ray. It was felt this could be a side effect of the chlorthaladone and was told to stop taking the medication.     Patient was last seen by Dr. Napoleon Eden on 5/29/18 for follow up after a recent abnormal myocardial perfusion study. At that visits a carotid and abdominal bruit was auscultated. A carotid US and abdominal US was ordered.     Past medical history significant for hypertension.     Today patient states that she is feeling well. States that she has not had any palpitations or headache since she stopped taking the chlorthalidone. Denies chest pain, shortness of breath, palpitations or lower extremity edema.     Past Medical History:   Diagnosis Date   • Circulation problem 01/2018   • Hyperlipidemia    • Hypertension      Past Surgical History:   Procedure Laterality Date   • CAROTID ENDARTERECTOMY Right 2000   • GYN SURGERY      hysterectomy     Family History   Problem Relation Age of Onset   • Heart Attack Mother 64   • Heart Attack Father 72     Social History     Social History   • Marital status: Single     Spouse name: N/A   • Number of children: N/A   • Years of education: N/A     Occupational History   • Not on file.     Social History Main Topics   • Smoking status: Former Smoker     Quit date: 2/25/1988   • Smokeless tobacco: Never Used   • Alcohol use Yes      Comment: 1 glass of wine x 2 days a week   • Drug use: No   • Sexual activity: Not on file     Other Topics Concern   • Not on file     Social History Narrative   • No narrative on file     Allergies   Allergen Reactions   •  "Chlorthalidone      Headaches severe. Heart racing    • Lisinopril Swelling     Of lower extremities      Outpatient Encounter Prescriptions as of 6/19/2018   Medication Sig Dispense Refill   • losartan (COZAAR) 25 MG Tab Take 1 Tab by mouth every evening. 90 Tab 3   • atorvastatin (LIPITOR) 20 MG Tab Take 1 Tab by mouth every day. 90 Tab 3   • cloNIDine (CATAPRES) 0.1 MG Tab Take 1 Tab by mouth 4 times a day as needed (Systolic BP >180, Diastolic BP>100). 60 Tab 1   • spironolactone (ALDACTONE) 50 MG Tab Take 50 mg by mouth every day.     • Garlic 10 MG Cap Take 2 Caps by mouth 3 times a day, with meals.     • Omega-3 Fatty Acids (FISH OIL) 1000 MG Cap capsule Take 2 Caps by mouth every day. 90 Cap 3   • ascorbic acid (ASCORBIC ACID) 500 MG Tab Take 500 mg by mouth every day.     • cyanocobalamin (VITAMIN B-12) 500 MCG Tab Take 500 mcg by mouth every day.     • amlodipine (NORVASC) 10 MG Tab Take 10 mg by mouth every day.     • aspirin EC (ECOTRIN) 81 MG Tablet Delayed Response Take 81 mg by mouth every day.     • [DISCONTINUED] losartan (COZAAR) 25 MG Tab Take 25 mg by mouth every day.     • [DISCONTINUED] chlorthalidone (HYGROTON) 25 MG Tab Take 1 Tab by mouth every bedtime. (Patient not taking: Reported on 6/19/2018) 30 Tab 2     No facility-administered encounter medications on file as of 6/19/2018.      Review of Systems   Constitutional: Positive for malaise/fatigue. Negative for weight loss.   Respiratory: Negative for shortness of breath.    Cardiovascular: Negative for chest pain, palpitations, orthopnea, claudication, leg swelling and PND.   Gastrointestinal: Negative for abdominal pain.   Neurological: Positive for weakness. Negative for dizziness.   All other systems reviewed and are negative.       Objective:   BP (!) 170/80   Pulse 68   Ht 1.626 m (5' 4\")   Wt 57.2 kg (126 lb)   SpO2 96%   BMI 21.63 kg/m²     Physical Exam   Constitutional: She is oriented to person, place, and time. She appears " well-developed. No distress.   Elderly appearing    HENT:   Head: Normocephalic.   Eyes: EOM are normal.   Neck: No JVD present.   Cardiovascular: Normal rate and regular rhythm.    Murmur heard.   Systolic murmur is present with a grade of 3/6   Pulses:       Radial pulses are 2+ on the right side, and 2+ on the left side.        Dorsalis pedis pulses are 2+ on the right side, and 2+ on the left side.   Pulmonary/Chest: Effort normal and breath sounds normal. No respiratory distress.   Abdominal: Soft. There is no tenderness.   Musculoskeletal: She exhibits no edema.   Neurological: She is alert and oriented to person, place, and time.   Skin: Skin is warm and dry.   Psychiatric: She has a normal mood and affect. Her behavior is normal.   Vitals reviewed.          Ref. Range 5/27/2018 05:43 6/1/2018 14:04 6/7/2018 10:58 6/17/2018 07:14   Sodium Latest Ref Range: 135 - 145 mmol/L 139 136 135 135   Potassium Latest Ref Range: 3.6 - 5.5 mmol/L 4.0 4.7 4.5 4.6   Chloride Latest Ref Range: 96 - 112 mmol/L 110 106 104 103   Co2 Latest Ref Range: 20 - 33 mmol/L 21 21 23 22   Anion Gap Latest Ref Range: 0.0 - 11.9  8.0 9.0 8.0 10.0   Glucose Latest Ref Range: 65 - 99 mg/dL 94 95 94 111 (H)   Bun Latest Ref Range: 8 - 22 mg/dL 17 28 (H) 24 (H) 30 (H)   Creatinine Latest Ref Range: 0.50 - 1.40 mg/dL 0.86 1.08 1.10 1.18   GFR If  Latest Ref Range: >60 mL/min/1.73 m 2 >60 57 (A) 56 (A) 52 (A)   GFR If Non  Latest Ref Range: >60 mL/min/1.73 m 2 >60 47 (A) 47 (A) 43 (A)   Calcium Latest Ref Range: 8.5 - 10.5 mg/dL 9.1 9.8 10.2 10.0   AST(SGOT) Latest Ref Range: 12 - 45 U/L  16  17   ALT(SGPT) Latest Ref Range: 2 - 50 U/L  7  8   Alkaline Phosphatase Latest Ref Range: 30 - 99 U/L  85  88   Total Bilirubin Latest Ref Range: 0.1 - 1.5 mg/dL  0.4  0.6   Albumin Latest Ref Range: 3.2 - 4.9 g/dL  4.0  4.6   Total Protein Latest Ref Range: 6.0 - 8.2 g/dL  7.7  8.8 (H)   Globulin Latest Ref Range: 1.9 -  3.5 g/dL  3.7 (H)  4.2 (H)   A-G Ratio Latest Units: g/dL  1.1  1.1       Carotid Duplex 6/12/18:   FINDINGS   Right carotid.    Plaque of the bifurcation extending into the internal carotid. Velocities    are consistent with < 50% stenosis of the internal carotid artery.    Left carotid    Plaque of the bifurcation extending into the internal carotid. Velocities    are consistent with < 50% stenosis of the internal carotid artery.      Bilateral subclavian and vertebral artery waveforms are antegrade and    waveforms are normal in character and velocity.     Aorti-Iliac US 6/12/18:   CONCLUSIONS   No evidence of aortic or Iliac aneurysm. Velocities are normal throughout    the bilateral iliac arteries.      TTE 3/10/18:  No prior study is available for comparison.   Normal left ventricular systolic function.  Left ventricular ejection fraction is visually estimated to be 65%.  Mild left ventricular hypertrophy.  Mild aortic stenosis.  Right heart pressures are consistent with moderate pulmonary   hypertension.    CT-Head 6/17/18:  1.  No acute intracranial findings.  2.  Diffuse atrophy and periventricular white matter change, consistent with chronic small vessel disease.    Chest X-Ray 6/17/18:  FINDINGS:  Single portable view of the chest demonstrates a normal cardiac silhouette and mediastinal contours. Calcification is in the aorta.  No discrete opacity, pleural fluid, or pneumothorax.  No suspicious bony lesions.      Assessment:     1. Essential hypertension, benign  BASIC METABOLIC PANEL   2. Dyslipidemia     3. Abnormal myocardial perfusion study: Small reversible apical abnormality         Medical Decision Making:  Today's Assessment / Status / Plan:     HTN:  -BP elevated here in office today, per patient it has been in the 150's/90's.  -Denies needing to take her clonidine for elevated BP readings at home.   -Patient stopped taking her losartan a few months ago because she didn't think it was  helping.  -Chlorthalidone discontinued due to side effects.   -Restart losartan 25 mg Q evening.   -Continue Spironolactone 50 mg and Norvasc 10 mg Q am.   -Patient will continue to monitor her BP and pulse and log readings at home.   -Repeat BMP in 3-4 weeks.     Carotid and Abdominal Bruit:  -Carotid US showing < 50% stenosis bilaterally  -Aorta-jairo US showing no evidence of aneurysm.     Abnormal MPI:  -Minimally abnormal MPI on 3/18/18, Dr. Eden managing with statin therapy.  -Denies chest pain.    HLD:  -Last LDL on 3/11/18 was 113.  -Continue Atorvastatin 20 mg daily.     Patient overall is feeling well. Denies chest pain, shortness of breath, lower extremity edema or palpitations. Patient will follow up with me in 6 weeks with BMP in 3-4 weeks. Encouraged patient to call should any questions or concerns arise in the mean time. Patient understands and agrees with the plan of care.     Collaborating Provider: Dr. Napoleon Eden

## 2018-06-17 ENCOUNTER — APPOINTMENT (OUTPATIENT)
Dept: RADIOLOGY | Facility: MEDICAL CENTER | Age: 83
End: 2018-06-17
Attending: EMERGENCY MEDICINE
Payer: MEDICARE

## 2018-06-17 ENCOUNTER — HOSPITAL ENCOUNTER (EMERGENCY)
Facility: MEDICAL CENTER | Age: 83
End: 2018-06-17
Attending: EMERGENCY MEDICINE
Payer: MEDICARE

## 2018-06-17 VITALS
OXYGEN SATURATION: 96 % | HEIGHT: 65 IN | TEMPERATURE: 98.4 F | SYSTOLIC BLOOD PRESSURE: 137 MMHG | WEIGHT: 126.54 LBS | DIASTOLIC BLOOD PRESSURE: 68 MMHG | BODY MASS INDEX: 21.08 KG/M2 | RESPIRATION RATE: 16 BRPM | HEART RATE: 56 BPM

## 2018-06-17 DIAGNOSIS — R00.2 PALPITATIONS: ICD-10-CM

## 2018-06-17 LAB
ALBUMIN SERPL BCP-MCNC: 4.6 G/DL (ref 3.2–4.9)
ALBUMIN/GLOB SERPL: 1.1 G/DL
ALP SERPL-CCNC: 88 U/L (ref 30–99)
ALT SERPL-CCNC: 8 U/L (ref 2–50)
ANION GAP SERPL CALC-SCNC: 10 MMOL/L (ref 0–11.9)
AST SERPL-CCNC: 17 U/L (ref 12–45)
BASOPHILS # BLD AUTO: 1.5 % (ref 0–1.8)
BASOPHILS # BLD: 0.08 K/UL (ref 0–0.12)
BILIRUB SERPL-MCNC: 0.6 MG/DL (ref 0.1–1.5)
BNP SERPL-MCNC: 9 PG/ML (ref 0–100)
BUN SERPL-MCNC: 30 MG/DL (ref 8–22)
CALCIUM SERPL-MCNC: 10 MG/DL (ref 8.5–10.5)
CHLORIDE SERPL-SCNC: 103 MMOL/L (ref 96–112)
CO2 SERPL-SCNC: 22 MMOL/L (ref 20–33)
CREAT SERPL-MCNC: 1.18 MG/DL (ref 0.5–1.4)
EKG IMPRESSION: NORMAL
EOSINOPHIL # BLD AUTO: 0.19 K/UL (ref 0–0.51)
EOSINOPHIL NFR BLD: 3.5 % (ref 0–6.9)
ERYTHROCYTE [DISTWIDTH] IN BLOOD BY AUTOMATED COUNT: 47.1 FL (ref 35.9–50)
GLOBULIN SER CALC-MCNC: 4.2 G/DL (ref 1.9–3.5)
GLUCOSE SERPL-MCNC: 111 MG/DL (ref 65–99)
HCT VFR BLD AUTO: 43.9 % (ref 37–47)
HGB BLD-MCNC: 14.4 G/DL (ref 12–16)
IMM GRANULOCYTES # BLD AUTO: 0.02 K/UL (ref 0–0.11)
IMM GRANULOCYTES NFR BLD AUTO: 0.4 % (ref 0–0.9)
LYMPHOCYTES # BLD AUTO: 1.68 K/UL (ref 1–4.8)
LYMPHOCYTES NFR BLD: 31.1 % (ref 22–41)
MCH RBC QN AUTO: 29.6 PG (ref 27–33)
MCHC RBC AUTO-ENTMCNC: 32.8 G/DL (ref 33.6–35)
MCV RBC AUTO: 90.1 FL (ref 81.4–97.8)
MONOCYTES # BLD AUTO: 0.5 K/UL (ref 0–0.85)
MONOCYTES NFR BLD AUTO: 9.3 % (ref 0–13.4)
NEUTROPHILS # BLD AUTO: 2.93 K/UL (ref 2–7.15)
NEUTROPHILS NFR BLD: 54.2 % (ref 44–72)
NRBC # BLD AUTO: 0 K/UL
NRBC BLD-RTO: 0 /100 WBC
PLATELET # BLD AUTO: 266 K/UL (ref 164–446)
PMV BLD AUTO: 9.3 FL (ref 9–12.9)
POTASSIUM SERPL-SCNC: 4.6 MMOL/L (ref 3.6–5.5)
PROT SERPL-MCNC: 8.8 G/DL (ref 6–8.2)
RBC # BLD AUTO: 4.87 M/UL (ref 4.2–5.4)
SODIUM SERPL-SCNC: 135 MMOL/L (ref 135–145)
TROPONIN I SERPL-MCNC: <0.01 NG/ML (ref 0–0.04)
WBC # BLD AUTO: 5.4 K/UL (ref 4.8–10.8)

## 2018-06-17 PROCEDURE — 83880 ASSAY OF NATRIURETIC PEPTIDE: CPT

## 2018-06-17 PROCEDURE — 80053 COMPREHEN METABOLIC PANEL: CPT

## 2018-06-17 PROCEDURE — 93005 ELECTROCARDIOGRAM TRACING: CPT

## 2018-06-17 PROCEDURE — 70450 CT HEAD/BRAIN W/O DYE: CPT

## 2018-06-17 PROCEDURE — 99284 EMERGENCY DEPT VISIT MOD MDM: CPT

## 2018-06-17 PROCEDURE — 93005 ELECTROCARDIOGRAM TRACING: CPT | Performed by: EMERGENCY MEDICINE

## 2018-06-17 PROCEDURE — 85025 COMPLETE CBC W/AUTO DIFF WBC: CPT

## 2018-06-17 PROCEDURE — 36415 COLL VENOUS BLD VENIPUNCTURE: CPT

## 2018-06-17 PROCEDURE — 84484 ASSAY OF TROPONIN QUANT: CPT

## 2018-06-17 PROCEDURE — 71045 X-RAY EXAM CHEST 1 VIEW: CPT

## 2018-06-17 ASSESSMENT — PAIN SCALES - GENERAL: PAINLEVEL_OUTOF10: 4

## 2018-06-17 NOTE — DISCHARGE INSTRUCTIONS
Palpitations  A palpitation is the feeling that your heartbeat is irregular or is faster than normal. It may feel like your heart is fluttering or skipping a beat. Palpitations are usually not a serious problem. They may be caused by many things, including smoking, caffeine, alcohol, stress, and certain medicines. Although most causes of palpitations are not serious, palpitations can be a sign of a serious medical problem. In some cases, you may need further medical evaluation.  Follow these instructions at home:  Pay attention to any changes in your symptoms. Take these actions to help with your condition:  · Avoid the following:  ¨ Caffeinated coffee, tea, soft drinks, diet pills, and energy drinks.  ¨ Chocolate.  ¨ Alcohol.  · Do not use any tobacco products, such as cigarettes, chewing tobacco, and e-cigarettes. If you need help quitting, ask your health care provider.  · Try to reduce your stress and anxiety. Things that can help you relax include:  ¨ Yoga.  ¨ Meditation.  ¨ Physical activity, such as swimming, jogging, or walking.  ¨ Biofeedback. This is a method that helps you learn to use your mind to control things in your body, such as your heartbeats.  · Get plenty of rest and sleep.  · Take over-the-counter and prescription medicines only as told by your health care provider.  · Keep all follow-up visits as told by your health care provider. This is important.  Contact a health care provider if:  · You continue to have a fast or irregular heartbeat after 24 hours.  · Your palpitations occur more often.  Get help right away if:  · You have chest pain or shortness of breath.  · You have a severe headache.  · You feel dizzy or you faint.  This information is not intended to replace advice given to you by your health care provider. Make sure you discuss any questions you have with your health care provider.  Document Released: 12/15/2001 Document Revised: 05/22/2017 Document Reviewed: 09/01/2016  [employer]  Interactive Patient Education © 2017 [employer]

## 2018-06-17 NOTE — ED NOTES
Patient ambulated to room. Placed on monitor and in gown. VSS. ERP at bedside. Labs drawn and sent.

## 2018-06-17 NOTE — ED TRIAGE NOTES
Livia Corea  89 y.o.  Chief Complaint   Patient presents with   • Head Ache     intermittent squeezing/throbbing pain to top of head, persistent since Thursday   • Shortness of Breath     woken from sleep, concurrent with palpitations   • Palpitations     states that HR was 119 at home     Ambulatory to triage with wheeled walker for above.    Denies chest pain/cough/dizziness/lightheadedness.    Currently rates headache 3/10.    EKG called for in triage per protocol.    Triage process explained to patient, apologized for wait time, and returned to lobby.

## 2018-06-17 NOTE — ED PROVIDER NOTES
"ED Provider Note    CHIEF COMPLAINT  Chief Complaint   Patient presents with   • Head Ache     intermittent squeezing/throbbing pain to top of head, persistent since Thursday   • Shortness of Breath     woken from sleep, concurrent with palpitations   • Palpitations     states that HR was 119 at home       HPI  Livia Corea is a 89 y.o. female who presents to the emergency department with several complaints.  She woke up this morning feeling her heart racing.  She felt a bit anxious with this and was short of breath.  Her heart rate was 119.  This lasted for about 10 minutes and then went away.  Never had any chest pain.  No longer short of breath.  She states that she did not feel an irregular heartbeat.  It was just going fast.  Did not take anything or do anything in particular to make the symptoms go away.  She took her blood pressure was acceptable.    Patient states that her last 4 days she will have a split second fleeting \"ping\" in her head.  This occurs about every 3-5 minutes.  Last episode was this morning about 30 minutes before coming in.  Currently no headache.  No persistent headache.  This is across the top of the head.  No neck stiffness.  No weakness numbness neurologic symptoms.  No vision change.  No exertional symptoms.      Denies abdominal pain.  No nausea, vomiting.  No fevers.      REVIEW OF SYSTEMS  As per HPI, otherwise a 10 point review of systems is negative    PAST MEDICAL HISTORY  Past Medical History:   Diagnosis Date   • Circulation problem 01/2018   • Hyperlipidemia    • Hypertension        SOCIAL HISTORY  Social History   Substance Use Topics   • Smoking status: Former Smoker     Quit date: 2/25/1988   • Smokeless tobacco: Never Used   • Alcohol use Yes      Comment: 1 glass of wine x 2 days a week       SURGICAL HISTORY  Past Surgical History:   Procedure Laterality Date   • CAROTID ENDARTERECTOMY Right 2000   • GYN SURGERY      hysterectomy       CURRENT " "MEDICATIONS  Home Medications     Reviewed by Lee Ann Cormier R.N. (Registered Nurse) on 06/17/18 at 0707  Med List Status: Complete   Medication Last Dose Status   amlodipine (NORVASC) 10 MG Tab 6/16/2018 Active   ascorbic acid (ASCORBIC ACID) 500 MG Tab 6/16/2018 Active   aspirin EC (ECOTRIN) 81 MG Tablet Delayed Response 6/16/2018 Active   chlorthalidone (HYGROTON) 25 MG Tab 6/16/2018 Active   cloNIDine (CATAPRES) 0.1 MG Tab prn Active   cyanocobalamin (VITAMIN B-12) 500 MCG Tab 6/16/2018 Active   Garlic 10 MG Cap 6/16/2018 Active   losartan (COZAAR) 25 MG Tab not taking Active   Omega-3 Fatty Acids (FISH OIL) 1000 MG Cap capsule 6/16/2018 Active   spironolactone (ALDACTONE) 50 MG Tab 6/16/2018 Active                ALLERGIES  Allergies   Allergen Reactions   • Lisinopril Swelling     Of lower extremities        PHYSICAL EXAM  VITAL SIGNS: /68   Pulse 73   Temp 36.9 °C (98.4 °F)   Resp 16   Ht 1.638 m (5' 4.5\")   Wt 57.4 kg (126 lb 8.7 oz)   SpO2 100%   BMI 21.39 kg/m²    Constitutional: Awake and alert.  Pleasant elderly female  HENT:  Atraumatic, Normocephalic.Oropharynx moist mucus membranes, nearly edentulous nose normal inspection.   Eyes: Normal inspection  Neck: Supple  Cardiovascular: Normal heart rate, Normal rhythm.  Symmetric peripheral pulses.   Thorax & Lungs: No respiratory distress, No wheezing, No rales, No rhonchi, No chest tenderness.   Abdomen: Bowel sounds normal, soft, non-distended, nontender, no mass  Skin: Warm, Dry, No rash.   Back: No tenderness, No CVA tenderness.   Extremities: No clubbing, cyanosis, edema, no Homans or cords   Neurologic: Alert & oriented x 3, Normal motor function, Normal sensory function, No focal deficits noted. Normal reflexes.  Normal Cranial Nerves.  Psychiatric: Anxious appearing    RADIOLOGY/PROCEDURES  DX-CHEST-PORTABLE (1 VIEW)   Final Result      No acute cardiopulmonary findings.      CT-HEAD W/O   Final Result      1.  No acute intracranial " findings.      2.  Diffuse atrophy and periventricular white matter change, consistent with chronic small vessel disease.            Imaging is interpreted by radiologist    Labs:  Results for orders placed or performed during the hospital encounter of 06/17/18   CBC WITH DIFFERENTIAL   Result Value Ref Range    WBC 5.4 4.8 - 10.8 K/uL    RBC 4.87 4.20 - 5.40 M/uL    Hemoglobin 14.4 12.0 - 16.0 g/dL    Hematocrit 43.9 37.0 - 47.0 %    MCV 90.1 81.4 - 97.8 fL    MCH 29.6 27.0 - 33.0 pg    MCHC 32.8 (L) 33.6 - 35.0 g/dL    RDW 47.1 35.9 - 50.0 fL    Platelet Count 266 164 - 446 K/uL    MPV 9.3 9.0 - 12.9 fL    Neutrophils-Polys 54.20 44.00 - 72.00 %    Lymphocytes 31.10 22.00 - 41.00 %    Monocytes 9.30 0.00 - 13.40 %    Eosinophils 3.50 0.00 - 6.90 %    Basophils 1.50 0.00 - 1.80 %    Immature Granulocytes 0.40 0.00 - 0.90 %    Nucleated RBC 0.00 /100 WBC    Neutrophils (Absolute) 2.93 2.00 - 7.15 K/uL    Lymphs (Absolute) 1.68 1.00 - 4.80 K/uL    Monos (Absolute) 0.50 0.00 - 0.85 K/uL    Eos (Absolute) 0.19 0.00 - 0.51 K/uL    Baso (Absolute) 0.08 0.00 - 0.12 K/uL    Immature Granulocytes (abs) 0.02 0.00 - 0.11 K/uL    NRBC (Absolute) 0.00 K/uL   COMP METABOLIC PANEL   Result Value Ref Range    Sodium 135 135 - 145 mmol/L    Potassium 4.6 3.6 - 5.5 mmol/L    Chloride 103 96 - 112 mmol/L    Co2 22 20 - 33 mmol/L    Anion Gap 10.0 0.0 - 11.9    Glucose 111 (H) 65 - 99 mg/dL    Bun 30 (H) 8 - 22 mg/dL    Creatinine 1.18 0.50 - 1.40 mg/dL    Calcium 10.0 8.5 - 10.5 mg/dL    AST(SGOT) 17 12 - 45 U/L    ALT(SGPT) 8 2 - 50 U/L    Alkaline Phosphatase 88 30 - 99 U/L    Total Bilirubin 0.6 0.1 - 1.5 mg/dL    Albumin 4.6 3.2 - 4.9 g/dL    Total Protein 8.8 (H) 6.0 - 8.2 g/dL    Globulin 4.2 (H) 1.9 - 3.5 g/dL    A-G Ratio 1.1 g/dL   TROPONIN   Result Value Ref Range    Troponin I <0.01 0.00 - 0.04 ng/mL   BNP   Result Value Ref Range    B Natriuretic Peptide 9 0 - 100 pg/mL   ESTIMATED GFR   Result Value Ref Range    GFR If   52 (A) >60 mL/min/1.73 m 2    GFR If Non  43 (A) >60 mL/min/1.73 m 2   EKG (ER)   Result Value Ref Range    Report       [facility]    Test Date:  2018  Pt Name:    GEO WHITESIDE                 Department: ER  MRN:        [id number]                      Room:  Gender:     Female                       Technician: [id number]  :        [id number]                   Requested By:ER TRIAGE PROTOCOL  Order #:    [id number]                    Reading MD: SOFIE ARAUZ MD    Measurements  Intervals                                Axis  Rate:       65                           P:          52  MA:         180                          QRS:        44  QRSD:       118                          T:          -6  QT:         424  QTc:        441    Interpretive Statements  SINUS RHYTHM  NONSPECIFIC INTRAVENTRICULAR CONDUCTION DELAY  BASELINE WANDER IN LEAD(S) III,aVL,V3  Compared to ECG 2018 14:09:29  No significant change    Electronically Signed On 2018 7:17:46 PDT by SOFIE ARAUZ MD         COURSE & MEDICAL DECISION MAKING  Pleasant elderly female presents after having an episode of palpitations in which her heart rate was mildly elevated.  She has also been having fleeting headaches.  She relates all of these symptoms to taking any medication.  She was evaluated in the emergency department for any emergent pathology.  She does not have suggestion of intracranial hemorrhage or acute CNS process.  She never had chest pain, but does not feel like she was breathing deeper.  Denies any ongoing dyspnea.  Vitals have been normal.  Chest x-ray is negative.  No suggestion of acute coronary syndrome or heart failure.  She does not have pneumonia.  I suppose this could be a reaction to her medication.  She should discuss this with her cardiologist.  She has an appointment with Dr. Concepcion in 2 days.  I precautioned to return to the ER for any chest pain, any  persistent headache or concerning symptoms.      FINAL IMPRESSION  1.  Palpitations  2.  Headache, resolved  3.  Possible medication reaction      This dictation was created using voice recognition software. The accuracy of the dictation is limited to the abilities of the software.  The nursing notes were reviewed and certain aspects of this information were incorporated into this note.      Electronically signed by: Paolo Mcgraw, 6/17/2018 7:20 AM

## 2018-06-19 ENCOUNTER — OFFICE VISIT (OUTPATIENT)
Dept: CARDIOLOGY | Facility: MEDICAL CENTER | Age: 83
End: 2018-06-19
Payer: MEDICARE

## 2018-06-19 VITALS
BODY MASS INDEX: 21.51 KG/M2 | HEIGHT: 64 IN | DIASTOLIC BLOOD PRESSURE: 80 MMHG | HEART RATE: 68 BPM | WEIGHT: 126 LBS | SYSTOLIC BLOOD PRESSURE: 170 MMHG | OXYGEN SATURATION: 96 %

## 2018-06-19 DIAGNOSIS — I10 ESSENTIAL HYPERTENSION, BENIGN: ICD-10-CM

## 2018-06-19 DIAGNOSIS — E78.5 DYSLIPIDEMIA: ICD-10-CM

## 2018-06-19 DIAGNOSIS — R94.39 ABNORMAL MYOCARDIAL PERFUSION STUDY: ICD-10-CM

## 2018-06-19 PROBLEM — R09.89 CAROTID BRUIT: Status: ACTIVE | Noted: 2018-06-19

## 2018-06-19 PROCEDURE — 99214 OFFICE O/P EST MOD 30 MIN: CPT | Performed by: NURSE PRACTITIONER

## 2018-06-19 RX ORDER — ATORVASTATIN CALCIUM 20 MG/1
20 TABLET, FILM COATED ORAL DAILY
Qty: 90 TAB | Refills: 3 | Status: SHIPPED | OUTPATIENT
Start: 2018-06-19 | End: 2018-09-25 | Stop reason: SDUPTHER

## 2018-06-19 RX ORDER — LOSARTAN POTASSIUM 25 MG/1
25 TABLET ORAL EVERY EVENING
Qty: 90 TAB | Refills: 3 | Status: SHIPPED | OUTPATIENT
Start: 2018-06-19 | End: 2018-07-23 | Stop reason: SDUPTHER

## 2018-06-19 ASSESSMENT — ENCOUNTER SYMPTOMS
WEAKNESS: 1
CLAUDICATION: 0
PALPITATIONS: 0
ORTHOPNEA: 0
WEIGHT LOSS: 0
DIZZINESS: 0
PND: 0
SHORTNESS OF BREATH: 0
ABDOMINAL PAIN: 0

## 2018-06-20 ENCOUNTER — TELEPHONE (OUTPATIENT)
Dept: CARDIOLOGY | Facility: MEDICAL CENTER | Age: 83
End: 2018-06-20

## 2018-06-20 DIAGNOSIS — R53.1 WEAKNESS: ICD-10-CM

## 2018-06-20 DIAGNOSIS — F41.0 PANIC ATTACKS: ICD-10-CM

## 2018-06-20 DIAGNOSIS — Z63.8 LACK OF FAMILY SUPPORT: ICD-10-CM

## 2018-06-20 DIAGNOSIS — I10 ESSENTIAL HYPERTENSION: ICD-10-CM

## 2018-06-20 SDOH — SOCIAL STABILITY - SOCIAL INSECURITY: OTHER SPECIFIED PROBLEMS RELATED TO PRIMARY SUPPORT GROUP: Z63.8

## 2018-06-20 NOTE — TELEPHONE ENCOUNTER
patient said she had another episode last night   Received: Today   Message Contents   CHITO Bradshaw/Sorin       Patient said she had another episode. She said a few minutes ago her blood pressure went high and she started having palpitations, she's feeling better now. Please call to advise. Ph. #[phone].        Pt explains that at about 2pm today while just resting on the couch she began feeling very tired so checked her BP and it was 212/96, 10 minutes later 186/85, now 1 hour later 135/75. She feels much better now.  She did not need to take a clonidine since her BP went down so fast.     At 11:30 am yesterday her BP was 130/85 so she did not take her losartan last night since it was so good. Educated pt that losartan is not a PRN medication and should be taken every evening. Explained the difference between clonidine and losartan. Pt states understanding and agrees to take nightly.     To is concerned about the sudden spike in BP again and wants to know how to prevent this.     To JS: any additional advise?

## 2018-06-21 NOTE — TELEPHONE ENCOUNTER
"another panic attack   Received: Today   Message Contents   Yamileth ESPINOSAYing Johnsonnddg Lopez R.N.   Phone Number: [phone]             Sorin,     Pt calling to report another panic attack about 20 min ago.  Please call to discuss [phone].      S/w Breanna LINCOLN regarding pt calling in again for second day in a row with panic attack. Per Breanna, no medication changes and recommends follow up with PCP and home health if pt thinks this would be beneficial for her as she is a 89 y/o living alone with no family assistance.       S/w pt about her panic attach. She said it \"came on for no reason\" and lasted 1-2 minutes. She reports face felt warm, she began to get shaky and also respirations increased. She had no chest pain/tightness or any other symptoms. She said her blood pressure did not spike like it did yesterday,  after the episode. Reassured pt regarding improvement in her blood pressure. Explained JS's recommendations as stated above. Pt thinks home health would be very beneficial and interested in moving forward with this. She also agrees to follow up with her PCP Dr. Carrero. She will call to schedule an appt.     JS would like home health orders placed under [facility], Dr. BASSAM Rodriguez. Orders placed.   "

## 2018-06-22 ENCOUNTER — HOME HEALTH ADMISSION (OUTPATIENT)
Dept: HOME HEALTH SERVICES | Facility: HOME HEALTHCARE | Age: 83
End: 2018-06-22
Payer: MEDICARE

## 2018-06-22 ENCOUNTER — TELEPHONE (OUTPATIENT)
Dept: CARDIOLOGY | Facility: MEDICAL CENTER | Age: 83
End: 2018-06-22

## 2018-06-22 NOTE — TELEPHONE ENCOUNTER
"   Mahsa Currie - REFERRAL TO HOME HEALTH << Less Detail',event)\" href=\"javascript:;\">More Detail >>      REFERRAL TO HOME HEALTH   Mahsa Currie   Sent: Thu June 21, 2018  2:41 PM   To: Makeda Hand R.N.                  Message     The referral to Home Health will be sent to [facility]. The contact number is [phone].       "

## 2018-07-18 ENCOUNTER — HOSPITAL ENCOUNTER (OUTPATIENT)
Dept: LAB | Facility: MEDICAL CENTER | Age: 83
End: 2018-07-18
Attending: NURSE PRACTITIONER
Payer: MEDICARE

## 2018-07-18 DIAGNOSIS — I10 ESSENTIAL HYPERTENSION, BENIGN: ICD-10-CM

## 2018-07-18 LAB
ANION GAP SERPL CALC-SCNC: 7 MMOL/L (ref 0–11.9)
BUN SERPL-MCNC: 27 MG/DL (ref 8–22)
CALCIUM SERPL-MCNC: 9.9 MG/DL (ref 8.5–10.5)
CHLORIDE SERPL-SCNC: 106 MMOL/L (ref 96–112)
CO2 SERPL-SCNC: 29 MMOL/L (ref 20–33)
CREAT SERPL-MCNC: 1.19 MG/DL (ref 0.5–1.4)
GLUCOSE SERPL-MCNC: 94 MG/DL (ref 65–99)
POTASSIUM SERPL-SCNC: 4 MMOL/L (ref 3.6–5.5)
SODIUM SERPL-SCNC: 142 MMOL/L (ref 135–145)

## 2018-07-18 PROCEDURE — 36415 COLL VENOUS BLD VENIPUNCTURE: CPT

## 2018-07-18 PROCEDURE — 80048 BASIC METABOLIC PNL TOTAL CA: CPT

## 2018-07-20 ENCOUNTER — TELEPHONE (OUTPATIENT)
Dept: CARDIOLOGY | Facility: MEDICAL CENTER | Age: 83
End: 2018-07-20

## 2018-07-20 DIAGNOSIS — I10 ESSENTIAL HYPERTENSION: ICD-10-CM

## 2018-07-20 RX ORDER — HYDRALAZINE HYDROCHLORIDE 50 MG/1
50 TABLET, FILM COATED ORAL 3 TIMES DAILY PRN
Qty: 45 TAB | Refills: 3 | Status: SHIPPED | OUTPATIENT
Start: 2018-07-20 | End: 2018-07-26 | Stop reason: SDUPTHER

## 2018-07-20 NOTE — TELEPHONE ENCOUNTER
Questions about medication   Received: Today   Message Contents   Payal Lopez R.N.             RODRIGO/Sorin     Patient has some questions about her medication and wants a call back at [phone].      Pt calling in concerned about clonidine dropping her heart rate. At 2am this morning her BP was 205/102 so took 0.1mg clonidine and about 2 hours later checked her blood pressure again and noticed her HR was down to 49bpm. She reports no symptoms, felt fine just worried about the low HR.     - This afternoon: HR 61, /78 which she said is her baseline  - pt states she has learned to manage her panic attacks much better after going to see a therapist. She does not feel this is effecting her BP anymore.     Pt is concerned about what to do if her BP spikes again as she is worried about clonidine dropping her HR.     ---------------------------------------------------------------------------------  1800: dicsused with SC as RODRIGO is out of the office today.     Per Mayra LINCOLN: stop clonidine and start hydralazine 50mg PRN up to TID for SBP above 160. Medication called into [facility].    Called pt and notified. She agrees with plan to stop clonidine and will take hydralzine if needed. She has an upcoming follow-up with   RODRIGO 8/09.

## 2018-07-23 RX ORDER — LOSARTAN POTASSIUM 50 MG/1
50 TABLET ORAL EVERY EVENING
Qty: 30 TAB | Refills: 11 | Status: SHIPPED | OUTPATIENT
Start: 2018-07-23 | End: 2018-08-09

## 2018-07-23 NOTE — TELEPHONE ENCOUNTER
"DEVIKA Jauregui.   You Yesterday (10:41 AM)      Winston Rowell,     I agree with Mayra. I think we should increase her Losartan to 50 mg daily as well. Please have patient continue to monitor home BP and pulse and log. Call office if HR or BP fluctuate in either direction. Have her get the BMP we discussed at our last visit prior to her appointment with me as well.     Thank you so much,     Breanna (Routing comment)      Message   Received: Yesterday   Message Contents   JEN Jauregui R.N.             I saw the patient just had labs done a few days ago so don’t worry about a BMP. Can you make sure she is taking the Losartan 25 mg daily that was started at her last visit prior to increasing to 50 mg. Thank you!!      Called pt and notified her of the above. Pt confirms that she is taking 25mg losartan every evening. She agrees to increase dosage. Answered all questions and explained reason for increasing dose in order to limit her need for PRN hydralazine. Pt states over the weekend she had a use hydralzine a few times and it lowered her BP very well but she states she \"felt kind of spacy\". Again explained that with increased losartan she will need this less. Pt appreciated and we reviewed upcoming follow up again.   "

## 2018-07-26 ENCOUNTER — TELEPHONE (OUTPATIENT)
Dept: CARDIOLOGY | Facility: MEDICAL CENTER | Age: 83
End: 2018-07-26

## 2018-07-26 RX ORDER — HYDRALAZINE HYDROCHLORIDE 50 MG/1
50 TABLET, FILM COATED ORAL DAILY
COMMUNITY
Start: 2018-07-26 | End: 2018-08-09

## 2018-07-26 NOTE — TELEPHONE ENCOUNTER
Spoke to pt and advised of recommendations as per Breanna LINCOLN. Pt states she re-took her BP around 1100 and it was 141 systolic. Advised to take scheduled hydralizine and keep BP log and call us early next week with an update. Pt verbalized understanding.

## 2018-07-26 NOTE — TELEPHONE ENCOUNTER
"Spoke to pt. Losartan was recently increased to 50 mg nightly. Pt is upset and anxious stating \"it's not working\". Pt reports BP on Wed was 200/100 and she took morning meds and it came down to 140-150 systolic. Pt woke up @0200 last night, took her BP and it was 215/105; she took a hydralazine and when she woke up this morning her BP was 177/85. Advised to rest and relax and if her BP is still elevated >160 systolic at 1000, she can take another hydralazine. Pt also reports she is not taking the spironolactone because it \"makes me sick\". Educated re: ER precautions and optimal times for taking BP.     Tiger Text to Breanna to advise.   "

## 2018-07-26 NOTE — TELEPHONE ENCOUNTER
----- Message from Payal Wade sent at 7/26/2018  7:38 AM PDT -----  Regarding: Problem with blood pressure  RODRIGO/Leslie    Patient said that the Losartan isn't working, her blood pressure is 215/100. She wants a call back asap to find out what she should do and can be reached at [phone].

## 2018-08-09 ENCOUNTER — OFFICE VISIT (OUTPATIENT)
Dept: CARDIOLOGY | Facility: MEDICAL CENTER | Age: 83
End: 2018-08-09
Payer: MEDICARE

## 2018-08-09 VITALS
RESPIRATION RATE: 12 BRPM | HEART RATE: 86 BPM | HEIGHT: 64 IN | SYSTOLIC BLOOD PRESSURE: 176 MMHG | WEIGHT: 131 LBS | BODY MASS INDEX: 22.36 KG/M2 | OXYGEN SATURATION: 94 % | DIASTOLIC BLOOD PRESSURE: 76 MMHG

## 2018-08-09 DIAGNOSIS — I10 ESSENTIAL HYPERTENSION: ICD-10-CM

## 2018-08-09 PROCEDURE — 99214 OFFICE O/P EST MOD 30 MIN: CPT | Performed by: NURSE PRACTITIONER

## 2018-08-09 RX ORDER — HYDROCHLOROTHIAZIDE 12.5 MG/1
12.5 CAPSULE, GELATIN COATED ORAL DAILY
COMMUNITY
End: 2018-08-09

## 2018-08-09 RX ORDER — HYDROCHLOROTHIAZIDE 12.5 MG/1
12.5 CAPSULE, GELATIN COATED ORAL 2 TIMES DAILY
Qty: 180 CAP | Refills: 3 | COMMUNITY
Start: 2018-08-09 | End: 2018-08-14 | Stop reason: SDUPTHER

## 2018-08-09 ASSESSMENT — ENCOUNTER SYMPTOMS
ABDOMINAL PAIN: 0
WEIGHT LOSS: 0
DIZZINESS: 0
WEAKNESS: 1
PALPITATIONS: 0
CLAUDICATION: 0
ORTHOPNEA: 0
PND: 0
SHORTNESS OF BREATH: 0

## 2018-08-09 NOTE — PROGRESS NOTES
No chief complaint on file.      Subjective:   Livia Corea is a 89 y.o. pleasant retired  for [employer] who presents today for follow up regarding her hypertension. Patient was last seen by myself on 6/19/18.     Patient was last seen by Dr. Napoleon Eden on 5/29/18 for follow up after a recent abnormal myocardial perfusion study. At that visits a carotid and abdominal bruit was auscultated. A carotid US and abdominal US was ordered.      Past medical history significant for hypertension.     Today patient states that she is feeling well. She has been having some difficulty with labile BP and states that she had some dizziness with the prn hydralazine. Denies chest pain, shortness of breath or lower extremity edema. Patient states that on one occasion she tool an extra HCTZ and that seemed to work the best to bring her BP down. Patient refuses to take her Losartan.     Past Medical History:   Diagnosis Date   • Circulation problem 01/2018   • Hyperlipidemia    • Hypertension      Past Surgical History:   Procedure Laterality Date   • CAROTID ENDARTERECTOMY Right 2000   • GYN SURGERY      hysterectomy     Family History   Problem Relation Age of Onset   • Heart Attack Mother 64   • Heart Attack Father 72     Social History     Social History   • Marital status: Single     Spouse name: N/A   • Number of children: N/A   • Years of education: N/A     Occupational History   • Not on file.     Social History Main Topics   • Smoking status: Former Smoker     Quit date: 2/25/1988   • Smokeless tobacco: Never Used   • Alcohol use Yes      Comment: 1 glass of wine x 2 days a week   • Drug use: No   • Sexual activity: Not on file     Other Topics Concern   • Not on file     Social History Narrative   • No narrative on file     Allergies   Allergen Reactions   • Chlorthalidone      Headaches severe. Heart racing    • Lisinopril Swelling     Of lower extremities      Outpatient Encounter  Prescriptions as of 8/9/2018   Medication Sig Dispense Refill   • hydrALAZINE (APRESOLINE) 50 MG Tab Take 1 Tab by mouth 3 times a day.     • losartan (COZAAR) 50 MG Tab Take 1 Tab by mouth every evening. 30 Tab 11   • atorvastatin (LIPITOR) 20 MG Tab Take 1 Tab by mouth every day. 90 Tab 3   • Garlic 10 MG Cap Take 2 Caps by mouth 3 times a day, with meals.     • Omega-3 Fatty Acids (FISH OIL) 1000 MG Cap capsule Take 2 Caps by mouth every day. 90 Cap 3   • ascorbic acid (ASCORBIC ACID) 500 MG Tab Take 500 mg by mouth every day.     • cyanocobalamin (VITAMIN B-12) 500 MCG Tab Take 500 mcg by mouth every day.     • amlodipine (NORVASC) 10 MG Tab Take 10 mg by mouth every day.     • aspirin EC (ECOTRIN) 81 MG Tablet Delayed Response Take 81 mg by mouth every day.       No facility-administered encounter medications on file as of 8/9/2018.      Review of Systems   Constitutional: Positive for malaise/fatigue. Negative for weight loss.   Respiratory: Negative for shortness of breath.    Cardiovascular: Negative for chest pain, palpitations, orthopnea, claudication, leg swelling and PND.   Gastrointestinal: Negative for abdominal pain.   Neurological: Positive for weakness. Negative for dizziness.   All other systems reviewed and are negative.       Objective:   There were no vitals taken for this visit.    Physical Exam   Constitutional: She is oriented to person, place, and time. She appears well-developed. No distress.   Elderly appearing    HENT:   Head: Normocephalic.   Eyes: EOM are normal.   Neck: No JVD present.   Cardiovascular: Normal rate and regular rhythm.    Murmur heard.   Systolic murmur is present with a grade of 3/6   Pulses:       Radial pulses are 2+ on the right side, and 2+ on the left side.        Dorsalis pedis pulses are 2+ on the right side, and 2+ on the left side.   Pulmonary/Chest: Effort normal and breath sounds normal. No respiratory distress.   Abdominal: Soft. There is no tenderness.    Musculoskeletal: She exhibits no edema.   Neurological: She is alert and oriented to person, place, and time.   Skin: Skin is warm and dry.   Psychiatric: She has a normal mood and affect. Her behavior is normal.   Vitals reviewed.     Results for GEO WHITESIDE (MRN [id number]) as of 8/9/2018 17:25   Ref. Range 6/7/2018 10:58 6/17/2018 07:14 7/18/2018 09:45   Sodium Latest Ref Range: 135 - 145 mmol/L 135 135 142   Potassium Latest Ref Range: 3.6 - 5.5 mmol/L 4.5 4.6 4.0   Chloride Latest Ref Range: 96 - 112 mmol/L 104 103 106   Co2 Latest Ref Range: 20 - 33 mmol/L 23 22 29   Anion Gap Latest Ref Range: 0.0 - 11.9  8.0 10.0 7.0   Glucose Latest Ref Range: 65 - 99 mg/dL 94 111 (H) 94   Bun Latest Ref Range: 8 - 22 mg/dL 24 (H) 30 (H) 27 (H)   Creatinine Latest Ref Range: 0.50 - 1.40 mg/dL 1.10 1.18 1.19   GFR If  Latest Ref Range: >60 mL/min/1.73 m 2 56 (A) 52 (A) 51 (A)   GFR If Non  Latest Ref Range: >60 mL/min/1.73 m 2 47 (A) 43 (A) 42 (A)   Calcium Latest Ref Range: 8.5 - 10.5 mg/dL 10.2 10.0 9.9       Carotid Duplex 6/12/18:   FINDINGS   Right carotid.    Plaque of the bifurcation extending into the internal carotid. Velocities    are consistent with < 50% stenosis of the internal carotid artery.    Left carotid    Plaque of the bifurcation extending into the internal carotid. Velocities    are consistent with < 50% stenosis of the internal carotid artery.      Bilateral subclavian and vertebral artery waveforms are antegrade and    waveforms are normal in character and velocity.     Aorti-Iliac US 6/12/18:   CONCLUSIONS   No evidence of aortic or Iliac aneurysm. Velocities are normal throughout    the bilateral iliac arteries.      TTE 3/10/18:  No prior study is available for comparison.   Normal left ventricular systolic function.  Left ventricular ejection fraction is visually estimated to be 65%.  Mild left ventricular hypertrophy.  Mild aortic stenosis.  Right heart  pressures are consistent with moderate pulmonary   hypertension.    CT-Head 6/17/18:  1.  No acute intracranial findings.  2.  Diffuse atrophy and periventricular white matter change, consistent with chronic small vessel disease.    Chest X-Ray 6/17/18:  FINDINGS:  Single portable view of the chest demonstrates a normal cardiac silhouette and mediastinal contours. Calcification is in the aorta.  No discrete opacity, pleural fluid, or pneumothorax.  No suspicious bony lesions.      Assessment:     No diagnosis found.    Medical Decision Making:  Today's Assessment / Status / Plan:     HTN:  -BP elevated here in office today.  -Patient stopped taking her losartan a few months ago because she didn't think it was helping and did not start taking it after prescribed at last visit.  -Chlorthalidone discontinued due to side effects.  -Stop Hydralazine d/t dizziness.  -Continue Norvasc 10 mg daily.  -Increase HCTZ to 12.5 mg BID.   -Patient will continue to monitor her BP and pulse and log readings at home.   -Repeat BMP in 6 weeks.     Carotid and Abdominal Bruit:  -Carotid US showing < 50% stenosis bilaterally  -Aorta-jairo US showing no evidence of aneurysm.     Abnormal MPI:  -Minimally abnormal MPI on 3/18/18, Dr. Eden managing with statin therapy.  -Denies chest pain.  -ASA 81 mg daily.     HLD:  -Last LDL on 3/11/18 was 113.  -Continue Atorvastatin 20 mg daily.     Patient will follow up with me in 6 weeks with BMP in 4-6 weeks. Encouraged patient to call should any questions or concerns arise in the mean time. Patient understands and agrees with the plan of care.     Collaborating Provider: Dr. Crescencio Kelley

## 2018-08-14 DIAGNOSIS — I10 ESSENTIAL HYPERTENSION: Primary | ICD-10-CM

## 2018-08-14 RX ORDER — HYDROCHLOROTHIAZIDE 12.5 MG/1
12.5 CAPSULE, GELATIN COATED ORAL 2 TIMES DAILY
Qty: 180 CAP | Refills: 3 | Status: SHIPPED | OUTPATIENT
Start: 2018-08-14 | End: 2019-05-09

## 2018-08-28 ENCOUNTER — TELEPHONE (OUTPATIENT)
Dept: CARDIOLOGY | Facility: MEDICAL CENTER | Age: 83
End: 2018-08-28

## 2018-08-28 DIAGNOSIS — I10 ESSENTIAL HYPERTENSION: ICD-10-CM

## 2018-08-28 RX ORDER — AMLODIPINE BESYLATE 10 MG/1
10 TABLET ORAL DAILY
Qty: 30 TAB | Refills: 11 | Status: SHIPPED | OUTPATIENT
Start: 2018-08-28 | End: 2018-09-25 | Stop reason: SDUPTHER

## 2018-08-28 NOTE — TELEPHONE ENCOUNTER
"Spoke to pt. She has not had much of an appetite and has had to take an afternoon nap for the past week to 10 days. She states that recently she has been getting the generic amlodipine when she was previously taking Norvasc and attributes symptoms to that. Pt reports he BP has been \"fine\", although a little guicho this morning. Pt requesting Rx for brand Norvasc be sent to pharmacy.   "

## 2018-08-28 NOTE — TELEPHONE ENCOUNTER
----- Message from Priyanka Aviles sent at 8/28/2018  2:18 PM PDT -----  Regarding: experiencing some side affects to amlodipine  Contact: [phone]  RODRIGO/Leslie Alcala is experiencing some side affects to amlodipine. She would please like a call back to discuss at [phone].

## 2018-09-11 ENCOUNTER — TELEPHONE (OUTPATIENT)
Dept: CARDIOLOGY | Facility: MEDICAL CENTER | Age: 83
End: 2018-09-11

## 2018-09-11 NOTE — TELEPHONE ENCOUNTER
"Spoke w/pt. She reports she noticed a \"rash\" on her right arm when she woke up this morning. Reports area is approx 3\" long and sort of a \"brownish color\", not red. Denies itchiness, pain, or feeling of warmth. Pt thinks it may be the HCTZ as she wasn't really taking it before and it was recently increased to BID. She notes that she saw online that one of the side effects could be \"rash\". Reports BP is 160's systolic upon awakening and \"goes down nicely\" to 130's systolic after meds.    To Breanna--please advise.      "

## 2018-09-11 NOTE — TELEPHONE ENCOUNTER
----- Message from Yamileth Rodriguez sent at 9/11/2018  8:55 AM PDT -----  Regarding: rash from hydrochlorothiazide  Contact: [phone]  RODRIGO/jennifer    Pt calling to report rash from hydrochlorothiazide (MICROZIDE) 12.5 MG on upper right arm, please call pt at [phone].

## 2018-09-12 NOTE — TELEPHONE ENCOUNTER
"RE:   Received: Today   Message Contents   JEN Jauregui R.N.   Caller: Unspecified (Yesterday, 10:14 AM)             It looks like we increased the dose over a month ago. I’m not sure if this rash is related to the HCTZ. It is not itchy or red so my index of suspicion is low for this to be an allergic reaction, but it is possible. I would like her to see her PCP or urgent care ASAP for further evaluation and recommendation.     Thank you!!     Breanna      S/w pt and informed her of Breanna LINCOLN's recommendations. Pt states she is not really worried anymore as the rash just \"peeled off\" this morning. She said it went away in \"1 or 2 flakes and it was gone\". Advised pt that if it were to come back or worsen she should go to her PCP or urgent care. Pt also has an upcoming appt with our office 9/25. We discussed this appt. Pt will continue her medications as prescribed.    "

## 2018-09-15 ENCOUNTER — APPOINTMENT (OUTPATIENT)
Dept: RADIOLOGY | Facility: MEDICAL CENTER | Age: 83
End: 2018-09-15
Attending: EMERGENCY MEDICINE
Payer: MEDICARE

## 2018-09-15 ENCOUNTER — HOSPITAL ENCOUNTER (EMERGENCY)
Facility: MEDICAL CENTER | Age: 83
End: 2018-09-15
Attending: EMERGENCY MEDICINE
Payer: MEDICARE

## 2018-09-15 VITALS
DIASTOLIC BLOOD PRESSURE: 78 MMHG | SYSTOLIC BLOOD PRESSURE: 180 MMHG | HEIGHT: 65 IN | RESPIRATION RATE: 19 BRPM | WEIGHT: 136.24 LBS | OXYGEN SATURATION: 97 % | HEART RATE: 79 BPM | BODY MASS INDEX: 22.7 KG/M2 | TEMPERATURE: 98.5 F

## 2018-09-15 DIAGNOSIS — R60.9 PERIPHERAL EDEMA: ICD-10-CM

## 2018-09-15 LAB
ANION GAP SERPL CALC-SCNC: 13 MMOL/L (ref 0–11.9)
BASOPHILS # BLD AUTO: 0.7 % (ref 0–1.8)
BASOPHILS # BLD: 0.04 K/UL (ref 0–0.12)
BNP SERPL-MCNC: 24 PG/ML (ref 0–100)
BUN SERPL-MCNC: 25 MG/DL (ref 8–22)
CALCIUM SERPL-MCNC: 9.6 MG/DL (ref 8.5–10.5)
CHLORIDE SERPL-SCNC: 101 MMOL/L (ref 96–112)
CO2 SERPL-SCNC: 23 MMOL/L (ref 20–33)
CREAT SERPL-MCNC: 0.98 MG/DL (ref 0.5–1.4)
EKG IMPRESSION: NORMAL
EOSINOPHIL # BLD AUTO: 0.15 K/UL (ref 0–0.51)
EOSINOPHIL NFR BLD: 2.7 % (ref 0–6.9)
ERYTHROCYTE [DISTWIDTH] IN BLOOD BY AUTOMATED COUNT: 44.6 FL (ref 35.9–50)
GLUCOSE SERPL-MCNC: 98 MG/DL (ref 65–99)
HCT VFR BLD AUTO: 41.3 % (ref 37–47)
HGB BLD-MCNC: 14.2 G/DL (ref 12–16)
IMM GRANULOCYTES # BLD AUTO: 0.01 K/UL (ref 0–0.11)
IMM GRANULOCYTES NFR BLD AUTO: 0.2 % (ref 0–0.9)
LYMPHOCYTES # BLD AUTO: 2.06 K/UL (ref 1–4.8)
LYMPHOCYTES NFR BLD: 37.5 % (ref 22–41)
MCH RBC QN AUTO: 30.9 PG (ref 27–33)
MCHC RBC AUTO-ENTMCNC: 34.4 G/DL (ref 33.6–35)
MCV RBC AUTO: 90 FL (ref 81.4–97.8)
MONOCYTES # BLD AUTO: 0.58 K/UL (ref 0–0.85)
MONOCYTES NFR BLD AUTO: 10.5 % (ref 0–13.4)
NEUTROPHILS # BLD AUTO: 2.66 K/UL (ref 2–7.15)
NEUTROPHILS NFR BLD: 48.4 % (ref 44–72)
NRBC # BLD AUTO: 0 K/UL
NRBC BLD-RTO: 0 /100 WBC
PLATELET # BLD AUTO: 252 K/UL (ref 164–446)
PMV BLD AUTO: 10 FL (ref 9–12.9)
POTASSIUM SERPL-SCNC: 3.4 MMOL/L (ref 3.6–5.5)
RBC # BLD AUTO: 4.59 M/UL (ref 4.2–5.4)
SODIUM SERPL-SCNC: 137 MMOL/L (ref 135–145)
TROPONIN I SERPL-MCNC: <0.01 NG/ML (ref 0–0.04)
WBC # BLD AUTO: 5.5 K/UL (ref 4.8–10.8)

## 2018-09-15 PROCEDURE — 99284 EMERGENCY DEPT VISIT MOD MDM: CPT

## 2018-09-15 PROCEDURE — 85025 COMPLETE CBC W/AUTO DIFF WBC: CPT

## 2018-09-15 PROCEDURE — 36415 COLL VENOUS BLD VENIPUNCTURE: CPT

## 2018-09-15 PROCEDURE — 80048 BASIC METABOLIC PNL TOTAL CA: CPT

## 2018-09-15 PROCEDURE — 84484 ASSAY OF TROPONIN QUANT: CPT

## 2018-09-15 PROCEDURE — 83880 ASSAY OF NATRIURETIC PEPTIDE: CPT

## 2018-09-15 PROCEDURE — 93005 ELECTROCARDIOGRAM TRACING: CPT | Performed by: EMERGENCY MEDICINE

## 2018-09-15 PROCEDURE — 71045 X-RAY EXAM CHEST 1 VIEW: CPT

## 2018-09-15 NOTE — ED NOTES
Pt ambulated back to room with FWW. Pt changing into gown. Pt's right calf more swollen. No redness or warmth to touch. Pt has sensation and denies pain on palpation or with ambulation.   Chart up for ERP.

## 2018-09-15 NOTE — ED TRIAGE NOTES
C/O bilat lower ext swelling,> R calf, denies pain,notices intermittent lower ext swelling, elevates and goes  usually away, denies pain

## 2018-09-15 NOTE — ED PROVIDER NOTES
Scribed for Stanislaw Villanueva M.D. by Kavon Mendiola. 9/15/2018  4:49 PM    CHIEF COMPLAINT  Chief Complaint   Patient presents with   • Leg Swelling     HPI  Livia Corea is a 89 y.o. female who presents complaining of bilateral lower leg swelling.  The patient, who reports a history of intermittent chronic lower extremity swelling, complains of an onset of increased bilateral lower leg swelling this morning.     She states she is usually able to manage her lower extremity swelling by elevating her legs, but has not been able to relieve her lower leg swelling by elevating her legs today.  The patient has not treated her symptoms with any medications. She reports her lower leg swelling is greater in her right lower leg than her left leg.She denies any leg pain. No leg pain with ambulation.  She has not had any tachycardia, tachypnea, shortness of breath, cough, hemoptysis, fevers or chills or nausea or vomiting or chest pain.    The patient takes Hydrochlorothiazide 12.5 mg BID, and Norvasc 10 mg QD for her history of hypertension. The patient has been taking these medications for about 5 weeks, and denies any new medication changes. The patient denies any history of renal disease. The patient denies any history of DVT. The patient is followed by Dr. Eden, Cardiology.     The patient denies any cough, shortness of breath, chest pain, calf pain, calf soreness,       REVIEW OF SYSTEMS  See HPI for details. All other systems are reviewed and are negative.     PAST MEDICAL HISTORY   has a past medical history of Circulation problem (01/2018); Hyperlipidemia; and Hypertension.    SOCIAL HISTORY  Social History     Social History Main Topics   • Smoking status: Former Smoker     Quit date: 2/25/1988   • Smokeless tobacco: Never Used   • Alcohol use Yes      Comment: 1 glass of wine x 2 days a week   • Drug use: No     SURGICAL HISTORY   has a past surgical history that includes gyn surgery and carotid  "endarterectomy (Right, 2000).    CURRENT MEDICATIONS  Home Medications    **Home medications have not yet been reviewed for this encounter**       ALLERGIES  Allergies   Allergen Reactions   • Chlorthalidone      Headaches severe. Heart racing    • Lisinopril Swelling     Of lower extremities      PHYSICAL EXAM  VITAL SIGNS: /76   Pulse 83   Temp 36.9 °C (98.5 °F)   Resp 20   Ht 1.651 m (5' 5\")   Wt 61.8 kg (136 lb 3.9 oz)   SpO2 96%   BMI 22.67 kg/m²   Pulse ox interpretation: I interpret this pulse ox as normal.  Constitutional: Alert in no apparent distress.  HENT: No signs of trauma, Bilateral external ears normal, Nose normal.   Eyes: Pupils are equal and reactive, Conjunctiva normal, Non-icteric.   Neck: Normal range of motion, No tenderness, Supple, No stridor.   Lymphatic: No lymphadenopathy noted.   Cardiovascular: Regular rate and rhythm, no murmurs.   Thorax & Lungs: Normal breath sounds, No respiratory distress, No wheezing, No chest tenderness.   Abdomen: Bowel sounds normal, Soft, No tenderness, No masses, No pulsatile masses. No peritoneal signs.  Skin: Warm, Dry, No erythema, No rash.   Back: No bony tenderness, No CVA tenderness.   Extremities: Mild non pitting edema just above the sock line bilaterally, right slightly greater than left. Intact distal pulses. No tenderness, No cyanosis,  Negative Therese's sign.   Musculoskeletal: Good range of motion in all major joints. No tenderness to palpation or major deformities noted.   Neurologic: Alert , Normal motor function, Normal sensory function, No focal deficits noted.   Psychiatric: Affect normal, Judgment normal, Mood normal.     EKG:   Interpreted by me    Rhythm:  Normal sinus rhythm   Rate: 75  Axis: left  Intervals: QRS 158ms  Ectopy: none  Conduction: Progression of previously noted nonspecific intraventricular conduction delay to more specific left bundle branch block.  ST Segments: no acute change  T Waves: no acute change  Q " Waves: none  Old EKG from 06/2018 shows Progression of previously noted nonspecific intraventricular conduction delay to more specific left bundle branch.      DIAGNOSTIC STUDIES / PROCEDURES  LABS  Labs Reviewed   BASIC METABOLIC PANEL - Abnormal; Notable for the following:        Result Value    Potassium 3.4 (*)     Bun 25 (*)     Anion Gap 13.0 (*)     All other components within normal limits   ESTIMATED GFR - Abnormal; Notable for the following:     GFR If Non  53 (*)     All other components within normal limits   CBC WITH DIFFERENTIAL   BTYPE NATRIURETIC PEPTIDE   TROPONIN       All labs reviewed by me.    RADIOLOGY  DX-CHEST-LIMITED (1 VIEW)   Final Result      1.  Minimal RIGHT lung base atelectasis or scar.   2.  No lobar pneumonia or pulmonary edema.         The radiologist's interpretations of all radiological studies have been reviewed by me.     COURSE & MEDICAL DECISION MAKING  Pertinent Labs & Imaging studies reviewed. (See chart for details)    4:49 PM Patient seen and examined at bedside. Ordered for Chest X Ray, troponin, BNP, CBC,  BMP to evaluate her symptoms. Differential diagnosis includes but is not limited to: dependent edema, kidney injury, CHF symptoms, excessive salt intake.     6:20 PM Recheck: Patient re-evaluated at beside. The patient is resting comfortably laying back on gurney. She was updated of her lab results and Chest X Ray results. Troponin is still pending.     7:23 PM Recheck: Patient re-evaluated at beside. Troponin is negative.   We discussed discharge instructions as well as return precautions which she understands.  She will get some compression stockings, will work to elevate her legs whenever she is at rest, but understands the importance of remaining mobile to maintain circulation.  I also suggested that if she would like to, she could take 25 mg twice daily of hydrochlorothiazide for the next 48 hours, rather than her current 12 mg twice daily.  The  patient was advised to schedule follow up appointment as soon as she can with her Cardiologist, Dr. Eden.  She has a left bundle branch block on her EKG, but no chest pain, and no evidence of ischemia.  There was previous evidence of an evolving nonspecific intraventricular conduction delay on previous EKGs, and this seems to have declared itself as a left bundle, but again, there is no reason to suspect ischemia.  I do not think that there is any relationship between this EKG change in the patient's very mild peripheral edema, which is most likely caused by mild venous insufficiency, given that the patient is 89 years old and has a history of hypertension.    The patient will not drink alcohol nor drive with prescribed medications. The patient will return for worsening symptoms and is stable at the time of discharge. The patient verbalizes understanding and will comply.      DISPOSITION:  Patient will be discharged home in stable condition.    FOLLOW UP:  Dr. Concepcion, your cardiologist    Schedule an appointment as soon as possible for a visit        FINAL IMPRESSION  1. Peripheral edema        The note accurately reflects work and decisions made by me.  Stanislaw Villanueva  9/15/2018  11:57 PM

## 2018-09-16 NOTE — DISCHARGE INSTRUCTIONS
Your laboratory tests were normal.  There is no evidence of a dangerous cause of your mild lower extremity swelling.  We did note a left bundle branch block on your EKG. this was already partially present on your previous EKG, but is clearer now.  Please mention this to your cardiology team at your upcoming visit.    For your edema, purchase some compression stockings from the drugstore, be careful not to add any salt to your diet and keep track of how much sodium is in the foods that you are eating, and avoid sodium whenever possible.  Keep your legs elevated whenever you are at rest.  If you would like, double your HCTZ does for the next 4 doses only.    Edema  Edema is an abnormal buildup of fluids. It is more common in your legs and thighs. Painless swelling of the feet and ankles is more likely as a person ages. It also is common in looser skin, like around your eyes.  Follow these instructions at home:  · Keep the affected body part above the level of the heart while lying down.  · Do not sit still or stand for a long time.  · Do not put anything right under your knees when you lie down.  · Do not wear tight clothes on your upper legs.  · Exercise your legs to help the puffiness (swelling) go down.  · Wear elastic bandages or support stockings as told by your doctor.  · A low-salt diet may help lessen the puffiness.  · Only take medicine as told by your doctor.  Contact a doctor if:  · Treatment is not working.  · You have heart, liver, or kidney disease and notice that your skin looks puffy or shiny.  · You have puffiness in your legs that does not get better when you raise your legs.  · You have sudden weight gain for no reason.  Get help right away if:  · You have shortness of breath or chest pain.  · You cannot breathe when you lie down.  · You have pain, redness, or warmth in the areas that are puffy.  · You have heart, liver, or kidney disease and get edema all of a sudden.  · You have a fever and your  symptoms get worse all of a sudden.  This information is not intended to replace advice given to you by your health care provider. Make sure you discuss any questions you have with your health care provider.  Document Released: 06/05/2009 Document Revised: 05/25/2017 Document Reviewed: 10/10/2014  Elsevier Interactive Patient Education © 2017 [employer]

## 2018-09-20 ENCOUNTER — HOSPITAL ENCOUNTER (OUTPATIENT)
Dept: LAB | Facility: MEDICAL CENTER | Age: 83
End: 2018-09-20
Attending: NURSE PRACTITIONER
Payer: MEDICARE

## 2018-09-20 DIAGNOSIS — I10 ESSENTIAL HYPERTENSION: ICD-10-CM

## 2018-09-20 LAB
ANION GAP SERPL CALC-SCNC: 10 MMOL/L (ref 0–11.9)
BUN SERPL-MCNC: 22 MG/DL (ref 8–22)
CALCIUM SERPL-MCNC: 9.6 MG/DL (ref 8.5–10.5)
CHLORIDE SERPL-SCNC: 103 MMOL/L (ref 96–112)
CO2 SERPL-SCNC: 27 MMOL/L (ref 20–33)
CREAT SERPL-MCNC: 1.07 MG/DL (ref 0.5–1.4)
FASTING STATUS PATIENT QL REPORTED: NORMAL
GLUCOSE SERPL-MCNC: 96 MG/DL (ref 65–99)
POTASSIUM SERPL-SCNC: 3.3 MMOL/L (ref 3.6–5.5)
SODIUM SERPL-SCNC: 140 MMOL/L (ref 135–145)

## 2018-09-20 PROCEDURE — 80048 BASIC METABOLIC PNL TOTAL CA: CPT

## 2018-09-20 PROCEDURE — 36415 COLL VENOUS BLD VENIPUNCTURE: CPT

## 2018-09-25 ENCOUNTER — OFFICE VISIT (OUTPATIENT)
Dept: CARDIOLOGY | Facility: MEDICAL CENTER | Age: 83
End: 2018-09-25
Payer: MEDICARE

## 2018-09-25 VITALS
BODY MASS INDEX: 25.03 KG/M2 | DIASTOLIC BLOOD PRESSURE: 80 MMHG | HEIGHT: 64 IN | WEIGHT: 146.6 LBS | SYSTOLIC BLOOD PRESSURE: 150 MMHG | OXYGEN SATURATION: 95 % | HEART RATE: 75 BPM

## 2018-09-25 DIAGNOSIS — E78.5 DYSLIPIDEMIA: ICD-10-CM

## 2018-09-25 DIAGNOSIS — R94.39 ABNORMAL MYOCARDIAL PERFUSION STUDY: ICD-10-CM

## 2018-09-25 DIAGNOSIS — I10 ESSENTIAL HYPERTENSION: ICD-10-CM

## 2018-09-25 DIAGNOSIS — R60.0 LOCALIZED EDEMA: ICD-10-CM

## 2018-09-25 DIAGNOSIS — E87.6 HYPOKALEMIA: ICD-10-CM

## 2018-09-25 PROCEDURE — 99214 OFFICE O/P EST MOD 30 MIN: CPT | Performed by: NURSE PRACTITIONER

## 2018-09-25 RX ORDER — AMLODIPINE BESYLATE 10 MG/1
10 TABLET ORAL DAILY
Qty: 90 TAB | Refills: 3 | Status: SHIPPED | OUTPATIENT
Start: 2018-09-25 | End: 2018-11-20 | Stop reason: SDUPTHER

## 2018-09-25 RX ORDER — ATORVASTATIN CALCIUM 20 MG/1
20 TABLET, FILM COATED ORAL DAILY
Qty: 90 TAB | Refills: 3 | Status: SHIPPED | OUTPATIENT
Start: 2018-09-25 | End: 2019-03-07

## 2018-09-25 RX ORDER — POTASSIUM CHLORIDE 750 MG/1
10 TABLET, EXTENDED RELEASE ORAL DAILY
Qty: 30 TAB | Refills: 11 | Status: SHIPPED | OUTPATIENT
Start: 2018-09-25 | End: 2018-12-13

## 2018-09-25 ASSESSMENT — ENCOUNTER SYMPTOMS
WEIGHT LOSS: 0
ABDOMINAL PAIN: 0
DIZZINESS: 0
ORTHOPNEA: 0
PND: 0
CLAUDICATION: 0
PALPITATIONS: 0
SHORTNESS OF BREATH: 0

## 2018-09-25 NOTE — PROGRESS NOTES
Chief Complaint   Patient presents with   • Hypertension       Subjective:   Livia Corea is a 89 y.o. pleasant retired  for [employer] who presents today for follow up after recent visit to the ER for lower extremity swelling on 9/15/18. Troponin was negative, BNP was 24.  Chest X-ray showed minimal right lung atelectasis with out pneumonia or pulmonary edema. EKG showed left bundle branch block. Patient was instructed to take HCTZ 25 mg BID for 48 hours and follow up as soon as possible with cardiology.     Patient was last seen by myself for follow up regarding her hypertension on 8/9/18, at that visit we increased her HCTZ from 12.5 mg daily to bid.     Patient was last seen by Dr. Napoleon Eden on 5/29/18 for follow up after a recent abnormal myocardial perfusion study. At that visits a carotid and abdominal bruit was auscultated. A carotid US and abdominal US was ordered.      Past medical history significant for hypertension.     Today patient states that she is feeling well. Patients lower extremity edema resolved within a day or two of her ER visit. Denies shortness of breath, chest pain or palpitations.     Past Medical History:   Diagnosis Date   • Circulation problem 01/2018   • Hyperlipidemia    • Hypertension      Past Surgical History:   Procedure Laterality Date   • CAROTID ENDARTERECTOMY Right 2000   • GYN SURGERY      hysterectomy     Family History   Problem Relation Age of Onset   • Heart Attack Mother 64   • Heart Attack Father 72     Social History     Social History   • Marital status: Single     Spouse name: N/A   • Number of children: N/A   • Years of education: N/A     Occupational History   • Not on file.     Social History Main Topics   • Smoking status: Former Smoker     Quit date: 2/25/1988   • Smokeless tobacco: Never Used   • Alcohol use Yes      Comment: 1 glass of wine x 2 days a week   • Drug use: No   • Sexual activity: Not on file     Other Topics  "Concern   • Not on file     Social History Narrative   • No narrative on file     Allergies   Allergen Reactions   • Chlorthalidone      Headaches severe. Heart racing    • Lisinopril Swelling     Of lower extremities      Outpatient Encounter Prescriptions as of 9/25/2018   Medication Sig Dispense Refill   • amLODIPine (NORVASC) 10 MG Tab Take 1 Tab by mouth every day. 90 Tab 3   • atorvastatin (LIPITOR) 20 MG Tab Take 1 Tab by mouth every day. 90 Tab 3   • potassium chloride SA (K-DUR) 10 MEQ Tab CR Take 1 Tab by mouth every day. 30 Tab 11   • hydrochlorothiazide (MICROZIDE) 12.5 MG capsule Take 1 Cap by mouth 2 times a day. 180 Cap 3   • [DISCONTINUED] amLODIPine (NORVASC) 10 MG Tab Take 1 Tab by mouth every day. 30 Tab 11   • [DISCONTINUED] atorvastatin (LIPITOR) 20 MG Tab Take 1 Tab by mouth every day. 90 Tab 3   • Garlic 10 MG Cap Take 2 Caps by mouth 3 times a day, with meals.     • Omega-3 Fatty Acids (FISH OIL) 1000 MG Cap capsule Take 2 Caps by mouth every day. 90 Cap 3   • ascorbic acid (ASCORBIC ACID) 500 MG Tab Take 500 mg by mouth every day.     • cyanocobalamin (VITAMIN B-12) 500 MCG Tab Take 500 mcg by mouth every day.     • aspirin EC (ECOTRIN) 81 MG Tablet Delayed Response Take 81 mg by mouth every day.       No facility-administered encounter medications on file as of 9/25/2018.      Review of Systems   Constitutional: Positive for malaise/fatigue. Negative for weight loss.   Respiratory: Negative for shortness of breath.    Cardiovascular: Negative for chest pain, palpitations, orthopnea, claudication, leg swelling and PND.   Gastrointestinal: Negative for abdominal pain.   Musculoskeletal: Positive for joint pain (Right wrist arthritis. ).   Neurological: Negative for dizziness.   All other systems reviewed and are negative.       Objective:   /80 (BP Location: Left arm, Patient Position: Sitting, BP Cuff Size: Adult)   Pulse 75   Ht 1.626 m (5' 4\")   Wt 66.5 kg (146 lb 9.6 oz)   SpO2 " 95%   BMI 25.16 kg/m²     Physical Exam   Constitutional: She is oriented to person, place, and time. She appears well-developed. No distress.   Elderly appearing    HENT:   Head: Normocephalic.   Eyes: EOM are normal.   Neck: No JVD present.   Cardiovascular: Normal rate and regular rhythm.    Murmur heard.   Systolic murmur is present with a grade of 3/6   Pulses:       Radial pulses are 2+ on the right side, and 2+ on the left side.        Dorsalis pedis pulses are 2+ on the right side, and 2+ on the left side.   Pulmonary/Chest: Effort normal and breath sounds normal. No respiratory distress.   Abdominal: Soft. There is no tenderness.   Musculoskeletal: She exhibits no edema.   Neurological: She is alert and oriented to person, place, and time.   Skin: Skin is warm and dry.   Psychiatric: She has a normal mood and affect. Her behavior is normal.   Vitals reviewed.     9/15/18:  B Natriuretic Peptide 24  0 - 100 pg/mL Final     Troponin I <0.01  0.00 - 0.04 ng/mL Final     BMP 9/20/18:  Component Value Ref Range & Units Status   Sodium 140  135 - 145 mmol/L Final   Potassium 3.3   3.6 - 5.5 mmol/L Final   Chloride 103  96 - 112 mmol/L Final   Co2 27  20 - 33 mmol/L Final   Glucose 96  65 - 99 mg/dL Final   Bun 22  8 - 22 mg/dL Final   Creatinine 1.07  0.50 - 1.40 mg/dL Final   Calcium 9.6  8.5 - 10.5 mg/dL Final   Anion Gap 10.0  0.0 - 11.9 Final     GFR If African American 58   >60 mL/min/1.73 m 2 Final   GFR If Non African American 48   >60 mL/min/1.73 m 2 Final           TTE 3/10/18:  No prior study is available for comparison.   Normal left ventricular systolic function.  Left ventricular ejection fraction is visually estimated to be 65%.  Mild left ventricular hypertrophy.  Mild aortic stenosis.  Right heart pressures are consistent with moderate pulmonary   hypertension.    Left Ventricle  Left ventricle is small in size. Mild left ventricular hypertrophy. Sigmoid septum. Normal left ventricular systolic  function. Left ventricular ejection fraction is visually estimated to be 65%. Normal regional wall motion. Grade I diastolic dysfunction.    Chest X-Ray 9/15/18:  FINDINGS:  Cardiomediastinal contour is within normal limits.  Atherosclerotic calcification and tortuosity of thoracic aorta.  Minimal curvilinear opacity RIGHT lung base.  No pleural fluid collection or pneumothorax.  S-shaped curvature of thoracic spine      Assessment:     1. Essential hypertension  amLODIPine (NORVASC) 10 MG Tab    BASIC METABOLIC PANEL   2. Localized edema     3. Abnormal myocardial perfusion study: Small reversible apical abnormality     4. Dyslipidemia     5. Hypokalemia  BASIC METABOLIC PANEL       Medical Decision Making:  Today's Assessment / Status / Plan:     HTN:  -Log from home showing that her mid day BP after her morning medications is 120's-130's/70's-80's.   -Continue Norvasc 10 mg daily and HCTZ to 12.5 mg BID.   -K decreasing steadily, now 3.3 since doubling her HCTZ.  -Start KCL 10 mEq daily with repeat BMP in 5-7 days.   -Patient will continue to monitor her BP and pulse and log readings at home.     Lower Extremity Swelling:  -Resolved.     Abnormal MPI:  -Minimally abnormal MPI on 3/18/18, Dr. Eden managing with ASA and statin therapy.  -Normal regional wall motion on noted on echo completed on 3/10/18.  -Denies chest pain.  -ASA 81 mg daily.     Carotid and Abdominal Bruit:  -Carotid US showing < 50% stenosis bilaterally  -Aorta-jairo US showing no evidence of aneurysm.     HLD:  -Last LDL on 3/11/18 was 113.  -Continue Atorvastatin 20 mg qd.     Patient will follow up with Dr. Napoleon Eden as scheduled on 12/13/18 or earlier if needed. Encouraged patient to contact office should any questions or concerns arise in the mean time. Patient understands and agrees with the plan of care.     Collaborating Provider: Dr. Phillip Lara

## 2018-10-02 ENCOUNTER — HOSPITAL ENCOUNTER (OUTPATIENT)
Dept: LAB | Facility: MEDICAL CENTER | Age: 83
End: 2018-10-02
Attending: NURSE PRACTITIONER
Payer: MEDICARE

## 2018-10-02 DIAGNOSIS — E87.6 HYPOKALEMIA: ICD-10-CM

## 2018-10-02 DIAGNOSIS — I10 ESSENTIAL HYPERTENSION: ICD-10-CM

## 2018-10-02 LAB
ANION GAP SERPL CALC-SCNC: 10 MMOL/L (ref 0–11.9)
BUN SERPL-MCNC: 27 MG/DL (ref 8–22)
CALCIUM SERPL-MCNC: 9.8 MG/DL (ref 8.5–10.5)
CHLORIDE SERPL-SCNC: 105 MMOL/L (ref 96–112)
CO2 SERPL-SCNC: 27 MMOL/L (ref 20–33)
CREAT SERPL-MCNC: 1.15 MG/DL (ref 0.5–1.4)
FASTING STATUS PATIENT QL REPORTED: NORMAL
GLUCOSE SERPL-MCNC: 90 MG/DL (ref 65–99)
POTASSIUM SERPL-SCNC: 3.7 MMOL/L (ref 3.6–5.5)
SODIUM SERPL-SCNC: 142 MMOL/L (ref 135–145)

## 2018-10-02 PROCEDURE — 36415 COLL VENOUS BLD VENIPUNCTURE: CPT

## 2018-10-02 PROCEDURE — 80048 BASIC METABOLIC PNL TOTAL CA: CPT

## 2018-10-04 ENCOUNTER — TELEPHONE (OUTPATIENT)
Dept: CARDIOLOGY | Facility: MEDICAL CENTER | Age: 83
End: 2018-10-04

## 2018-10-04 DIAGNOSIS — E87.6 HYPOKALEMIA: ICD-10-CM

## 2018-10-04 NOTE — TELEPHONE ENCOUNTER
DEVIKA Jauregui.  Leslie Braga R.N.             Winston Nelson,     Will you please let Livia know her potassium is now normal. Continue with the KCL 10 mEq daily and repeat BMP in 3 weeks just to make sure her potassium level stays within normal range.     Thank you,     Breanna      Called pt and notified of results/recommendations. Lab work ordered and lab slip sent to pt.

## 2018-10-09 ENCOUNTER — TELEPHONE (OUTPATIENT)
Dept: CARDIOLOGY | Facility: MEDICAL CENTER | Age: 83
End: 2018-10-09

## 2018-10-09 NOTE — TELEPHONE ENCOUNTER
"Spoke to pt. She is concerned re: recent LE swelling. She usually gets the swelling in her right leg, but this morning it was in both. Pt reports upon waking, her swelling is better, but as the day goes on it gets worse. Reports her systolic BP has been 130's-140's. Denies SOB or ANN. She does not weigh herself daily. She currently takes amlodipine 10 mg QD (which she has been on \"for years\") and HCTZ 12.5 mg BID.     Pt wants to know if she can take her amlodipine 5 mg BID, instead of all at once, and if that would help? Or any other recommendation?    To Breanna--please advise.       "

## 2018-10-09 NOTE — TELEPHONE ENCOUNTER
----- Message from Nina Nichols sent at 10/9/2018  1:33 PM PDT -----  Regarding: patient is calling about leg swelling  RODRIGO/Leslie      Patient is calling to report swelling in her legs is getting worse. It's now affecting her left leg. She can be reached at [phone].

## 2018-10-10 NOTE — TELEPHONE ENCOUNTER
Called pt back. Advised she can try taking amlodipine 5 mg in morning and 5 mg in evening. Advised to weigh and log BP's daily. Pt will call back with update in 4-5 days unless swelling doesn't resolve or gets worse.

## 2018-10-29 ENCOUNTER — HOSPITAL ENCOUNTER (OUTPATIENT)
Dept: LAB | Facility: MEDICAL CENTER | Age: 83
End: 2018-10-29
Attending: NURSE PRACTITIONER
Payer: MEDICARE

## 2018-10-29 DIAGNOSIS — E87.6 HYPOKALEMIA: ICD-10-CM

## 2018-10-29 LAB
ANION GAP SERPL CALC-SCNC: 9 MMOL/L (ref 0–11.9)
BUN SERPL-MCNC: 27 MG/DL (ref 8–22)
CALCIUM SERPL-MCNC: 9.9 MG/DL (ref 8.5–10.5)
CHLORIDE SERPL-SCNC: 105 MMOL/L (ref 96–112)
CO2 SERPL-SCNC: 27 MMOL/L (ref 20–33)
CREAT SERPL-MCNC: 0.98 MG/DL (ref 0.5–1.4)
FASTING STATUS PATIENT QL REPORTED: NORMAL
GLUCOSE SERPL-MCNC: 93 MG/DL (ref 65–99)
POTASSIUM SERPL-SCNC: 3.8 MMOL/L (ref 3.6–5.5)
SODIUM SERPL-SCNC: 141 MMOL/L (ref 135–145)

## 2018-10-29 PROCEDURE — 36415 COLL VENOUS BLD VENIPUNCTURE: CPT

## 2018-10-29 PROCEDURE — 80048 BASIC METABOLIC PNL TOTAL CA: CPT

## 2018-10-30 ENCOUNTER — TELEPHONE (OUTPATIENT)
Dept: CARDIOLOGY | Facility: MEDICAL CENTER | Age: 83
End: 2018-10-30

## 2018-10-30 NOTE — TELEPHONE ENCOUNTER
Notes recorded by JEN Jauregui on 10/30/2018 at 5:10 AM PDT  Please let Livia know that her BMP showed stable potassium levels and an improvement in her renal function. Please have her FU with FK as scheduled on 12/13/18.    Thank you,  Breanna  ===========================================    Spoke to pt and notified of labs/recommendations as per Breanna LINCOLN. Pt appreciative of call and verbalized understanding.

## 2018-11-20 DIAGNOSIS — I10 ESSENTIAL HYPERTENSION: ICD-10-CM

## 2018-11-20 RX ORDER — AMLODIPINE BESYLATE 5 MG/1
5 TABLET ORAL 2 TIMES DAILY
Qty: 180 TAB | Refills: 3 | Status: SHIPPED | OUTPATIENT
Start: 2018-11-20 | End: 2018-12-11 | Stop reason: SDUPTHER

## 2018-11-28 ENCOUNTER — TELEPHONE (OUTPATIENT)
Dept: CARDIOLOGY | Facility: MEDICAL CENTER | Age: 83
End: 2018-11-28

## 2018-11-28 NOTE — TELEPHONE ENCOUNTER
Yamileth Braga R.N.   Phone Number: [phone]             RODRIGO/jennifer     Pt calling for med advice to assist with high b/p.  Please call pt after 2pm today, [phone].      Spoke w/pt. She reports increased BP's for a couple of days; one reading up to 190 systolic. Instead of taking her amlodipine 5 mg a.m. and 5 mg in p.m. today, she took 10 mg at once and her most recent BP was 158/72. She has clonidine from a recent ER visit for hypertension w/directions to take for SBP >180, DBP >100. She wants to know if OK to take if her BP gets that high again?    To Breanna--recommendations re: BP/meds?

## 2018-11-30 NOTE — TELEPHONE ENCOUNTER
DEVIKA Jauregui.   to Me     11/28/18 5:26 PM   I agree. Okay to take the Norvasc at once instead of BID if more helpful with BP control. Take clonidine only when really needed (SBP>180) until she can FU with FK on 12/13/18.     Thank you,   Breanna   ===============================================    Spoke to pt to follow up. Pt states her BP is better today (SBP's 140's-150's on average). She is taking Norvasc 10 mg QD instead of splitting now. She will follow up as scheduled on 12/13.

## 2018-12-11 ENCOUNTER — TELEPHONE (OUTPATIENT)
Dept: CARDIOLOGY | Facility: MEDICAL CENTER | Age: 83
End: 2018-12-11

## 2018-12-11 DIAGNOSIS — I10 ESSENTIAL HYPERTENSION: ICD-10-CM

## 2018-12-11 RX ORDER — LOSARTAN POTASSIUM 50 MG/1
50 TABLET ORAL DAILY
Qty: 30 TAB | Refills: 6 | Status: SHIPPED | OUTPATIENT
Start: 2018-12-11 | End: 2018-12-13

## 2018-12-11 RX ORDER — AMLODIPINE BESYLATE 5 MG/1
5 TABLET ORAL 2 TIMES DAILY
COMMUNITY
Start: 2018-12-11 | End: 2019-05-09

## 2018-12-11 RX ORDER — CLONIDINE HYDROCHLORIDE 0.1 MG/1
0.1 TABLET ORAL 4 TIMES DAILY PRN
Qty: 60 TAB | Refills: 1 | COMMUNITY
Start: 2018-12-11 | End: 2019-01-07

## 2018-12-12 ENCOUNTER — HOSPITAL ENCOUNTER (EMERGENCY)
Facility: MEDICAL CENTER | Age: 83
End: 2018-12-12
Attending: EMERGENCY MEDICINE
Payer: MEDICARE

## 2018-12-12 ENCOUNTER — APPOINTMENT (OUTPATIENT)
Dept: RADIOLOGY | Facility: MEDICAL CENTER | Age: 83
End: 2018-12-12
Attending: EMERGENCY MEDICINE
Payer: MEDICARE

## 2018-12-12 ENCOUNTER — TELEPHONE (OUTPATIENT)
Dept: CARDIOLOGY | Facility: MEDICAL CENTER | Age: 83
End: 2018-12-12

## 2018-12-12 VITALS
RESPIRATION RATE: 18 BRPM | BODY MASS INDEX: 25.03 KG/M2 | DIASTOLIC BLOOD PRESSURE: 82 MMHG | HEIGHT: 64 IN | OXYGEN SATURATION: 92 % | WEIGHT: 146.61 LBS | HEART RATE: 56 BPM | SYSTOLIC BLOOD PRESSURE: 191 MMHG | TEMPERATURE: 98.6 F

## 2018-12-12 DIAGNOSIS — I10 ESSENTIAL HYPERTENSION: ICD-10-CM

## 2018-12-12 LAB
ALBUMIN SERPL BCP-MCNC: 3.9 G/DL (ref 3.2–4.9)
ALBUMIN/GLOB SERPL: 1.1 G/DL
ALP SERPL-CCNC: 82 U/L (ref 30–99)
ALT SERPL-CCNC: 9 U/L (ref 2–50)
ANION GAP SERPL CALC-SCNC: 9 MMOL/L (ref 0–11.9)
APTT PPP: 33.9 SEC (ref 24.7–36)
AST SERPL-CCNC: 24 U/L (ref 12–45)
BASOPHILS # BLD AUTO: 1.6 % (ref 0–1.8)
BASOPHILS # BLD: 0.07 K/UL (ref 0–0.12)
BILIRUB SERPL-MCNC: 0.6 MG/DL (ref 0.1–1.5)
BNP SERPL-MCNC: 45 PG/ML (ref 0–100)
BUN SERPL-MCNC: 25 MG/DL (ref 8–22)
CALCIUM SERPL-MCNC: 9.6 MG/DL (ref 8.5–10.5)
CHLORIDE SERPL-SCNC: 105 MMOL/L (ref 96–112)
CO2 SERPL-SCNC: 26 MMOL/L (ref 20–33)
CREAT SERPL-MCNC: 1.05 MG/DL (ref 0.5–1.4)
EKG IMPRESSION: NORMAL
EOSINOPHIL # BLD AUTO: 0.15 K/UL (ref 0–0.51)
EOSINOPHIL NFR BLD: 3.4 % (ref 0–6.9)
ERYTHROCYTE [DISTWIDTH] IN BLOOD BY AUTOMATED COUNT: 47.3 FL (ref 35.9–50)
GLOBULIN SER CALC-MCNC: 3.6 G/DL (ref 1.9–3.5)
GLUCOSE SERPL-MCNC: 95 MG/DL (ref 65–99)
HCT VFR BLD AUTO: 38.7 % (ref 37–47)
HGB BLD-MCNC: 12.7 G/DL (ref 12–16)
IMM GRANULOCYTES # BLD AUTO: 0.01 K/UL (ref 0–0.11)
IMM GRANULOCYTES NFR BLD AUTO: 0.2 % (ref 0–0.9)
INR PPP: 0.98 (ref 0.87–1.13)
LIPASE SERPL-CCNC: 14 U/L (ref 11–82)
LYMPHOCYTES # BLD AUTO: 1.9 K/UL (ref 1–4.8)
LYMPHOCYTES NFR BLD: 42.9 % (ref 22–41)
MCH RBC QN AUTO: 30.2 PG (ref 27–33)
MCHC RBC AUTO-ENTMCNC: 32.8 G/DL (ref 33.6–35)
MCV RBC AUTO: 92.1 FL (ref 81.4–97.8)
MONOCYTES # BLD AUTO: 0.5 K/UL (ref 0–0.85)
MONOCYTES NFR BLD AUTO: 11.3 % (ref 0–13.4)
NEUTROPHILS # BLD AUTO: 1.8 K/UL (ref 2–7.15)
NEUTROPHILS NFR BLD: 40.6 % (ref 44–72)
NRBC # BLD AUTO: 0 K/UL
NRBC BLD-RTO: 0 /100 WBC
PLATELET # BLD AUTO: 232 K/UL (ref 164–446)
PMV BLD AUTO: 10.4 FL (ref 9–12.9)
POTASSIUM SERPL-SCNC: 3.8 MMOL/L (ref 3.6–5.5)
PROT SERPL-MCNC: 7.5 G/DL (ref 6–8.2)
PROTHROMBIN TIME: 13.1 SEC (ref 12–14.6)
RBC # BLD AUTO: 4.2 M/UL (ref 4.2–5.4)
SODIUM SERPL-SCNC: 140 MMOL/L (ref 135–145)
TROPONIN I SERPL-MCNC: 0.01 NG/ML (ref 0–0.04)
WBC # BLD AUTO: 4.4 K/UL (ref 4.8–10.8)

## 2018-12-12 PROCEDURE — 93005 ELECTROCARDIOGRAM TRACING: CPT | Performed by: EMERGENCY MEDICINE

## 2018-12-12 PROCEDURE — 80053 COMPREHEN METABOLIC PANEL: CPT

## 2018-12-12 PROCEDURE — 83690 ASSAY OF LIPASE: CPT

## 2018-12-12 PROCEDURE — 85730 THROMBOPLASTIN TIME PARTIAL: CPT

## 2018-12-12 PROCEDURE — 83880 ASSAY OF NATRIURETIC PEPTIDE: CPT

## 2018-12-12 PROCEDURE — 99284 EMERGENCY DEPT VISIT MOD MDM: CPT

## 2018-12-12 PROCEDURE — 71045 X-RAY EXAM CHEST 1 VIEW: CPT

## 2018-12-12 PROCEDURE — 84484 ASSAY OF TROPONIN QUANT: CPT

## 2018-12-12 PROCEDURE — 36415 COLL VENOUS BLD VENIPUNCTURE: CPT

## 2018-12-12 PROCEDURE — 85025 COMPLETE CBC W/AUTO DIFF WBC: CPT

## 2018-12-12 PROCEDURE — 85610 PROTHROMBIN TIME: CPT

## 2018-12-12 ASSESSMENT — PAIN SCALES - GENERAL: PAINLEVEL_OUTOF10: 0

## 2018-12-12 ASSESSMENT — ENCOUNTER SYMPTOMS: TINGLING: 1

## 2018-12-12 NOTE — ED PROVIDER NOTES
ED Provider Note    Scribed for Vignesh Nicole M.D. by Laura Kunz. 12/12/2018, 1:14 PM.    Primary care provider: Estuardo Carrero M.D.  Means of arrival: Ambulance  History obtained from: Patient  History limited by: None     CHIEF COMPLAINT  Chief Complaint   Patient presents with   • Hypertension   • Arm Pain       HPI  Livia Corea is a 89 y.o. Female with a history of hypertension who presents to the Emergency Department for evaluation of elevated blood pressure changed from baseline onset 2 days ago. Patient is currently taking blood pressure medications and is around the 160s at baseline. She decided to present to the ED when her blood pressure increased to the 200s/80s after taking losartan and hydrochlorothiazide. Patient also took clonidine yesterday. She notes feeling lightheaded and left forearm tingling at that time. Patient denies any new leg swelling. Patient is ambulatory with a walker secondary to chronic leg pain at baseline. She is currently being followed by Dr. Eden, Cardiology. Patient has undergone an echo previously. Patient denies any associated chest pain. She states she has never experienced this before and checks her blood pressure about 15 times a day.     REVIEW OF SYSTEMS  Review of Systems   Cardiovascular: Negative for chest pain and leg swelling.   Musculoskeletal: Positive for joint pain.   Neurological: Positive for tingling.        Positive for lightheadedness and left arm tingling      PAST MEDICAL HISTORY   has a past medical history of Circulation problem (01/2018); Hyperlipidemia; and Hypertension. Chronic leg pain     SURGICAL HISTORY   has a past surgical history that includes gyn surgery and carotid endarterectomy (Right, 2000).    SOCIAL HISTORY  Social History   Substance Use Topics   • Smoking status: Former Smoker     Quit date: 2/25/1988   • Smokeless tobacco: Never Used   • Alcohol use Yes      Comment: 1 glass of wine x 2 days a week      History  "  Drug Use No       FAMILY HISTORY  Family History   Problem Relation Age of Onset   • Heart Attack Mother 64   • Heart Attack Father 72       CURRENT MEDICATIONS  Home Medications     Reviewed by Rosio Ayala R.N. (Registered Nurse) on 12/12/18 at [employer]  Med List Status: Partial   Medication Last Dose Status   amLODIPine (NORVASC) 5 MG Tab  Active   ascorbic acid (ASCORBIC ACID) 500 MG Tab  Active   aspirin EC (ECOTRIN) 81 MG Tablet Delayed Response  Active   atorvastatin (LIPITOR) 20 MG Tab  Active   cloNIDine (CATAPRES) 0.1 MG Tab  Active   cyanocobalamin (VITAMIN B-12) 500 MCG Tab  Active   Garlic 10 MG Cap  Active   hydrochlorothiazide (MICROZIDE) 12.5 MG capsule  Active   losartan (COZAAR) 50 MG Tab  Active   Omega-3 Fatty Acids (FISH OIL) 1000 MG Cap capsule  Active   potassium chloride SA (K-DUR) 10 MEQ Tab CR  Active                ALLERGIES  Allergies   Allergen Reactions   • Chlorthalidone      Headaches severe. Heart racing    • Lisinopril Swelling     Of lower extremities        PHYSICAL EXAM  VITAL SIGNS: BP (!) 191/82   Pulse (!) 59   Temp 37 °C (98.6 °F) (Temporal)   Resp 18   Ht 1.626 m (5' 4\")   Wt 66.5 kg (146 lb 9.7 oz)   SpO2 96%   BMI 25.16 kg/m²     Constitutional: Well developed, Well nourished, No acute distress, Non-toxic appearance. Elderly in appearance.   HENT: Normocephalic, Atraumatic, Bilateral external ears normal, oropharynx moist, No oral exudates, Nose normal.   Eyes:conjunctiva is normal, there are no signs of exudate.   Neck: Supple, no meningeal signs.  Cardiovascular: Regular rate and rhythm without gallops or rubs. Grade 2-3/15 murmur in left sternal border.   Thorax & Lungs: No respiratory distress. Breathing comfortably. Lungs are clear to auscultation bilaterally, there are no wheezes no rales. Chest wall is nontender. Mild murmur.   Abdomen: Soft, nontender, nondistended. Bowel sounds are present.   Skin: Warm, Dry, No erythema,   Back: No tenderness, No CVA " tenderness.  Musculoskeletal: Good range of motion in all major joints. No tenderness to palpation or major deformities noted. Intact distal pulses, no clubbing, no cyanosis, no edema,   Neurologic: Alert & oriented x 3, Moving all extremities. No gross abnormalities.    Psychiatric: Affect normal, Judgment normal, Mood normal.     LABS  Results for orders placed or performed during the hospital encounter of 12/12/18   CBC with Differential   Result Value Ref Range    WBC 4.4 (L) 4.8 - 10.8 K/uL    RBC 4.20 4.20 - 5.40 M/uL    Hemoglobin 12.7 12.0 - 16.0 g/dL    Hematocrit 38.7 37.0 - 47.0 %    MCV 92.1 81.4 - 97.8 fL    MCH 30.2 27.0 - 33.0 pg    MCHC 32.8 (L) 33.6 - 35.0 g/dL    RDW 47.3 35.9 - 50.0 fL    Platelet Count 232 164 - 446 K/uL    MPV 10.4 9.0 - 12.9 fL    Neutrophils-Polys 40.60 (L) 44.00 - 72.00 %    Lymphocytes 42.90 (H) 22.00 - 41.00 %    Monocytes 11.30 0.00 - 13.40 %    Eosinophils 3.40 0.00 - 6.90 %    Basophils 1.60 0.00 - 1.80 %    Immature Granulocytes 0.20 0.00 - 0.90 %    Nucleated RBC 0.00 /100 WBC    Neutrophils (Absolute) 1.80 (L) 2.00 - 7.15 K/uL    Lymphs (Absolute) 1.90 1.00 - 4.80 K/uL    Monos (Absolute) 0.50 0.00 - 0.85 K/uL    Eos (Absolute) 0.15 0.00 - 0.51 K/uL    Baso (Absolute) 0.07 0.00 - 0.12 K/uL    Immature Granulocytes (abs) 0.01 0.00 - 0.11 K/uL    NRBC (Absolute) 0.00 K/uL   Complete Metabolic Panel (CMP)   Result Value Ref Range    Total Bilirubin 0.6 0.1 - 1.5 mg/dL    Sodium 140 135 - 145 mmol/L    Potassium 3.8 3.6 - 5.5 mmol/L    Chloride 105 96 - 112 mmol/L    Co2 26 20 - 33 mmol/L    Anion Gap 9.0 0.0 - 11.9    Glucose 95 65 - 99 mg/dL    Bun 25 (H) 8 - 22 mg/dL    Creatinine 1.05 0.50 - 1.40 mg/dL    Calcium 9.6 8.5 - 10.5 mg/dL    AST(SGOT) 24 12 - 45 U/L    ALT(SGPT) 9 2 - 50 U/L    Alkaline Phosphatase 82 30 - 99 U/L    Albumin 3.9 3.2 - 4.9 g/dL    Total Protein 7.5 6.0 - 8.2 g/dL    Globulin 3.6 (H) 1.9 - 3.5 g/dL    A-G Ratio 1.1 g/dL   Btype Natriuretic  Peptide (BNP)   Result Value Ref Range    B Natriuretic Peptide 45 0 - 100 pg/mL   Prothrombin Time (PT/INR)   Result Value Ref Range    PT 13.1 12.0 - 14.6 sec    INR 0.98 0.87 - 1.13   APTT   Result Value Ref Range    APTT 33.9 24.7 - 36.0 sec   Lipase   Result Value Ref Range    Lipase 14 11 - 82 U/L   Troponin STAT   Result Value Ref Range    Troponin I 0.01 0.00 - 0.04 ng/mL   ESTIMATED GFR   Result Value Ref Range    GFR If  59 (A) >60 mL/min/1.73 m 2    GFR If Non African American 49 (A) >60 mL/min/1.73 m 2   EKG   Result Value Ref Range    Report       [facility] Emergency Dept.    Test Date:  2018  Pt Name:    GEO WHITESIDE                 Department: ER  MRN:        [id number]                      Room:       [facility]  Gender:     Female                       Technician: [id number]  :        [id number]                   Requested By:FELIX LOPEZ  Order #:    [id number]                    Reading MD: FELIX LOPEZ MD    Measurements  Intervals                                Axis  Rate:       51                           P:          51  WV:         200                          QRS:        41  QRSD:       164                          T:          262  QT:         532  QTc:        491    Interpretive Statements  SINUS BRADYCARDIA  LEFT BUNDLE BRANCH BLOCK  Compared to ECG 09/15/2018 16:59:35  unchanges from prior    Electronically Signed On 2018 15:03:50 PST by FELIX LOPEZ MD     All labs reviewed by me.    EKG  Interpreted by me as above.     RADIOLOGY  DX-CHEST-PORTABLE (1 VIEW)   Final Result      1.  Unchanged RIGHT basilar opacity most compatible with scar or atelectasis   2.  Persistently enlarged cardiac silhouette   3.  Atherosclerosis      The radiologist's interpretation of all radiological studies have been reviewed by me.    COURSE & MEDICAL DECISION MAKING  Pertinent Labs & Imaging studies reviewed. (See chart for details)    1:14 PM Patient seen  and examined at bedside. Ordered DX chest, EKG, CBC, CMP, BNP, PT/INR, APTT, Lipase, and Troponin STAT to evaluate her symptoms.     Decision Making:  Patient's blood pressures come down to 150/70 despite resting in the emergency department.  At this point I feel that his labile blood pressure secondary to reactivity.  Laboratory studies EKG are normal.  At this point recommended for the patient to follow-up she has an appointment with Dr. Concepcion her cardiologist tomorrow for further outpatient treatment care and medication change as necessary.  Currently I see no signs of endorgan injury and I feel the patient stable for discharge.    The patient will return for new or worsening symptoms and is stable at the time of discharge.    DISPOSITION:  Patient will be discharged home in stable condition.    FOLLOW UP:  Napoleon Eden M.D.  [address]  [address]  [place] [address]  [phone]      tomorrow for your appointment      OUTPATIENT MEDICATIONS:  Discharge Medication List as of 12/12/2018  3:38 PM        FINAL IMPRESSION  1. Essential hypertension         Laura BARBOZA (Kevin), am scribing for, and in the presence of, Vignesh Nicole M.D..  Electronically signed by: Laura Ridley), 12/12/2018  Vignesh BARBOZA M.D. personally performed the services described in this documentation, as scribed by Laura Kunz in my presence, and it is both accurate and complete. C.     The note accurately reflects work and decisions made by me.  Vignesh Nicole  12/12/2018  4:35 PM

## 2018-12-12 NOTE — ED NOTES
VSS. PIV DC'd intact. Pt given DC instructions with verbalized understanding. Ambulatory to exit with steady walker gait.

## 2018-12-12 NOTE — TELEPHONE ENCOUNTER
"Corinna Gonzalez, Med Ass't  Leslie Braga R.N.   Phone Number: [phone]             JS   FK     Hello there Livia Nelson called and she says she has pain in lower L. arm for about an hour now. She says her BP is 184/92. She would like a call back at: [phone].      Spoke to pt. She states her BP was 160 systolic upon waking up. She took her meds and 1.5-2 hrs later her BP was 184 systolic. She started to feel intermittent \"jabs\" of pain in her left arm and took her BP again which was 204 systolic. She took a prn clonidine as prescribed about 10-15 minutes ago and BP was 182/91; HR 80 while on phone w/RN, but pt still experiencing arm pain. Denies any additional symptoms, but she has never experienced arm pain before. Discussed case w/Breanna LINCOLN and agreed pt should go to ER for assessment/treatment. Pt notified and agrees.  "

## 2018-12-12 NOTE — ED NOTES
Assumed care of pt from EMS - see triage note by this RN for details. Changed to leandro, hooked to monitor- VSS. Labs drawn from existing PIV.Fall precautions in place. Call bell in reach.  Awaiting MD eval/orders. Ongoing monitoring.

## 2018-12-12 NOTE — ED TRIAGE NOTES
BIB [facility] from home. Intermittent L FA pain/twitching since 0700. No trauma. Denies CP, SOB, or nausea. + HTN x 2 days despite taking all meds as prescribed. Started Losartan this morning.

## 2018-12-13 ENCOUNTER — OFFICE VISIT (OUTPATIENT)
Dept: CARDIOLOGY | Facility: MEDICAL CENTER | Age: 83
End: 2018-12-13
Payer: MEDICARE

## 2018-12-13 VITALS
DIASTOLIC BLOOD PRESSURE: 70 MMHG | OXYGEN SATURATION: 96 % | HEART RATE: 68 BPM | SYSTOLIC BLOOD PRESSURE: 146 MMHG | BODY MASS INDEX: 23.22 KG/M2 | WEIGHT: 136 LBS | HEIGHT: 64 IN

## 2018-12-13 DIAGNOSIS — I65.23 ATHEROSCLEROSIS OF BOTH CAROTID ARTERIES: ICD-10-CM

## 2018-12-13 DIAGNOSIS — E78.5 DYSLIPIDEMIA: ICD-10-CM

## 2018-12-13 DIAGNOSIS — R94.39 ABNORMAL MYOCARDIAL PERFUSION STUDY: ICD-10-CM

## 2018-12-13 DIAGNOSIS — I10 ESSENTIAL HYPERTENSION: ICD-10-CM

## 2018-12-13 PROBLEM — R09.89 CAROTID BRUIT: Status: RESOLVED | Noted: 2018-06-19 | Resolved: 2018-12-13

## 2018-12-13 PROCEDURE — 99214 OFFICE O/P EST MOD 30 MIN: CPT | Performed by: INTERNAL MEDICINE

## 2018-12-13 RX ORDER — IRBESARTAN 300 MG/1
300 TABLET ORAL DAILY
Qty: 30 TAB | Refills: 11 | Status: SHIPPED | OUTPATIENT
Start: 2018-12-13 | End: 2019-04-15

## 2018-12-13 RX ORDER — SPIRONOLACTONE 25 MG/1
25 TABLET ORAL DAILY
Qty: 30 TAB | Refills: 3 | Status: SHIPPED | OUTPATIENT
Start: 2018-12-13 | End: 2018-12-14 | Stop reason: SDUPTHER

## 2018-12-13 NOTE — PROGRESS NOTES
Chief Complaint   Patient presents with   • HTN (Controlled)     x3 mon. f/v    • Edema       Subjective:   Livia Corea is a 89 y.o. female who presents today for follow-up of her hypertension, dyslipidemia and carotid plaque.    She has had difficulty with her blood pressure being under poor control.  Her systolic blood pressures have gone up as high as about 190 systolic.  Diastolic pressures have been up to about 100.    She has had no chest discomfort, dyspnea on exertion, PND, orthopnea or edema.  She occasionally gets dyspneic if she is anxious.  She has had no palpitations or lightheadedness.  No    Past Medical History:   Diagnosis Date   • Circulation problem 01/2018   • Hyperlipidemia    • Hypertension      Past Surgical History:   Procedure Laterality Date   • CAROTID ENDARTERECTOMY Right 2000   • GYN SURGERY      hysterectomy     Family History   Problem Relation Age of Onset   • Heart Attack Mother 64   • Heart Attack Father 72     Social History     Social History   • Marital status: Single     Spouse name: N/A   • Number of children: N/A   • Years of education: N/A     Occupational History   • Not on file.     Social History Main Topics   • Smoking status: Former Smoker     Quit date: 2/25/1988   • Smokeless tobacco: Never Used   • Alcohol use Yes      Comment: 1 glass of wine x 2 days a week   • Drug use: No   • Sexual activity: Not on file     Other Topics Concern   • Not on file     Social History Narrative   • No narrative on file     Allergies   Allergen Reactions   • Chlorthalidone      Headaches severe. Heart racing    • Lisinopril Swelling     Of lower extremities      Outpatient Encounter Prescriptions as of 12/13/2018   Medication Sig Dispense Refill   • amLODIPine (NORVASC) 5 MG Tab Take 5 mg by mouth 2 Times a Day.     • losartan (COZAAR) 50 MG Tab Take 1 Tab by mouth every day. 30 Tab 6   • cloNIDine (CATAPRES) 0.1 MG Tab Take 1 Tab by mouth 4 times a day as needed (Systolic  "BP >180, Diastolic BP>100). 60 Tab 1   • hydrochlorothiazide (MICROZIDE) 12.5 MG capsule Take 1 Cap by mouth 2 times a day. 180 Cap 3   • Garlic 10 MG Cap Take 2 Caps by mouth 3 times a day, with meals.     • Omega-3 Fatty Acids (FISH OIL) 1000 MG Cap capsule Take 2 Caps by mouth every day. 90 Cap 3   • ascorbic acid (ASCORBIC ACID) 500 MG Tab Take 500 mg by mouth every day.     • cyanocobalamin (VITAMIN B-12) 500 MCG Tab Take 500 mcg by mouth every day.     • aspirin EC (ECOTRIN) 81 MG Tablet Delayed Response Take 81 mg by mouth every day.     • atorvastatin (LIPITOR) 20 MG Tab Take 1 Tab by mouth every day. 90 Tab 3   • potassium chloride SA (K-DUR) 10 MEQ Tab CR Take 1 Tab by mouth every day. 30 Tab 11     No facility-administered encounter medications on file as of 12/13/2018.      ROS     Objective:   /70 (BP Location: Left arm, Patient Position: Sitting, BP Cuff Size: Adult)   Pulse 68   Ht 1.626 m (5' 4\")   Wt 61.7 kg (136 lb)   SpO2 96%   BMI 23.34 kg/m²     Physical Exam   Constitutional: She appears well-developed and well-nourished.   Neck: No JVD present.   Cardiovascular: Normal rate and regular rhythm.    No murmur heard.  Pulmonary/Chest: Effort normal and breath sounds normal. She has no rales.   Abdominal: Soft. There is no tenderness.   Musculoskeletal: She exhibits no edema.         FINDINGS   Right carotid.    Plaque of the bifurcation extending into the internal carotid. Velocities    are consistent with < 50% stenosis of the internal carotid artery.    Left carotid    Plaque of the bifurcation extending into the internal carotid. Velocities    are consistent with < 50% stenosis of the internal carotid artery.      Bilateral subclavian and vertebral artery waveforms are antegrade and    waveforms are normal in character and velocity.      Final Date:      12 June 2018     Transthoracic  Echo Report      Echocardiography Laboratory    CONCLUSIONS  No prior study is available for " comparison.   Normal left ventricular systolic function.  Left ventricular ejection fraction is visually estimated to be 65%.  Mild left ventricular hypertrophy.  Mild aortic stenosis.  Right heart pressures are consistent with moderate pulmonary   hypertension.    GEO WHITESIDE  Exam Date:         03/10/2018 taking    Lab Results   Component Value Date/Time    CHOLSTRLTOT 184 03/11/2018 04:27 AM     (H) 03/11/2018 04:27 AM    HDL 54 03/11/2018 04:27 AM    TRIGLYCERIDE 87 03/11/2018 04:27 AM       Lab Results   Component Value Date/Time    SODIUM 140 12/12/2018 01:00 PM    POTASSIUM 3.8 12/12/2018 01:00 PM    CHLORIDE 105 12/12/2018 01:00 PM    CO2 26 12/12/2018 01:00 PM    GLUCOSE 95 12/12/2018 01:00 PM    BUN 25 (H) 12/12/2018 01:00 PM    CREATININE 1.05 12/12/2018 01:00 PM     Lab Results   Component Value Date/Time    ALKPHOSPHAT 82 12/12/2018 01:00 PM    ASTSGOT 24 12/12/2018 01:00 PM    ALTSGPT 9 12/12/2018 01:00 PM    TBILIRUBIN 0.6 12/12/2018 01:00 PM      Lab Results   Component Value Date/Time    BNPBTYPENAT 45 12/12/2018 01:00 PM          Assessment:     1. Abnormal myocardial perfusion study: Small reversible apical abnormality     2. Dyslipidemia     3. Carotid bruit, unspecified laterality     4. Essential hypertension         Medical Decision Making:  Today's Assessment / Status / Plan:     Ms. Whiteside is having difficulty with hypertension.  She is somewhat noncompliant with her medical therapy.  She was started on losartan but did not think this was working as well as clonidine.  She is therefore not taking this medication.  She is also not taking atorvastatin.    At this time, we will place her on the irbesartan 300 mg daily.  We will start her on Spironolactone 12.5 mg daily.  She will have lab work in 1 week and follow-up in 3 weeks with a repeat BMP.

## 2018-12-14 RX ORDER — SPIRONOLACTONE 25 MG/1
12.5 TABLET ORAL DAILY
Qty: 45 TAB | Refills: 0 | Status: SHIPPED | OUTPATIENT
Start: 2018-12-14 | End: 2019-01-07

## 2018-12-21 ENCOUNTER — HOSPITAL ENCOUNTER (OUTPATIENT)
Dept: LAB | Facility: MEDICAL CENTER | Age: 83
End: 2018-12-21
Attending: INTERNAL MEDICINE
Payer: MEDICARE

## 2018-12-21 DIAGNOSIS — I10 ESSENTIAL HYPERTENSION: ICD-10-CM

## 2018-12-21 PROCEDURE — 36415 COLL VENOUS BLD VENIPUNCTURE: CPT

## 2018-12-21 PROCEDURE — 80048 BASIC METABOLIC PNL TOTAL CA: CPT

## 2018-12-22 LAB
ANION GAP SERPL CALC-SCNC: 10 MMOL/L (ref 0–11.9)
BUN SERPL-MCNC: 42 MG/DL (ref 8–22)
CALCIUM SERPL-MCNC: 9.7 MG/DL (ref 8.5–10.5)
CHLORIDE SERPL-SCNC: 105 MMOL/L (ref 96–112)
CO2 SERPL-SCNC: 27 MMOL/L (ref 20–33)
CREAT SERPL-MCNC: 1.35 MG/DL (ref 0.5–1.4)
FASTING STATUS PATIENT QL REPORTED: NORMAL
GLUCOSE SERPL-MCNC: 89 MG/DL (ref 65–99)
POTASSIUM SERPL-SCNC: 4.5 MMOL/L (ref 3.6–5.5)
SODIUM SERPL-SCNC: 142 MMOL/L (ref 135–145)

## 2019-01-03 ENCOUNTER — HOSPITAL ENCOUNTER (OUTPATIENT)
Dept: LAB | Facility: MEDICAL CENTER | Age: 84
End: 2019-01-03
Attending: INTERNAL MEDICINE
Payer: MEDICARE

## 2019-01-03 DIAGNOSIS — I10 ESSENTIAL HYPERTENSION: ICD-10-CM

## 2019-01-03 LAB
ANION GAP SERPL CALC-SCNC: 10 MMOL/L (ref 0–11.9)
BUN SERPL-MCNC: 32 MG/DL (ref 8–22)
CALCIUM SERPL-MCNC: 10.2 MG/DL (ref 8.5–10.5)
CHLORIDE SERPL-SCNC: 101 MMOL/L (ref 96–112)
CO2 SERPL-SCNC: 28 MMOL/L (ref 20–33)
CREAT SERPL-MCNC: 1.32 MG/DL (ref 0.5–1.4)
FASTING STATUS PATIENT QL REPORTED: NORMAL
GLUCOSE SERPL-MCNC: 86 MG/DL (ref 65–99)
POTASSIUM SERPL-SCNC: 3.9 MMOL/L (ref 3.6–5.5)
SODIUM SERPL-SCNC: 139 MMOL/L (ref 135–145)

## 2019-01-03 PROCEDURE — 36415 COLL VENOUS BLD VENIPUNCTURE: CPT

## 2019-01-03 PROCEDURE — 80048 BASIC METABOLIC PNL TOTAL CA: CPT

## 2019-01-07 ENCOUNTER — OFFICE VISIT (OUTPATIENT)
Dept: CARDIOLOGY | Facility: MEDICAL CENTER | Age: 84
End: 2019-01-07
Payer: MEDICARE

## 2019-01-07 VITALS
HEART RATE: 68 BPM | BODY MASS INDEX: 23.73 KG/M2 | HEIGHT: 64 IN | SYSTOLIC BLOOD PRESSURE: 160 MMHG | WEIGHT: 139 LBS | DIASTOLIC BLOOD PRESSURE: 72 MMHG | OXYGEN SATURATION: 95 %

## 2019-01-07 DIAGNOSIS — R94.39 ABNORMAL MYOCARDIAL PERFUSION STUDY: ICD-10-CM

## 2019-01-07 DIAGNOSIS — I10 ESSENTIAL HYPERTENSION: ICD-10-CM

## 2019-01-07 DIAGNOSIS — E78.5 DYSLIPIDEMIA: ICD-10-CM

## 2019-01-07 DIAGNOSIS — I65.23 ATHEROSCLEROSIS OF BOTH CAROTID ARTERIES: ICD-10-CM

## 2019-01-07 PROCEDURE — 99214 OFFICE O/P EST MOD 30 MIN: CPT | Performed by: INTERNAL MEDICINE

## 2019-01-07 RX ORDER — SPIRONOLACTONE 25 MG/1
25 TABLET ORAL DAILY
Qty: 30 TAB | Refills: 3 | Status: SHIPPED | OUTPATIENT
Start: 2019-01-07 | End: 2019-03-07

## 2019-01-07 RX ORDER — ATORVASTATIN CALCIUM 40 MG/1
40 TABLET, FILM COATED ORAL DAILY
Qty: 30 TAB | Refills: 11 | Status: SHIPPED | OUTPATIENT
Start: 2019-01-07 | End: 2019-03-07

## 2019-01-07 NOTE — PROGRESS NOTES
Chief Complaint   Patient presents with   • Hypertension       Subjective:   Livia Corea is a 89 y.o. female who presents today for follow-up of her hypertension, dyslipidemia and carotid plaque.    At the time of her last visit, she was having significant difficulty with her hypertension with systolic blood pressures as high as 190.  She brings in a record of her blood pressures and they are under much better control.  Her blood pressures range from approximately 130-160/70-80.    She has had some difficulty with dependent edema but this is about 60% improved.  She denies any dyspnea on exertion, PND or orthopnea.  She has had no chest discomfort, palpitations or lightheadedness.        Past Medical History:   Diagnosis Date   • Circulation problem 01/2018   • Hyperlipidemia    • Hypertension      Past Surgical History:   Procedure Laterality Date   • CAROTID ENDARTERECTOMY Right 2000   • GYN SURGERY      hysterectomy     Family History   Problem Relation Age of Onset   • Heart Attack Mother 64   • Heart Attack Father 72     Social History     Social History   • Marital status: Single     Spouse name: N/A   • Number of children: N/A   • Years of education: N/A     Occupational History   • Not on file.     Social History Main Topics   • Smoking status: Former Smoker     Quit date: 2/25/1988   • Smokeless tobacco: Never Used   • Alcohol use Yes      Comment: 1 glass of wine x 2 days a week   • Drug use: No   • Sexual activity: Not on file     Other Topics Concern   • Not on file     Social History Narrative   • No narrative on file     Allergies   Allergen Reactions   • Chlorthalidone      Headaches severe. Heart racing    • Lisinopril Swelling     Of lower extremities      Outpatient Encounter Prescriptions as of 1/7/2019   Medication Sig Dispense Refill   • spironolactone (ALDACTONE) 25 MG Tab Take 0.5 Tabs by mouth every day. 45 Tab 0   • irbesartan (AVAPRO) 300 MG Tab Take 1 Tab by mouth every day. 30  "Tab 11   • amLODIPine (NORVASC) 5 MG Tab Take 5 mg by mouth 2 Times a Day.     • cloNIDine (CATAPRES) 0.1 MG Tab Take 1 Tab by mouth 4 times a day as needed (Systolic BP >180, Diastolic BP>100). 60 Tab 1   • atorvastatin (LIPITOR) 20 MG Tab Take 1 Tab by mouth every day. 90 Tab 3   • hydrochlorothiazide (MICROZIDE) 12.5 MG capsule Take 1 Cap by mouth 2 times a day. 180 Cap 3   • Garlic 10 MG Cap Take 2 Caps by mouth 3 times a day, with meals.     • Omega-3 Fatty Acids (FISH OIL) 1000 MG Cap capsule Take 2 Caps by mouth every day. 90 Cap 3   • ascorbic acid (ASCORBIC ACID) 500 MG Tab Take 500 mg by mouth every day.     • cyanocobalamin (VITAMIN B-12) 500 MCG Tab Take 500 mcg by mouth every day.     • aspirin EC (ECOTRIN) 81 MG Tablet Delayed Response Take 81 mg by mouth every day.       No facility-administered encounter medications on file as of 1/7/2019.      ROS     Objective:   /72 (BP Location: Left arm, Patient Position: Sitting, BP Cuff Size: Adult)   Pulse 68   Ht 1.626 m (5' 4\")   Wt 63 kg (139 lb)   SpO2 95%   BMI 23.86 kg/m²     Physical Exam   Constitutional: She appears well-developed and well-nourished.   Neck: No JVD present.   Cardiovascular: Normal rate and regular rhythm.    No murmur heard.  Pulmonary/Chest: Effort normal and breath sounds normal. She has no rales.   Abdominal: Soft. There is no tenderness.   Musculoskeletal: She exhibits no edema.     Lab Results   Component Value Date/Time    CHOLSTRLTOT 184 03/11/2018 04:27 AM     (H) 03/11/2018 04:27 AM    HDL 54 03/11/2018 04:27 AM    TRIGLYCERIDE 87 03/11/2018 04:27 AM       Lab Results   Component Value Date/Time    SODIUM 139 01/03/2019 08:38 AM    POTASSIUM 3.9 01/03/2019 08:38 AM    CHLORIDE 101 01/03/2019 08:38 AM    CO2 28 01/03/2019 08:38 AM    GLUCOSE 86 01/03/2019 08:38 AM    BUN 32 (H) 01/03/2019 08:38 AM    CREATININE 1.32 01/03/2019 08:38 AM     Lab Results   Component Value Date/Time    ALKPHOSPHAT 82 12/12/2018 " 01:00 PM    ASTSGOT 24 12/12/2018 01:00 PM    ALTSGPT 9 12/12/2018 01:00 PM    TBILIRUBIN 0.6 12/12/2018 01:00 PM      Lab Results   Component Value Date/Time    BNPBTYPENAT 45 12/12/2018 01:00 PM          Assessment:     1. Essential hypertension     2. Atherosclerosis of both carotid arteries: Mild in 2018     3. Abnormal myocardial perfusion study: Small reversible apical abnormality     4. Dyslipidemia         Medical Decision Making:  Today's Assessment / Status / Plan:     Ms. Corea is clinically stable.  Her blood pressure is under improved but not optimal control.  She does have carotid plaque and her LDL was not under good control last year.  We will increase spironolactone to 25 mg daily.  Will also increase atorvastatin to 40 mg daily.  She will have lab work in about 6 weeks and follow-up in a couple of months.

## 2019-01-07 NOTE — LETTER
[facility]   [address], [facility] [address]  [place] [address]  Phone: [phone]  Fax: [phone]              Livia Corea  11/21/1926    Encounter Date: 1/7/2019    Napoleon Eden M.D.          PROGRESS NOTE:  Chief Complaint   Patient presents with   • Hypertension       Subjective:   Livia Corea is a 89 y.o. female who presents today for follow-up of her hypertension, dyslipidemia and carotid plaque.    At the time of her last visit, she was having significant difficulty with her hypertension with systolic blood pressures as high as 190.  She brings in a record of her blood pressures and they are under much better control.  Her blood pressures range from approximately 130-160/70-80.    She has had some difficulty with dependent edema but this is about 60% improved.  She denies any dyspnea on exertion, PND or orthopnea.  She has had no chest discomfort, palpitations or lightheadedness.        Past Medical History:   Diagnosis Date   • Circulation problem 01/2018   • Hyperlipidemia    • Hypertension      Past Surgical History:   Procedure Laterality Date   • CAROTID ENDARTERECTOMY Right 2000   • GYN SURGERY      hysterectomy     Family History   Problem Relation Age of Onset   • Heart Attack Mother 64   • Heart Attack Father 72     Social History     Social History   • Marital status: Single     Spouse name: N/A   • Number of children: N/A   • Years of education: N/A     Occupational History   • Not on file.     Social History Main Topics   • Smoking status: Former Smoker     Quit date: 2/25/1988   • Smokeless tobacco: Never Used   • Alcohol use Yes      Comment: 1 glass of wine x 2 days a week   • Drug use: No   • Sexual activity: Not on file     Other Topics Concern   • Not on file     Social History Narrative   • No narrative on file     Allergies   Allergen Reactions   • Chlorthalidone      Headaches severe. Heart racing    • Lisinopril  "Swelling     Of lower extremities      Outpatient Encounter Prescriptions as of 1/7/2019   Medication Sig Dispense Refill   • spironolactone (ALDACTONE) 25 MG Tab Take 0.5 Tabs by mouth every day. 45 Tab 0   • irbesartan (AVAPRO) 300 MG Tab Take 1 Tab by mouth every day. 30 Tab 11   • amLODIPine (NORVASC) 5 MG Tab Take 5 mg by mouth 2 Times a Day.     • cloNIDine (CATAPRES) 0.1 MG Tab Take 1 Tab by mouth 4 times a day as needed (Systolic BP >180, Diastolic BP>100). 60 Tab 1   • atorvastatin (LIPITOR) 20 MG Tab Take 1 Tab by mouth every day. 90 Tab 3   • hydrochlorothiazide (MICROZIDE) 12.5 MG capsule Take 1 Cap by mouth 2 times a day. 180 Cap 3   • Garlic 10 MG Cap Take 2 Caps by mouth 3 times a day, with meals.     • Omega-3 Fatty Acids (FISH OIL) 1000 MG Cap capsule Take 2 Caps by mouth every day. 90 Cap 3   • ascorbic acid (ASCORBIC ACID) 500 MG Tab Take 500 mg by mouth every day.     • cyanocobalamin (VITAMIN B-12) 500 MCG Tab Take 500 mcg by mouth every day.     • aspirin EC (ECOTRIN) 81 MG Tablet Delayed Response Take 81 mg by mouth every day.       No facility-administered encounter medications on file as of 1/7/2019.      ROS     Objective:   /72 (BP Location: Left arm, Patient Position: Sitting, BP Cuff Size: Adult)   Pulse 68   Ht 1.626 m (5' 4\")   Wt 63 kg (139 lb)   SpO2 95%   BMI 23.86 kg/m²      Physical Exam   Constitutional: She appears well-developed and well-nourished.   Neck: No JVD present.   Cardiovascular: Normal rate and regular rhythm.    No murmur heard.  Pulmonary/Chest: Effort normal and breath sounds normal. She has no rales.   Abdominal: Soft. There is no tenderness.   Musculoskeletal: She exhibits no edema.     Lab Results   Component Value Date/Time    CHOLSTRLTOT 184 03/11/2018 04:27 AM     (H) 03/11/2018 04:27 AM    HDL 54 03/11/2018 04:27 AM    TRIGLYCERIDE 87 03/11/2018 04:27 AM       Lab Results   Component Value Date/Time    SODIUM 139 01/03/2019 08:38 AM    " POTASSIUM 3.9 01/03/2019 08:38 AM    CHLORIDE 101 01/03/2019 08:38 AM    CO2 28 01/03/2019 08:38 AM    GLUCOSE 86 01/03/2019 08:38 AM    BUN 32 (H) 01/03/2019 08:38 AM    CREATININE 1.32 01/03/2019 08:38 AM     Lab Results   Component Value Date/Time    ALKPHOSPHAT 82 12/12/2018 01:00 PM    ASTSGOT 24 12/12/2018 01:00 PM    ALTSGPT 9 12/12/2018 01:00 PM    TBILIRUBIN 0.6 12/12/2018 01:00 PM      Lab Results   Component Value Date/Time    BNPBTYPENAT 45 12/12/2018 01:00 PM          Assessment:     1. Essential hypertension     2. Atherosclerosis of both carotid arteries: Mild in 2018     3. Abnormal myocardial perfusion study: Small reversible apical abnormality     4. Dyslipidemia         Medical Decision Making:  Today's Assessment / Status / Plan:     Ms. Corea is clinically stable.  Her blood pressure is under improved but not optimal control.  She does have carotid plaque in her LDL was not under good control last year.  We will increase spironolactone to 25 mg daily.  Will also increase atorvastatin to 40 mg daily.  She will have lab work in about 6 weeks and follow-up in a couple of months.      Estuardo Carrero M.D.  [address] [address]  VIA Mail

## 2019-02-20 ENCOUNTER — HOSPITAL ENCOUNTER (OUTPATIENT)
Dept: LAB | Facility: MEDICAL CENTER | Age: 84
End: 2019-02-20
Attending: FAMILY MEDICINE
Payer: MEDICARE

## 2019-02-20 LAB
ANION GAP SERPL CALC-SCNC: 8 MMOL/L (ref 0–11.9)
BUN SERPL-MCNC: 19 MG/DL (ref 8–22)
CALCIUM SERPL-MCNC: 10.2 MG/DL (ref 8.5–10.5)
CHLORIDE SERPL-SCNC: 99 MMOL/L (ref 96–112)
CO2 SERPL-SCNC: 28 MMOL/L (ref 20–33)
CREAT SERPL-MCNC: 1.03 MG/DL (ref 0.5–1.4)
GLUCOSE SERPL-MCNC: 86 MG/DL (ref 65–99)
POTASSIUM SERPL-SCNC: 3.4 MMOL/L (ref 3.6–5.5)
SODIUM SERPL-SCNC: 135 MMOL/L (ref 135–145)

## 2019-02-20 PROCEDURE — 36415 COLL VENOUS BLD VENIPUNCTURE: CPT

## 2019-02-20 PROCEDURE — 80048 BASIC METABOLIC PNL TOTAL CA: CPT

## 2019-02-22 ENCOUNTER — TELEPHONE (OUTPATIENT)
Dept: CARDIOLOGY | Facility: MEDICAL CENTER | Age: 84
End: 2019-02-22

## 2019-02-23 NOTE — TELEPHONE ENCOUNTER
"She calls and states that her HR is usually 59-72 and she has noticed that when she is at rest it is 81-85 lately.  She has no palpitations or any other signs that her heart rate is a problem; however she has had some short irregular beats as viewed on the BP machine light.  She did notice that once walking to her car, her HR was 88, but then at [facility] her HR was \"ok.\"    I reassured her that  is normal; advised her to call for any future concerns; and asked her to bring her readings to appt with Dr. Eden on 3/7 to review.  She will comply and was relieved.      "

## 2019-03-01 ENCOUNTER — TELEPHONE (OUTPATIENT)
Dept: CARDIOLOGY | Facility: MEDICAL CENTER | Age: 84
End: 2019-03-01

## 2019-03-01 NOTE — TELEPHONE ENCOUNTER
tingling in foot with rapid breathing   Received: Today   Message Contents   Yamileth Gunter R.N.   Phone Number: [phone]             FK/roberto     Pt calling to report around 2:25 today she had tingling in foot (left sole), that lasted about 5 minutes, along with rapid breathing probably due to being nervous about this.   Please call Livia .      Returned patient call. Pt was sitting and had been so for approx 30 mins. Pt states it occurred about 8-9 mins. Pt states it has never happened before. It resolved and has not returned. Broccoli is the only green she eats. Pt will f/u with her PCP. No difficulty walking afterwards.

## 2019-03-04 ENCOUNTER — HOSPITAL ENCOUNTER (OUTPATIENT)
Dept: LAB | Facility: MEDICAL CENTER | Age: 84
End: 2019-03-04
Attending: INTERNAL MEDICINE
Payer: MEDICARE

## 2019-03-04 DIAGNOSIS — E78.5 DYSLIPIDEMIA: ICD-10-CM

## 2019-03-04 DIAGNOSIS — I10 ESSENTIAL HYPERTENSION: ICD-10-CM

## 2019-03-04 DIAGNOSIS — I65.23 ATHEROSCLEROSIS OF BOTH CAROTID ARTERIES: ICD-10-CM

## 2019-03-04 LAB
ALBUMIN SERPL BCP-MCNC: 4.5 G/DL (ref 3.2–4.9)
ALBUMIN/GLOB SERPL: 1.1 G/DL
ALP SERPL-CCNC: 93 U/L (ref 30–99)
ALT SERPL-CCNC: 8 U/L (ref 2–50)
ANION GAP SERPL CALC-SCNC: 11 MMOL/L (ref 0–11.9)
AST SERPL-CCNC: 24 U/L (ref 12–45)
BILIRUB SERPL-MCNC: 0.6 MG/DL (ref 0.1–1.5)
BUN SERPL-MCNC: 23 MG/DL (ref 8–22)
CALCIUM SERPL-MCNC: 10.9 MG/DL (ref 8.5–10.5)
CHLORIDE SERPL-SCNC: 104 MMOL/L (ref 96–112)
CHOLEST SERPL-MCNC: 210 MG/DL (ref 100–199)
CO2 SERPL-SCNC: 25 MMOL/L (ref 20–33)
CREAT SERPL-MCNC: 0.98 MG/DL (ref 0.5–1.4)
FASTING STATUS PATIENT QL REPORTED: NORMAL
GLOBULIN SER CALC-MCNC: 4.1 G/DL (ref 1.9–3.5)
GLUCOSE SERPL-MCNC: 89 MG/DL (ref 65–99)
HDLC SERPL-MCNC: 64 MG/DL
LDLC SERPL CALC-MCNC: 123 MG/DL
POTASSIUM SERPL-SCNC: 3.7 MMOL/L (ref 3.6–5.5)
PROT SERPL-MCNC: 8.6 G/DL (ref 6–8.2)
SODIUM SERPL-SCNC: 140 MMOL/L (ref 135–145)
TRIGL SERPL-MCNC: 117 MG/DL (ref 0–149)

## 2019-03-04 PROCEDURE — 80053 COMPREHEN METABOLIC PANEL: CPT

## 2019-03-04 PROCEDURE — 80061 LIPID PANEL: CPT

## 2019-03-04 PROCEDURE — 36415 COLL VENOUS BLD VENIPUNCTURE: CPT

## 2019-03-07 ENCOUNTER — OFFICE VISIT (OUTPATIENT)
Dept: CARDIOLOGY | Facility: MEDICAL CENTER | Age: 84
End: 2019-03-07
Payer: MEDICARE

## 2019-03-07 VITALS
WEIGHT: 131 LBS | BODY MASS INDEX: 21.83 KG/M2 | HEIGHT: 65 IN | DIASTOLIC BLOOD PRESSURE: 70 MMHG | SYSTOLIC BLOOD PRESSURE: 144 MMHG | HEART RATE: 72 BPM

## 2019-03-07 DIAGNOSIS — I10 ESSENTIAL HYPERTENSION: ICD-10-CM

## 2019-03-07 DIAGNOSIS — I65.23 ATHEROSCLEROSIS OF BOTH CAROTID ARTERIES: ICD-10-CM

## 2019-03-07 DIAGNOSIS — E78.5 DYSLIPIDEMIA: ICD-10-CM

## 2019-03-07 PROCEDURE — 99214 OFFICE O/P EST MOD 30 MIN: CPT | Performed by: INTERNAL MEDICINE

## 2019-03-07 RX ORDER — SPIRONOLACTONE 50 MG/1
50 TABLET, FILM COATED ORAL DAILY
Qty: 30 TAB | Refills: 11 | Status: SHIPPED | OUTPATIENT
Start: 2019-03-07 | End: 2019-05-09

## 2019-03-07 RX ORDER — ROSUVASTATIN CALCIUM 5 MG/1
5 TABLET, COATED ORAL
Qty: 30 TAB | Refills: 11 | Status: SHIPPED | OUTPATIENT
Start: 2019-03-07 | End: 2019-04-15

## 2019-03-07 NOTE — LETTER
[facility]   [address], [facility] [address]  [place] [address]  Phone: [phone]  Fax: [phone]              Livia Corea  11/21/1926    Encounter Date: 3/7/2019    Napoleon Eden M.D.          PROGRESS NOTE:  Chief Complaint   Patient presents with   • HTN (Controlled)       Subjective:   Livia Corea is a 89 y.o. female who presents today for follow-up of her hypertension, dyslipidemia and carotid plaque.  We increased her atorvastatin at the time of her last visit.  This caused her nausea and vomiting.  She has discontinued atorvastatin.  She was previously on a lower dose but this also caused her some nausea.    She brings in a record of her blood pressures and they are under better control.  Systolic blood pressures still run up into the mid 140s.  Diastolic pressures are generally 80 or less.    Her edema has improved.  She occasionally notes some mild dependent edema.  She has had no chest discomfort, dyspnea, or lightheadedness.  She does note occasionally an increase in her heart rate.  However this is transient and not over 90 bpm.        Past Medical History:   Diagnosis Date   • Circulation problem 01/2018   • Hyperlipidemia    • Hypertension      Past Surgical History:   Procedure Laterality Date   • CAROTID ENDARTERECTOMY Right 2000   • GYN SURGERY      hysterectomy     Family History   Problem Relation Age of Onset   • Heart Attack Mother 64   • Heart Attack Father 72     Social History     Social History   • Marital status: Single     Spouse name: N/A   • Number of children: N/A   • Years of education: N/A     Occupational History   • Not on file.     Social History Main Topics   • Smoking status: Former Smoker     Quit date: 2/25/1988   • Smokeless tobacco: Never Used   • Alcohol use Yes      Comment: 1 glass of wine x 2 days a week   • Drug use: No   • Sexual activity: Not on file     Other Topics Concern   • Not on file      "    Social History Narrative   • No narrative on file     Allergies   Allergen Reactions   • Chlorthalidone      Headaches severe. Heart racing    • Lisinopril Swelling     Of lower extremities      Outpatient Encounter Prescriptions as of 3/7/2019   Medication Sig Dispense Refill   • spironolactone (ALDACTONE) 25 MG Tab Take 1 Tab by mouth every day. 30 Tab 3   • irbesartan (AVAPRO) 300 MG Tab Take 1 Tab by mouth every day. 30 Tab 11   • amLODIPine (NORVASC) 5 MG Tab Take 5 mg by mouth 2 Times a Day.     • hydrochlorothiazide (MICROZIDE) 12.5 MG capsule Take 1 Cap by mouth 2 times a day. 180 Cap 3   • Garlic 10 MG Cap Take 2 Caps by mouth 3 times a day, with meals.     • Omega-3 Fatty Acids (FISH OIL) 1000 MG Cap capsule Take 2 Caps by mouth every day. 90 Cap 3   • ascorbic acid (ASCORBIC ACID) 500 MG Tab Take 500 mg by mouth every day.     • cyanocobalamin (VITAMIN B-12) 500 MCG Tab Take 500 mcg by mouth every day.     • aspirin EC (ECOTRIN) 81 MG Tablet Delayed Response Take 81 mg by mouth every day.     • [DISCONTINUED] atorvastatin (LIPITOR) 40 MG Tab Take 1 Tab by mouth every day. (Patient not taking: Reported on 3/7/2019) 30 Tab 11   • [DISCONTINUED] atorvastatin (LIPITOR) 20 MG Tab Take 1 Tab by mouth every day. (Patient not taking: Reported on 3/7/2019) 90 Tab 3     No facility-administered encounter medications on file as of 3/7/2019.      ROS     Objective:   /70 (BP Location: Left arm, Patient Position: Sitting, BP Cuff Size: Adult)   Pulse 72   Ht 1.638 m (5' 4.5\")   Wt 59.4 kg (131 lb)   BMI 22.14 kg/m²      Physical Exam   Constitutional: She appears well-developed and well-nourished.   Neck: No JVD present.   Cardiovascular: Normal rate and regular rhythm.    No murmur heard.  Pulmonary/Chest: Effort normal and breath sounds normal. She has no rales.   Abdominal: Soft. There is no tenderness.   Musculoskeletal: She exhibits no edema.     Lab Results   Component Value Date/Time    " CHOLSTRLTOT 210 (H) 03/04/2019 09:35 AM     (H) 03/04/2019 09:35 AM    HDL 64 03/04/2019 09:35 AM    TRIGLYCERIDE 117 03/04/2019 09:35 AM       Lab Results   Component Value Date/Time    SODIUM 140 03/04/2019 09:35 AM    POTASSIUM 3.7 03/04/2019 09:35 AM    CHLORIDE 104 03/04/2019 09:35 AM    CO2 25 03/04/2019 09:35 AM    GLUCOSE 89 03/04/2019 09:35 AM    BUN 23 (H) 03/04/2019 09:35 AM    CREATININE 0.98 03/04/2019 09:35 AM     Lab Results   Component Value Date/Time    ALKPHOSPHAT 93 03/04/2019 09:35 AM    ASTSGOT 24 03/04/2019 09:35 AM    ALTSGPT 8 03/04/2019 09:35 AM    TBILIRUBIN 0.6 03/04/2019 09:35 AM      Lab Results   Component Value Date/Time    BNPBTYPENAT 45 12/12/2018 01:00 PM          Assessment:     1. Essential hypertension     2. Dyslipidemia     3. Atherosclerosis of both carotid arteries: Mild in 2018         Medical Decision Making:  Today's Assessment / Status / Plan:     Ms. Corea is clinically stable.  Her blood pressure is under improved but not optimal control.  Will increase Spironolactone to 50 mg daily.  She will have a BMP in 1 week and in 3 weeks.  In addition, we will try rosuvastatin 5 mg daily.  She will have lab work in about 6 weeks and follow-up in a couple of months.      Estuardo Carrero M.D.  [facility]  [address] [address]  VIA Mail

## 2019-03-07 NOTE — PROGRESS NOTES
Chief Complaint   Patient presents with   • HTN (Controlled)       Subjective:   Livia Corea is a 89 y.o. female who presents today for follow-up of her hypertension, dyslipidemia and carotid plaque.  We increased her atorvastatin at the time of her last visit.  This caused her nausea and vomiting.  She has discontinued atorvastatin.  She was previously on a lower dose but this also caused her some nausea.    She brings in a record of her blood pressures and they are under better control.  Systolic blood pressures still run up into the mid 140s.  Diastolic pressures are generally 80 or less.    Her edema has improved.  She occasionally notes some mild dependent edema.  She has had no chest discomfort, dyspnea, or lightheadedness.  She does note occasionally an increase in her heart rate.  However this is transient and not over 90 bpm.        Past Medical History:   Diagnosis Date   • Circulation problem 01/2018   • Hyperlipidemia    • Hypertension      Past Surgical History:   Procedure Laterality Date   • CAROTID ENDARTERECTOMY Right 2000   • GYN SURGERY      hysterectomy     Family History   Problem Relation Age of Onset   • Heart Attack Mother 64   • Heart Attack Father 72     Social History     Social History   • Marital status: Single     Spouse name: N/A   • Number of children: N/A   • Years of education: N/A     Occupational History   • Not on file.     Social History Main Topics   • Smoking status: Former Smoker     Quit date: 2/25/1988   • Smokeless tobacco: Never Used   • Alcohol use Yes      Comment: 1 glass of wine x 2 days a week   • Drug use: No   • Sexual activity: Not on file     Other Topics Concern   • Not on file     Social History Narrative   • No narrative on file     Allergies   Allergen Reactions   • Chlorthalidone      Headaches severe. Heart racing    • Lisinopril Swelling     Of lower extremities      Outpatient Encounter Prescriptions as of 3/7/2019   Medication Sig Dispense  "Refill   • spironolactone (ALDACTONE) 25 MG Tab Take 1 Tab by mouth every day. 30 Tab 3   • irbesartan (AVAPRO) 300 MG Tab Take 1 Tab by mouth every day. 30 Tab 11   • amLODIPine (NORVASC) 5 MG Tab Take 5 mg by mouth 2 Times a Day.     • hydrochlorothiazide (MICROZIDE) 12.5 MG capsule Take 1 Cap by mouth 2 times a day. 180 Cap 3   • Garlic 10 MG Cap Take 2 Caps by mouth 3 times a day, with meals.     • Omega-3 Fatty Acids (FISH OIL) 1000 MG Cap capsule Take 2 Caps by mouth every day. 90 Cap 3   • ascorbic acid (ASCORBIC ACID) 500 MG Tab Take 500 mg by mouth every day.     • cyanocobalamin (VITAMIN B-12) 500 MCG Tab Take 500 mcg by mouth every day.     • aspirin EC (ECOTRIN) 81 MG Tablet Delayed Response Take 81 mg by mouth every day.     • [DISCONTINUED] atorvastatin (LIPITOR) 40 MG Tab Take 1 Tab by mouth every day. (Patient not taking: Reported on 3/7/2019) 30 Tab 11   • [DISCONTINUED] atorvastatin (LIPITOR) 20 MG Tab Take 1 Tab by mouth every day. (Patient not taking: Reported on 3/7/2019) 90 Tab 3     No facility-administered encounter medications on file as of 3/7/2019.      ROS     Objective:   /70 (BP Location: Left arm, Patient Position: Sitting, BP Cuff Size: Adult)   Pulse 72   Ht 1.638 m (5' 4.5\")   Wt 59.4 kg (131 lb)   BMI 22.14 kg/m²     Physical Exam   Constitutional: She appears well-developed and well-nourished.   Neck: No JVD present.   Cardiovascular: Normal rate and regular rhythm.    No murmur heard.  Pulmonary/Chest: Effort normal and breath sounds normal. She has no rales.   Abdominal: Soft. There is no tenderness.   Musculoskeletal: She exhibits no edema.     Lab Results   Component Value Date/Time    CHOLSTRLTOT 210 (H) 03/04/2019 09:35 AM     (H) 03/04/2019 09:35 AM    HDL 64 03/04/2019 09:35 AM    TRIGLYCERIDE 117 03/04/2019 09:35 AM       Lab Results   Component Value Date/Time    SODIUM 140 03/04/2019 09:35 AM    POTASSIUM 3.7 03/04/2019 09:35 AM    CHLORIDE 104 " 03/04/2019 09:35 AM    CO2 25 03/04/2019 09:35 AM    GLUCOSE 89 03/04/2019 09:35 AM    BUN 23 (H) 03/04/2019 09:35 AM    CREATININE 0.98 03/04/2019 09:35 AM     Lab Results   Component Value Date/Time    ALKPHOSPHAT 93 03/04/2019 09:35 AM    ASTSGOT 24 03/04/2019 09:35 AM    ALTSGPT 8 03/04/2019 09:35 AM    TBILIRUBIN 0.6 03/04/2019 09:35 AM      Lab Results   Component Value Date/Time    BNPBTYPENAT 45 12/12/2018 01:00 PM          Assessment:     1. Essential hypertension     2. Dyslipidemia     3. Atherosclerosis of both carotid arteries: Mild in 2018         Medical Decision Making:  Today's Assessment / Status / Plan:     Ms. Corea is clinically stable.  Her blood pressure is under improved but not optimal control.  Will increase Spironolactone to 50 mg daily.  She will have a BMP in 1 week and in 3 weeks.  In addition, we will try rosuvastatin 5 mg daily.  She will have lab work in about 6 weeks and follow-up in a couple of months.

## 2019-03-08 ENCOUNTER — TELEPHONE (OUTPATIENT)
Dept: CARDIOLOGY | Facility: MEDICAL CENTER | Age: 84
End: 2019-03-08

## 2019-03-09 NOTE — TELEPHONE ENCOUNTER
"She is very convinced that because she took the Spironolactone at 11 am- her BP spiked to 220 systolic.  She says the BP went up (she knew something was wrong) when she was in her car (sitting quietly watching her dog) at about 2pm.  When she got home was when she took the BP (after sitting for 5 min). Other medications remained the same.   I told her I would check with Breanna and call her back, but I did not think it was because of that.  Her BP is now 180/ but she is \"worked up\" about it.  "

## 2019-03-09 NOTE — TELEPHONE ENCOUNTER
Patient was informed.  She says that sometimes her HR is irregular as shown by her BP machine.  She will keep her eye on that and document that with her BP's.  She agrees to continue taking the Spironolactone.    I asked her to use 911 if her BP goes up again so they can do and rhythm strip --and she will comply.  I let her know I would relay all of this to Dr. Eden next week.

## 2019-03-09 NOTE — TELEPHONE ENCOUNTER
----- Message from Yamileth Rodriguez sent at 3/8/2019  3:55 PM PST -----  Regarding: b/p spiked to 220/98  Contact: [phone]  NEL/jossy    Pt calling to report an episode of feeling like her b/p was high while she was driving her car.  Took b/p at home a few minutes later and it had spiked to 220/98.  Pt took b/p 20 min ago and it 173/80.    Please call pt at

## 2019-03-12 NOTE — TELEPHONE ENCOUNTER
I called to see how she is doing.  She has a cold and fever at the moment; however, she stopped the Avapro altogether and her BP is better at 130 systolic.  She does not think that her HR is irregular now.  She was short and to the point and said goodbye.

## 2019-03-13 NOTE — TELEPHONE ENCOUNTER
She calls to inquire whether she can take the clonidine if her BP is >180 for a half hour, or should she wait for it to be a full hour?  I did not see that Dr. Eden mentioned Clonidine in his visit with her on the 7th, and the medication is on the discontinued list.  Dr. Eden- please advise.

## 2019-03-19 NOTE — TELEPHONE ENCOUNTER
"Patient Calls     Napoleon Eden M.D.   You 22 hours ago (12:55 PM)      Please give her a call and update her med list.  If she wants to take clonidine in the not the medications previously prescribed then she should get it from her PCP. (Routing comment)      Patient called. Pt states currently blood pressure 137-139. Pt states that is great and she is happy with that. Pt believes that is \"shot up\" because of stress secondary to the symptoms she had after taking Irbesartan. Pt states she doesn't drink alcohol. Pt states she is on a low sodium diet. Pt states she hasn't taken irbesartan since it gave her odd vision and really frightened her. Pt states she also stopped taking rosuvastatin secondary to nausea. Pt reassured I would discuss with FK and call her back with any recommendations or advisement. Pt cautioned not to take Clonidine as that can sometimes drop patients too quickly. Pt states understanding and states she hasn't taken it in approx 8 months.   "

## 2019-03-29 ENCOUNTER — OFFICE VISIT (OUTPATIENT)
Dept: CARDIOLOGY | Facility: MEDICAL CENTER | Age: 84
End: 2019-03-29
Payer: MEDICARE

## 2019-03-29 ENCOUNTER — TELEPHONE (OUTPATIENT)
Dept: CARDIOLOGY | Facility: MEDICAL CENTER | Age: 84
End: 2019-03-29

## 2019-03-29 VITALS
BODY MASS INDEX: 21.83 KG/M2 | OXYGEN SATURATION: 95 % | SYSTOLIC BLOOD PRESSURE: 140 MMHG | HEART RATE: 80 BPM | DIASTOLIC BLOOD PRESSURE: 82 MMHG | WEIGHT: 131 LBS | HEIGHT: 65 IN

## 2019-03-29 DIAGNOSIS — E78.5 DYSLIPIDEMIA: ICD-10-CM

## 2019-03-29 DIAGNOSIS — I65.23 ATHEROSCLEROSIS OF BOTH CAROTID ARTERIES: ICD-10-CM

## 2019-03-29 DIAGNOSIS — R00.2 PALPITATIONS: ICD-10-CM

## 2019-03-29 DIAGNOSIS — I10 ESSENTIAL HYPERTENSION: ICD-10-CM

## 2019-03-29 PROCEDURE — 99214 OFFICE O/P EST MOD 30 MIN: CPT | Performed by: INTERNAL MEDICINE

## 2019-03-29 RX ORDER — EZETIMIBE 10 MG/1
10 TABLET ORAL DAILY
Qty: 30 TAB | Refills: 11 | Status: SHIPPED | OUTPATIENT
Start: 2019-03-29 | End: 2019-04-15

## 2019-03-29 NOTE — TELEPHONE ENCOUNTER
"10:17 Pt walks-in to clinic stating her heart races intermittently \"for no apparent reason\". She would like an EKG performed.    Pt scheduled to see MD at 1:45 today.  "

## 2019-03-29 NOTE — PROGRESS NOTES
Chief Complaint   Patient presents with   • Irregular Heart Beat       Subjective:   Livia Corea is a 89 y.o. female who presents today for follow-up of her hypertension, dyslipidemia and carotid plaque.  She had difficulties with atorvastatin causing nausea.  We tried her on low-dose rosuvastatin which also cause her nausea.  We increased her Spironolactone because of poorly controlled hypertension.  She is tolerating this.    Her blood pressures at home been running for approximately 135-145/70-80.    Her heart rate normally runs in the 70s.  She has had some occasions since her last visit where her heart rate seemed to be beating abnormally high for her.  It was about as high as 100 bpm.    She has had no chest discomfort, dyspnea, edema or lightheadedness.    Past Medical History:   Diagnosis Date   • Circulation problem 01/2018   • Hyperlipidemia    • Hypertension      Past Surgical History:   Procedure Laterality Date   • CAROTID ENDARTERECTOMY Right 2000   • GYN SURGERY      hysterectomy     Family History   Problem Relation Age of Onset   • Heart Attack Mother 64   • Heart Attack Father 72     Social History     Social History   • Marital status: Single     Spouse name: N/A   • Number of children: N/A   • Years of education: N/A     Occupational History   • Not on file.     Social History Main Topics   • Smoking status: Former Smoker     Quit date: 2/25/1988   • Smokeless tobacco: Never Used   • Alcohol use Yes      Comment: 1 glass of wine x 2 days a week   • Drug use: No   • Sexual activity: Not on file     Other Topics Concern   • Not on file     Social History Narrative   • No narrative on file     Allergies   Allergen Reactions   • Chlorthalidone      Headaches severe. Heart racing    • Lisinopril Swelling     Of lower extremities      Outpatient Encounter Prescriptions as of 3/29/2019   Medication Sig Dispense Refill   • rosuvastatin (CRESTOR) 5 MG Tab Take 1 Tab by mouth every 48 hours. 30  "Tab 11   • spironolactone (ALDACTONE) 50 MG Tab Take 1 Tab by mouth every day. 30 Tab 11   • irbesartan (AVAPRO) 300 MG Tab Take 1 Tab by mouth every day. 30 Tab 11   • amLODIPine (NORVASC) 5 MG Tab Take 5 mg by mouth 2 Times a Day.     • hydrochlorothiazide (MICROZIDE) 12.5 MG capsule Take 1 Cap by mouth 2 times a day. 180 Cap 3   • Garlic 10 MG Cap Take 2 Caps by mouth 3 times a day, with meals.     • Omega-3 Fatty Acids (FISH OIL) 1000 MG Cap capsule Take 2 Caps by mouth every day. 90 Cap 3   • ascorbic acid (ASCORBIC ACID) 500 MG Tab Take 500 mg by mouth every day.     • cyanocobalamin (VITAMIN B-12) 500 MCG Tab Take 500 mcg by mouth every day.     • aspirin EC (ECOTRIN) 81 MG Tablet Delayed Response Take 81 mg by mouth every day.       No facility-administered encounter medications on file as of 3/29/2019.      ROS     Objective:   /82 (BP Location: Left arm, Patient Position: Sitting, BP Cuff Size: Adult)   Pulse 80   Ht 1.638 m (5' 4.5\")   Wt 59.4 kg (131 lb)   SpO2 95%   BMI 22.14 kg/m²     Physical Exam   Constitutional: She appears well-developed and well-nourished.   Neck: No JVD present.   Cardiovascular: Normal rate and regular rhythm.    Murmur (2/6 to 3/6 systolic at the base) heard.  Pulmonary/Chest: Effort normal and breath sounds normal. She has no rales.   Abdominal: Soft. There is no tenderness.   Musculoskeletal: She exhibits no edema.     Lab Results   Component Value Date/Time    CHOLSTRLTOT 210 (H) 03/04/2019 09:35 AM     (H) 03/04/2019 09:35 AM    HDL 64 03/04/2019 09:35 AM    TRIGLYCERIDE 117 03/04/2019 09:35 AM       Lab Results   Component Value Date/Time    SODIUM 140 03/04/2019 09:35 AM    POTASSIUM 3.7 03/04/2019 09:35 AM    CHLORIDE 104 03/04/2019 09:35 AM    CO2 25 03/04/2019 09:35 AM    GLUCOSE 89 03/04/2019 09:35 AM    BUN 23 (H) 03/04/2019 09:35 AM    CREATININE 0.98 03/04/2019 09:35 AM     Lab Results   Component Value Date/Time    ALKPHOSPHAT 93 03/04/2019 " 09:35 AM    ASTSGOT 24 03/04/2019 09:35 AM    ALTSGPT 8 03/04/2019 09:35 AM    TBILIRUBIN 0.6 03/04/2019 09:35 AM      Lab Results   Component Value Date/Time    BNPBTYPENAT 45 12/12/2018 01:00 PM        Assessment:     1. Atherosclerosis of both carotid arteries: Mild in 2018     2. Essential hypertension     3. Dyslipidemia         Medical Decision Making:  Today's Assessment / Status / Plan:     Ms. Corea had difficulty with an increased heart rate which is unusual for her.  Even with exertion her heart rate is usually in the 70s to low 80s.  She may be having difficulty with PAF.  We will at least obtain a Holter monitor.  She is intolerant of statin therapy and will start her on ezetimibe 10 mg daily.  She will follow-up in about a month with a lipid panel.  Her blood pressure appears to be under better control.  We will continue her on her current antihypertensive therapy for now.

## 2019-03-29 NOTE — LETTER
[facility]   [address] [facility], [facility] [address]  [place] [address]  Phone: [phone]  Fax: [phone]              Livia Corea  11/21/1926    Encounter Date: 3/29/2019    Napoleon Eden M.D.          PROGRESS NOTE:  Chief Complaint   Patient presents with   • Irregular Heart Beat       Subjective:   Livia Corea is a 89 y.o. female who presents today for follow-up of her hypertension, dyslipidemia and carotid plaque.  She had difficulties with atorvastatin causing nausea.  We tried her on low-dose rosuvastatin which also cause her nausea.  We increased her Spironolactone because of poorly controlled hypertension.  She is tolerating this.    Her blood pressures at home been running for approximately 135-145/70-80.    Her heart rate normally runs in the 70s.  She has had some occasions since her last visit where her heart rate seemed to be beating abnormally high for her.  It was about as high as 100 bpm.    She has had no chest discomfort, dyspnea, edema or lightheadedness.    Past Medical History:   Diagnosis Date   • Circulation problem 01/2018   • Hyperlipidemia    • Hypertension      Past Surgical History:   Procedure Laterality Date   • CAROTID ENDARTERECTOMY Right 2000   • GYN SURGERY      hysterectomy     Family History   Problem Relation Age of Onset   • Heart Attack Mother 64   • Heart Attack Father 72     Social History     Social History   • Marital status: Single     Spouse name: N/A   • Number of children: N/A   • Years of education: N/A     Occupational History   • Not on file.     Social History Main Topics   • Smoking status: Former Smoker     Quit date: 2/25/1988   • Smokeless tobacco: Never Used   • Alcohol use Yes      Comment: 1 glass of wine x 2 days a week   • Drug use: No   • Sexual activity: Not on file     Other Topics Concern   • Not on file     Social History Narrative   • No narrative on file     Allergies   Allergen  "Reactions   • Chlorthalidone      Headaches severe. Heart racing    • Lisinopril Swelling     Of lower extremities      Outpatient Encounter Prescriptions as of 3/29/2019   Medication Sig Dispense Refill   • rosuvastatin (CRESTOR) 5 MG Tab Take 1 Tab by mouth every 48 hours. 30 Tab 11   • spironolactone (ALDACTONE) 50 MG Tab Take 1 Tab by mouth every day. 30 Tab 11   • irbesartan (AVAPRO) 300 MG Tab Take 1 Tab by mouth every day. 30 Tab 11   • amLODIPine (NORVASC) 5 MG Tab Take 5 mg by mouth 2 Times a Day.     • hydrochlorothiazide (MICROZIDE) 12.5 MG capsule Take 1 Cap by mouth 2 times a day. 180 Cap 3   • Garlic 10 MG Cap Take 2 Caps by mouth 3 times a day, with meals.     • Omega-3 Fatty Acids (FISH OIL) 1000 MG Cap capsule Take 2 Caps by mouth every day. 90 Cap 3   • ascorbic acid (ASCORBIC ACID) 500 MG Tab Take 500 mg by mouth every day.     • cyanocobalamin (VITAMIN B-12) 500 MCG Tab Take 500 mcg by mouth every day.     • aspirin EC (ECOTRIN) 81 MG Tablet Delayed Response Take 81 mg by mouth every day.       No facility-administered encounter medications on file as of 3/29/2019.      ROS     Objective:   /82 (BP Location: Left arm, Patient Position: Sitting, BP Cuff Size: Adult)   Pulse 80   Ht 1.638 m (5' 4.5\")   Wt 59.4 kg (131 lb)   SpO2 95%   BMI 22.14 kg/m²      Physical Exam   Constitutional: She appears well-developed and well-nourished.   Neck: No JVD present.   Cardiovascular: Normal rate and regular rhythm.    Murmur (2/6 to 3/6 systolic at the base) heard.  Pulmonary/Chest: Effort normal and breath sounds normal. She has no rales.   Abdominal: Soft. There is no tenderness.   Musculoskeletal: She exhibits no edema.     Lab Results   Component Value Date/Time    CHOLSTRLTOT 210 (H) 03/04/2019 09:35 AM     (H) 03/04/2019 09:35 AM    HDL 64 03/04/2019 09:35 AM    TRIGLYCERIDE 117 03/04/2019 09:35 AM       Lab Results   Component Value Date/Time    SODIUM 140 03/04/2019 09:35 AM    " POTASSIUM 3.7 03/04/2019 09:35 AM    CHLORIDE 104 03/04/2019 09:35 AM    CO2 25 03/04/2019 09:35 AM    GLUCOSE 89 03/04/2019 09:35 AM    BUN 23 (H) 03/04/2019 09:35 AM    CREATININE 0.98 03/04/2019 09:35 AM     Lab Results   Component Value Date/Time    ALKPHOSPHAT 93 03/04/2019 09:35 AM    ASTSGOT 24 03/04/2019 09:35 AM    ALTSGPT 8 03/04/2019 09:35 AM    TBILIRUBIN 0.6 03/04/2019 09:35 AM      Lab Results   Component Value Date/Time    BNPBTYPENAT 45 12/12/2018 01:00 PM        Assessment:     1. Atherosclerosis of both carotid arteries: Mild in 2018     2. Essential hypertension     3. Dyslipidemia         Medical Decision Making:  Today's Assessment / Status / Plan:     Ms. Corea had difficulty with an increased heart rate which is unusual for her.  Even with exertion her heart rate is usually in the 70s to low 80s.  She may be having difficulty with PAF.  Will at least obtain a Holter monitor.  She is intolerant of statin therapy and will start her on ezetimibe 10 mg daily.  She will follow-up in about a month with a lipid panel.  Her blood pressure appears to be under better control.  We will continue her on her current antihypertensive therapy for now.      Estuardo Carrero M.D.  [address]  [address] [place] [address]  VIA Mail

## 2019-04-09 ENCOUNTER — NON-PROVIDER VISIT (OUTPATIENT)
Dept: CARDIOLOGY | Facility: MEDICAL CENTER | Age: 84
End: 2019-04-09
Payer: MEDICARE

## 2019-04-09 DIAGNOSIS — R00.2 PALPITATIONS: ICD-10-CM

## 2019-04-09 PROCEDURE — 93224 XTRNL ECG REC UP TO 48 HRS: CPT | Performed by: INTERNAL MEDICINE

## 2019-04-12 DIAGNOSIS — R00.2 PALPITATIONS: ICD-10-CM

## 2019-04-15 ENCOUNTER — OFFICE VISIT (OUTPATIENT)
Dept: CARDIOLOGY | Facility: MEDICAL CENTER | Age: 84
End: 2019-04-15
Payer: MEDICARE

## 2019-04-15 ENCOUNTER — TELEPHONE (OUTPATIENT)
Dept: CARDIOLOGY | Facility: MEDICAL CENTER | Age: 84
End: 2019-04-15

## 2019-04-15 VITALS
HEIGHT: 64 IN | DIASTOLIC BLOOD PRESSURE: 70 MMHG | BODY MASS INDEX: 22.36 KG/M2 | WEIGHT: 131 LBS | SYSTOLIC BLOOD PRESSURE: 160 MMHG | HEART RATE: 76 BPM

## 2019-04-15 DIAGNOSIS — E78.5 DYSLIPIDEMIA: ICD-10-CM

## 2019-04-15 DIAGNOSIS — I65.23 ATHEROSCLEROSIS OF BOTH CAROTID ARTERIES: ICD-10-CM

## 2019-04-15 DIAGNOSIS — I10 ESSENTIAL HYPERTENSION: ICD-10-CM

## 2019-04-15 DIAGNOSIS — R00.2 PALPITATIONS: ICD-10-CM

## 2019-04-15 LAB — EKG IMPRESSION: NORMAL

## 2019-04-15 PROCEDURE — 99214 OFFICE O/P EST MOD 30 MIN: CPT | Performed by: INTERNAL MEDICINE

## 2019-04-15 NOTE — TELEPHONE ENCOUNTER
"short duration pains in pt's chest   Received: Today   Message Contents   Yamileth Gunter R.N.   Phone Number: [phone]             FK/roberto     Pt calling to report some pains in chest that were slight a few nites ago, same thing happened during the nite last nite.  After taking amlodipine this morning, there were 3 more pains that occurred every 5-6 minutes and were all short in duration, were somewhere between \"sharp\" and \"not sharp\".  No other symptoms.       Please call Livia       Returned patient call. Intermittent chest pain. No recent cough. Immediate onset, spontaneously go away. Denies SOB, diaphoresis, no nausea. Stays in same place. Under left breast. No pain at this time. No oxygen. No CPAP. Never had pain before. On 0/10 she reports 4/10 pain. Pt scheduled to see MD today. ER precautions discussed.   "

## 2019-04-15 NOTE — LETTER
[facility]   [address] [place] [address] [address]  [place] [address]  Phone: [phone]  Fax: [phone]              Livia Corea  11/21/1926    Encounter Date: 4/15/2019    Napoleon Eden M.D.          PROGRESS NOTE:  Chief Complaint   Patient presents with   • Hypertension     F/V: 1 MO/ HOLTER IN EPIC       Subjective:   Livia Corea is a 89 y.o. female who presents today for follow-up of her hypertension, dyslipidemia and carotid plaque.  She had difficulties with atorvastatin causing nausea.  We tried her on low-dose rosuvastatin which also cause her nausea.  We increased her Spironolactone because of poorly controlled hypertension.  She is tolerating this.    A couple of nights ago she began having chest discomfort.  It occurred a couple of times after she took her amlodipine and lasted for 5 seconds.  This was a localized discomfort which was somewhere between an ache and a sharp discomfort.  She had a couple more episodes the next night and 5 episodes today.  All of them lasted seconds.  She did seem to correlate them with taking her Norvasc.    She underwent evaluation with a Holter monitor because of feeling that her heart rate was beating somewhat faster than normal.  Her palpitations have resolved.    She has noted some dyspnea on exertion.  This is difficult to quantitate but this morning after taking her dog for walks she noted she was mildly more dyspneic than usual.  She has had no PND, orthopnea or edema.  She is noted no lightheadedness.    Past Medical History:   Diagnosis Date   • Circulation problem 01/2018   • Hyperlipidemia    • Hypertension      Past Surgical History:   Procedure Laterality Date   • CAROTID ENDARTERECTOMY Right 2000   • GYN SURGERY      hysterectomy     Family History   Problem Relation Age of Onset   • Heart Attack Mother 64   • Heart Attack Father 72     Social History     Social History   • Marital status:  Single     Spouse name: N/A   • Number of children: N/A   • Years of education: N/A     Occupational History   • Not on file.     Social History Main Topics   • Smoking status: Former Smoker     Quit date: 2/25/1988   • Smokeless tobacco: Never Used   • Alcohol use Yes      Comment: 1 glass of wine x 2 days a week   • Drug use: No   • Sexual activity: Not on file     Other Topics Concern   • Not on file     Social History Narrative   • No narrative on file     Allergies   Allergen Reactions   • Chlorthalidone      Headaches severe. Heart racing    • Lisinopril Swelling     Of lower extremities      Outpatient Encounter Prescriptions as of 4/15/2019   Medication Sig Dispense Refill   • spironolactone (ALDACTONE) 50 MG Tab Take 1 Tab by mouth every day. 30 Tab 11   • amLODIPine (NORVASC) 5 MG Tab Take 5 mg by mouth 2 Times a Day.     • hydrochlorothiazide (MICROZIDE) 12.5 MG capsule Take 1 Cap by mouth 2 times a day. 180 Cap 3   • Garlic 10 MG Cap Take 2 Caps by mouth 3 times a day, with meals.     • Omega-3 Fatty Acids (FISH OIL) 1000 MG Cap capsule Take 2 Caps by mouth every day. 90 Cap 3   • ascorbic acid (ASCORBIC ACID) 500 MG Tab Take 500 mg by mouth every day.     • cyanocobalamin (VITAMIN B-12) 500 MCG Tab Take 500 mcg by mouth every day.     • aspirin EC (ECOTRIN) 81 MG Tablet Delayed Response Take 81 mg by mouth every day.     • [DISCONTINUED] ezetimibe (ZETIA) 10 MG Tab Take 1 Tab by mouth every day. (Patient not taking: Reported on 4/15/2019) 30 Tab 11   • [DISCONTINUED] rosuvastatin (CRESTOR) 5 MG Tab Take 1 Tab by mouth every 48 hours. (Patient not taking: Reported on 4/15/2019) 30 Tab 11   • [DISCONTINUED] irbesartan (AVAPRO) 300 MG Tab Take 1 Tab by mouth every day. (Patient not taking: Reported on 4/15/2019) 30 Tab 11     No facility-administered encounter medications on file as of 4/15/2019.      ROS     Objective:   /70 (BP Location: Left arm, Patient Position: Sitting, BP Cuff Size: Adult)    "Pulse 76   Ht 1.626 m (5' 4\")   Wt 59.4 kg (131 lb)   BMI 22.49 kg/m²      Physical Exam   Constitutional: She appears well-developed and well-nourished.   Neck: No JVD present.   Cardiovascular: Normal rate and regular rhythm.    No murmur heard.  Pulmonary/Chest: Effort normal and breath sounds normal. She has no rales. She exhibits tenderness.   Abdominal: Soft. There is no tenderness.   Musculoskeletal: She exhibits no edema.      Lab Results   Component Value Date/Time    CHOLSTRLTOT 210 (H) 03/04/2019 09:35 AM     (H) 03/04/2019 09:35 AM    HDL 64 03/04/2019 09:35 AM    TRIGLYCERIDE 117 03/04/2019 09:35 AM       Lab Results   Component Value Date/Time    SODIUM 140 03/04/2019 09:35 AM    POTASSIUM 3.7 03/04/2019 09:35 AM    CHLORIDE 104 03/04/2019 09:35 AM    CO2 25 03/04/2019 09:35 AM    GLUCOSE 89 03/04/2019 09:35 AM    BUN 23 (H) 03/04/2019 09:35 AM    CREATININE 0.98 03/04/2019 09:35 AM     Lab Results   Component Value Date/Time    ALKPHOSPHAT 93 03/04/2019 09:35 AM    ASTSGOT 24 03/04/2019 09:35 AM    ALTSGPT 8 03/04/2019 09:35 AM    TBILIRUBIN 0.6 03/04/2019 09:35 AM      Lab Results   Component Value Date/Time    BNPBTYPENAT 45 12/12/2018 01:00 PM          Holter monitor from April 9: This showed a sinus arrhythmia with a left bundle branch block pattern and an average heart rate of 61 bpm.  Her high heart rate was 112 bpm and her low heart rate was 42 bpm.  No significant pauses were noted.  Rare PVCs were present.  Patient had 2 atrial runs longest which was 4 beats at a rate of 140 bpm.  The fastest was 167 bpm before only 3 beats.  She otherwise had rare PACs.    Assessment:     1. Palpitations     2. Dyslipidemia     3. Atherosclerosis of both carotid arteries: Mild in 2018     4. Essential hypertension         Medical Decision Making:  Today's Assessment / Status / Plan:     Ms. Corea is clinically stable.  Her chest discomfort is secondary to costochondritis.  She was quite tender to " palpation in the left parasternal region.  Her blood pressure is up today but it is usually under good control.  She was advised that she could take naproxen OTC 1 tab twice daily for a few days to see if this improves her pain.  Her palpitations have resolved and her Holter monitor shows no significant dysrhythmias.  I feel she can follow-up in about 6 months.  She is going to  her prescription for ezetimibe 10 mg daily.  She has not done that since her last visit.  I would like her to check her blood pressures 3 times a week and record those values for us.  She already has an appointment in about 6 weeks and she will keep that.  We will decide about increasing her medical therapy at that time.  She has had difficulty with multiple medications in the past.      Estuardo Carrero M.D.  [facility] [address] [address]  VIA Mail

## 2019-04-15 NOTE — PROGRESS NOTES
Chief Complaint   Patient presents with   • Hypertension     F/V: 1 MO/ HOLTER IN EPIC       Subjective:   Livia Corea is a 89 y.o. female who presents today for follow-up of her hypertension, dyslipidemia and carotid plaque.  She had difficulties with atorvastatin causing nausea.  We tried her on low-dose rosuvastatin which also cause her nausea.  We increased her Spironolactone because of poorly controlled hypertension.  She is tolerating this.    A couple of nights ago she began having chest discomfort.  It occurred a couple of times after she took her amlodipine and lasted for 5 seconds.  This was a localized discomfort which was somewhere between an ache and a sharp discomfort.  She had a couple more episodes the next night and 5 episodes today.  All of them lasted seconds.  She did seem to correlate them with taking her Norvasc.    She underwent evaluation with a Holter monitor because of feeling that her heart rate was beating somewhat faster than normal.  Her palpitations have resolved.    She has noted some dyspnea on exertion.  This is difficult to quantitate but this morning after taking her dog for walks she noted she was mildly more dyspneic than usual.  She has had no PND, orthopnea or edema.  She is noted no lightheadedness.    Past Medical History:   Diagnosis Date   • Circulation problem 01/2018   • Hyperlipidemia    • Hypertension      Past Surgical History:   Procedure Laterality Date   • CAROTID ENDARTERECTOMY Right 2000   • GYN SURGERY      hysterectomy     Family History   Problem Relation Age of Onset   • Heart Attack Mother 64   • Heart Attack Father 72     Social History     Social History   • Marital status: Single     Spouse name: N/A   • Number of children: N/A   • Years of education: N/A     Occupational History   • Not on file.     Social History Main Topics   • Smoking status: Former Smoker     Quit date: 2/25/1988   • Smokeless tobacco: Never Used   • Alcohol use Yes       "Comment: 1 glass of wine x 2 days a week   • Drug use: No   • Sexual activity: Not on file     Other Topics Concern   • Not on file     Social History Narrative   • No narrative on file     Allergies   Allergen Reactions   • Chlorthalidone      Headaches severe. Heart racing    • Lisinopril Swelling     Of lower extremities      Outpatient Encounter Prescriptions as of 4/15/2019   Medication Sig Dispense Refill   • spironolactone (ALDACTONE) 50 MG Tab Take 1 Tab by mouth every day. 30 Tab 11   • amLODIPine (NORVASC) 5 MG Tab Take 5 mg by mouth 2 Times a Day.     • hydrochlorothiazide (MICROZIDE) 12.5 MG capsule Take 1 Cap by mouth 2 times a day. 180 Cap 3   • Garlic 10 MG Cap Take 2 Caps by mouth 3 times a day, with meals.     • Omega-3 Fatty Acids (FISH OIL) 1000 MG Cap capsule Take 2 Caps by mouth every day. 90 Cap 3   • ascorbic acid (ASCORBIC ACID) 500 MG Tab Take 500 mg by mouth every day.     • cyanocobalamin (VITAMIN B-12) 500 MCG Tab Take 500 mcg by mouth every day.     • aspirin EC (ECOTRIN) 81 MG Tablet Delayed Response Take 81 mg by mouth every day.     • [DISCONTINUED] ezetimibe (ZETIA) 10 MG Tab Take 1 Tab by mouth every day. (Patient not taking: Reported on 4/15/2019) 30 Tab 11   • [DISCONTINUED] rosuvastatin (CRESTOR) 5 MG Tab Take 1 Tab by mouth every 48 hours. (Patient not taking: Reported on 4/15/2019) 30 Tab 11   • [DISCONTINUED] irbesartan (AVAPRO) 300 MG Tab Take 1 Tab by mouth every day. (Patient not taking: Reported on 4/15/2019) 30 Tab 11     No facility-administered encounter medications on file as of 4/15/2019.      ROS     Objective:   /70 (BP Location: Left arm, Patient Position: Sitting, BP Cuff Size: Adult)   Pulse 76   Ht 1.626 m (5' 4\")   Wt 59.4 kg (131 lb)   BMI 22.49 kg/m²     Physical Exam   Constitutional: She appears well-developed and well-nourished.   Neck: No JVD present.   Cardiovascular: Normal rate and regular rhythm.    No murmur heard.  Pulmonary/Chest: Effort " normal and breath sounds normal. She has no rales. She exhibits tenderness.   Abdominal: Soft. There is no tenderness.   Musculoskeletal: She exhibits no edema.      Lab Results   Component Value Date/Time    CHOLSTRLTOT 210 (H) 03/04/2019 09:35 AM     (H) 03/04/2019 09:35 AM    HDL 64 03/04/2019 09:35 AM    TRIGLYCERIDE 117 03/04/2019 09:35 AM       Lab Results   Component Value Date/Time    SODIUM 140 03/04/2019 09:35 AM    POTASSIUM 3.7 03/04/2019 09:35 AM    CHLORIDE 104 03/04/2019 09:35 AM    CO2 25 03/04/2019 09:35 AM    GLUCOSE 89 03/04/2019 09:35 AM    BUN 23 (H) 03/04/2019 09:35 AM    CREATININE 0.98 03/04/2019 09:35 AM     Lab Results   Component Value Date/Time    ALKPHOSPHAT 93 03/04/2019 09:35 AM    ASTSGOT 24 03/04/2019 09:35 AM    ALTSGPT 8 03/04/2019 09:35 AM    TBILIRUBIN 0.6 03/04/2019 09:35 AM      Lab Results   Component Value Date/Time    BNPBTYPENAT 45 12/12/2018 01:00 PM          Holter monitor from April 9: This showed a sinus arrhythmia with a left bundle branch block pattern and an average heart rate of 61 bpm.  Her high heart rate was 112 bpm and her low heart rate was 42 bpm.  No significant pauses were noted.  Rare PVCs were present.  Patient had 2 atrial runs longest which was 4 beats at a rate of 140 bpm.  The fastest was 167 bpm before only 3 beats.  She otherwise had rare PACs.    Assessment:     1. Palpitations     2. Dyslipidemia     3. Atherosclerosis of both carotid arteries: Mild in 2018     4. Essential hypertension         Medical Decision Making:  Today's Assessment / Status / Plan:     Ms. Corea is clinically stable.  Her chest discomfort is secondary to costochondritis.  She was quite tender to palpation in the left parasternal region.  Her blood pressure is up today but it is usually under good control.  She was advised that she could take naproxen OTC 1 tab twice daily for a few days to see if this improves her pain.  Her palpitations have resolved and her Holter  monitor shows no significant dysrhythmias.  I feel she can follow-up in about 6 months.  She is going to  her prescription for ezetimibe 10 mg daily.  She has not done that since her last visit.  I would like her to check her blood pressures 3 times a week and record those values for us.  She already has an appointment in about 6 weeks and she will keep that.  We will decide about increasing her medical therapy at that time.  She has had difficulty with multiple medications in the past.

## 2019-05-01 ENCOUNTER — APPOINTMENT (OUTPATIENT)
Dept: RADIOLOGY | Facility: MEDICAL CENTER | Age: 84
End: 2019-05-01
Attending: EMERGENCY MEDICINE
Payer: MEDICARE

## 2019-05-01 ENCOUNTER — PATIENT MESSAGE (OUTPATIENT)
Dept: CARDIOLOGY | Facility: MEDICAL CENTER | Age: 84
End: 2019-05-01

## 2019-05-01 ENCOUNTER — HOSPITAL ENCOUNTER (EMERGENCY)
Facility: MEDICAL CENTER | Age: 84
End: 2019-05-01
Attending: EMERGENCY MEDICINE
Payer: MEDICARE

## 2019-05-01 VITALS
WEIGHT: 127.87 LBS | HEART RATE: 54 BPM | TEMPERATURE: 98.2 F | DIASTOLIC BLOOD PRESSURE: 80 MMHG | RESPIRATION RATE: 15 BRPM | SYSTOLIC BLOOD PRESSURE: 197 MMHG | BODY MASS INDEX: 21.95 KG/M2 | OXYGEN SATURATION: 96 %

## 2019-05-01 DIAGNOSIS — I10 ESSENTIAL HYPERTENSION: ICD-10-CM

## 2019-05-01 DIAGNOSIS — E87.6 HYPOKALEMIA: ICD-10-CM

## 2019-05-01 LAB
ALBUMIN SERPL BCP-MCNC: 4.3 G/DL (ref 3.2–4.9)
ALBUMIN/GLOB SERPL: 1.2 G/DL
ALP SERPL-CCNC: 88 U/L (ref 30–99)
ALT SERPL-CCNC: 7 U/L (ref 2–50)
ANION GAP SERPL CALC-SCNC: 8 MMOL/L (ref 0–11.9)
AST SERPL-CCNC: 17 U/L (ref 12–45)
BILIRUB SERPL-MCNC: 0.7 MG/DL (ref 0.1–1.5)
BUN SERPL-MCNC: 22 MG/DL (ref 8–22)
CALCIUM SERPL-MCNC: 9.7 MG/DL (ref 8.5–10.5)
CHLORIDE SERPL-SCNC: 100 MMOL/L (ref 96–112)
CO2 SERPL-SCNC: 29 MMOL/L (ref 20–33)
CREAT SERPL-MCNC: 0.95 MG/DL (ref 0.5–1.4)
EKG IMPRESSION: NORMAL
GLOBULIN SER CALC-MCNC: 3.5 G/DL (ref 1.9–3.5)
GLUCOSE SERPL-MCNC: 92 MG/DL (ref 65–99)
POTASSIUM SERPL-SCNC: 3.1 MMOL/L (ref 3.6–5.5)
PROT SERPL-MCNC: 7.8 G/DL (ref 6–8.2)
SODIUM SERPL-SCNC: 137 MMOL/L (ref 135–145)

## 2019-05-01 PROCEDURE — 93005 ELECTROCARDIOGRAM TRACING: CPT | Performed by: EMERGENCY MEDICINE

## 2019-05-01 PROCEDURE — 99284 EMERGENCY DEPT VISIT MOD MDM: CPT

## 2019-05-01 PROCEDURE — 71045 X-RAY EXAM CHEST 1 VIEW: CPT

## 2019-05-01 PROCEDURE — 80053 COMPREHEN METABOLIC PANEL: CPT

## 2019-05-01 PROCEDURE — A9270 NON-COVERED ITEM OR SERVICE: HCPCS | Performed by: EMERGENCY MEDICINE

## 2019-05-01 PROCEDURE — 700102 HCHG RX REV CODE 250 W/ 637 OVERRIDE(OP): Performed by: EMERGENCY MEDICINE

## 2019-05-01 RX ORDER — POTASSIUM CHLORIDE 20 MEQ/1
20 TABLET, EXTENDED RELEASE ORAL ONCE
Status: COMPLETED | OUTPATIENT
Start: 2019-05-01 | End: 2019-05-01

## 2019-05-01 RX ORDER — SPIRONOLACTONE 25 MG/1
50 TABLET ORAL ONCE
Status: COMPLETED | OUTPATIENT
Start: 2019-05-01 | End: 2019-05-01

## 2019-05-01 RX ORDER — HYDROCHLOROTHIAZIDE 12.5 MG/1
12.5 TABLET ORAL ONCE
Status: COMPLETED | OUTPATIENT
Start: 2019-05-01 | End: 2019-05-01

## 2019-05-01 RX ORDER — CLONIDINE HYDROCHLORIDE 0.1 MG/1
0.1 TABLET ORAL 2 TIMES DAILY
COMMUNITY
End: 2019-05-24 | Stop reason: SDUPTHER

## 2019-05-01 RX ORDER — AMLODIPINE BESYLATE 5 MG/1
5 TABLET ORAL ONCE
Status: COMPLETED | OUTPATIENT
Start: 2019-05-01 | End: 2019-05-01

## 2019-05-01 RX ADMIN — AMLODIPINE BESYLATE 5 MG: 5 TABLET ORAL at 08:33

## 2019-05-01 RX ADMIN — POTASSIUM CHLORIDE 20 MEQ: 1500 TABLET, EXTENDED RELEASE ORAL at 11:07

## 2019-05-01 RX ADMIN — HYDROCHLOROTHIAZIDE 12.5 MG: 12.5 TABLET ORAL at 08:34

## 2019-05-01 RX ADMIN — SPIRONOLACTONE 25 MG: 25 TABLET ORAL at 08:34

## 2019-05-01 NOTE — ED TRIAGE NOTES
BIB [facility] to [facility] w/ c/o htn.  Pt states she awoke at 0400 w/ some dizziness.  Checked her BP and it was 200's/100's. Hx of HTN, compliant w/ meds. Pt took a .1 mg Clonidine at 0400.  Reports this usually will decrease her BP but her BP has remained high for the morning.  Pt has no c/o at this time.  Denies pain.  Pt in gown, on monitor, chart up for ERP.

## 2019-05-01 NOTE — PATIENT COMMUNICATION
E/R for high b/p   Received: Today   Message Contents   Yamileth Gunter R.N.   Phone Number: [phone]             NEL/roberto     B/P 200/112 at 4am then took clonidine, it dropped to 120/80, 90 minutes later it went up to: 180/90, pt called ambulance & was transported to [facility] E/R, at this time b/p is: 197/80.  No other symptoms.  Pt on cell if you wish to call, but she fully understands E/R hospital protocol.  Cell#[phone].  Pt also sent an email to FK very early this morning.      MD advised.  Pts My Chart message also routed to MD.

## 2019-05-01 NOTE — ED PROVIDER NOTES
ED Provider Note    Scribed for Ulysses Petty D.O. by Susie Monroy. 5/1/2019  8:18 AM    Primary care provider: Estuardo Carrero M.D.  Means of arrival: ambulance  History obtained from: patient  History limited by: none    CHIEF COMPLAINT  Chief Complaint   Patient presents with   • Hypertension       HPI  Livia Corea is a 89 y.o. female who presents to the Emergency Department for evaluation of asymptomatic fluctuating blood pressures. Patient has a history of hypertension, normally managed with hydrochlorothiazide, amlodipine and spironolactone. She noted yesterday that her blood pressures were running 160-170s systolic throughout the day even with her blood pressure medications. Patient measured her blood pressure this morning and found it to be 210 systolic. She did not take her normal BP medications this morning, but did take a single dose of 0.1mg clonidine which is her regular rescue medication for elevated blood pressure. Her blood pressure dropped to 120 systolic following this however, 90 minutes later she noted her that her blood pressure fluctuated again to 170 systolic. Patient has been asymptomatic during this entire time of fluctuating blood pressures with no complaints of headache, chest pain, or shortness of breath.    REVIEW OF SYSTEMS  Pertinent positives include fluctuating blood pressure. Pertinent negatives include no headache, chest pain, shortness of breath.  All other systems reviewed and negative.    PAST MEDICAL HISTORY  Past Medical History:   Diagnosis Date   • Circulation problem 01/2018   • Hyperlipidemia    • Hypertension        SURGICAL HISTORY  Past Surgical History:   Procedure Laterality Date   • CAROTID ENDARTERECTOMY Right 2000   • GYN SURGERY      hysterectomy        SOCIAL HISTORY  Social History   Substance Use Topics   • Smoking status: Former Smoker     Quit date: 2/25/1988   • Smokeless tobacco: Never Used   • Alcohol use Yes      Comment: 1 glass of  wine x 2 days a week      History   Drug Use No       FAMILY HISTORY  Family History   Problem Relation Age of Onset   • Heart Attack Mother 64   • Heart Attack Father 72       CURRENT MEDICATIONS  Home Medications     Reviewed by Osmani Steven R.N. (Registered Nurse) on 05/01/19 at 0807  Med List Status: Not Addressed   Medication Last Dose Status   amLODIPine (NORVASC) 5 MG Tab 4/30/2019 Active   ascorbic acid (ASCORBIC ACID) 500 MG Tab  Active   aspirin EC (ECOTRIN) 81 MG Tablet Delayed Response  Active   cloNIDine (CATAPRES) 0.1 MG Tab 5/1/2019 Active   cyanocobalamin (VITAMIN B-12) 500 MCG Tab  Active   Garlic 10 MG Cap  Active   hydrochlorothiazide (MICROZIDE) 12.5 MG capsule 4/30/2019 Active   Omega-3 Fatty Acids (FISH OIL) 1000 MG Cap capsule  Active   spironolactone (ALDACTONE) 50 MG Tab 4/30/2019 Active                ALLERGIES  Allergies   Allergen Reactions   • Chlorthalidone      Headaches severe. Heart racing    • Lisinopril Swelling     Of lower extremities        PHYSICAL EXAM  VITAL SIGNS: BP (!) 197/80   Pulse (!) 54   Temp 36.8 °C (98.2 °F) (Temporal)   Resp 18   Wt 58 kg (127 lb 13.9 oz)   BMI 21.95 kg/m²     Nursing notes and vitals reviewed.  Constitutional: Well developed, Well nourished, No acute distress, Non-toxic appearance.   Eyes: PERRLA, EOMI, Conjunctiva normal, No discharge.   Cardiovascular: Normal heart rate, Normal rhythm, No murmurs, No rubs, No gallops.   Thorax & Lungs: No respiratory distress, No rales, No rhonchi, No wheezing, No chest tenderness.   Abdomen: Bowel sounds normal, Soft, No tenderness, No guarding, No rebound, No masses, No pulsatile masses.   Skin: Warm, Dry, No erythema, No rash.   Musculoskeletal: Intact distal pulses, No edema, No cyanosis, No clubbing. Good range of motion in all major joints. No tenderness to palpation or major deformities noted, no CVA tenderness, no midline back tenderness.   Neurologic:  Alert & oriented to month and age,  Normal cognition, Cranial nerves II-XII are intact, No slurred speech, Negative finger to nose bilaterally, No pronator drift bilaterally,   strength 5/5 bilaterally, Leg raise strength 5/5 bilaterally, Sensation intact throughout  Psychiatric: Affect normal for clinical presentation.    DIAGNOSTIC STUDIES/PROCEDURES    LABS  Results for orders placed or performed during the hospital encounter of 19   CMP   Result Value Ref Range    Sodium 137 135 - 145 mmol/L    Potassium 3.1 (L) 3.6 - 5.5 mmol/L    Chloride 100 96 - 112 mmol/L    Co2 29 20 - 33 mmol/L    Anion Gap 8.0 0.0 - 11.9    Glucose 92 65 - 99 mg/dL    Bun 22 8 - 22 mg/dL    Creatinine 0.95 0.50 - 1.40 mg/dL    Calcium 9.7 8.5 - 10.5 mg/dL    AST(SGOT) 17 12 - 45 U/L    ALT(SGPT) 7 2 - 50 U/L    Alkaline Phosphatase 88 30 - 99 U/L    Total Bilirubin 0.7 0.1 - 1.5 mg/dL    Albumin 4.3 3.2 - 4.9 g/dL    Total Protein 7.8 6.0 - 8.2 g/dL    Globulin 3.5 1.9 - 3.5 g/dL    A-G Ratio 1.2 g/dL   ESTIMATED GFR   Result Value Ref Range    GFR If African American >60 >60 mL/min/1.73 m 2    GFR If Non African American 55 (A) >60 mL/min/1.73 m 2   EKG   Result Value Ref Range    Report       [facility] Emergency Dept.    Test Date:  2019  Pt Name:    GEO WHITESIDE                 Department: ER  MRN:        [id number]                      Room:        21  Gender:     Female                       Technician: [id number]  :        [id number]                   Requested By:UZMA DELGADILLO  Order #:    [id number]                    Reading MD: UZMA DELGADILLO, DO    Measurements  Intervals                                Axis  Rate:       49                           P:          55  IL:         188                          QRS:        65  QRSD:       164                          T:          253  QT:         556  QTc:        503    Interpretive Statements  SINUS BRADYCARDIA  LEFT BUNDLE BRANCH BLOCK  Compared to ECG 2018  13:50:52  No significant changes    Electronically Signed On 5-1-2019 11:32:37 PDT by UZMA DELGADILLO, DO         All labs reviewed by me.    RADIOLOGY  DX-CHEST-LIMITED (1 VIEW)   Final Result      1.  Unchanged RIGHT basilar opacity. This is likely atelectasis or scar, less likely a mass. Consider CT.   2.  Persistently enlarged cardiac silhouette   3.  Atherosclerosis        The radiologist's interpretation of all radiological studies have been reviewed by me.    COURSE & MEDICAL DECISION MAKING  Pertinent Labs & Imaging studies reviewed. (See chart for details)    8:18 AM - Patient seen and examined at bedside. Patient will be treated with her normal blood pressure medications of 5mg oral Amlodipine, 12.5mg oral HCTZ, 50mg Spironolactone. Ordered chest xray, CMP, and EKG to evaluate her symptoms. I informed the patient that I would order for her regular BP medications, as she has not yet taken them today, to see if this helps bring her blood pressure down. I also explained I would order for work up lab work and chest xray to rule out any acute processes that could be contributing to her fluctuating blood pressures. She understands and is agreeable.    10:28 AM I re-evaluated patient at bedside. She is still asymptomatic with BP improving to 164/68 with her medications. I informed the patient that her blood pressures are at an adequate level for discharge. I advised her to follow up with her PCP and Cardiologist within the week to continue managing her blood pressure. She understands and agrees with discharge.     This is a [facility] 89 y.o. female that presents with hypertension.  Here in the emergency department, she has no evidence of endorgan damage, normal EKG, with heart failure, renal failure.  She did receive her home medications and has significant improvement in her blood pressure down to 142.  I did have a conversation she became extremely anxious and I do believe this is contributing to her  slightly elevated high blood pressure presentation here.  I have encouraged her to take her medications as prescribed, to journal her blood pressure in the follow-up with a primary care physician if she has consistently elevated blood pressure readings.  Once again she is completely asymptomatic.  Although her blood pressure is 175 systolic on discharge, she has no symptoms whatsoever, I have given her home medications and would wait to see if these take effect adequately.  She knows to follow with Dr. Concepcion for further evaluation and strict return precautions have been given.  As for her kalemia, the patient received potassium chloride oral repletion.  The patient will return for new or worsening symptoms and is stable at the time of discharge.    DISPOSITION:  Patient will be discharged home in stable condition.    FOLLOW UP:  [facility], Emergency Dept  [address]  [facility] [address]  [phone]    If symptoms worsen    Estuardo Carrero M.D.  [address]  [address]  [facility] [address]  [phone]    Schedule an appointment as soon as possible for a visit in 1 week      Napoleon Eden M.D.  [address]  [address]  [facility] [address]  [phone]    Schedule an appointment as soon as possible for a visit       FINAL IMPRESSION  1. Essential hypertension Active   2. Hypokalemia Active         I, Susie Monroy (Scribe), am scribing for, and in the presence of, Ulysses Petty D.O    Electronically signed by: Susie Monroy (Scribe), 5/1/2019    IUlysses D.O. personally performed the services described in this documentation, as scribed by Susie Monroy in my presence, and it is both accurate and complete.    The note accurately reflects work and decisions made by me.  Ulysses Petty  5/1/2019  2:16 PM

## 2019-05-02 NOTE — TELEPHONE ENCOUNTER
She has had difficulty with multiple medications.  If she has not had any problem with losartan in the past start her at 25 mg daily and have her call back her blood pressures early next week.  If her blood pressure is significantly elevated she could take 2 tablets daily.  Let her know that the maximum dose is 100 mg daily.

## 2019-05-04 ENCOUNTER — HOSPITAL ENCOUNTER (EMERGENCY)
Facility: MEDICAL CENTER | Age: 84
End: 2019-05-04
Attending: EMERGENCY MEDICINE
Payer: MEDICARE

## 2019-05-04 VITALS
DIASTOLIC BLOOD PRESSURE: 87 MMHG | OXYGEN SATURATION: 95 % | TEMPERATURE: 98 F | BODY MASS INDEX: 19.93 KG/M2 | WEIGHT: 127 LBS | SYSTOLIC BLOOD PRESSURE: 181 MMHG | HEART RATE: 65 BPM | RESPIRATION RATE: 17 BRPM | HEIGHT: 67 IN

## 2019-05-04 DIAGNOSIS — R55 NEAR SYNCOPE: ICD-10-CM
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
## 2019-05-04 PROCEDURE — 99283 EMERGENCY DEPT VISIT LOW MDM: CPT

## 2019-05-04 NOTE — ED TRIAGE NOTES
"Pt seen here on 5/1 for HTN. Pt states when she was waiting for the cab her legs gave out and she was on the floor. Pt states \"I didn't pass out\". Pt just wants to get \"checked out\". Pt with no medical complaints. ambulatory with walker.   "

## 2019-05-05 NOTE — ED NOTES
Pt provided with discharge instructions, instructions for follow up appointment with PCP and Cardiologist, s/s of when to seek emergency care.  Pt verbalizes understanding.  Pt discharged in good condition.

## 2019-05-05 NOTE — ED PROVIDER NOTES
"ED Provider Note    CHIEF COMPLAINT  Chief Complaint   Patient presents with   • Other       HPI  Livia Corea is a 89 y.o. female who presents for evaluation of a ground-level fall that actually occurred 3 days ago while she was here at the hospital.  The patient was being evaluated for hypertension, she was treated with medications and had an otherwise unremarkable work-up.  The patient was being discharged, while in the waiting room she states that she just found herself on the ground.  The patient denies a loss of conscious, she was not lightheaded, the patient was not short of breath, she did not have any chest pain or any other symptoms whatsoever.  She denies any injuries.  She was able to get back up and ambulate with her walker so she just went home.  Since being home over the past 3 days she has had no symptoms whatsoever but decided to come here today just to get \"checked out.\"  She has no other complaints.  Her discharge blood pressure is documented by the ER physician was greater than 170 systolic, no documentation to suggest hypotension.    REVIEW OF SYSTEMS  Negative for fever, rash, chest pain, dyspnea, abdominal pain, nausea, vomiting, diarrhea, headache, focal weakness, focal numbness, focal tingling, back pain. All other systems are negative.     PAST MEDICAL HISTORY  Past Medical History:   Diagnosis Date   • Circulation problem 01/2018   • Hyperlipidemia    • Hypertension        FAMILY HISTORY  Family History   Problem Relation Age of Onset   • Heart Attack Mother 64   • Heart Attack Father 72       SOCIAL HISTORY  Social History   Substance Use Topics   • Smoking status: Former Smoker     Quit date: 2/25/1988   • Smokeless tobacco: Never Used   • Alcohol use Yes      Comment: 1 glass of wine x 2 days a week       SURGICAL HISTORY  Past Surgical History:   Procedure Laterality Date   • CAROTID ENDARTERECTOMY Right 2000   • GYN SURGERY      hysterectomy       CURRENT MEDICATIONS  I " "personally reviewed the medication list in the charting documentation.     ALLERGIES  Allergies   Allergen Reactions   • Chlorthalidone      Headaches severe. Heart racing    • Lisinopril Swelling     Of lower extremities        MEDICAL RECORD  I have reviewed patient's medical record and pertinent results are listed above.      PHYSICAL EXAM  VITAL SIGNS: BP (!) 165/95   Pulse 76   Temp 37.3 °C (99.2 °F) (Temporal)   Resp 16   Ht 1.702 m (5' 7\")   Wt 57.6 kg (127 lb)   SpO2 95%   BMI 19.89 kg/m²    Constitutional: Elderly and frail but overall well appearing patient in no acute distress.  Not toxic, nor ill in appearance.  HENT: Mucus membranes moist.    Eyes: No scleral icterus. Normal conjunctiva   Neck: Supple, comfortable, nonpainful range of motion.   Cardiovascular: Regular heart rate and rhythm.   Thorax & Lungs: Chest is nontender.  Lungs are clear to auscultation with good air movement bilaterally.  No wheeze, rhonchi, nor rales.   Abdomen: Soft, with no tenderness, rebound nor guarding.  No mass or pulsatile mass appreciated.  Skin: Warm, dry. No rash appreciated  Extremities/Musculoskeletal: No sign of trauma. No asymmetric calf tenderness, erythema or edema. Normal range of motion   Neurologic: AAOx4, Cranial nerves II-XII grossly intact, PERRLA, EOMI, speech is normal, normal and symmetric motor and sensory functions upper and lower extremities bilaterally  Psychiatric: Normal affect appropriate for the clinical situation.    COURSE & MEDICAL DECISION MAKING  I have reviewed any medical record information, laboratory studies and radiographic results as noted above.    Encounter Summary: This is a 89 y.o. female with a ground-level fall 3 days ago while being discharged from this emergency department, she said no symptoms fall, just decided to come get checked on her exam is unremarkable and nonfocal.  Vital signs reveal ongoing hypertension but better than before, otherwise unremarkable, she " feels fine and has no indication of injury or other significant pathology on exam, being discharged home in stable condition with strict return instructions discussed      DISPOSITION: Discharged home in stable condition      FINAL IMPRESSION  1. Fall from ground level    2. Near syncope           This dictation was created using voice recognition software. The accuracy of the dictation is limited to the abilities of the software. I expect there may be some errors of grammar and possibly content. The nursing notes were reviewed and certain aspects of this information were incorporated into this note.    Electronically signed by: Nicko Temple, 5/4/2019 5:43 PM

## 2019-05-06 ENCOUNTER — HOSPITAL ENCOUNTER (OUTPATIENT)
Dept: LAB | Facility: MEDICAL CENTER | Age: 84
End: 2019-05-06
Attending: INTERNAL MEDICINE
Payer: MEDICARE

## 2019-05-06 DIAGNOSIS — I65.23 ATHEROSCLEROSIS OF BOTH CAROTID ARTERIES: ICD-10-CM

## 2019-05-06 DIAGNOSIS — E78.5 DYSLIPIDEMIA: ICD-10-CM

## 2019-05-06 LAB
CHOLEST SERPL-MCNC: 212 MG/DL (ref 100–199)
FASTING STATUS PATIENT QL REPORTED: NORMAL
HDLC SERPL-MCNC: 60 MG/DL
LDLC SERPL CALC-MCNC: 136 MG/DL
TRIGL SERPL-MCNC: 81 MG/DL (ref 0–149)

## 2019-05-06 PROCEDURE — 80061 LIPID PANEL: CPT

## 2019-05-06 PROCEDURE — 36415 COLL VENOUS BLD VENIPUNCTURE: CPT

## 2019-05-09 ENCOUNTER — OFFICE VISIT (OUTPATIENT)
Dept: CARDIOLOGY | Facility: MEDICAL CENTER | Age: 84
End: 2019-05-09
Payer: MEDICARE

## 2019-05-09 VITALS
OXYGEN SATURATION: 97 % | WEIGHT: 130 LBS | DIASTOLIC BLOOD PRESSURE: 70 MMHG | SYSTOLIC BLOOD PRESSURE: 168 MMHG | HEIGHT: 64 IN | HEART RATE: 70 BPM | BODY MASS INDEX: 22.2 KG/M2

## 2019-05-09 DIAGNOSIS — I65.23 ATHEROSCLEROSIS OF BOTH CAROTID ARTERIES: ICD-10-CM

## 2019-05-09 DIAGNOSIS — E78.5 DYSLIPIDEMIA: ICD-10-CM

## 2019-05-09 DIAGNOSIS — I10 ESSENTIAL HYPERTENSION: ICD-10-CM

## 2019-05-09 PROCEDURE — 99214 OFFICE O/P EST MOD 30 MIN: CPT | Performed by: INTERNAL MEDICINE

## 2019-05-09 RX ORDER — HYDROCHLOROTHIAZIDE 12.5 MG/1
TABLET ORAL
COMMUNITY
Start: 2019-02-08 | End: 2019-05-13

## 2019-05-09 RX ORDER — AMLODIPINE BESYLATE 5 MG/1
TABLET ORAL
COMMUNITY
Start: 2019-02-08 | End: 2019-05-13

## 2019-05-09 RX ORDER — HYDROCHLOROTHIAZIDE 12.5 MG/1
12.5 TABLET ORAL DAILY
Qty: 30 TAB | Refills: 11 | Status: SHIPPED | OUTPATIENT
Start: 2019-05-09 | End: 2019-05-23 | Stop reason: SDUPTHER

## 2019-05-09 RX ORDER — EZETIMIBE 10 MG/1
10 TABLET ORAL DAILY
COMMUNITY
End: 2019-06-05

## 2019-05-09 RX ORDER — AMLODIPINE BESYLATE 5 MG/1
5 TABLET ORAL 2 TIMES DAILY
Qty: 60 TAB | Refills: 11 | Status: ON HOLD | OUTPATIENT
Start: 2019-05-09 | End: 2019-05-29

## 2019-05-09 RX ORDER — LOSARTAN POTASSIUM 50 MG/1
TABLET ORAL
COMMUNITY
Start: 2018-05-17 | End: 2019-05-09

## 2019-05-09 RX ORDER — POTASSIUM CHLORIDE 20 MEQ/1
20 TABLET, EXTENDED RELEASE ORAL DAILY
Qty: 30 TAB | Refills: 11 | Status: SHIPPED | OUTPATIENT
Start: 2019-05-09 | End: 2019-05-23

## 2019-05-09 NOTE — PATIENT INSTRUCTIONS
Take hydrochlorothiazide and potassium supplementation this afternoon after its picked up at the pharmacy and then each morning    Take amlodipine 5 mg tonight and then twice daily starting tomorrow

## 2019-05-09 NOTE — LETTER
[facility]   [address] [place] [address]  SHAWN [place]ario [address]  Phone: [phone]  Fax: [phone]              Livia Corea  11/21/1926    Encounter Date: 5/9/2019    Napoleon Eden M.D.          PROGRESS NOTE:  Chief Complaint   Patient presents with   • Palpitations     F/V: 1 MO       Subjective:   Livia Corea is a 89 y.o. female who presents today for follow-up of her hypertension, dyslipidemia and carotid plaque.  She had difficulties with atorvastatin causing nausea.  We tried her on low-dose rosuvastatin which also cause her nausea.  We increased her Spironolactone because of poorly controlled hypertension.     Earlier this week, she fell and was ultimately seen in the emergency room.  She actually did not lose consciousness and had no lightheadedness.  She just found herself on the ground.    Because of her fall she was worried this was secondary to her blood pressure medications.  She therefore stopped spironolactone and HCTZ.    Her blood pressures been intermittently elevated.  She denies any chest discomfort, dyspnea or edema.  She has had no palpitations.        Past Medical History:   Diagnosis Date   • Circulation problem 01/2018   • Hyperlipidemia    • Hypertension      Past Surgical History:   Procedure Laterality Date   • CAROTID ENDARTERECTOMY Right 2000   • GYN SURGERY      hysterectomy     Family History   Problem Relation Age of Onset   • Heart Attack Mother 64   • Heart Attack Father 72     Social History     Social History   • Marital status: Single     Spouse name: N/A   • Number of children: N/A   • Years of education: N/A     Occupational History   • Not on file.     Social History Main Topics   • Smoking status: Former Smoker     Quit date: 2/25/1988   • Smokeless tobacco: Never Used   • Alcohol use Yes      Comment: 1 glass of wine x 2 days a week   • Drug use: No   • Sexual activity: Not on file     Other Topics  "Concern   • Not on file     Social History Narrative   • No narrative on file     Allergies   Allergen Reactions   • Chlorthalidone      Headaches severe. Heart racing    • Lisinopril Swelling     Of lower extremities      Outpatient Encounter Prescriptions as of 5/9/2019   Medication Sig Dispense Refill   • aspirin 81 MG tablet ASPIRIN 81 MG ORAL TABLET     • amLODIPine (NORVASC) 5 MG Tab NORVASC 5 MG TABS     • hydroCHLOROthiazide (HYDRODIURIL) 12.5 MG tablet HYDROCHLOROTHIAZIDE 12.5 MG TABS     • ezetimibe (ZETIA) 10 MG Tab Take 10 mg by mouth every day.     • cloNIDine (CATAPRES) 0.1 MG Tab Take 0.1 mg by mouth 2 times a day.     • Garlic 10 MG Cap Take 2 Caps by mouth 3 times a day, with meals.     • Omega-3 Fatty Acids (FISH OIL) 1000 MG Cap capsule Take 2 Caps by mouth every day. 90 Cap 3   • ascorbic acid (ASCORBIC ACID) 500 MG Tab Take 500 mg by mouth every day.     • cyanocobalamin (VITAMIN B-12) 500 MCG Tab Take 500 mcg by mouth every day.     • aspirin EC (ECOTRIN) 81 MG Tablet Delayed Response Take 81 mg by mouth every day.     • losartan (COZAAR) 50 MG Tab LOSARTAN POTASSIUM 50 MG TABS     • spironolactone (ALDACTONE) 50 MG Tab Take 1 Tab by mouth every day. (Patient not taking: Reported on 5/9/2019) 30 Tab 11   • amLODIPine (NORVASC) 5 MG Tab Take 5 mg by mouth 2 Times a Day.     • hydrochlorothiazide (MICROZIDE) 12.5 MG capsule Take 1 Cap by mouth 2 times a day. (Patient not taking: Reported on 5/9/2019) 180 Cap 3     No facility-administered encounter medications on file as of 5/9/2019.      ROS     Objective:   BP (!) 168/70 (BP Location: Left arm, Patient Position: Sitting, BP Cuff Size: Adult)   Pulse 70   Ht 1.626 m (5' 4\")   Wt 59 kg (130 lb)   SpO2 97%   BMI 22.31 kg/m²      Physical Exam   Constitutional: She appears well-developed and well-nourished.   Neck: No JVD present.   Cardiovascular: Normal rate and regular rhythm.    No murmur heard.  Pulmonary/Chest: Effort normal and breath " sounds normal. She has no rales.   Abdominal: Soft. There is no tenderness.   Musculoskeletal: She exhibits no edema.     Lab Results   Component Value Date/Time    CHOLSTRLTOT 212 (H) 05/06/2019 11:44 AM     (H) 05/06/2019 11:44 AM    HDL 60 05/06/2019 11:44 AM    TRIGLYCERIDE 81 05/06/2019 11:44 AM       Lab Results   Component Value Date/Time    SODIUM 137 05/01/2019 08:50 AM    POTASSIUM 3.1 (L) 05/01/2019 08:50 AM    CHLORIDE 100 05/01/2019 08:50 AM    CO2 29 05/01/2019 08:50 AM    GLUCOSE 92 05/01/2019 08:50 AM    BUN 22 05/01/2019 08:50 AM    CREATININE 0.95 05/01/2019 08:50 AM     Lab Results   Component Value Date/Time    ALKPHOSPHAT 88 05/01/2019 08:50 AM    ASTSGOT 17 05/01/2019 08:50 AM    ALTSGPT 7 05/01/2019 08:50 AM    TBILIRUBIN 0.7 05/01/2019 08:50 AM      Lab Results   Component Value Date/Time    BNPBTYPENAT 45 12/12/2018 01:00 PM          Assessment:     1. Essential hypertension     2. Atherosclerosis of both carotid arteries: Mild in 2018     3. Dyslipidemia         Medical Decision Making:  Today's Assessment / Status / Plan:     Ms. Corea is having difficulty with her blood pressure being elevated.  At this time, she does not want to take spironolactone.  She will start back on HCTZ plus potassium supplementation 20 mEq daily.  Her potassium level was low but she attributed that to diarrhea.  She will also start increase amlodipine to 5 mg twice daily.  She will follow-up in a couple of weeks with a BMP.      Estuardo Carrero M.D.  [address]  [address] [place] [address]  VIA Mail

## 2019-05-09 NOTE — PROGRESS NOTES
Chief Complaint   Patient presents with   • Palpitations     F/V: 1 MO       Subjective:   Livia Corea is a 89 y.o. female who presents today for follow-up of her hypertension, dyslipidemia and carotid plaque.  She had difficulties with atorvastatin causing nausea.  We tried her on low-dose rosuvastatin which also cause her nausea.  We increased her Spironolactone because of poorly controlled hypertension.     Earlier this week, she fell and was ultimately seen in the emergency room.  She actually did not lose consciousness and had no lightheadedness.  She just found herself on the ground.    Because of her fall she was worried this was secondary to her blood pressure medications.  She therefore stopped spironolactone and HCTZ.    Her blood pressures been intermittently elevated.  She denies any chest discomfort, dyspnea or edema.  She has had no palpitations.        Past Medical History:   Diagnosis Date   • Circulation problem 01/2018   • Hyperlipidemia    • Hypertension      Past Surgical History:   Procedure Laterality Date   • CAROTID ENDARTERECTOMY Right 2000   • GYN SURGERY      hysterectomy     Family History   Problem Relation Age of Onset   • Heart Attack Mother 64   • Heart Attack Father 72     Social History     Social History   • Marital status: Single     Spouse name: N/A   • Number of children: N/A   • Years of education: N/A     Occupational History   • Not on file.     Social History Main Topics   • Smoking status: Former Smoker     Quit date: 2/25/1988   • Smokeless tobacco: Never Used   • Alcohol use Yes      Comment: 1 glass of wine x 2 days a week   • Drug use: No   • Sexual activity: Not on file     Other Topics Concern   • Not on file     Social History Narrative   • No narrative on file     Allergies   Allergen Reactions   • Chlorthalidone      Headaches severe. Heart racing    • Lisinopril Swelling     Of lower extremities      Outpatient Encounter Prescriptions as of 5/9/2019  "  Medication Sig Dispense Refill   • aspirin 81 MG tablet ASPIRIN 81 MG ORAL TABLET     • amLODIPine (NORVASC) 5 MG Tab NORVASC 5 MG TABS     • hydroCHLOROthiazide (HYDRODIURIL) 12.5 MG tablet HYDROCHLOROTHIAZIDE 12.5 MG TABS     • ezetimibe (ZETIA) 10 MG Tab Take 10 mg by mouth every day.     • cloNIDine (CATAPRES) 0.1 MG Tab Take 0.1 mg by mouth 2 times a day.     • Garlic 10 MG Cap Take 2 Caps by mouth 3 times a day, with meals.     • Omega-3 Fatty Acids (FISH OIL) 1000 MG Cap capsule Take 2 Caps by mouth every day. 90 Cap 3   • ascorbic acid (ASCORBIC ACID) 500 MG Tab Take 500 mg by mouth every day.     • cyanocobalamin (VITAMIN B-12) 500 MCG Tab Take 500 mcg by mouth every day.     • aspirin EC (ECOTRIN) 81 MG Tablet Delayed Response Take 81 mg by mouth every day.     • losartan (COZAAR) 50 MG Tab LOSARTAN POTASSIUM 50 MG TABS     • spironolactone (ALDACTONE) 50 MG Tab Take 1 Tab by mouth every day. (Patient not taking: Reported on 5/9/2019) 30 Tab 11   • amLODIPine (NORVASC) 5 MG Tab Take 5 mg by mouth 2 Times a Day.     • hydrochlorothiazide (MICROZIDE) 12.5 MG capsule Take 1 Cap by mouth 2 times a day. (Patient not taking: Reported on 5/9/2019) 180 Cap 3     No facility-administered encounter medications on file as of 5/9/2019.      ROS     Objective:   BP (!) 168/70 (BP Location: Left arm, Patient Position: Sitting, BP Cuff Size: Adult)   Pulse 70   Ht 1.626 m (5' 4\")   Wt 59 kg (130 lb)   SpO2 97%   BMI 22.31 kg/m²     Physical Exam   Constitutional: She appears well-developed and well-nourished.   Neck: No JVD present.   Cardiovascular: Normal rate and regular rhythm.    No murmur heard.  Pulmonary/Chest: Effort normal and breath sounds normal. She has no rales.   Abdominal: Soft. There is no tenderness.   Musculoskeletal: She exhibits no edema.     Lab Results   Component Value Date/Time    CHOLSTRLTOT 212 (H) 05/06/2019 11:44 AM     (H) 05/06/2019 11:44 AM    HDL 60 05/06/2019 11:44 AM    " TRIGLYCERIDE 81 05/06/2019 11:44 AM       Lab Results   Component Value Date/Time    SODIUM 137 05/01/2019 08:50 AM    POTASSIUM 3.1 (L) 05/01/2019 08:50 AM    CHLORIDE 100 05/01/2019 08:50 AM    CO2 29 05/01/2019 08:50 AM    GLUCOSE 92 05/01/2019 08:50 AM    BUN 22 05/01/2019 08:50 AM    CREATININE 0.95 05/01/2019 08:50 AM     Lab Results   Component Value Date/Time    ALKPHOSPHAT 88 05/01/2019 08:50 AM    ASTSGOT 17 05/01/2019 08:50 AM    ALTSGPT 7 05/01/2019 08:50 AM    TBILIRUBIN 0.7 05/01/2019 08:50 AM      Lab Results   Component Value Date/Time    BNPBTYPENAT 45 12/12/2018 01:00 PM          Assessment:     1. Essential hypertension     2. Atherosclerosis of both carotid arteries: Mild in 2018     3. Dyslipidemia         Medical Decision Making:  Today's Assessment / Status / Plan:     Ms. Corea is having difficulty with her blood pressure being elevated.  At this time, she does not want to take spironolactone.  She will start back on HCTZ plus potassium supplementation 20 mEq daily.  Her potassium level was low but she attributed that to diarrhea.  She will also increase amlodipine to 5 mg twice daily.  She will follow-up in a couple of weeks with a BMP.

## 2019-05-12 ENCOUNTER — TELEPHONE (OUTPATIENT)
Dept: CARDIOLOGY | Facility: MEDICAL CENTER | Age: 84
End: 2019-05-12

## 2019-05-13 NOTE — TELEPHONE ENCOUNTER
Pt has labile BP today and some highs up to 191/90 took clonidine was a little better    Check BP later this evening and if over 190 take an extra clonidine    Please call her in the AM and make sure she is okay    It is my pleasure to participate in the care of Ms. Corea.  Please do not hesitate to contact me with questions or concerns.    Royce Corea MD PhD [facility]  Cardiologist [facility]    Please note that this dictation was created using voice recognition software. I have worked with consultants from the vendor as well as technical experts from [facility] to optimize the interface. I have made every reasonable attempt to correct obvious errors, but I expect that there are errors of grammar and possibly content I did not discover before finalizing the note.

## 2019-05-17 NOTE — PROGRESS NOTES
Chief Complaint   Patient presents with   • HTN (Uncontrolled)     F/V DX:HTN       Subjective:   Livia Corea is a 89 y.o. pleasant retired  for [employer] who presents today for follow up with recent complaints of elevated blood pressure.     Patient was last seen by Dr. Veronica Eden on 5/9/19, her HCTZ was restarted and her amlodipine was increased to 5 mg BID.     Past medical history significant for hypertension, dyslipidemia and carotid artery disease.     Today patient states that she is feeling well.  States that she feels the recent episodic increase in her blood pressure is most likely due to experiencing the loss of many friends recently.  Patient tells me that on average her blood pressure is in the 140s/80s after she takes her blood pressure medication.    Past Medical History:   Diagnosis Date   • Circulation problem 01/2018   • Hyperlipidemia    • Hypertension      Past Surgical History:   Procedure Laterality Date   • CAROTID ENDARTERECTOMY Right 2000   • GYN SURGERY      hysterectomy     Family History   Problem Relation Age of Onset   • Heart Attack Mother 64   • Heart Attack Father 72     Social History     Social History   • Marital status: Single     Spouse name: N/A   • Number of children: N/A   • Years of education: N/A     Occupational History   • Not on file.     Social History Main Topics   • Smoking status: Former Smoker     Quit date: 2/25/1988   • Smokeless tobacco: Never Used   • Alcohol use Yes      Comment: 1 glass of wine x 2 days a week   • Drug use: No   • Sexual activity: Not on file     Other Topics Concern   • Not on file     Social History Narrative   • No narrative on file     Allergies   Allergen Reactions   • Chlorthalidone      Headaches severe. Heart racing    • Lisinopril Swelling     Of lower extremities      Outpatient Encounter Prescriptions as of 5/23/2019   Medication Sig Dispense Refill   • hydroCHLOROthiazide (HYDRODIURIL) 12.5 MG  "tablet Take 1 Tab by mouth 2 Times a Day. 60 Tab 11   • ezetimibe (ZETIA) 10 MG Tab Take 10 mg by mouth every day.     • amLODIPine (NORVASC) 5 MG Tab Take 1 Tab by mouth 2 Times a Day. 60 Tab 11   • Garlic 10 MG Cap Take 2 Caps by mouth 3 times a day, with meals.     • Omega-3 Fatty Acids (FISH OIL) 1000 MG Cap capsule Take 2 Caps by mouth every day. 90 Cap 3   • ascorbic acid (ASCORBIC ACID) 500 MG Tab Take 500 mg by mouth every day.     • cyanocobalamin (VITAMIN B-12) 500 MCG Tab Take 500 mcg by mouth every day.     • aspirin EC (ECOTRIN) 81 MG Tablet Delayed Response Take 81 mg by mouth every day.     • [DISCONTINUED] hydroCHLOROthiazide (HYDRODIURIL) 12.5 MG tablet Take 1 Tab by mouth every day. 30 Tab 11   • [DISCONTINUED] potassium chloride SA (KDUR) 20 MEQ Tab CR Take 1 Tab by mouth every day. (Patient not taking: Reported on 5/23/2019) 30 Tab 11   • cloNIDine (CATAPRES) 0.1 MG Tab Take 0.1 mg by mouth 2 times a day.       No facility-administered encounter medications on file as of 5/23/2019.      Review of Systems   Constitutional: Positive for malaise/fatigue. Negative for weight loss.   Respiratory: Negative for shortness of breath.    Cardiovascular: Negative for chest pain, palpitations, orthopnea, claudication, leg swelling and PND.   Gastrointestinal: Negative for abdominal pain and blood in stool.   Genitourinary: Negative for hematuria.   Neurological: Negative for dizziness and weakness.   All other systems reviewed and are negative.       Objective:   /82 (BP Location: Left arm, Patient Position: Sitting, BP Cuff Size: Adult)   Pulse 70   Ht 1.638 m (5' 4.5\")   Wt 59.4 kg (131 lb)   SpO2 97%   BMI 22.14 kg/m²     Physical Exam     TTE 3/10/18:  No prior study is available for comparison.   Normal left ventricular systolic function.  Left ventricular ejection fraction is visually estimated to be 65%.  Mild left ventricular hypertrophy.  Mild aortic stenosis.  Right heart pressures are " consistent with moderate pulmonary   hypertension.    Left Ventricle  Left ventricle is small in size. Mild left ventricular hypertrophy. Sigmoid septum. Normal left ventricular systolic function. Left ventricular ejection fraction is visually estimated to be 65%. Normal regional wall motion. Grade I diastolic dysfunction.    Chest X-Ray 9/15/18:  FINDINGS:  Cardiomediastinal contour is within normal limits.  Atherosclerotic calcification and tortuosity of thoracic aorta.  Minimal curvilinear opacity RIGHT lung base.  No pleural fluid collection or pneumothorax.  S-shaped curvature of thoracic spine    Carotid US 6/12/18:  CONCLUSIONS  Mild bilateral internal carotid artery stenosis (<50%).     5/21/19:  Sodium 134   135 - 145 mmol/L Final   Potassium 3.7  3.6 - 5.5 mmol/L Final   Chloride 96  96 - 112 mmol/L Final   Co2 28  20 - 33 mmol/L Final   Glucose 66  65 - 99 mg/dL Final   Bun 20  8 - 22 mg/dL Final   Creatinine 0.98  0.50 - 1.40 mg/dL Final   Calcium 9.4  8.5 - 10.5 mg/dL Final   Anion Gap 10.0  0.0 - 11.9 Final     GFR If African American >60  >60 mL/min/1.73 m 2 Final   GFR If Non African American 53   >60 mL/min/1.73 m 2 Final     Cholesterol,Tot 212   100 - 199 mg/dL Final   Triglycerides 81  0 - 149 mg/dL Final   HDL 60  >=40 mg/dL Final      <100 mg/dL Final           Assessment:     1. Mixed hyperlipidemia  LIPID PANEL   2. Essential hypertension, benign  Comp Metabolic Panel   3. Abnormal myocardial perfusion study: Small reversible apical abnormality     4. Atherosclerosis of both carotid arteries: Mild in 2018     5. Dyslipidemia         Medical Decision Making:  Today's Assessment / Status / Plan:     HTN:  -Stable.  -Averaging 140s/80s.  -CMP on 5/10 1/19 showing stable renal function and potassium level.  -Continue Norvasc 5 mg twice daily and HCTZ 12.5 mg twice daily with as needed clonidine.  -Repeat CMP in 3 months.    Abnormal MPI:  -Mildly abnormal MPI on 3/18/18, Dr. Eden  managing with ASA and statin therapy.  -Normal regional wall motion on noted on echo completed on 3/10/18.  -Denies chest pain.  -Continue ASA 81 mg daily.     Carotid Stenosis:  -Carotid US on 6/12/18 showed < 50% stenosis bilaterally.  -Continue ASA 81 mg daily.  -Unable to tolerate statin therapy.     HLD:  -Last LDL on 5/6/19 was 136.  -Experienced nausea with Lipitor and Crestor.   -Continue Zetia 10 mg daily.    Patient will follow-up with myself in 3 months or earlier if needed.  Encouraged patient to contact office should any questions or concerns arise meantime.  Patient understands and agrees with plan of care.    Future Appointments  Date Time Provider Department Center   8/22/2019 10:30 AM JEN Jauregui [facility] None     Collaborating Provider: Dr. Tyler Rodriguez     Please note that this dictation was created using voice recognition software. I have made every reasonable attempt to correct obvious errors, but I expect that there are errors of grammar and possibly content I did not discover before finalizing the note.

## 2019-05-21 ENCOUNTER — HOSPITAL ENCOUNTER (OUTPATIENT)
Dept: LAB | Facility: MEDICAL CENTER | Age: 84
End: 2019-05-21
Attending: INTERNAL MEDICINE
Payer: MEDICARE

## 2019-05-21 DIAGNOSIS — I10 ESSENTIAL HYPERTENSION: ICD-10-CM

## 2019-05-21 LAB
ANION GAP SERPL CALC-SCNC: 10 MMOL/L (ref 0–11.9)
BUN SERPL-MCNC: 20 MG/DL (ref 8–22)
CALCIUM SERPL-MCNC: 9.4 MG/DL (ref 8.5–10.5)
CHLORIDE SERPL-SCNC: 96 MMOL/L (ref 96–112)
CO2 SERPL-SCNC: 28 MMOL/L (ref 20–33)
CREAT SERPL-MCNC: 0.98 MG/DL (ref 0.5–1.4)
FASTING STATUS PATIENT QL REPORTED: NORMAL
GLUCOSE SERPL-MCNC: 66 MG/DL (ref 65–99)
POTASSIUM SERPL-SCNC: 3.7 MMOL/L (ref 3.6–5.5)
SODIUM SERPL-SCNC: 134 MMOL/L (ref 135–145)

## 2019-05-21 PROCEDURE — 80048 BASIC METABOLIC PNL TOTAL CA: CPT

## 2019-05-21 PROCEDURE — 36415 COLL VENOUS BLD VENIPUNCTURE: CPT

## 2019-05-23 ENCOUNTER — OFFICE VISIT (OUTPATIENT)
Dept: CARDIOLOGY | Facility: MEDICAL CENTER | Age: 84
End: 2019-05-23
Payer: MEDICARE

## 2019-05-23 VITALS
OXYGEN SATURATION: 97 % | WEIGHT: 131 LBS | DIASTOLIC BLOOD PRESSURE: 82 MMHG | SYSTOLIC BLOOD PRESSURE: 158 MMHG | HEIGHT: 65 IN | BODY MASS INDEX: 21.83 KG/M2 | HEART RATE: 70 BPM

## 2019-05-23 DIAGNOSIS — E78.2 MIXED HYPERLIPIDEMIA: ICD-10-CM

## 2019-05-23 DIAGNOSIS — I10 ESSENTIAL HYPERTENSION, BENIGN: ICD-10-CM

## 2019-05-23 DIAGNOSIS — I65.23 ATHEROSCLEROSIS OF BOTH CAROTID ARTERIES: ICD-10-CM

## 2019-05-23 DIAGNOSIS — R94.39 ABNORMAL MYOCARDIAL PERFUSION STUDY: ICD-10-CM

## 2019-05-23 DIAGNOSIS — E78.5 DYSLIPIDEMIA: ICD-10-CM

## 2019-05-23 PROBLEM — R53.1 WEAKNESS: Status: RESOLVED | Noted: 2018-03-17 | Resolved: 2019-05-23

## 2019-05-23 PROBLEM — R60.0 LOCALIZED EDEMA: Status: RESOLVED | Noted: 2018-09-25 | Resolved: 2019-05-23

## 2019-05-23 PROBLEM — R79.9 ELEVATED BUN: Status: RESOLVED | Noted: 2018-03-10 | Resolved: 2019-05-23

## 2019-05-23 PROBLEM — R00.2 PALPITATIONS: Status: RESOLVED | Noted: 2019-03-29 | Resolved: 2019-05-23

## 2019-05-23 PROCEDURE — 99214 OFFICE O/P EST MOD 30 MIN: CPT | Performed by: NURSE PRACTITIONER

## 2019-05-23 RX ORDER — HYDROCHLOROTHIAZIDE 12.5 MG/1
12.5 TABLET ORAL 2 TIMES DAILY
Qty: 60 TAB | Refills: 11 | Status: ON HOLD | OUTPATIENT
Start: 2019-05-23 | End: 2019-05-29

## 2019-05-23 ASSESSMENT — ENCOUNTER SYMPTOMS
ORTHOPNEA: 0
CLAUDICATION: 0
PND: 0
DIZZINESS: 0
ABDOMINAL PAIN: 0
BLOOD IN STOOL: 0
WEAKNESS: 0
WEIGHT LOSS: 0
SHORTNESS OF BREATH: 0
PALPITATIONS: 0

## 2019-05-24 ENCOUNTER — TELEPHONE (OUTPATIENT)
Dept: CARDIOLOGY | Facility: MEDICAL CENTER | Age: 84
End: 2019-05-24

## 2019-05-24 RX ORDER — CLONIDINE HYDROCHLORIDE 0.1 MG/1
0.1 TABLET ORAL 2 TIMES DAILY
Qty: 1 TAB | Refills: 1 | Status: ON HOLD | COMMUNITY
Start: 2019-05-24 | End: 2019-05-29

## 2019-05-24 NOTE — TELEPHONE ENCOUNTER
I gave her your message; however, like today, she felt something was wrong (her back got cold) about 11:30 this am, and her BP was 202 systolic-- again.  She wants to know if she can take the clonidine 3x/day if needed (6-8 hours apart?); and, if so, what is the systolic reading she should treat?      By the way, she says that all of this is VERY unusual for her.  She has a new BP cuff (3 weeks old) and has 2 other BP cuffs as well.  She sits for 5 minutes prior to taking her pressure.

## 2019-05-24 NOTE — TELEPHONE ENCOUNTER
----- Message from Lin Aviles sent at 5/24/2019  8:08 AM PDT -----  Contact: [phone]    Pt reports her b/p has been high since early this morning took clonidine and it went a little but now its high again. She would please like a call back at [phone].

## 2019-05-24 NOTE — TELEPHONE ENCOUNTER
"She was worried that her BP was 202/100 at 6am. She did not miss any doses of meds the day before, and does not know the cause.   It usually is not that high in the am.  She took a Clonidine and it was 117 systolic shortly after.  2 hours later (she had not taken her Norvasc or HCTZ yet) it was 169 systolic (\"and it is never that high again after the clonidine\").  At 8:30 am she took her am meds and now it is 140/.  I suggested that she start taking her BP in the evenings and taking another clonidine prior to bedtime if needed (she had not been doing this).  She will call back if she has more concerns.  "

## 2019-05-25 NOTE — TELEPHONE ENCOUNTER
She was informed and will comply.  She will try not to take her BP so often and use the extra clonidine carefully.

## 2019-05-26 ENCOUNTER — HOSPITAL ENCOUNTER (OUTPATIENT)
Facility: MEDICAL CENTER | Age: 84
End: 2019-05-29
Attending: EMERGENCY MEDICINE | Admitting: FAMILY MEDICINE
Payer: MEDICARE

## 2019-05-26 ENCOUNTER — APPOINTMENT (OUTPATIENT)
Dept: RADIOLOGY | Facility: MEDICAL CENTER | Age: 84
End: 2019-05-26
Attending: EMERGENCY MEDICINE
Payer: MEDICARE

## 2019-05-26 DIAGNOSIS — E87.1 HYPONATREMIA: ICD-10-CM

## 2019-05-26 DIAGNOSIS — R00.1 BRADYCARDIA: ICD-10-CM

## 2019-05-26 DIAGNOSIS — I10 HTN (HYPERTENSION), MALIGNANT: ICD-10-CM

## 2019-05-26 DIAGNOSIS — I15.9 SECONDARY HYPERTENSION: ICD-10-CM

## 2019-05-26 PROCEDURE — 84484 ASSAY OF TROPONIN QUANT: CPT

## 2019-05-26 PROCEDURE — 93005 ELECTROCARDIOGRAM TRACING: CPT | Performed by: EMERGENCY MEDICINE

## 2019-05-26 PROCEDURE — 99285 EMERGENCY DEPT VISIT HI MDM: CPT

## 2019-05-26 PROCEDURE — 85025 COMPLETE CBC W/AUTO DIFF WBC: CPT

## 2019-05-26 PROCEDURE — 80053 COMPREHEN METABOLIC PANEL: CPT

## 2019-05-26 PROCEDURE — 36415 COLL VENOUS BLD VENIPUNCTURE: CPT

## 2019-05-27 LAB
ALBUMIN SERPL BCP-MCNC: 4.4 G/DL (ref 3.2–4.9)
ALBUMIN/GLOB SERPL: 1.3 G/DL
ALP SERPL-CCNC: 84 U/L (ref 30–99)
ALT SERPL-CCNC: 7 U/L (ref 2–50)
ANION GAP SERPL CALC-SCNC: 10 MMOL/L (ref 0–11.9)
ANION GAP SERPL CALC-SCNC: 8 MMOL/L (ref 0–11.9)
AST SERPL-CCNC: 16 U/L (ref 12–45)
BASOPHILS # BLD AUTO: 0.8 % (ref 0–1.8)
BASOPHILS # BLD: 0.04 K/UL (ref 0–0.12)
BILIRUB SERPL-MCNC: 0.6 MG/DL (ref 0.1–1.5)
BUN SERPL-MCNC: 20 MG/DL (ref 8–22)
BUN SERPL-MCNC: 27 MG/DL (ref 8–22)
CALCIUM SERPL-MCNC: 9.5 MG/DL (ref 8.5–10.5)
CALCIUM SERPL-MCNC: 9.7 MG/DL (ref 8.5–10.5)
CHLORIDE SERPL-SCNC: 93 MMOL/L (ref 96–112)
CHLORIDE SERPL-SCNC: 96 MMOL/L (ref 96–112)
CO2 SERPL-SCNC: 25 MMOL/L (ref 20–33)
CO2 SERPL-SCNC: 27 MMOL/L (ref 20–33)
CREAT SERPL-MCNC: 0.83 MG/DL (ref 0.5–1.4)
CREAT SERPL-MCNC: 0.98 MG/DL (ref 0.5–1.4)
EKG IMPRESSION: NORMAL
EKG IMPRESSION: NORMAL
EOSINOPHIL # BLD AUTO: 0.14 K/UL (ref 0–0.51)
EOSINOPHIL NFR BLD: 2.7 % (ref 0–6.9)
ERYTHROCYTE [DISTWIDTH] IN BLOOD BY AUTOMATED COUNT: 42.8 FL (ref 35.9–50)
GLOBULIN SER CALC-MCNC: 3.4 G/DL (ref 1.9–3.5)
GLUCOSE BLD-MCNC: 95 MG/DL (ref 65–99)
GLUCOSE SERPL-MCNC: 102 MG/DL (ref 65–99)
GLUCOSE SERPL-MCNC: 103 MG/DL (ref 65–99)
HCT VFR BLD AUTO: 40.4 % (ref 37–47)
HGB BLD-MCNC: 13.5 G/DL (ref 12–16)
IMM GRANULOCYTES # BLD AUTO: 0.01 K/UL (ref 0–0.11)
IMM GRANULOCYTES NFR BLD AUTO: 0.2 % (ref 0–0.9)
LYMPHOCYTES # BLD AUTO: 2.07 K/UL (ref 1–4.8)
LYMPHOCYTES NFR BLD: 40.2 % (ref 22–41)
MCH RBC QN AUTO: 30.1 PG (ref 27–33)
MCHC RBC AUTO-ENTMCNC: 33.4 G/DL (ref 33.6–35)
MCV RBC AUTO: 90 FL (ref 81.4–97.8)
MONOCYTES # BLD AUTO: 0.49 K/UL (ref 0–0.85)
MONOCYTES NFR BLD AUTO: 9.5 % (ref 0–13.4)
NEUTROPHILS # BLD AUTO: 2.4 K/UL (ref 2–7.15)
NEUTROPHILS NFR BLD: 46.6 % (ref 44–72)
NRBC # BLD AUTO: 0 K/UL
NRBC BLD-RTO: 0 /100 WBC
PLATELET # BLD AUTO: 250 K/UL (ref 164–446)
PMV BLD AUTO: 9.3 FL (ref 9–12.9)
POTASSIUM SERPL-SCNC: 3.2 MMOL/L (ref 3.6–5.5)
POTASSIUM SERPL-SCNC: 3.4 MMOL/L (ref 3.6–5.5)
PROT SERPL-MCNC: 7.8 G/DL (ref 6–8.2)
RBC # BLD AUTO: 4.49 M/UL (ref 4.2–5.4)
SODIUM SERPL-SCNC: 128 MMOL/L (ref 135–145)
SODIUM SERPL-SCNC: 131 MMOL/L (ref 135–145)
TROPONIN I SERPL-MCNC: 0.01 NG/ML (ref 0–0.04)
TROPONIN I SERPL-MCNC: 0.02 NG/ML (ref 0–0.04)
WBC # BLD AUTO: 5.2 K/UL (ref 4.8–10.8)

## 2019-05-27 PROCEDURE — A9270 NON-COVERED ITEM OR SERVICE: HCPCS | Performed by: INTERNAL MEDICINE

## 2019-05-27 PROCEDURE — 93010 ELECTROCARDIOGRAM REPORT: CPT | Mod: 77 | Performed by: INTERNAL MEDICINE

## 2019-05-27 PROCEDURE — 96365 THER/PROPH/DIAG IV INF INIT: CPT

## 2019-05-27 PROCEDURE — 97161 PT EVAL LOW COMPLEX 20 MIN: CPT

## 2019-05-27 PROCEDURE — 36415 COLL VENOUS BLD VENIPUNCTURE: CPT

## 2019-05-27 PROCEDURE — 93010 ELECTROCARDIOGRAM REPORT: CPT | Performed by: INTERNAL MEDICINE

## 2019-05-27 PROCEDURE — 99214 OFFICE O/P EST MOD 30 MIN: CPT | Performed by: INTERNAL MEDICINE

## 2019-05-27 PROCEDURE — 80048 BASIC METABOLIC PNL TOTAL CA: CPT

## 2019-05-27 PROCEDURE — 700111 HCHG RX REV CODE 636 W/ 250 OVERRIDE (IP): Performed by: FAMILY MEDICINE

## 2019-05-27 PROCEDURE — 306588 SLEEVE,VASO CALF MED: Performed by: FAMILY MEDICINE

## 2019-05-27 PROCEDURE — 700102 HCHG RX REV CODE 250 W/ 637 OVERRIDE(OP): Performed by: FAMILY MEDICINE

## 2019-05-27 PROCEDURE — 96366 THER/PROPH/DIAG IV INF ADDON: CPT

## 2019-05-27 PROCEDURE — 71045 X-RAY EXAM CHEST 1 VIEW: CPT

## 2019-05-27 PROCEDURE — A9270 NON-COVERED ITEM OR SERVICE: HCPCS | Performed by: FAMILY MEDICINE

## 2019-05-27 PROCEDURE — 700105 HCHG RX REV CODE 258

## 2019-05-27 PROCEDURE — G0378 HOSPITAL OBSERVATION PER HR: HCPCS

## 2019-05-27 PROCEDURE — 96372 THER/PROPH/DIAG INJ SC/IM: CPT

## 2019-05-27 PROCEDURE — 84484 ASSAY OF TROPONIN QUANT: CPT

## 2019-05-27 PROCEDURE — 93005 ELECTROCARDIOGRAM TRACING: CPT | Performed by: FAMILY MEDICINE

## 2019-05-27 PROCEDURE — 82962 GLUCOSE BLOOD TEST: CPT

## 2019-05-27 PROCEDURE — 700105 HCHG RX REV CODE 258: Performed by: EMERGENCY MEDICINE

## 2019-05-27 PROCEDURE — 93005 ELECTROCARDIOGRAM TRACING: CPT | Mod: 77 | Performed by: EMERGENCY MEDICINE

## 2019-05-27 PROCEDURE — 700102 HCHG RX REV CODE 250 W/ 637 OVERRIDE(OP): Performed by: INTERNAL MEDICINE

## 2019-05-27 PROCEDURE — 83735 ASSAY OF MAGNESIUM: CPT

## 2019-05-27 RX ORDER — HYDRALAZINE HYDROCHLORIDE 50 MG/1
50 TABLET, FILM COATED ORAL EVERY 8 HOURS
Status: DISCONTINUED | OUTPATIENT
Start: 2019-05-27 | End: 2019-05-27

## 2019-05-27 RX ORDER — AMOXICILLIN 250 MG
2 CAPSULE ORAL 2 TIMES DAILY
Status: DISCONTINUED | OUTPATIENT
Start: 2019-05-27 | End: 2019-05-29 | Stop reason: HOSPADM

## 2019-05-27 RX ORDER — AMLODIPINE BESYLATE 5 MG/1
5 TABLET ORAL 2 TIMES DAILY
Status: DISCONTINUED | OUTPATIENT
Start: 2019-05-27 | End: 2019-05-27

## 2019-05-27 RX ORDER — EZETIMIBE 10 MG/1
10 TABLET ORAL DAILY
Status: DISCONTINUED | OUTPATIENT
Start: 2019-05-27 | End: 2019-05-29 | Stop reason: HOSPADM

## 2019-05-27 RX ORDER — SODIUM CHLORIDE 9 MG/ML
1000 INJECTION, SOLUTION INTRAVENOUS ONCE
Status: COMPLETED | OUTPATIENT
Start: 2019-05-27 | End: 2019-05-27

## 2019-05-27 RX ORDER — SODIUM CHLORIDE 9 MG/ML
INJECTION, SOLUTION INTRAVENOUS
Status: COMPLETED
Start: 2019-05-27 | End: 2019-05-27

## 2019-05-27 RX ORDER — HYDRALAZINE HYDROCHLORIDE 50 MG/1
50 TABLET, FILM COATED ORAL 2 TIMES DAILY
Status: DISCONTINUED | OUTPATIENT
Start: 2019-05-28 | End: 2019-05-28

## 2019-05-27 RX ORDER — POTASSIUM CHLORIDE 7.45 MG/ML
10 INJECTION INTRAVENOUS
Status: DISPENSED | OUTPATIENT
Start: 2019-05-27 | End: 2019-05-27

## 2019-05-27 RX ORDER — HYDROCHLOROTHIAZIDE 25 MG/1
12.5 TABLET ORAL 2 TIMES DAILY
Status: DISCONTINUED | OUTPATIENT
Start: 2019-05-27 | End: 2019-05-27

## 2019-05-27 RX ORDER — BISACODYL 10 MG
10 SUPPOSITORY, RECTAL RECTAL
Status: DISCONTINUED | OUTPATIENT
Start: 2019-05-27 | End: 2019-05-29 | Stop reason: HOSPADM

## 2019-05-27 RX ORDER — ENALAPRILAT 1.25 MG/ML
1.25 INJECTION INTRAVENOUS EVERY 6 HOURS PRN
Status: DISCONTINUED | OUTPATIENT
Start: 2019-05-27 | End: 2019-05-29 | Stop reason: HOSPADM

## 2019-05-27 RX ORDER — CLONIDINE HYDROCHLORIDE 0.1 MG/1
0.1 TABLET ORAL 2 TIMES DAILY
Status: DISCONTINUED | OUTPATIENT
Start: 2019-05-27 | End: 2019-05-27

## 2019-05-27 RX ORDER — AMLODIPINE BESYLATE 5 MG/1
5 TABLET ORAL 2 TIMES DAILY
Status: DISCONTINUED | OUTPATIENT
Start: 2019-05-28 | End: 2019-05-28

## 2019-05-27 RX ORDER — HYDRALAZINE HYDROCHLORIDE 50 MG/1
100 TABLET, FILM COATED ORAL 2 TIMES DAILY
Status: DISCONTINUED | OUTPATIENT
Start: 2019-05-27 | End: 2019-05-27

## 2019-05-27 RX ORDER — POTASSIUM CHLORIDE 20 MEQ/1
40 TABLET, EXTENDED RELEASE ORAL DAILY
Status: DISCONTINUED | OUTPATIENT
Start: 2019-05-27 | End: 2019-05-27

## 2019-05-27 RX ORDER — POLYETHYLENE GLYCOL 3350 17 G/17G
1 POWDER, FOR SOLUTION ORAL
Status: DISCONTINUED | OUTPATIENT
Start: 2019-05-27 | End: 2019-05-29 | Stop reason: HOSPADM

## 2019-05-27 RX ORDER — AMLODIPINE BESYLATE 5 MG/1
5 TABLET ORAL
Status: DISCONTINUED | OUTPATIENT
Start: 2019-05-27 | End: 2019-05-27

## 2019-05-27 RX ADMIN — POTASSIUM CHLORIDE 10 MEQ: 7.46 INJECTION, SOLUTION INTRAVENOUS at 20:02

## 2019-05-27 RX ADMIN — ENOXAPARIN SODIUM 40 MG: 100 INJECTION SUBCUTANEOUS at 21:44

## 2019-05-27 RX ADMIN — SODIUM CHLORIDE 1000 ML: 9 INJECTION, SOLUTION INTRAVENOUS at 01:45

## 2019-05-27 RX ADMIN — POTASSIUM CHLORIDE 10 MEQ: 7.46 INJECTION, SOLUTION INTRAVENOUS at 16:29

## 2019-05-27 RX ADMIN — SODIUM CHLORIDE 500 ML: 9 INJECTION, SOLUTION INTRAVENOUS at 15:16

## 2019-05-27 RX ADMIN — HYDRALAZINE HYDROCHLORIDE 100 MG: 50 TABLET, FILM COATED ORAL at 13:54

## 2019-05-27 RX ADMIN — POTASSIUM CHLORIDE 10 MEQ: 7.46 INJECTION, SOLUTION INTRAVENOUS at 21:44

## 2019-05-27 RX ADMIN — SODIUM CHLORIDE: 9 INJECTION, SOLUTION INTRAVENOUS at 21:50

## 2019-05-27 RX ADMIN — SODIUM CHLORIDE 500 ML: 9 INJECTION, SOLUTION INTRAVENOUS at 14:45

## 2019-05-27 RX ADMIN — HYDROCHLOROTHIAZIDE 12.5 MG: 25 TABLET ORAL at 04:30

## 2019-05-27 RX ADMIN — AMLODIPINE BESYLATE 5 MG: 5 TABLET ORAL at 04:28

## 2019-05-27 RX ADMIN — ASPIRIN 81 MG: 81 TABLET, DELAYED RELEASE ORAL at 04:28

## 2019-05-27 ASSESSMENT — COGNITIVE AND FUNCTIONAL STATUS - GENERAL
MOBILITY SCORE: 24
SUGGESTED CMS G CODE MODIFIER MOBILITY: CH

## 2019-05-27 ASSESSMENT — GAIT ASSESSMENTS
ASSISTIVE DEVICE: FRONT WHEEL WALKER
DISTANCE (FEET): 150
GAIT LEVEL OF ASSIST: SUPERVISED

## 2019-05-27 ASSESSMENT — LIFESTYLE VARIABLES: EVER_SMOKED: YES

## 2019-05-27 NOTE — PROGRESS NOTES
1430 Pt came to RN station to report dizziness, Pt assisted to chair /59.  2 RNs assisted to bed, supine/[id number].  .  Benita MARKHAM with [facility] called to bedside.500ccNS  bolus initiated    12 lead- SR   FSBS 95  Breanna APRN cards at bedside.  Additional 500cc bolus NS infusing

## 2019-05-27 NOTE — ED TRIAGE NOTES
Pt to rm 18 per ems, c/o elevated blood pressure x 1 day with mild headache, denies trauma/fever, aox4, resp even/unlabored

## 2019-05-27 NOTE — THERAPY
OT referral received. Per RN, pt hypotensive and not appropriate for OOB activity at this time. Will continue to monitor and complete OT eval as indicated.

## 2019-05-27 NOTE — PROGRESS NOTES
Called to patient's bedside by nursing after patient became dizzy/lightheaded and was found to have a blood pressures of 89 over 50s.  When I presented to bedside patient was in Trendelenburg position was visibly shaking and complaining of being severely lightheaded and dizzy.  500 cc bolus was started.  EKG with sinus rhythm and PVCs.  A stat troponin was obtained.  Dr. Bernabe of cardiology was consulted and recommended an additional 500 cc bolus and to hold evening blood pressure medications.  Recommended restarting amlodipine 5 mg twice daily and hydralazine 50 mg twice daily tomorrow morning.

## 2019-05-27 NOTE — ED NOTES
ROUNDING- PT updated on POC. Pending- bed placement. PT in no distress at this time. Will continue to monitor.

## 2019-05-27 NOTE — H&P
"[facility] MEDICINE HISTORY AND PHYSICAL     PATIENT ID:  NAME:  Livia Corea  MRN:               [id number]  YOB: 1926    Date of Admission: 5/26/2019     Attending: Dr. Wilson    Resident: Dr. Posadas    Primary Care Physician:  Magan    CC: \"My blood pressure is high\"    HPI: Livia Corea is a 89 y.o. female who presented with hypertensive urgency. Patient reports that this afternoon her blood pressure was elevated >170 SBP and so she took her clonidine, her blood pressure initially decreased but within an hour had increased again prompting her to call EMS, she reports her BP went as high as 210 systolic, she denied chest pain or shortness of breath, if it were not for her elevated BP she would be at her baseline health, denies changes in vision, headache, n/v, edema.    REVIEW OF SYSTEMS:   Ten systems reviewed and were negative except as noted in the HPI.                PAST MEDICAL HISTORY:  Past Medical History:   Diagnosis Date   • Circulation problem 01/2018   • Hyperlipidemia    • Hypertension        PAST SURGICAL HISTORY:  Past Surgical History:   Procedure Laterality Date   • CAROTID ENDARTERECTOMY Right 2000   • GYN SURGERY      hysterectomy       FAMILY HISTORY:  Family History   Problem Relation Age of Onset   • Heart Attack Mother 64   • Heart Attack Father 72       SOCIAL HISTORY:   Pt lives in [place] alone  Smoking denies  Etoh use denies  Drug use denies    DIET:   Orders Placed This Encounter   Procedures   • Diet Order Regular     Standing Status:   Standing     Number of Occurrences:   1     Order Specific Question:   Diet:     Answer:   Regular [1]       ALLERGIES:  Allergies   Allergen Reactions   • Chlorthalidone      Headaches severe. Heart racing    • Lisinopril Swelling     Of lower extremities        OUTPATIENT MEDICATIONS:    Current Facility-Administered Medications:   •  senna-docusate (PERICOLACE or SENOKOT S) 8.6-50 MG per tablet 2 Tab, 2 Tab, Oral, BID **AND** " polyethylene glycol/lytes (MIRALAX) PACKET 1 Packet, 1 Packet, Oral, QDAY PRN **AND** magnesium hydroxide (MILK OF MAGNESIA) suspension 30 mL, 30 mL, Oral, QDAY PRN **AND** bisacodyl (DULCOLAX) suppository 10 mg, 10 mg, Rectal, QDAY PRN, Luciano Posadas M.D.  •  enalaprilat (VASOTEC) injection 1.25 mg, 1.25 mg, Intravenous, Q6HRS PRN, Luciano Posadas M.D.  •  amLODIPine (NORVASC) tablet 5 mg, 5 mg, Oral, Q DAY, Luciano Posadas M.D.  •  aspirin EC (ECOTRIN) tablet 81 mg, 81 mg, Oral, DAILY, Luciano Posadas M.D.  •  cloNIDine (CATAPRES) tablet 0.1 mg, 0.1 mg, Oral, BID, Luciano Posadas M.D.  •  ezetimibe (ZETIA) tablet 10 mg, 10 mg, Oral, DAILY, Luciano Posadas M.D.  •  hydroCHLOROthiazide (HYDRODIURIL) tablet 12.5 mg, 12.5 mg, Oral, BID, Luciano Posadas M.D.    Current Outpatient Prescriptions:   •  cloNIDine (CATAPRES) 0.1 MG Tab, Take 1 Tab by mouth 2 times a day. May take an extra clonidine midday for systolic BP >170, Disp: 1 Tab, Rfl: 1  •  hydroCHLOROthiazide (HYDRODIURIL) 12.5 MG tablet, Take 1 Tab by mouth 2 Times a Day., Disp: 60 Tab, Rfl: 11  •  ezetimibe (ZETIA) 10 MG Tab, Take 10 mg by mouth every day., Disp: , Rfl:   •  amLODIPine (NORVASC) 5 MG Tab, Take 1 Tab by mouth 2 Times a Day., Disp: 60 Tab, Rfl: 11  •  Garlic 10 MG Cap, Take 2 Caps by mouth 3 times a day, with meals., Disp: , Rfl:   •  Omega-3 Fatty Acids (FISH OIL) 1000 MG Cap capsule, Take 2 Caps by mouth every day., Disp: 90 Cap, Rfl: 3  •  ascorbic acid (ASCORBIC ACID) 500 MG Tab, Take 500 mg by mouth every day., Disp: , Rfl:   •  cyanocobalamin (VITAMIN B-12) 500 MCG Tab, Take 500 mcg by mouth every day., Disp: , Rfl:   •  aspirin EC (ECOTRIN) 81 MG Tablet Delayed Response, Take 81 mg by mouth every day., Disp: , Rfl:     PHYSICAL EXAM:  Vitals:    05/26/19 2354 05/27/19 0031 05/27/19 0101 05/27/19 0201   Resp: (!) 34 (!) 64 16 (!) 21   Temp: 36.1 °C (97 °F)      TempSrc: Temporal      SpO2: 93% 94% 94% 96%  "  Weight: 59 kg (130 lb 1.1 oz)      Height: 1.651 m (5' 5\")      , Temp (24hrs), Av.1 °C (97 °F), Min:36.1 °C (97 °F), Max:36.1 °C (97 °F)  , Pulse Oximetry: 96 %    General: Pt resting in NAD, cooperative   Skin:  Pink, warm and dry.  No rashes  HEENT: NC/AT. PERRL. EOMI. MMM. No nasal discharge. Oropharynx nonerythematous without exudate/plaques  Neck:  Supple without lymphadenopathy or rigidity. No JVD   Lungs:  Symmetrical.  CTAB with no W/R/R.  Good air movement   Cardiovascular:  S1/S2 RRR without M/R/G.  Abdomen:  Abdomen is soft NT/ND. +BS. No masses noted.   Extremities:  Full range of motion. No gross deformities noted. 2+ pulses in all extremities. No C/C/E   Spine:  Straight without vertebral anomalies.  CNS:  Muscle tone is normal. Cranial nerves II-XII grossly intact. 2+ DTRs.       ERCourse:  BP not requiring PRN medications, did have episodes of bradycardia down to the 30's prompting admission    LAB TESTS:   Recent Labs      19   2350   WBC  5.2   RBC  4.49   HEMOGLOBIN  13.5   HEMATOCRIT  40.4   MCV  90.0   MCH  30.1   RDW  42.8   PLATELETCT  250   MPV  9.3   NEUTSPOLYS  46.60   LYMPHOCYTES  40.20   MONOCYTES  9.50   EOSINOPHILS  2.70   BASOPHILS  0.80     Recent Labs      19   2350   TROPONINI  0.01     Recent Labs      19   2350   SODIUM  128*   POTASSIUM  3.4*   CHLORIDE  93*   CO2  25   BUN  27*   CREATININE  0.98   CALCIUM  9.7   ALBUMIN  4.4       CULTURES:   Results     ** No results found for the last 168 hours. **          IMAGES:  CXR normal    CONSULTS:   none    ASSESSMENT/PLAN: 89 y.o. female admitted for hypertensive urgency and bradycardia.    # Hypertensive urgency  # HTN  - Hx of labile blood pressures  - Home use of PRN clonidine  - BP's today in the 170-200's  - Asymptomatic  - Followed by cardiology outpatient   - Continue home meds plus PRNs    # Bradycardia  - Episodes of bradycardia down to the 30's  - EKG unchanged from prior studies  - Asymptomatic "   - Trop neg in ER  - Unclear etiology   - Monitor on tele   - May consider discussing with cardiology in the morning    # Hyponatremia   - Na 128  - asymptomatic   - Likely dehydration component  - IVF bolus in ER   - Follow up am labs     Dispo: Admit to tele for monitoring and BP control  Code: DNR but intubation OK

## 2019-05-27 NOTE — CONSULTS
Cardiology Consult Note:    Arianna To  Date & Time note created:    5/27/2019   1:29 PM     Referring MD:  Dr. Wilson    Patient ID:   Name:             Livia Corea   YOB: 1926  Age:                 89 y.o.  female   MRN:               [id number]                                                             Chief Complaint / Reason for consult:  Hypertension managemnt    History of Present Illness:    This is a 89 years old women with prior history of uncontrolled hypertension, presented to the hospital due to not feeling well and found to be in bradycardia.  Patient was on clonidine for her blood pressure control.  She was last seen in cardiology office for which her hydrochlorothiazide was restarted and her amlodipine was increased to 5 mill grams p.o. twice a day.  She denies having any chest pain or shortness of breath.  She does not feel well when her blood pressure is elevated.    No family history of sudden cardiac death.    I personally interpreted her EKG tracing which shows sinus rhythm without evidence of acute coronary syndrome.    I have independently interpreted and reviewed echocardiogram's actual images with patient which showed normal left ventricular systolic function. No wall motion abnormality. No significant valvular disease. 03/2018.    No significant coronary ischemia found on nuclear stress test done in March 2018.    Review of Systems:      Constitutional: Denies fevers, Denies weight changes  Eyes: Denies changes in vision, no eye pain  Ears/Nose/Throat/Mouth: Denies nasal congestion or sore throat   Cardiovascular: no chest pain, no palpitations   Respiratory: no shortness of breath , Denies cough  Gastrointestinal/Hepatic: Denies abdominal pain, nausea, vomiting, diarrhea, constipation or GI bleeding   Genitourinary: Denies dysuria or frequency  Musculoskeletal/Rheum: Denies  joint pain and swelling   Skin: Denies rash  Neurological: Denies headache,  confusion, memory loss or focal weakness/parasthesias  Psychiatric: denies mood disorder   Endocrine: [place] thyroid problems  Heme/Oncology/Lymph Nodes: Denies enlarged lymph nodes, denies brusing or known bleeding disorder  All other systems were reviewed and are negative (AMA/CMS criteria)                Past Medical History:   Past Medical History:   Diagnosis Date   • Circulation problem 01/2018   • Hyperlipidemia    • Hypertension      Active Hospital Problems    Diagnosis   • HTN (hypertension), malignant [I10]     Priority: Low       Past Surgical History:  Past Surgical History:   Procedure Laterality Date   • CAROTID ENDARTERECTOMY Right 2000   • GYN SURGERY      hysterectomy       Hospital Medications:    Current Facility-Administered Medications:   •  senna-docusate (PERICOLACE or SENOKOT S) 8.6-50 MG per tablet 2 Tab, 2 Tab, Oral, BID **AND** polyethylene glycol/lytes (MIRALAX) PACKET 1 Packet, 1 Packet, Oral, QDAY PRN **AND** magnesium hydroxide (MILK OF MAGNESIA) suspension 30 mL, 30 mL, Oral, QDAY PRN **AND** bisacodyl (DULCOLAX) suppository 10 mg, 10 mg, Rectal, QDAY PRN, Luciano Posadas M.D.  •  enalaprilat (VASOTEC) injection 1.25 mg, 1.25 mg, Intravenous, Q6HRS PRN, Luciano Posadas M.D.  •  aspirin EC (ECOTRIN) tablet 81 mg, 81 mg, Oral, DAILY, Luciano Posadas M.D., 81 mg at 05/27/19 0428  •  ezetimibe (ZETIA) tablet 10 mg, 10 mg, Oral, DAILY, Luciano Posadas M.D.  •  amLODIPine (NORVASC) tablet 5 mg, 5 mg, Oral, BID, Benita Sood M.D.  •  hydrALAZINE (APRESOLINE) tablet 100 mg, 100 mg, Oral, BID, Arianna Bernabe M.D.    Current Outpatient Medications:  Prescriptions Prior to Admission   Medication Sig Dispense Refill Last Dose   • cloNIDine (CATAPRES) 0.1 MG Tab Take 1 Tab by mouth 2 times a day. May take an extra clonidine midday for systolic BP >170 1 Tab 1    • hydroCHLOROthiazide (HYDRODIURIL) 12.5 MG tablet Take 1 Tab by mouth 2 Times a Day. 60 Tab 11    • ezetimibe  "(ZETIA) 10 MG Tab Take 10 mg by mouth every day.   Taking   • amLODIPine (NORVASC) 5 MG Tab Take 1 Tab by mouth 2 Times a Day. 60 Tab 11 Taking   • Garlic 10 MG Cap Take 2 Caps by mouth 3 times a day, with meals.   Taking   • Omega-3 Fatty Acids (FISH OIL) 1000 MG Cap capsule Take 2 Caps by mouth every day. 90 Cap 3 Taking   • ascorbic acid (ASCORBIC ACID) 500 MG Tab Take 500 mg by mouth every day.   Taking   • cyanocobalamin (VITAMIN B-12) 500 MCG Tab Take 500 mcg by mouth every day.   Taking   • aspirin EC (ECOTRIN) 81 MG Tablet Delayed Response Take 81 mg by mouth every day.   Taking       Medication Allergy:  Allergies   Allergen Reactions   • Chlorthalidone      Headaches severe. Heart racing    • Lisinopril Swelling     Of lower extremities        Family History:  Family History   Problem Relation Age of Onset   • Heart Attack Mother 64   • Heart Attack Father 72       Social History:  Social History     Social History   • Marital status: Single     Spouse name: N/A   • Number of children: N/A   • Years of education: N/A     Occupational History   • Not on file.     Social History Main Topics   • Smoking status: Former Smoker     Quit date: 2/25/1988   • Smokeless tobacco: Never Used   • Alcohol use Yes      Comment: 1 glass of wine x 2 days a week   • Drug use: No   • Sexual activity: Not on file     Other Topics Concern   • Not on file     Social History Narrative   • No narrative on file         Physical Exam:  Vitals/ General Appearance:   Weight/BMI: Body mass index is 21.64 kg/m².  /73   Pulse 60   Temp 36.3 °C (97.4 °F) (Temporal)   Resp 17   Ht 1.651 m (5' 5\")   Wt 59 kg (130 lb 1.1 oz)   SpO2 94%   Vitals:    05/27/19 0423 05/27/19 0438 05/27/19 0750 05/27/19 1200   BP: 155/69 140/64 154/65 146/73   Pulse:   (!) 56 60   Resp:   17 17   Temp:   36.4 °C (97.6 °F) 36.3 °C (97.4 °F)   TempSrc:   Temporal Temporal   SpO2:   97% 94%   Weight:       Height:         Oxygen Therapy:  Pulse " Oximetry: 94 %, O2 (LPM): 0, O2 Delivery: None (Room Air)    Constitutional:   Well developed, Well nourished, No acute distress  HENMT:  Normocephalic, Atraumatic, Oropharynx moist mucous membranes, No oral exudates, Nose normal.  No thyromegaly.  Eyes:  EOMI, Conjunctiva normal, No discharge.  Neck:  Normal range of motion, No cervical tenderness,  no JVD.  Cardiovascular:  Normal heart rate, Normal rhythm, No murmurs, No rubs, No gallops.   Extremitites with intact distal pulses, no cyanosis, or edema.  Lungs:  Normal breath sounds, breath sounds clear to auscultation bilaterally,  no rales, no rhonchi, no wheezing.   Abdomen: Bowel sounds normal, Soft, No tenderness, No guarding, No rebound, No masses, No hepatosplenomegaly.  Skin: Warm, Dry, No erythema, No rash, no induration.  Neurologic: Alert & oriented x 3, No focal deficits noted, cranial nerves II through X are intact.  Psychiatric: Affect normal, Judgment normal, Mood normal.      MDM (Data Review):     Records reviewed and summarized in current documentation    Lab Data Review:  Recent Results (from the past 24 hour(s))   CBC with Differential    Collection Time: 05/26/19 11:50 PM   Result Value Ref Range    WBC 5.2 4.8 - 10.8 K/uL    RBC 4.49 4.20 - 5.40 M/uL    Hemoglobin 13.5 12.0 - 16.0 g/dL    Hematocrit 40.4 37.0 - 47.0 %    MCV 90.0 81.4 - 97.8 fL    MCH 30.1 27.0 - 33.0 pg    MCHC 33.4 (L) 33.6 - 35.0 g/dL    RDW 42.8 35.9 - 50.0 fL    Platelet Count 250 164 - 446 K/uL    MPV 9.3 9.0 - 12.9 fL    Neutrophils-Polys 46.60 44.00 - 72.00 %    Lymphocytes 40.20 22.00 - 41.00 %    Monocytes 9.50 0.00 - 13.40 %    Eosinophils 2.70 0.00 - 6.90 %    Basophils 0.80 0.00 - 1.80 %    Immature Granulocytes 0.20 0.00 - 0.90 %    Nucleated RBC 0.00 /100 WBC    Neutrophils (Absolute) 2.40 2.00 - 7.15 K/uL    Lymphs (Absolute) 2.07 1.00 - 4.80 K/uL    Monos (Absolute) 0.49 0.00 - 0.85 K/uL    Eos (Absolute) 0.14 0.00 - 0.51 K/uL    Baso (Absolute) 0.04 0.00 -  0.12 K/uL    Immature Granulocytes (abs) 0.01 0.00 - 0.11 K/uL    NRBC (Absolute) 0.00 K/uL   Complete Metabolic Panel (CMP)    Collection Time: 19 11:50 PM   Result Value Ref Range    Sodium 128 (L) 135 - 145 mmol/L    Potassium 3.4 (L) 3.6 - 5.5 mmol/L    Chloride 93 (L) 96 - 112 mmol/L    Co2 25 20 - 33 mmol/L    Anion Gap 10.0 0.0 - 11.9    Glucose 102 (H) 65 - 99 mg/dL    Bun 27 (H) 8 - 22 mg/dL    Creatinine 0.98 0.50 - 1.40 mg/dL    Calcium 9.7 8.5 - 10.5 mg/dL    AST(SGOT) 16 12 - 45 U/L    ALT(SGPT) 7 2 - 50 U/L    Alkaline Phosphatase 84 30 - 99 U/L    Total Bilirubin 0.6 0.1 - 1.5 mg/dL    Albumin 4.4 3.2 - 4.9 g/dL    Total Protein 7.8 6.0 - 8.2 g/dL    Globulin 3.4 1.9 - 3.5 g/dL    A-G Ratio 1.3 g/dL   Troponin    Collection Time: 19 11:50 PM   Result Value Ref Range    Troponin I 0.01 0.00 - 0.04 ng/mL   ESTIMATED GFR    Collection Time: 19 11:50 PM   Result Value Ref Range    GFR If African American >60 >60 mL/min/1.73 m 2    GFR If Non  53 (A) >60 mL/min/1.73 m 2   EKG (NOW)    Collection Time: 19 12:03 AM   Result Value Ref Range    Report       [facility] Emergency Dept.    Test Date:  2019  Pt Name:    GEO WHITESIDE                 Department: ER  MRN:        [id number]                      Room:        18  Gender:     Female                       Technician: [id number]  :        [id number]                   Requested By:RHIANNA ORLANDO  Order #:    [id number]                    Reading MD: RHIANNA ORLANDO MD    Measurements  Intervals                                Axis  Rate:       52                           P:          58  SD:         204                          QRS:        55  QRSD:       172                          T:          246  QT:         548  QTc:        510    Interpretive Statements  SINUS BRADYCARDIA  IVCD, CONSIDER ATYPICAL LBBB  Compared to ECG 2019 09:08:30  No significant changes    Electronically  Signed On 5- 1:00:53 PDT by RHIANNA ORLANDO MD     Basic Metabolic Panel    Collection Time: 05/27/19  9:55 AM   Result Value Ref Range    Sodium 131 (L) 135 - 145 mmol/L    Potassium 3.2 (L) 3.6 - 5.5 mmol/L    Chloride 96 96 - 112 mmol/L    Co2 27 20 - 33 mmol/L    Glucose 103 (H) 65 - 99 mg/dL    Bun 20 8 - 22 mg/dL    Creatinine 0.83 0.50 - 1.40 mg/dL    Calcium 9.5 8.5 - 10.5 mg/dL    Anion Gap 8.0 0.0 - 11.9   ESTIMATED GFR    Collection Time: 05/27/19  9:55 AM   Result Value Ref Range    GFR If African American >60 >60 mL/min/1.73 m 2    GFR If Non African American >60 >60 mL/min/1.73 m 2       Imaging/Procedures Review:    Chest Xray:  Reviewed    EKG:   As in HPI.     MDM (Assessment and Plan):     Active Hospital Problems    Diagnosis   • HTN (hypertension), malignant [I10]     Priority: Low         At this time, we will stop hydrochlorothiazide and potassium supplement.  We will start patient on hydralazine 100 mg p.o. twice a day.  Agree with stopping clonidine.  Continue amlodipine 5 mg p.o. twice a day.  No further invasive cardiac work-up or procedure.      Thank you for referring this patient to our cardiology service.  We will follow patient with you.      Arianna Bernabe MD.   Cardiology Inpatient Service.  [facility].  [phone].  Kailyn Nazario.

## 2019-05-27 NOTE — PROGRESS NOTES
Pt arrived on floor from ED with ACLS RN. Pt placed on tele monitor, monitor room notified. Pt admitted and assessed. Call light and belongings are within reach.

## 2019-05-27 NOTE — THERAPY
"Physical Therapy Evaluation completed.   Bed Mobility: Modified Independent     Transfers: Modified Independent   Gait: Supervised with Front-Wheel Walker       Plan of Care: Patient with no further skilled PT needs in the acute care setting at this time  Discharge Recommendations: Equipment: No Equipment Needed. Post-acute therapy Currently anticipate no further skilled therapy needs once patient is discharged from the inpatient setting.    Pt admitted for hypertensive urgency and presents very near or at her functional baseline. She completed x150' of gait with FWW with /89mmHg pre-gait and 184/79mmHg post-gait. Denied symptoms. At this time, pt does not require further acute PT intervention and appears functionally capable of return home.     See \"Rehab Therapy-Acute\" Patient Summary Report for complete documentation.     "

## 2019-05-27 NOTE — ED PROVIDER NOTES
ED Provider Note    Scribed for Javier Phelan M.D. by Mahin Andrew. 5/26/2019, 11:57 PM.    Primary care provider: Estuardo Carrero M.D.  Means of arrival: Ambulance  History obtained from: Patient   History limited by: None     CHIEF COMPLAINT  Chief Complaint   Patient presents with   • Hypertension   • Headache       HPI  Livia Corea is a 89 y.o. female who presents to the Emergency Department evaluation of hypertension onset 4 pm today. She reports that she takes her blood pressure 3-4 times daily and that usually her blood pressure is in the 130's systolic. However, around 4 pm today, her blood pressure had been in the 170's. At that time, she took 1 mg clonidine which she was prescribed 2 weeks ago. Her blood pressure continued to increased and was at 200/124 around 11 pm. At that time, she took an additional dose of clonidine. She presented to the ED because she was upset about her blood pressure. Patient takes 5 mg Norvasc and 12.5 mg HCTZ daily.  She also endorses associated left forehead discomfort onset after arrival and leg swelling onset last night. She denies nausea, chest pain, shortness of breath, abdominal pain, dysphagia, aphasia, paraesthesia, or weakness.     REVIEW OF SYSTEMS  Pertinent positives include slight headache and leg swelling. Pertinent negatives include nausea, diarrhea, chest pain, abdominal pain, slurred speech, trouble speaking, numbness or tingling.   .All other systems negative.    PAST MEDICAL HISTORY   has a past medical history of Circulation problem (01/2018); Hyperlipidemia; and Hypertension.    SURGICAL HISTORY   has a past surgical history that includes gyn surgery and carotid endarterectomy (Right, 2000).    SOCIAL HISTORY  Social History   Substance Use Topics   • Smoking status: Former Smoker     Quit date: 2/25/1988   • Smokeless tobacco: Never Used   • Alcohol use Yes      Comment: 1 glass of wine x 2 days a week      History   Drug Use No  "      FAMILY HISTORY  Family History   Problem Relation Age of Onset   • Heart Attack Mother 64   • Heart Attack Father 72       CURRENT MEDICATIONS  Home Medications     Reviewed by Tarik Gallegos R.N. (Registered Nurse) on 05/26/19 at 2355  Med List Status: Partial   Medication Last Dose Status   amLODIPine (NORVASC) 5 MG Tab  Active   ascorbic acid (ASCORBIC ACID) 500 MG Tab  Active   aspirin EC (ECOTRIN) 81 MG Tablet Delayed Response  Active   cloNIDine (CATAPRES) 0.1 MG Tab  Active   cyanocobalamin (VITAMIN B-12) 500 MCG Tab  Active   ezetimibe (ZETIA) 10 MG Tab  Active   Garlic 10 MG Cap  Active   hydroCHLOROthiazide (HYDRODIURIL) 12.5 MG tablet  Active   Omega-3 Fatty Acids (FISH OIL) 1000 MG Cap capsule  Active                ALLERGIES  Allergies   Allergen Reactions   • Chlorthalidone      Headaches severe. Heart racing    • Lisinopril Swelling     Of lower extremities        PHYSICAL EXAM  VITAL SIGNS: Ht 1.651 m (5' 5\")   Wt 59 kg (130 lb 1.1 oz)   BMI 21.64 kg/m²     Constitutional: Well developed, Well nourished, no acute distress.   HENT: Normocephalic, Atraumatic, Oropharynx moist.   Eyes: Conjunctiva normal, No discharge.   Cardiovascular: Normal heart rate, Normal rhythm, No murmurs, equal pulses.   Pulmonary: Normal breath sounds, No respiratory distress, No wheezing, No rales, No rhonchi. Heart and Lungs normal  Chest: No chest wall tenderness or deformity.   Abdomen:Soft, Non-tender, No masses, no rebound, no guarding.   Back: No CVA tenderness.   Musculoskeletal: No major deformities noted, No tenderness.   Skin: Warm, Dry, No erythema, No rash.   Neurologic: Alert & oriented x 3, Normal motor function,  No focal deficits noted.   Psychiatric: Affect normal, Judgment normal, Mood normal.     LABS  Results for orders placed or performed during the hospital encounter of 05/26/19   CBC with Differential   Result Value Ref Range    WBC 5.2 4.8 - 10.8 K/uL    RBC 4.49 4.20 - 5.40 M/uL    " Hemoglobin 13.5 12.0 - 16.0 g/dL    Hematocrit 40.4 37.0 - 47.0 %    MCV 90.0 81.4 - 97.8 fL    MCH 30.1 27.0 - 33.0 pg    MCHC 33.4 (L) 33.6 - 35.0 g/dL    RDW 42.8 35.9 - 50.0 fL    Platelet Count 250 164 - 446 K/uL    MPV 9.3 9.0 - 12.9 fL    Neutrophils-Polys 46.60 44.00 - 72.00 %    Lymphocytes 40.20 22.00 - 41.00 %    Monocytes 9.50 0.00 - 13.40 %    Eosinophils 2.70 0.00 - 6.90 %    Basophils 0.80 0.00 - 1.80 %    Immature Granulocytes 0.20 0.00 - 0.90 %    Nucleated RBC 0.00 /100 WBC    Neutrophils (Absolute) 2.40 2.00 - 7.15 K/uL    Lymphs (Absolute) 2.07 1.00 - 4.80 K/uL    Monos (Absolute) 0.49 0.00 - 0.85 K/uL    Eos (Absolute) 0.14 0.00 - 0.51 K/uL    Baso (Absolute) 0.04 0.00 - 0.12 K/uL    Immature Granulocytes (abs) 0.01 0.00 - 0.11 K/uL    NRBC (Absolute) 0.00 K/uL   Complete Metabolic Panel (CMP)   Result Value Ref Range    Sodium 128 (L) 135 - 145 mmol/L    Potassium 3.4 (L) 3.6 - 5.5 mmol/L    Chloride 93 (L) 96 - 112 mmol/L    Co2 25 20 - 33 mmol/L    Anion Gap 10.0 0.0 - 11.9    Glucose 102 (H) 65 - 99 mg/dL    Bun 27 (H) 8 - 22 mg/dL    Creatinine 0.98 0.50 - 1.40 mg/dL    Calcium 9.7 8.5 - 10.5 mg/dL    AST(SGOT) 16 12 - 45 U/L    ALT(SGPT) 7 2 - 50 U/L    Alkaline Phosphatase 84 30 - 99 U/L    Total Bilirubin 0.6 0.1 - 1.5 mg/dL    Albumin 4.4 3.2 - 4.9 g/dL    Total Protein 7.8 6.0 - 8.2 g/dL    Globulin 3.4 1.9 - 3.5 g/dL    A-G Ratio 1.3 g/dL   Troponin   Result Value Ref Range    Troponin I 0.01 0.00 - 0.04 ng/mL   ESTIMATED GFR   Result Value Ref Range    GFR If African American >60 >60 mL/min/1.73 m 2    GFR If Non  53 (A) >60 mL/min/1.73 m 2   EKG (NOW)   Result Value Ref Range    Report       [facility] Emergency Dept.    Test Date:  2019  Pt Name:    GEO WHITESIDE                 Department: ER  MRN:        [id number]                      Room:        18  Gender:     Female                       Technician: [id number]  :        [id number]                    Requested By:RHIANNA ORLANDO  Order #:    [id number]                    Reading MD: RHIANNA ORLANDO MD    Measurements  Intervals                                Axis  Rate:       52                           P:          58  ND:         204                          QRS:        55  QRSD:       172                          T:          246  QT:         548  QTc:        510    Interpretive Statements  SINUS BRADYCARDIA  IVCD, CONSIDER ATYPICAL LBBB  Compared to ECG 2019 09:08:30  No significant changes    Electronically Signed On 2019 1:00:53 PDT by RHIANNA ORLANDO MD         All labs reviewed by me.    EKG  Results for orders placed or performed during the hospital encounter of 19   EKG (NOW)   Result Value Ref Range    Report       [facility] Emergency Dept.    Test Date:  2019  Pt Name:    GEO WHITESIDE                 Department: ER  MRN:        [id number]                      Room:       [facility]  Gender:     Female                       Technician: [id number]  :        [id number]                   Requested By:RHIANNA ORLANDO  Order #:    [id number]                    Reading MD: RHIANNA ORLANDO MD    Measurements  Intervals                                Axis  Rate:       52                           P:          58  ND:         204                          QRS:        55  QRSD:       172                          T:          246  QT:         548  QTc:        510    Interpretive Statements  SINUS BRADYCARDIA  IVCD, CONSIDER ATYPICAL LBBB  Compared to ECG 2019 09:08:30  No significant changes    Electronically Signed On 2019 1:00:53 PDT by RHIANNA ORLANDO MD         RADIOLOGY  DX-CHEST-PORTABLE (1 VIEW)   Final Result      1.  There is no acute cardiopulmonary process.        The radiologist's interpretation of all radiological studies have been reviewed by me.    COURSE & MEDICAL DECISION MAKING  Pertinent Labs & Imaging studies  reviewed. (See chart for details)    11:57 PM - Patient seen and examined at bedside. Patient will be treated with 1L NS. Ordered DX-Chest, CMP, CBC with diff., Troponin, estimated GFR, and EKG to evaluate her symptoms. The differential diagnoses include but are not limited to: Hypertensive urgency, endorgan damage, hypertension, electrolyte abnormality, myocardial infarction    Obtained and reviewed past medical records from 5/4/2019 which indicated patient was discharged from the hospital on May 1st. As she was leaving the same afternoon, she had a ground level fall. She presented to the ED 3 days later for evaluation for worsening myalgias.    12:15 PM - Per the nurse, the patient had some PVCs while blood was being drawn.     1:05 AM - I discussed lab and radiology results as above. Patient reevaluated at bedside and heart rate is dropping into the 30s. She denies taking any additional doses of her hypertension medication. Discussed plan for admission for continued monitoring and blood pressure management. Patient is agreeable to this.    1:23 AM - I discussed the patient's case and the above findings with Dr. Posadas ([facility]) who will accept the patient to their service.     Medical Decision Making: At this point time patient's blood pressure is come down but she is now having episodes of bradycardia into the 30s.  Because of this I am concerned that she will need careful monitoring.  She may need adjustment of her blood pressure medications as well since this is a second time this month she is come in with elevated blood pressures.  Patient does not show any signs of endorgan damage.  She does have some hyponatremia which will be treated with normal saline.    DISPOSITION:  Patient will be admitted to Dr. Posadas in guarded condition.    FINAL IMPRESSION  1. Secondary hypertension    2. Hyponatremia    3. Bradycardia          Mahin BARBOZAScribalonso), am scribing for, and in the presence of,  Javier Phelan M.D.    Electronically signed by: Mahin Andrew (Scribe), 5/26/2019    IJavier M.D. personally performed the services described in this documentation, as scribed by Mahin Andrew in my presence, and it is both accurate and complete.    The note accurately reflects work and decisions made by me.  Javier Phelan  5/27/2019  2:17 AM

## 2019-05-28 DIAGNOSIS — F41.9 ANXIETY: ICD-10-CM

## 2019-05-28 LAB
ANION GAP SERPL CALC-SCNC: 7 MMOL/L (ref 0–11.9)
ANION GAP SERPL CALC-SCNC: 9 MMOL/L (ref 0–11.9)
BUN SERPL-MCNC: 16 MG/DL (ref 8–22)
BUN SERPL-MCNC: 16 MG/DL (ref 8–22)
CALCIUM SERPL-MCNC: 9.4 MG/DL (ref 8.5–10.5)
CALCIUM SERPL-MCNC: 9.4 MG/DL (ref 8.5–10.5)
CHLORIDE SERPL-SCNC: 103 MMOL/L (ref 96–112)
CHLORIDE SERPL-SCNC: 103 MMOL/L (ref 96–112)
CO2 SERPL-SCNC: 21 MMOL/L (ref 20–33)
CO2 SERPL-SCNC: 24 MMOL/L (ref 20–33)
CREAT SERPL-MCNC: 0.77 MG/DL (ref 0.5–1.4)
CREAT SERPL-MCNC: 0.89 MG/DL (ref 0.5–1.4)
EKG IMPRESSION: NORMAL
GLUCOSE SERPL-MCNC: 101 MG/DL (ref 65–99)
GLUCOSE SERPL-MCNC: 94 MG/DL (ref 65–99)
MAGNESIUM SERPL-MCNC: 2.2 MG/DL (ref 1.5–2.5)
POTASSIUM SERPL-SCNC: 3.4 MMOL/L (ref 3.6–5.5)
POTASSIUM SERPL-SCNC: 3.5 MMOL/L (ref 3.6–5.5)
SODIUM SERPL-SCNC: 133 MMOL/L (ref 135–145)
SODIUM SERPL-SCNC: 134 MMOL/L (ref 135–145)
TROPONIN I SERPL-MCNC: 0.02 NG/ML (ref 0–0.04)
TROPONIN I SERPL-MCNC: 0.03 NG/ML (ref 0–0.04)

## 2019-05-28 PROCEDURE — G0378 HOSPITAL OBSERVATION PER HR: HCPCS

## 2019-05-28 PROCEDURE — 700102 HCHG RX REV CODE 250 W/ 637 OVERRIDE(OP): Performed by: STUDENT IN AN ORGANIZED HEALTH CARE EDUCATION/TRAINING PROGRAM

## 2019-05-28 PROCEDURE — 36415 COLL VENOUS BLD VENIPUNCTURE: CPT

## 2019-05-28 PROCEDURE — 700102 HCHG RX REV CODE 250 W/ 637 OVERRIDE(OP): Performed by: FAMILY MEDICINE

## 2019-05-28 PROCEDURE — A9270 NON-COVERED ITEM OR SERVICE: HCPCS | Performed by: NURSE PRACTITIONER

## 2019-05-28 PROCEDURE — 700102 HCHG RX REV CODE 250 W/ 637 OVERRIDE(OP): Performed by: NURSE PRACTITIONER

## 2019-05-28 PROCEDURE — 97165 OT EVAL LOW COMPLEX 30 MIN: CPT

## 2019-05-28 PROCEDURE — 99215 OFFICE O/P EST HI 40 MIN: CPT | Performed by: INTERNAL MEDICINE

## 2019-05-28 PROCEDURE — A9270 NON-COVERED ITEM OR SERVICE: HCPCS | Performed by: STUDENT IN AN ORGANIZED HEALTH CARE EDUCATION/TRAINING PROGRAM

## 2019-05-28 PROCEDURE — A9270 NON-COVERED ITEM OR SERVICE: HCPCS | Performed by: FAMILY MEDICINE

## 2019-05-28 PROCEDURE — 80048 BASIC METABOLIC PNL TOTAL CA: CPT

## 2019-05-28 PROCEDURE — 84484 ASSAY OF TROPONIN QUANT: CPT

## 2019-05-28 RX ORDER — AMLODIPINE BESYLATE 10 MG/1
10 TABLET ORAL 2 TIMES DAILY
Status: DISCONTINUED | OUTPATIENT
Start: 2019-05-28 | End: 2019-05-29 | Stop reason: HOSPADM

## 2019-05-28 RX ORDER — POTASSIUM CHLORIDE 20 MEQ/1
40 TABLET, EXTENDED RELEASE ORAL ONCE
Status: DISPENSED | OUTPATIENT
Start: 2019-05-28 | End: 2019-05-29

## 2019-05-28 RX ORDER — AMLODIPINE BESYLATE 5 MG/1
5 TABLET ORAL ONCE
Status: COMPLETED | OUTPATIENT
Start: 2019-05-28 | End: 2019-05-28

## 2019-05-28 RX ADMIN — AMLODIPINE BESYLATE 5 MG: 5 TABLET ORAL at 16:35

## 2019-05-28 RX ADMIN — ASPIRIN 81 MG: 81 TABLET, DELAYED RELEASE ORAL at 05:37

## 2019-05-28 RX ADMIN — AMLODIPINE BESYLATE 5 MG: 5 TABLET ORAL at 05:37

## 2019-05-28 RX ADMIN — AMLODIPINE BESYLATE 5 MG: 10 TABLET ORAL at 20:59

## 2019-05-28 ASSESSMENT — COGNITIVE AND FUNCTIONAL STATUS - GENERAL
DAILY ACTIVITIY SCORE: 23
CLIMB 3 TO 5 STEPS WITH RAILING: A LITTLE
TOILETING: A LITTLE
STANDING UP FROM CHAIR USING ARMS: A LITTLE
SUGGESTED CMS G CODE MODIFIER DAILY ACTIVITY: CI
DAILY ACTIVITIY SCORE: 23
SUGGESTED CMS G CODE MODIFIER DAILY ACTIVITY: CI
SUGGESTED CMS G CODE MODIFIER MOBILITY: CJ
MOBILITY SCORE: 21
HELP NEEDED FOR BATHING: A LITTLE
WALKING IN HOSPITAL ROOM: A LITTLE

## 2019-05-28 ASSESSMENT — ENCOUNTER SYMPTOMS
PALPITATIONS: 0
ABDOMINAL PAIN: 0
FEVER: 0
NERVOUS/ANXIOUS: 1
DYSPHORIC MOOD: 1
SLEEP DISTURBANCE: 1
SHORTNESS OF BREATH: 0
BLOOD IN STOOL: 0
CHEST TIGHTNESS: 0
DIZZINESS: 0

## 2019-05-28 ASSESSMENT — PATIENT HEALTH QUESTIONNAIRE - PHQ9
SUM OF ALL RESPONSES TO PHQ9 QUESTIONS 1 AND 2: 0
2. FEELING DOWN, DEPRESSED, IRRITABLE, OR HOPELESS: NOT AT ALL
1. LITTLE INTEREST OR PLEASURE IN DOING THINGS: NOT AT ALL

## 2019-05-28 ASSESSMENT — ACTIVITIES OF DAILY LIVING (ADL): TOILETING: INDEPENDENT

## 2019-05-28 ASSESSMENT — LIFESTYLE VARIABLES: ALCOHOL_USE: NO

## 2019-05-28 NOTE — PROGRESS NOTES
Cardiology Follow Up Progress Note    Date of Service  5/28/2019    Attending Physician  Vitor Wilson M.D.    Chief Complaint   Uncontrolled blood pressure    Consulted hypertensive emergency management.     HPI  Livia Corea is a 89 y.o. female admitted 5/26/2019 with hypertensive urgency.    Patient was last seen in clinic by Dr. Napoleon Concepcion on 5/9/19 who started patient on hydrochlorothiazide 12.5 mg twice daily and her Norvasc was increased to 5 mg twice daily.  She was doing well on this regimen until approximately 1 week ago.  Patient also states that she has been under significant emotional distress due to the loss of 4 friends all within a 1 week span of time.    Past medical history significant for hypertension, hyperlipidemia and carotid artery disease.    Interim Events  5/27/19: Patient refusing to take any dosage of high Hydralazine after the 100 mg dose dropped her blood pressure into the 80s/50s which patient was very symptomatic with.  She also does not want to start the Aldactone as she just recently tried that in March and it caused her systolic blood pressure to spike into the low 200s.  While talking with patient, she does admit that perhaps her recent blood pressure issues may be due to emotional distress and anxiety.  She has suffered significant loss over the past 2 years with a total of 18 friends passing away (4 of them in the last week).  She has concerns about who will take care of her dog when she passes as she is concerned that at the age of 89 she will outlive her.  Patient states that she does have cyclical episodes of approximately twice a month where her depression anxiety increased significantly.    Monitor:SR 60-91 with occasional PVC's.     Review of Systems  Review of Systems   Constitutional: Negative for fever.   Respiratory: Negative for chest tightness and shortness of breath.    Cardiovascular: Negative for chest pain, palpitations and leg swelling.    Gastrointestinal: Negative for abdominal pain and blood in stool.   Genitourinary: Negative for hematuria.   Musculoskeletal: Negative for gait problem.   Neurological: Negative for dizziness and syncope.   Psychiatric/Behavioral: Positive for dysphoric mood and sleep disturbance. The patient is nervous/anxious.    All other systems reviewed and are negative.      Vital signs in last 24 hours  Temp:  [36.8 °C (98.2 °F)-37.4 °C (99.3 °F)] 37 °C (98.6 °F)  Pulse:  [62-76] 64  Resp:  [14-17] 14  BP: (104-182)/(53-89) 182/76  SpO2:  [90 %-96 %] 96 %    Physical Exam  Physical Exam   Constitutional: She is oriented to person, place, and time. She appears well-developed and well-nourished.   HENT:   Head: Normocephalic.   Eyes: EOM are normal.   Neck: No JVD present.   Cardiovascular: Normal rate, regular rhythm, normal heart sounds and intact distal pulses.    Pulmonary/Chest: Effort normal and breath sounds normal. No respiratory distress.   Abdominal: Soft. There is no tenderness.   Musculoskeletal: Normal range of motion. She exhibits no edema.   Neurological: She is alert and oriented to person, place, and time.   Skin: Skin is warm and dry.   Psychiatric: She has a normal mood and affect. Her behavior is normal. Thought content normal.   Nursing note and vitals reviewed.      Lab Review  Lab Results   Component Value Date/Time    WBC 5.2 05/26/2019 11:50 PM    RBC 4.49 05/26/2019 11:50 PM    HEMOGLOBIN 13.5 05/26/2019 11:50 PM    HEMATOCRIT 40.4 05/26/2019 11:50 PM    MCV 90.0 05/26/2019 11:50 PM    MCH 30.1 05/26/2019 11:50 PM    MCHC 33.4 (L) 05/26/2019 11:50 PM    MPV 9.3 05/26/2019 11:50 PM      Lab Results   Component Value Date/Time    SODIUM 134 (L) 05/28/2019 07:53 AM    POTASSIUM 3.4 (L) 05/28/2019 07:53 AM    CHLORIDE 103 05/28/2019 07:53 AM    CO2 24 05/28/2019 07:53 AM    GLUCOSE 94 05/28/2019 07:53 AM    BUN 16 05/28/2019 07:53 AM    CREATININE 0.89 05/28/2019 07:53 AM      Lab Results   Component  Value Date/Time    ASTSGOT 16 05/26/2019 11:50 PM    ALTSGPT 7 05/26/2019 11:50 PM     Lab Results   Component Value Date/Time    CHOLSTRLTOT 212 (H) 05/06/2019 11:44 AM     (H) 05/06/2019 11:44 AM    HDL 60 05/06/2019 11:44 AM    TRIGLYCERIDE 81 05/06/2019 11:44 AM    TROPONINI 0.02 05/28/2019 07:53 AM           NM-Cardiac Stress Test 3/18/18:   NUCLEAR IMAGING INTERPRETATION  Small area of mild apical ischemia is not excluded.   Fixed inferior wall photopenia may be related to attenuation artifact or prior infarction.   There is quite mild inferior wall hypokinesis.   Left ventricle ejection fraction is 62%  ECG INTERPRETATION  Negative stress ECG for ischemia.     TTE 3/10/18:  No prior study is available for comparison.   Normal left ventricular systolic function.  Left ventricular ejection fraction is visually estimated to be 65%.  Mild left ventricular hypertrophy.  Mild aortic stenosis.  Right heart pressures are consistent with moderate pulmonary   hypertension.     Left Ventricle  Left ventricle is small in size. Mild left ventricular hypertrophy. Sigmoid septum. Normal left ventricular systolic function. Left ventricular ejection fraction is visually estimated to be 65%. Normal regional wall motion. Grade I diastolic dysfunction.     Chest X-Ray 9/15/18:  FINDINGS:  Cardiomediastinal contour is within normal limits.  Atherosclerotic calcification and tortuosity of thoracic aorta.  Minimal curvilinear opacity RIGHT lung base.  No pleural fluid collection or pneumothorax.  S-shaped curvature of thoracic spine     Carotid US 6/12/18:  CONCLUSIONS  Mild bilateral internal carotid artery stenosis (<50%).        Assessment/Plan  Hypertension:  -Still elevated, patient refusing to take hydralazine or spironolactone.  -Discussed with Dr. Bernabe who recommends increasing Norvasc to 10 mg twice daily.    Abnormal MPI:  -Mildly abnormal MPI in 3/18/2018, Dr. Napoleon Concepcion managing with ASA and statin therapy  outpatient.  -Normal regional wall motion noted on echo completed on 3/10/2018.  -Denies chest pain.    Carotid stenosis:  -Carotid ultrasound in 6/12/2018 showed <50% stenosis bilaterally.  -Continue ASA 81 mg daily.  -Patient intolerant of statin therapy, currently on Zetia 10 mg daily.    Hyperlipidemia:  -Last LDL on 5/6/2019 was 136.  -Patient experienced significant nausea with Lipitor and Crestor.  -Continue Zetia 10 mg daily.    After reviewing patient's chart and talking with patient there does appear to be a component of generalized anxiety and possibly some degree of depression.  Patient states that approximately twice a month this does seem to flare up, I can see this cycle reflected on chart review and the number of nursing calls that are placed with our office.  Patient will take her blood pressure 10+ times per day.  I asked Liiva if she would like to speak with someone outpatient and she states that she would.  I have placed an outpatient referral to psychiatry for possible medication intervention as she has previously seen a psychologist for her anxiety and they have given her some helpful coping mechanisms however I do not think that these are enough to help her cope long term.      I have concerns that if her anxiety is not treated her blood pressure will continue to be difficult to manage.    Thank you for allowing us to participate in the care of this patient.  We will continue to follow alongside you.  Please let us know if you have any questions or concerns. I have arranged cardiology follow up as below.    Future Appointments  Date Time Provider Department Center   6/11/2019 9:15 AM DEVIKA Jauregui. [facility] None     DEVIKA Jauregui.   Cardiologist, [facility]  [phone]

## 2019-05-28 NOTE — PROGRESS NOTES
Assumed pt care. Pt resting in bed. Plan of care discussed with pt. Verbalizes understanding. Bed in lowest position, locked, and alarm activated. Call light within reach. SR on monitor

## 2019-05-28 NOTE — CARE PLAN
Problem: Safety  Goal: Will remain free from falls  Outcome: PROGRESSING AS EXPECTED  Fall risk assessed, and in place.  Patient is high fall risk.  Patient educated not to get up with out assistance.  Patient verbalized understanding.  Bed alarm in place and on.  Call light with in reach.        Problem: Infection  Goal: Will remain free from infection  Outcome: PROGRESSING AS EXPECTED  Educated patient on hand washing. Standard precautions in place. Assessed patient for s/s of infection.

## 2019-05-28 NOTE — PROGRESS NOTES
Dr. Posadas called RN and updated on labs and EKG. No need for further interventions tonight as EKG and trop were within normal limits and K was 3.5

## 2019-05-28 NOTE — THERAPY
"Occupational Therapy Evaluation completed.   Functional Status: Mod I bed mobility, supv transfers with FWW, supv LB dressing   Plan of Care: Patient with no further skilled OT needs in the acute care setting at this time  Discharge Recommendations:  Equipment: No Equipment Needed. Post-acute therapy Currently anticipate no further skilled therapy needs once patient is discharged from the inpatient setting.    See \"Rehab Therapy-Acute\" Patient Summary Report for complete documentation.    89 y.o. female with h/o HTN, admitted with hypotension, bradycardia. Seen now for OT eval. Pt is completing basic ADL and transfers with no more than supv. She uses shower chair at home; has a friend check in with her daily by phone or visit. Pt appears to be at or near baseline function from OT standpoint. No further acute OT needs at this time.     "

## 2019-05-28 NOTE — PROGRESS NOTES
Stat EKG does not show any ST elevation no depression. Awaiting troponin results and then will notify MD

## 2019-05-28 NOTE — PROGRESS NOTES
"  [facility]     Attending: Dr. Matthews  Resident: Benita Sood (PGY-1)  PATIENT: Livia Corea; [id number]; 11/21/1926    ID: 89 y.o. female admitted for HTN urgency     SUBJECTIVE:  Overnight episode of ST depression on tele.  EKG and trop wnl.  No associated CP or SOB.  Patient refusing to take hydralazine.  Stating \" I will never take the medication again it almost killed me.\"  Denies CP, SOB,     OBJECTIVE:     Vitals:    05/28/19 0833 05/28/19 0848 05/28/19 1100 05/28/19 1146   BP: (!) 171/89 (!) 174/74 (!) 182/76 (!) 182/76   Pulse: 72   64   Resp: 16   14   Temp: 36.8 °C (98.2 °F)   37 °C (98.6 °F)   TempSrc: Temporal   Temporal   SpO2: 95%   96%   Weight:       Height:           Intake/Output Summary (Last 24 hours) at 05/28/19 1520  Last data filed at 05/28/19 0833   Gross per 24 hour   Intake           668.33 ml   Output              850 ml   Net          -181.67 ml       PE:  General: No acute distress, resting comfortably in bed.  HEENT: NC/AT. PERRLA. EOMI. MMM  Cardiovascular: RRR with no M/R/G.  Respiratory: Symmetrical chest. CTAB with no adventitious breath sounds   Abdomen: soft, NT/ND, no masses, +BS   EXT:  No peripheral edema   Neuro: non focal with no numbness, tingling or changes in sensation    LABS:  Recent Labs      05/26/19 2350   WBC  5.2   RBC  4.49   HEMOGLOBIN  13.5   HEMATOCRIT  40.4   MCV  90.0   MCH  30.1   RDW  42.8   PLATELETCT  250   MPV  9.3   NEUTSPOLYS  46.60   LYMPHOCYTES  40.20   MONOCYTES  9.50   EOSINOPHILS  2.70   BASOPHILS  0.80     Recent Labs      05/26/19   2350  05/27/19   0955  05/27/19   2346  05/28/19   0753   SODIUM  128*  131*  133*  134*   POTASSIUM  3.4*  3.2*  3.5*  3.4*   CHLORIDE  93*  96  103  103   CO2  25  27  21  24   BUN  27*  20  16  16   CREATININE  0.98  0.83  0.77  0.89   CALCIUM  9.7  9.5  9.4  9.4   MAGNESIUM   --    --   2.2   --    ALBUMIN  4.4   --    --    --      Estimated GFR/CRCL = Estimated Creatinine Clearance: 36.3 " mL/min (by C-G formula based on SCr of 0.89 mg/dL).  Recent Labs      05/27/19   0955  05/27/19   1510  05/27/19   2346  05/28/19   0753   GLUCOSE  103*   --   101*  94   POCGLUCOSE   --   95   --    --      Recent Labs      05/26/19   2350   ASTSGOT  16   ALTSGPT  7   TBILIRUBIN  0.6   ALKPHOSPHAT  84   GLOBULIN  3.4     Recent Labs      05/27/19   1446  05/27/19   2346  05/28/19   0753   TROPONINI  0.02  0.03  0.02         No results for input(s): INR, APTT, FIBRINOGEN in the last 72 hours.    Invalid input(s): DIMER    MICROBIOLOGY: reviewed     IMAGING: reviewed     MEDS:  Current medications reviewed     ASSESSMENT/PLAN: 89 y.o. female admitted for hypertensive urgency and bradycardia.     # Hypertensive urgency  # HTN   - hx of liable Blood pressures  - Home medications: HCTZ 12.5 BID, amlodipine 5 mg BID, clonidine 0.1 mg PRN   - Yesterday afternoon after receiving 100 mg of hydralazine BP in the 85/65s, very symptomatic, responsive to fluids.  Pt now refusing to take hydralazine   - Cardiology following: recommending increasing amlodipine to 10 mg BID     # ST Depression: resolved   Overnight episode of ST depression on Tele, no CP or SOB, EKG no acute chnages  Trop 0.3-> 0.2    # Bradycardia   HR stable since admission   - Episodes of bradycardia down to the 30's in ED, EKG unchanged from prior studies, Asymptomatic, Trop neg in ER  -  Per cardiology likely 2/2 to clonidine.    - Telemetry monitoring      # Hyponatremia   - Chronic, asymptomatic, stable   - CTM     # Hypokalemia   K 3.4, patient refusing potassium supplement, states she can fix it with her diet.  Accepting of potential cardiac side effects of hypokalemia   CTM     # Grief:  Patient mentioning several times that all of her friends are dying.  This has been a very emotionally stressful time and she believes is likely contributing to Bps   - Does not want to start medication at this time, agreeable to f/u with PCP about this issue      DVT:  lovenox- patient refusing   Dispo: Inpatient   Code: DNR but intubation OK

## 2019-05-28 NOTE — PROGRESS NOTES
Dr. Posadas returned page. Updated on new ST depression. Orders received for stat EKG, trop, and order BNP after pt finishes all 3 k-riders.

## 2019-05-28 NOTE — PROGRESS NOTES
"Pt reports she has 4/10 CP that \"comes and goes\".  Pt denies current. 164/88 SR 77. [facility] fam paged. DR Epstein notified, he recommends for NOC RN to page again if pain increases in frequency and/or intensity  "

## 2019-05-28 NOTE — PROGRESS NOTES
New ST depression on tele monitor. Replaced stickers and verified correct lead placement. Continues to be ST depression. VSS and no complaints of chest pain or shortness of breath. On-call MD paged to update and orders.

## 2019-05-28 NOTE — DISCHARGE PLANNING
Anticipated Discharge Disposition: TBD    Action: LSW met with pt @ bedside for assessment. Pt lives in an apt on the first floor with her dog. No roommates. Pt states prior to hospitalization she was independent with all ADLs/IADLs. Pt drives. Pt states she has a cane and walker. Pt's walker is at bedside. Pharmacy is [facility]. Pt states she has prescription coverage. DC support are pt's two friends, Alyson and Charis. Pt states they check in on her daily. These two ladies are pt's emergency contacts.     TX stating pt is at baseline. Anticipating no further TX needs. LSW asked pt and pt feels she is at baseline as well.     Barriers to Discharge: medical clearance, OBS    Plan: LSW to f/u with care team re dc plan    Care Transition Team Assessment    Information Source  Orientation : Oriented x 4  Information Given By: Patient  Who is responsible for making decisions for patient? : Patient    Readmission Evaluation  Is this a readmission?: No    Elopement Risk  Legal Hold: No  Ambulatory or Self Mobile in Wheelchair: Yes  Disoriented: No  Psychiatric Symptoms: None  History of Wandering: No  Elopement this Admit: No  Vocalizing Wanting to Leave: No  Displays Behaviors, Body Language Wanting to Leave: No-Not at Risk for Elopement  Elopement Risk: Not at Risk for Elopement    Interdisciplinary Discharge Planning  Does Admitting Nurse Feel This Could be a Complex Discharge?: No  Lives with - Patient's Self Care Capacity: Alone and Able to Care For Self  Patient or legal guardian wants to designate a caregiver (see row info): No  Housing / Facility: 1 Story Apartment / Condo  Do You Take your Prescribed Medications Regularly: Yes  Able to Return to Previous ADL's: Yes  Mobility Issues: Yes  Prior Services: None, Home-Independent  Patient Expects to be Discharged to:: home  Assistance Needed: No  Durable Medical Equipment: Walker    Discharge Preparedness  What is your plan after discharge?: Uncertain - pending medical  team collaboration, Home with help, Home health care  What are your discharge supports?: Other (comment) (Friends)  Prior Functional Level: Ambulatory, Independent with Activities of Daily Living, Independent with Medication Management, Uses Walker  Difficulity with ADLs: Walking    Functional Assesment  Prior Functional Level: Ambulatory, Independent with Activities of Daily Living, Independent with Medication Management, Uses Walker    Finances  Financial Barriers to Discharge: No  Prescription Coverage: Yes    Vision / Hearing Impairment  Vision Impairment : No  Hearing Impairment : No         Advance Directive  Advance Directive?: None  Advance Directive offered?: AD Booklet refused    Domestic Abuse  Have you ever been the victim of abuse or violence?: No    Psychological Assessment  History of Substance Abuse: None  History of Psychiatric Problems: No  Non-compliant with Treatment: No  Newly Diagnosed Illness: Yes         Anticipated Discharge Information  Anticipated discharge disposition: [facility], Home

## 2019-05-28 NOTE — CARE PLAN
Problem: Safety  Goal: Will remain free from injury  Outcome: PROGRESSING AS EXPECTED  Pt remains free from injury. Bed in lowest position, locked, and alarm activated. Nonskid footwear in place. Call light and personal belongings within reach. Hourly rounding.    Problem: Venous Thromboembolism (VTW)/Deep Vein Thrombosis (DVT) Prevention:  Goal: Patient will participate in Venous Thrombosis (VTE)/Deep Vein Thrombosis (DVT)Prevention Measures  Outcome: PROGRESSING AS EXPECTED   05/27/19 2000   Mechanical/VTE Prophylaxis   Mechanical Prophylaxis  SCDs, Sequential Compression Device   SCDs, Sequential Compression Device On   OTHER   Pharmacologic Prophylaxis Used LMWH: Enoxaparin(Lovenox)   Pt is participating in VTE/DVT prevention measures

## 2019-05-28 NOTE — PROGRESS NOTES
Pt refusing hydralazine, 40 mEq K, zetia, and lovenox. Dr. Posadas notified and stated he will notify Dr. Sood today to update her.

## 2019-05-29 ENCOUNTER — PATIENT OUTREACH (OUTPATIENT)
Dept: HEALTH INFORMATION MANAGEMENT | Facility: OTHER | Age: 84
End: 2019-05-29

## 2019-05-29 VITALS
HEART RATE: 61 BPM | DIASTOLIC BLOOD PRESSURE: 69 MMHG | SYSTOLIC BLOOD PRESSURE: 160 MMHG | OXYGEN SATURATION: 92 % | BODY MASS INDEX: 21.05 KG/M2 | TEMPERATURE: 97.6 F | RESPIRATION RATE: 16 BRPM | HEIGHT: 65 IN | WEIGHT: 126.32 LBS

## 2019-05-29 LAB
ANION GAP SERPL CALC-SCNC: 9 MMOL/L (ref 0–11.9)
BUN SERPL-MCNC: 20 MG/DL (ref 8–22)
CALCIUM SERPL-MCNC: 9.2 MG/DL (ref 8.5–10.5)
CHLORIDE SERPL-SCNC: 103 MMOL/L (ref 96–112)
CO2 SERPL-SCNC: 23 MMOL/L (ref 20–33)
CREAT SERPL-MCNC: 1.13 MG/DL (ref 0.5–1.4)
GLUCOSE SERPL-MCNC: 94 MG/DL (ref 65–99)
POTASSIUM SERPL-SCNC: 3.4 MMOL/L (ref 3.6–5.5)
SODIUM SERPL-SCNC: 135 MMOL/L (ref 135–145)

## 2019-05-29 PROCEDURE — G0378 HOSPITAL OBSERVATION PER HR: HCPCS

## 2019-05-29 PROCEDURE — 36415 COLL VENOUS BLD VENIPUNCTURE: CPT

## 2019-05-29 PROCEDURE — 80048 BASIC METABOLIC PNL TOTAL CA: CPT

## 2019-05-29 PROCEDURE — 700111 HCHG RX REV CODE 636 W/ 250 OVERRIDE (IP): Performed by: FAMILY MEDICINE

## 2019-05-29 PROCEDURE — 700102 HCHG RX REV CODE 250 W/ 637 OVERRIDE(OP): Performed by: FAMILY MEDICINE

## 2019-05-29 PROCEDURE — 99213 OFFICE O/P EST LOW 20 MIN: CPT | Performed by: INTERNAL MEDICINE

## 2019-05-29 PROCEDURE — A9270 NON-COVERED ITEM OR SERVICE: HCPCS | Performed by: NURSE PRACTITIONER

## 2019-05-29 PROCEDURE — 700102 HCHG RX REV CODE 250 W/ 637 OVERRIDE(OP): Performed by: NURSE PRACTITIONER

## 2019-05-29 PROCEDURE — 96372 THER/PROPH/DIAG INJ SC/IM: CPT

## 2019-05-29 PROCEDURE — A9270 NON-COVERED ITEM OR SERVICE: HCPCS | Performed by: FAMILY MEDICINE

## 2019-05-29 RX ORDER — POTASSIUM CHLORIDE 20 MEQ/1
40 TABLET, EXTENDED RELEASE ORAL ONCE
Status: COMPLETED | OUTPATIENT
Start: 2019-05-29 | End: 2019-05-29

## 2019-05-29 RX ORDER — AMLODIPINE BESYLATE 10 MG/1
10 TABLET ORAL 2 TIMES DAILY
Qty: 180 TAB | Refills: 3 | Status: SHIPPED | OUTPATIENT
Start: 2019-05-29 | End: 2019-06-05

## 2019-05-29 RX ADMIN — ASPIRIN 81 MG: 81 TABLET, DELAYED RELEASE ORAL at 08:50

## 2019-05-29 RX ADMIN — AMLODIPINE BESYLATE 10 MG: 10 TABLET ORAL at 05:03

## 2019-05-29 RX ADMIN — POTASSIUM CHLORIDE 40 MEQ: 1500 TABLET, EXTENDED RELEASE ORAL at 08:51

## 2019-05-29 RX ADMIN — ENOXAPARIN SODIUM 40 MG: 100 INJECTION SUBCUTANEOUS at 05:04

## 2019-05-29 ASSESSMENT — ENCOUNTER SYMPTOMS
FEVER: 0
SHORTNESS OF BREATH: 0
BLOOD IN STOOL: 0
DIZZINESS: 0
CHEST TIGHTNESS: 0
PALPITATIONS: 0
NERVOUS/ANXIOUS: 1
ABDOMINAL PAIN: 0

## 2019-05-29 NOTE — DISCHARGE SUMMARY
[facility] DISCHARGE SUMMARY     Admit Date:  5/26/2019       Discharge Date:       Service:   [facility] Inpatient Team  Attending Physician(s):   Dr. Wilson       [place] Resident(s):   Benita Sood M.D. (PGY-1)    Primary Diagnosis:   Hypertension urgency  Secondary Diagnoses:                Hypertension  Bradycardia  Elevated creatinine  ST depression  Hyponatremia  Hypokalemia  Grief    HPI:     89-year-old female who presented from home after having blood pressures greater than 170 at home.  She took PRN clonidine but blood pressures elevated to 210 systolic.  Associated with discomfort in her back.  Denies chest pain and shortness of breath.  In ED noted to be bradycardic into the 30s.    Hospital Summary (Brief Narrative):       ASSESSMENT/PLAN: 89 y.o. female admitted for hypertensive urgency and bradycardia.     # Hypertensive urgency  # HTN   # Elevated Creatinine   History of labile blood pressures.  Cardiology was consulted in the ED.  Recommended hydralazine 100 twice daily and discontinuing hydrochlorothiazide and clonidine.  After receiving dose of hydrochlorothiazide patient became very dizzy.  Blood pressure at that time were 85/60s.  She was placed in Trendelenburg positioning where she remained extremely symptomatic.  EKG at this time was unremarkable troponins were negative.  Blood pressures improved with fluid resuscitation.  Following this incident patient refused to take any further hydralazine.  Cardiology recommended amlodipine 10 mg twice daily.  Tolerated this overnight blood pressures well controlled less than 160s.  Noted to have elevated creatinine to 1.13 on day of discharge.  Baseline approximately 0.9.  Likely this elevation is secondary to episode of hypotension but due to recent adjustments in blood pressure medication we will have patient repeat BMP in 2 days as an outpatient.  Advised to continue amlodipine 10 mg twice daily and discontinue clonidine  and hydrochlorothiazide.  She has an appointment with her PCP on June 18.  Follow-up with cardiology as previously scheduled.       # Bradycardia  Episodes of bradycardia into the 30s in ED.  EKG with no acute changes, troponin negative.  Cardiology was consulted thought likely secondary to clonidine.  Patient was on telemetry monitoring throughout hospitalization with no further episodes of bradycardia.  She did have one episode of ST depression on telemetry while asleep.  She was asymptomatic at this time twelve-lead EKG with no evidence of ST depression and troponin negative.  Follow-up with cardiology as previously scheduled, discontinue clonidine.       # Hyponatremia   - Na 135, Chronic, asymptomatic, stable        # Hypokalemia   Stable at 3.4, patient refusing potassium supplement while hospitalized, states she can fix it with her diet.  Recommend outpatient monitoring of potassium.    # Grief:  Patient mentioning several times that all of her friends are dying.  This has been a very emotionally stressful time and she believes is likely contributing to Bps   Does not want to start medication at this time would like to follow-up with PCP about this issue.       Consultants:      Cardiology Dr. Bernabe     Procedures:        None     Imaging/ Testing:      CXR: wnl   WBC: 5.2  K 3.4  Cr: 0.77-> 1.13    Medication changes:  Discontinue clonidine and hydrochlorothiazide  Amlodipine 10 mg twice daily    Discharge Medications:         Livia Corea   Home Medication Instructions [place]:[id number]    Printed on:05/29/19 1038   Medication Information                      amLODIPine (NORVASC) 10 MG Tab  Take 1 Tab by mouth 2 Times a Day.             ascorbic acid (ASCORBIC ACID) 500 MG Tab  Take 500 mg by mouth every day.             aspirin EC (ECOTRIN) 81 MG Tablet Delayed Response  Take 81 mg by mouth every day.             cyanocobalamin (VITAMIN B-12) 500 MCG Tab  Take 500 mcg by mouth every day.              ezetimibe (ZETIA) 10 MG Tab  Take 10 mg by mouth every day.             Garlic 10 MG Cap  Take 2 Caps by mouth 3 times a day, with meals.             Omega-3 Fatty Acids (FISH OIL) 1000 MG Cap capsule  Take 2 Caps by mouth every day.                 Disposition:   To home     Diet:   Regular     Activity:   As tolerated     Instructions:      The patient was instructed to return to the ER in the event of worsening symptoms. I have counseled the patient on the importance of compliance and the patient has agreed to proceed with all medical recommendations and follow up plan indicated above.   The patient understands that all medications come with benefits and risks. Risks may include permanent injury or death and these risks can be minimized with close reassessment and monitoring.        Please CC: Estuardo Carrero M.D.    Follow up appointment details :      BMP in 2 days to monitor kidney function and potassium   PCP on June 18th  Cardiology as previously scheduled     Pending Studies:        None        Benita Sood M.D.   PGY-1  [facility]   [phone]

## 2019-05-29 NOTE — PROGRESS NOTES
Assumed care from Anai ARANA. Received bedside report.  Updated patient on daily plan of care on white board. Patient denies any additional needs at this time.  Patient belongings and call light with in reach.  Vitals stable. Bed alarm on and working appropriately.  Will continue to monitor.

## 2019-05-29 NOTE — PROGRESS NOTES
Patient being discharged. Patient educated on discharge instructions and new prescriptions. Pt verbalizes understanding. Follow up appointment made with cards and PCP. Pt instructed to obtain labs prior to follow up with PCP. PIV removed, monitor checked in. Patient going home via cab, escorted down to [facility].

## 2019-05-29 NOTE — DISCHARGE INSTRUCTIONS
Discharge Instructions    Discharged to home by car with friend. Discharged via wheelchair, hospital escort: Yes.  Special equipment needed: Not Applicable    Be sure to schedule a follow-up appointment with your primary care doctor or any specialists as instructed.     Discharge Plan:   Diet Plan: Discussed  Activity Level: Discussed  Confirmed Follow up Appointment: Appointment Scheduled  Confirmed Symptoms Management: Discussed  Medication Reconciliation Updated: Yes  Influenza Vaccine Indication: Not indicated: Previously immunized this influenza season and > 8 years of age    I understand that a diet low in cholesterol, fat, and sodium is recommended for good health. Unless I have been given specific instructions below for another diet, I accept this instruction as my diet prescription.   Other diet: cardiac    Special Instructions: None    · Is patient discharged on Warfarin / Coumadin?   No   • Accelerated Hypertension  • Malignant Hypertension  • Essential Hypertension  • Other explanation of Clinical Findings  • Unable to determine      Depression / Suicide Risk    As you are discharged from this [facility] facility, it is important to learn how to keep safe from harming yourself.    Recognize the warning signs:  · Abrupt changes in personality, positive or negative- including increase in energy   · Giving away possessions  · Change in eating patterns- significant weight changes-  positive or negative  · Change in sleeping patterns- unable to sleep or sleeping all the time   · Unwillingness or inability to communicate  · Depression  · Unusual sadness, discouragement and loneliness  · Talk of wanting to die  · Neglect of personal appearance   · Rebelliousness- reckless behavior  · Withdrawal from people/activities they love  · Confusion- inability to concentrate     If you or a loved one observes any of these behaviors or has concerns about self-harm, here's what you can do:  · Talk about it- your  feelings and reasons for harming yourself  · Remove any means that you might use to hurt yourself (examples: pills, rope, extension cords, firearm)  · Get professional help from the community (Mental Health, Substance Abuse, psychological counseling)  · Do not be alone:Call your Safe Contact- someone whom you trust who will be there for you.  · Call your local CRISIS HOTLINE 917-0297 or 253-846-2617  · Call your local Children's Mobile Crisis Response Team [place] (040) 929-3571 or wwwInfiniDB  · Call the toll free National Suicide Prevention Hotlines   · National Suicide Prevention Lifeline 881-344-UBGD (8290)  · National [employer] Line Network 800-SUICIDE (386-3211)      Amlodipine 10 mg BID   Stop HCTZ and clonidine   Repeat lab work at [facility] before the weekend   Increase dietary intake of potassium   F/u with PCP on June 18 and with cardiology as schedule   If become lightheaded/dizzy, develop CP, of if pressures consistenly > 180/100 call PCP or go to ED    Hypertension  Hypertension, commonly called high blood pressure, is when the force of blood pumping through your arteries is too strong. Your arteries are the blood vessels that carry blood from your heart throughout your body. A blood pressure reading consists of a higher number over a lower number, such as 110/72. The higher number (systolic) is the pressure inside your arteries when your heart pumps. The lower number (diastolic) is the pressure inside your arteries when your heart relaxes. Ideally you want your blood pressure below 120/80.  Hypertension forces your heart to work harder to pump blood. Your arteries may become narrow or stiff. Having untreated or uncontrolled hypertension can cause heart attack, stroke, kidney disease, and other problems.  What increases the risk?  Some risk factors for high blood pressure are controllable. Others are not.  Risk factors you cannot control include:  · Race. You may be at higher risk if you are  .  · Age. Risk increases with age.  · Gender. Men are at higher risk than women before age 45 years. After age 65, women are at higher risk than men.  Risk factors you can control include:  · Not getting enough exercise or physical activity.  · Being overweight.  · Getting too much fat, sugar, calories, or salt in your diet.  · Drinking too much alcohol.  What are the signs or symptoms?  Hypertension does not usually cause signs or symptoms. Extremely high blood pressure (hypertensive crisis) may cause headache, anxiety, shortness of breath, and nosebleed.  How is this diagnosed?  To check if you have hypertension, your health care provider will measure your blood pressure while you are seated, with your arm held at the level of your heart. It should be measured at least twice using the same arm. Certain conditions can cause a difference in blood pressure between your right and left arms. A blood pressure reading that is higher than normal on one occasion does not mean that you need treatment. If it is not clear whether you have high blood pressure, you may be asked to return on a different day to have your blood pressure checked again. Or, you may be asked to monitor your blood pressure at home for 1 or more weeks.  How is this treated?  Treating high blood pressure includes making lifestyle changes and possibly taking medicine. Living a healthy lifestyle can help lower high blood pressure. You may need to change some of your habits.  Lifestyle changes may include:  · Following the DASH diet. This diet is high in fruits, vegetables, and whole grains. It is low in salt, red meat, and added sugars.  · Keep your sodium intake below 2,300 mg per day.  · Getting at least 30-45 minutes of aerobic exercise at least 4 times per week.  · Losing weight if necessary.  · Not smoking.  · Limiting alcoholic beverages.  · Learning ways to reduce stress.  Your health care provider may prescribe medicine if  lifestyle changes are not enough to get your blood pressure under control, and if one of the following is true:  · You are 18-59 years of age and your systolic blood pressure is above 140.  · You are 60 years of age or older, and your systolic blood pressure is above 150.  · Your diastolic blood pressure is above 90.  · You have diabetes, and your systolic blood pressure is over 140 or your diastolic blood pressure is over 90.  · You have kidney disease and your blood pressure is above 140/90.  · You have heart disease and your blood pressure is above 140/90.  Your personal target blood pressure may vary depending on your medical conditions, your age, and other factors.  Follow these instructions at home:  · Have your blood pressure rechecked as directed by your health care provider.  · Take medicines only as directed by your health care provider. Follow the directions carefully. Blood pressure medicines must be taken as prescribed. The medicine does not work as well when you skip doses. Skipping doses also puts you at risk for problems.  · Do not smoke.  · Monitor your blood pressure at home as directed by your health care provider.  Contact a health care provider if:  · You think you are having a reaction to medicines taken.  · You have recurrent headaches or feel dizzy.  · You have swelling in your ankles.  · You have trouble with your vision.  Get help right away if:  · You develop a severe headache or confusion.  · You have unusual weakness, numbness, or feel faint.  · You have severe chest or abdominal pain.  · You vomit repeatedly.  · You have trouble breathing.  This information is not intended to replace advice given to you by your health care provider. Make sure you discuss any questions you have with your health care provider.  Document Released: 12/18/2006 Document Revised: 05/25/2017 Document Reviewed: 10/10/2014  [employer] Interactive Patient Education © 2017 [employer]        Return if you have  chest pain, shortness of breath, productive cough, confusion, severe headache, stroke symptoms or blood pressure top number is above 200, or bottom number is above 110.  Have your doctor recheck your sodium and blood pressure this week.

## 2019-05-29 NOTE — PROGRESS NOTES
[facility] PROGRESS NOTE     Attending: Dr. Matthews  Resident: Benita Sood (PGY-1)  PATIENT: Livia Corea; [id number]; 11/21/1926    ID:  89 y.o. female admitted for HTN urgency     SUBJECTIVE: No acute events overnight, Increased dose of amlodipine without issues.  Patient feels comfortable on this regemin.  Discussed mild increase in Cr.  She is agreeable to have repeat lab work done as an outpatient.      OBJECTIVE:     Vitals:    05/28/19 1619 05/28/19 2000 05/29/19 0000 05/29/19 0400   BP: (!) 176/89 148/86 125/55 149/49   Pulse: 65 66 68 (!) 55   Resp: 16 17 16 16   Temp: 37.1 °C (98.7 °F) 36.7 °C (98.1 °F) 36.7 °C (98 °F) 36.2 °C (97.1 °F)   TempSrc: Temporal Temporal Temporal Temporal   SpO2: 94% 96% 94% 95%   Weight:  57.3 kg (126 lb 5.2 oz)     Height:           Intake/Output Summary (Last 24 hours) at 05/29/19 0552  Last data filed at 05/28/19 1549   Gross per 24 hour   Intake              660 ml   Output              200 ml   Net              460 ml       PE:  General: No acute distress, resting comfortably in bed.  HEENT: NC/AT. PERRLA. EOMI. MMM  Cardiovascular: RRR with no M/R/G.  Respiratory: Symmetrical chest. CTAB with no adventitious breath sounds   Abdomen: soft, NT/ND, no masses, +BS   EXT:  Moving all extremities   Neuro: non focal with no numbness, tingling or changes in sensation    LABS:  Recent Labs      05/26/19 2350   WBC  5.2   RBC  4.49   HEMOGLOBIN  13.5   HEMATOCRIT  40.4   MCV  90.0   MCH  30.1   RDW  42.8   PLATELETCT  250   MPV  9.3   NEUTSPOLYS  46.60   LYMPHOCYTES  40.20   MONOCYTES  9.50   EOSINOPHILS  2.70   BASOPHILS  0.80     Recent Labs      05/26/19   2350   05/27/19   2346  05/28/19   0753  05/29/19   0027   SODIUM  128*   < >  133*  134*  135   POTASSIUM  3.4*   < >  3.5*  3.4*  3.4*   CHLORIDE  93*   < >  103  103  103   CO2  25   < >  21  24  23   BUN  27*   < >  16  16  20   CREATININE  0.98   < >  0.77  0.89  1.13   CALCIUM  9.7   < >  9.4  9.4  9.2    MAGNESIUM   --    --   2.2   --    --    ALBUMIN  4.4   --    --    --    --     < > = values in this interval not displayed.     Estimated GFR/CRCL = Estimated Creatinine Clearance: 28.6 mL/min (by C-G formula based on SCr of 1.13 mg/dL).  Recent Labs      05/27/19   1510  05/27/19   2346  05/28/19   0753  05/29/19   0027   GLUCOSE   --   101*  94  94   POCGLUCOSE  95   --    --    --      Recent Labs      05/26/19   2350   ASTSGOT  16   ALTSGPT  7   TBILIRUBIN  0.6   ALKPHOSPHAT  84   GLOBULIN  3.4     Recent Labs      05/27/19   1446  05/27/19   2346  05/28/19   0753   TROPONINI  0.02  0.03  0.02         No results for input(s): INR, APTT, FIBRINOGEN in the last 72 hours.    Invalid input(s): DIMER    MICROBIOLOGY: reviewed      IMAGING: reviewed      MEDS:  Current medications reviewed      ASSESSMENT/PLAN: 89 y.o. female admitted for hypertensive urgency and bradycardia.     # Hypertensive urgency  # HTN   - hx of liable Blood pressures  - Home medications: HCTZ 12.5 BID, amlodipine 5 mg BID, clonidine 0.1 mg PRN   - Yesterday afternoon after receiving 100 mg of hydralazine BP in the 85/65s, very symptomatic, responsive to fluids.  Pt now refusing to take hydralazine   - Cardiology following: recommending increasing amlodipine to 10 mg BID      # Bradycardia   HR stable since admission   - Episodes of bradycardia down to the 30's in ED, EKG unchanged from prior studies, Asymptomatic, Trop neg in ER  -  Per cardiology likely 2/2 to clonidine.    - Telemetry monitoring     #Elevated Creatinine:    Baseline Cr 0.9, elevated to 1.13 overnight, likely from prolonged period of hypotension on 5/27.  Repeat Cr in 2-3 days as an OP     # ST Depression: resolved   During hospitalizationepisode of ST depression on Tele, no CP or SOB, EKG no acute chnages  Trop 0.3-> 0.2      # Hyponatremia   - Chronic, asymptomatic, stable   - CTM      # Hypokalemia   Stable at 3.4, patient refusing potassium supplement, states she can  fix it with her diet.  Accepting of potential cardiac side effects of hypokalemia   CTM      # Grief:  Patient mentioning several times that all of her friends are dying.  This has been a very emotionally stressful time and she believes is likely contributing to Bps   - Does not want to start medication at this time, agreeable to f/u with PCP about this issue      DVT: lovenox- patient refusing   Dispo: Inpatient   Code: DNR but intubation OK  - Chronic, asymptomatic, stable   - CTM      Dispo: Home this afternoon if BP and HR stable

## 2019-05-29 NOTE — PROGRESS NOTES
Cardiology Follow Up Progress Note    Date of Service  5/29/2019    Attending Physician  Vitor Wilson M.D.    Chief Complaint   Uncontrolled blood pressure     Consulted hypertensive emergency management.      HPI  Livia Corea is a 89 y.o. female admitted 5/26/2019 with hypertensive urgency.     Patient was last seen in clinic by Dr. Napoleon Concepcion on 5/9/19 who started patient on hydrochlorothiazide 12.5 mg twice daily and her Norvasc was increased to 5 mg twice daily.  She was doing well on this regimen until approximately 1 week ago.  Patient also states that she has been under significant emotional distress due to the loss of 4 friends all within a 1 week span of time.     Past medical history significant for hypertension, hyperlipidemia and carotid artery disease.    Interim Events  5/28/19: Patient refusing to take any dosage of high Hydralazine after the 100 mg dose dropped her blood pressure into the 80s/50s which patient was very symptomatic with.  She also does not want to start the Aldactone as she just recently tried that in March and it caused her systolic blood pressure to spike into the low 200s.  While talking with patient, she does admit that perhaps her recent blood pressure issues may be due to emotional distress and anxiety.  She has suffered significant loss over the past 2 years with a total of 18 friends passing away (4 of them in the last week).  She has concerns about who will take care of her dog when she passes as she is concerned that at the age of 89 she will outlive her.  Patient states that she does have cyclical episodes of approximately twice a month where her depression anxiety increased significantly.    5/29/19: Patient resting in bed, concerned about her a.m. systolic blood pressure being in the 160s.  Has many questions about medications and how often to check her blood pressure and what blood pressure readings she should be concerned about.  No significant events  overnight per nursing.    Monitor:SB/SR 50-86 with rare PAC/PVC, SR 61-89 (evening monitor summary).     Review of Systems  Review of Systems   Constitutional: Negative for fever.   Respiratory: Negative for chest tightness and shortness of breath.    Cardiovascular: Negative for chest pain, palpitations and leg swelling.   Gastrointestinal: Negative for abdominal pain and blood in stool.   Genitourinary: Negative for hematuria.   Musculoskeletal: Negative for gait problem.   Neurological: Negative for dizziness and syncope.   Psychiatric/Behavioral: The patient is nervous/anxious.         Grief due to loss of multiple friends   All other systems reviewed and are negative.      Vital signs in last 24 hours  Temp:  [36.2 °C (97.1 °F)-37.1 °C (98.7 °F)] 36.2 °C (97.1 °F)  Pulse:  [55-72] 55  Resp:  [14-17] 16  BP: (125-182)/(49-89) 149/49  SpO2:  [94 %-96 %] 95 %    Physical Exam  Physical Exam   Constitutional: She is oriented to person, place, and time. She appears well-developed and well-nourished.   HENT:   Head: Normocephalic.   Eyes: EOM are normal.   Neck: No JVD present.   Cardiovascular: Normal rate, regular rhythm, normal heart sounds and intact distal pulses.    Pulmonary/Chest: Effort normal and breath sounds normal. No respiratory distress.   Abdominal: Soft. Bowel sounds are normal.   Musculoskeletal: Normal range of motion. She exhibits no edema.   Neurological: She is alert and oriented to person, place, and time.   Skin: Skin is warm and dry.   Psychiatric: Her speech is normal and behavior is normal. Judgment and thought content normal. Her mood appears anxious. Cognition and memory are normal.   Slightly anxious with many questions.    Nursing note and vitals reviewed.      Lab Review  Lab Results   Component Value Date/Time    WBC 5.2 05/26/2019 11:50 PM    RBC 4.49 05/26/2019 11:50 PM    HEMOGLOBIN 13.5 05/26/2019 11:50 PM    HEMATOCRIT 40.4 05/26/2019 11:50 PM    MCV 90.0 05/26/2019 11:50 PM     MCH 30.1 05/26/2019 11:50 PM    MCHC 33.4 (L) 05/26/2019 11:50 PM    MPV 9.3 05/26/2019 11:50 PM      Lab Results   Component Value Date/Time    SODIUM 135 05/29/2019 12:27 AM    POTASSIUM 3.4 (L) 05/29/2019 12:27 AM    CHLORIDE 103 05/29/2019 12:27 AM    CO2 23 05/29/2019 12:27 AM    GLUCOSE 94 05/29/2019 12:27 AM    BUN 20 05/29/2019 12:27 AM    CREATININE 1.13 05/29/2019 12:27 AM      Lab Results   Component Value Date/Time    ASTSGOT 16 05/26/2019 11:50 PM    ALTSGPT 7 05/26/2019 11:50 PM     Lab Results   Component Value Date/Time    CHOLSTRLTOT 212 (H) 05/06/2019 11:44 AM     (H) 05/06/2019 11:44 AM    HDL 60 05/06/2019 11:44 AM    TRIGLYCERIDE 81 05/06/2019 11:44 AM    TROPONINI 0.02 05/28/2019 07:53 AM           NM-Cardiac Stress Test 3/18/18:   NUCLEAR IMAGING INTERPRETATION  Small area of mild apical ischemia is not excluded.   Fixed inferior wall photopenia may be related to attenuation artifact or prior infarction.   There is quite mild inferior wall hypokinesis.   Left ventricle ejection fraction is 62%  ECG INTERPRETATION  Negative stress ECG for ischemia.     TTE 3/10/18:  No prior study is available for comparison.   Normal left ventricular systolic function.  Left ventricular ejection fraction is visually estimated to be 65%.  Mild left ventricular hypertrophy.  Mild aortic stenosis.  Right heart pressures are consistent with moderate pulmonary   hypertension.     Left Ventricle  Left ventricle is small in size. Mild left ventricular hypertrophy. Sigmoid septum. Normal left ventricular systolic function. Left ventricular ejection fraction is visually estimated to be 65%. Normal regional wall motion. Grade I diastolic dysfunction.     Chest X-Ray 9/15/18:  FINDINGS:  Cardiomediastinal contour is within normal limits.  Atherosclerotic calcification and tortuosity of thoracic aorta.  Minimal curvilinear opacity RIGHT lung base.  No pleural fluid collection or pneumothorax.  S-shaped curvature of  thoracic spine     Carotid US 6/12/18:  CONCLUSIONS  Mild bilateral internal carotid artery stenosis (<50%).         Assessment/Plan  Hypertension:  -Improved and stable.   -Continue Norvasc 10 mg BID.      Bradycardia:  -Resolved, HR now stable.  -Likely 2/2 clonidine.    Impaired Renal Function:  -New problem.  -Cr this am 1.13, up from 0.77 on admit.  -GFR down to 54 from <60.  -BMP outpatient ordered by primary per patient.      Hyperlipidemia:  -Last LDL on 5/6/2019 was 136.  -Patient experienced significant nausea with Lipitor and Crestor.  -Continue Zetia 10 mg daily.     Anxiety/Grief:  -Outpatient referral to psychologist for possible treatment with medication as this has been an ongoing issue for some time. Previous psychiatry consult offered helpful coping mechanisms, however I do not think these will be enough to help her cope long term.      I have concerns that if her anxiety is not treated her blood pressure will continue to be difficult to manage.     Thank you for allowing us to participate in the care of this patient.    Patient is stable from cardiology perspective for discharge with close cardiology follow-up as below. Please let us know if you have any questions or concerns.      Future Appointments  Date Time Provider Department Center   6/11/2019 9:15 AM DEVIKA Jauregui. [facility] None      DEVIKA Jauregui.   Cardiologist, [facility]  [phone]

## 2019-05-29 NOTE — PROGRESS NOTES
Bedside report received with RN, patient care assumed, assessment completed. Pt A&Ox4, no complaints of pain. Updated on POC, all questions answered. Bed in lowest, locked position, treaded socks on, call light and belongings within reach.

## 2019-05-29 NOTE — PROGRESS NOTES
12 Hour Chart Check    Monitor Summary    SR 61-86    Bigem  BBB  O PVCs  O PACs    0.20/0.14/0.42

## 2019-05-30 ENCOUNTER — HOSPITAL ENCOUNTER (OUTPATIENT)
Dept: LAB | Facility: MEDICAL CENTER | Age: 84
End: 2019-05-30
Attending: STUDENT IN AN ORGANIZED HEALTH CARE EDUCATION/TRAINING PROGRAM
Payer: MEDICARE

## 2019-05-30 ENCOUNTER — PATIENT OUTREACH (OUTPATIENT)
Dept: HEALTH INFORMATION MANAGEMENT | Facility: OTHER | Age: 84
End: 2019-05-30

## 2019-05-30 ENCOUNTER — HOSPITAL ENCOUNTER (OUTPATIENT)
Dept: LAB | Facility: MEDICAL CENTER | Age: 84
End: 2019-05-30
Attending: NURSE PRACTITIONER
Payer: MEDICARE

## 2019-05-30 DIAGNOSIS — I10 HTN (HYPERTENSION), MALIGNANT: ICD-10-CM

## 2019-05-30 DIAGNOSIS — I10 ESSENTIAL HYPERTENSION, BENIGN: ICD-10-CM

## 2019-05-30 LAB
ALBUMIN SERPL BCP-MCNC: 4.3 G/DL (ref 3.2–4.9)
ALBUMIN/GLOB SERPL: 1.2 G/DL
ALP SERPL-CCNC: 84 U/L (ref 30–99)
ALT SERPL-CCNC: 8 U/L (ref 2–50)
ANION GAP SERPL CALC-SCNC: 8 MMOL/L (ref 0–11.9)
AST SERPL-CCNC: 20 U/L (ref 12–45)
BILIRUB SERPL-MCNC: 0.7 MG/DL (ref 0.1–1.5)
BUN SERPL-MCNC: 27 MG/DL (ref 8–22)
CALCIUM SERPL-MCNC: 9.6 MG/DL (ref 8.5–10.5)
CHLORIDE SERPL-SCNC: 104 MMOL/L (ref 96–112)
CHOLEST SERPL-MCNC: 182 MG/DL (ref 100–199)
CO2 SERPL-SCNC: 24 MMOL/L (ref 20–33)
CREAT SERPL-MCNC: 1.17 MG/DL (ref 0.5–1.4)
GLOBULIN SER CALC-MCNC: 3.6 G/DL (ref 1.9–3.5)
GLUCOSE SERPL-MCNC: 96 MG/DL (ref 65–99)
HDLC SERPL-MCNC: 51 MG/DL
LDLC SERPL CALC-MCNC: 117 MG/DL
POTASSIUM SERPL-SCNC: 4.7 MMOL/L (ref 3.6–5.5)
PROT SERPL-MCNC: 7.9 G/DL (ref 6–8.2)
SODIUM SERPL-SCNC: 136 MMOL/L (ref 135–145)
TRIGL SERPL-MCNC: 72 MG/DL (ref 0–149)

## 2019-05-30 PROCEDURE — 80053 COMPREHEN METABOLIC PANEL: CPT

## 2019-05-30 PROCEDURE — 80061 LIPID PANEL: CPT

## 2019-05-30 PROCEDURE — 36415 COLL VENOUS BLD VENIPUNCTURE: CPT

## 2019-05-31 NOTE — PROGRESS NOTES
SC[facility] post discharge med rec completed. No clinically significant medication issues noted. Patient denies any side effects, barriers to accessing medications, or trouble with adherence.     Patient has high dose amlodipine as recommended by cardiology. Discussed potential side effects and how to monitor. Patient has f/u scheduled on 6/11.

## 2019-06-04 ENCOUNTER — TELEPHONE (OUTPATIENT)
Dept: CARDIOLOGY | Facility: MEDICAL CENTER | Age: 84
End: 2019-06-04

## 2019-06-04 NOTE — TELEPHONE ENCOUNTER
Yamileth Saavedra, R.N.   Phone Number: [phone]             JS/jossy Rios with [employer] calling to report pt contacted her this morning to report a rapid heart rate (97 or 98) awakens pt in middle of night.  Pt stated this morning it happened again, h/r was 106.     Pt is asking for advice or possibly be seen.   Please call [phone]     If questions, Blanca is at  [phone]      Spoke with pt regarding above. She states two days ago she felt her heart start racing, last night she felt the same thing but her HR was 97 and then all of a sudden it was 102. Advised what normal HR was but she is feeling the palpitations. Provided pt with education on medication and side effects. Told her to keep a log of these episodes (HR and symptoms) and bring to [facility] with JS next week. She verbalized understanding.

## 2019-06-04 NOTE — TELEPHONE ENCOUNTER
Lin Saavedra, R.N.   Phone Number: [phone]             RODRIGO/Phoebe     Pt wants to add  additional info she forgot to discuss in regards to phone call from earlier. She can be reached at [phone]      Spoke with her again. She stated her BP had jumped to 198/101 but has now come back down. She added this has never happened before but she did cut her norvas to 5mg at night instead of 10mg. Told her to keep a log for the next few days and call back if it is maintaining elevated but wasn't concerned as it came right back down immediately.

## 2019-06-05 ENCOUNTER — HOSPITAL ENCOUNTER (EMERGENCY)
Facility: MEDICAL CENTER | Age: 84
End: 2019-06-05
Attending: EMERGENCY MEDICINE
Payer: MEDICARE

## 2019-06-05 VITALS
HEART RATE: 67 BPM | WEIGHT: 125.66 LBS | SYSTOLIC BLOOD PRESSURE: 190 MMHG | TEMPERATURE: 97.6 F | DIASTOLIC BLOOD PRESSURE: 100 MMHG | OXYGEN SATURATION: 95 % | RESPIRATION RATE: 16 BRPM | BODY MASS INDEX: 20.91 KG/M2

## 2019-06-05 DIAGNOSIS — R42 DIZZINESS: ICD-10-CM

## 2019-06-05 DIAGNOSIS — I10 ESSENTIAL HYPERTENSION: ICD-10-CM

## 2019-06-05 DIAGNOSIS — E86.0 DEHYDRATION: ICD-10-CM

## 2019-06-05 DIAGNOSIS — R53.1 WEAKNESS: ICD-10-CM

## 2019-06-05 LAB
ALBUMIN SERPL BCP-MCNC: 3.9 G/DL (ref 3.2–4.9)
ALBUMIN/GLOB SERPL: 1.1 G/DL
ALP SERPL-CCNC: 83 U/L (ref 30–99)
ALT SERPL-CCNC: 7 U/L (ref 2–50)
ANION GAP SERPL CALC-SCNC: 10 MMOL/L (ref 0–11.9)
AST SERPL-CCNC: 17 U/L (ref 12–45)
BILIRUB SERPL-MCNC: 0.5 MG/DL (ref 0.1–1.5)
BUN SERPL-MCNC: 24 MG/DL (ref 8–22)
CALCIUM SERPL-MCNC: 9.5 MG/DL (ref 8.5–10.5)
CHLORIDE SERPL-SCNC: 107 MMOL/L (ref 96–112)
CO2 SERPL-SCNC: 22 MMOL/L (ref 20–33)
CREAT SERPL-MCNC: 1.12 MG/DL (ref 0.5–1.4)
EKG IMPRESSION: NORMAL
ERYTHROCYTE [DISTWIDTH] IN BLOOD BY AUTOMATED COUNT: 45.5 FL (ref 35.9–50)
GLOBULIN SER CALC-MCNC: 3.6 G/DL (ref 1.9–3.5)
GLUCOSE SERPL-MCNC: 101 MG/DL (ref 65–99)
HCT VFR BLD AUTO: 38.1 % (ref 37–47)
HGB BLD-MCNC: 12.6 G/DL (ref 12–16)
MCH RBC QN AUTO: 30.4 PG (ref 27–33)
MCHC RBC AUTO-ENTMCNC: 33.1 G/DL (ref 33.6–35)
MCV RBC AUTO: 91.8 FL (ref 81.4–97.8)
PLATELET # BLD AUTO: 342 K/UL (ref 164–446)
PMV BLD AUTO: 9.4 FL (ref 9–12.9)
POTASSIUM SERPL-SCNC: 4.1 MMOL/L (ref 3.6–5.5)
PROT SERPL-MCNC: 7.5 G/DL (ref 6–8.2)
RBC # BLD AUTO: 4.15 M/UL (ref 4.2–5.4)
SODIUM SERPL-SCNC: 139 MMOL/L (ref 135–145)
TROPONIN I SERPL-MCNC: <0.01 NG/ML (ref 0–0.04)
WBC # BLD AUTO: 4.7 K/UL (ref 4.8–10.8)

## 2019-06-05 PROCEDURE — 80053 COMPREHEN METABOLIC PANEL: CPT

## 2019-06-05 PROCEDURE — 84484 ASSAY OF TROPONIN QUANT: CPT

## 2019-06-05 PROCEDURE — 99284 EMERGENCY DEPT VISIT MOD MDM: CPT

## 2019-06-05 PROCEDURE — 93005 ELECTROCARDIOGRAM TRACING: CPT | Performed by: EMERGENCY MEDICINE

## 2019-06-05 PROCEDURE — 93005 ELECTROCARDIOGRAM TRACING: CPT

## 2019-06-05 PROCEDURE — 85027 COMPLETE CBC AUTOMATED: CPT

## 2019-06-05 PROCEDURE — 700105 HCHG RX REV CODE 258: Performed by: EMERGENCY MEDICINE

## 2019-06-05 RX ORDER — AMLODIPINE BESYLATE 10 MG/1
5 TABLET ORAL 2 TIMES DAILY
COMMUNITY
End: 2019-06-08

## 2019-06-05 RX ORDER — CHLORAL HYDRATE 500 MG
1000 CAPSULE ORAL EVERY EVENING
COMMUNITY

## 2019-06-05 RX ORDER — SODIUM CHLORIDE 9 MG/ML
1000 INJECTION, SOLUTION INTRAVENOUS ONCE
Status: COMPLETED | OUTPATIENT
Start: 2019-06-05 | End: 2019-06-05

## 2019-06-05 RX ADMIN — SODIUM CHLORIDE 1000 ML: 9 INJECTION, SOLUTION INTRAVENOUS at 15:20

## 2019-06-05 NOTE — ED NOTES
Med Rec Updated and Complete per Pt at bedside  Allergies Reviewed  No PO ABX last 30 days.    Pt taking LD ASA daily.

## 2019-06-05 NOTE — ED NOTES
Pt brought in from [facility] from home. Per EMS pt c/o generalized weakness/dizziness and SOB x 2-3 days. Pt states she was just d/c from here on 05/29/2019, states they stopped her hydrochlorothiazide and increased her Norvasc to 10 mg BID. Pt states she used to take Norvasc 5 mg BID. Pt states after the dose increase she has felt these symptoms.     BS 94. Pt denies pain. Pt a/o x4, speaking in full sentences.

## 2019-06-05 NOTE — ED NOTES
Pt ambulated to restroom, denies dizziness. Walking steady with walker.   Provided with fruit and snacks to eat.

## 2019-06-05 NOTE — ED PROVIDER NOTES
"ED Provider Note    Scribed for Bear Naranjo M.D. by Jessika Dowell. 6/5/2019  2:43 PM    Primary care provider: Estuardo Carrero M.D.  Means of arrival: EMS  History obtained from: Patient  History limited by: None    CHIEF COMPLAINT  Chief Complaint   Patient presents with   • Weakness   • Dizziness       HPI  Livia Corea is a 89 y.o. female who presents to the Emergency Department dizziness and weakness onset two hours prior to arrival. Patient reports additional near-syncopal episode that lasted about 10 minutes, as well as symptoms described as \"shaking like a leaf.\" She reports similar symptoms yesterday and took her blood pressure which revealed 190s. The patient additionally reports multiple scattered bruises. Patient denies any chest pain, fevers, or leg pain.      Patient was taking 10 mg dose of Norvasc in the morning and 10 mg at night per discharge instructions, however she recently lowered her dosage to 5 mg at night. Her last dose of Norvasc 10 mg was earlier today around 0800.     Patient reportedly maintains healthy diet and hydration, but notes she drinks three cups of green tea daily.     Review of past medical records shows the patient was admitted for hypertensive urgency to Telemetry on 5/26/19 under [facility]. Patient was teresa in 30s. She had no further episodes of bradycardia in hospital. Cardiology discontinued clonidine. She was discharged with Norvasc for hypertension. Her blood pressure this visit 172/80.     REVIEW OF SYSTEMS  Pertinent negatives include no chest pain, fevers, and leg pain.   As above, all other systems reviewed and are negative.   See HPI for further details.     PAST MEDICAL HISTORY   has a past medical history of Circulation problem (01/2018); Hyperlipidemia; and Hypertension.    SURGICAL HISTORY   has a past surgical history that includes gyn surgery and carotid endarterectomy (Right, 2000).    SOCIAL HISTORY  Social History   Substance Use " Topics   • Smoking status: Former Smoker     Quit date: 2/25/1988   • Smokeless tobacco: Never Used   • Alcohol use Yes      Comment: 1 glass of wine x 2 days a week      History   Drug Use No       FAMILY HISTORY  Family History   Problem Relation Age of Onset   • Heart Attack Mother 64   • Heart Attack Father 72       CURRENT MEDICATIONS  Home Medications     Reviewed by Richard Coe (Pharmacy Tech) on 06/05/19 at 1635  Med List Status: Complete   Medication Last Dose Status   amLODIPine (NORVASC) 10 MG Tab 6/5/2019 Active   ascorbic acid (ASCORBIC ACID) 500 MG Tab 6/4/2019 Active   aspirin EC (ECOTRIN) 81 MG Tablet Delayed Response 6/4/2019 Active   cyanocobalamin (VITAMIN B-12) 500 MCG Tab 6/4/2019 Active   Garlic 10 MG Cap 6/4/2019 Active   Omega-3 Fatty Acids (FISH OIL) 1000 MG Cap capsule 6/4/2019 Active                ALLERGIES  Allergies   Allergen Reactions   • Chlorthalidone      Headaches severe. Heart racing    • Lisinopril Swelling     Of lower extremities        PHYSICAL EXAM  VITAL SIGNS: BP (!) 172/80   Pulse 70   Temp 36.4 °C (97.6 °F) (Temporal)   Resp 16   Wt 57 kg (125 lb 10.6 oz)   BMI 20.91 kg/m²   Constitutional: Well developed, No acute distress, Non-toxic appearance.   HENT: Normocephalic, Atraumatic, Bilateral external ears normal, dry mucous membranes. No oral exudates or nasal discharge.   Eyes: Pupils are equal round and reactive, EOMI, Conjunctiva normal, No discharge.   Neck: Normal range of motion, No tenderness, Supple, No stridor. No meningismus.  Lymphatic: No lymphadenopathy noted.   Cardiovascular: Regular rate and rhythm without murmur rub or gallop.  Thorax & Lungs: Clear breath sounds bilaterally without wheezes, rhonchi or rales. There is no chest wall tenderness.   Abdomen: Soft non-tender non-distended. There is no rebound or guarding. No organomegaly is appreciated. Bowel sounds are normal.  Skin: Bruise to right lower quadrant. Poor skin turgor.   Back: No  CVA or spinal tenderness.   Extremities: Intact distal pulses, No edema, No tenderness, No cyanosis, No clubbing. Capillary refill is less than 2 seconds.  Musculoskeletal: Good range of motion in all major joints. No tenderness to palpation or major deformities noted.   Neurologic: Alert & oriented x 3, Normal motor function, Normal sensory function, No focal deficits noted. Reflexes are normal.  Psychiatric: Affect normal, Judgment normal, Mood normal. There is no suicidal ideation or patient reported hallucinations.       DIAGNOSTIC STUDIES / PROCEDURES    LABS  Labs Reviewed   CBC WITHOUT DIFFERENTIAL - Abnormal; Notable for the following:        Result Value    WBC 4.7 (*)     RBC 4.15 (*)     MCHC 33.1 (*)     All other components within normal limits   COMP METABOLIC PANEL - Abnormal; Notable for the following:     Glucose 101 (*)     Bun 24 (*)     Globulin 3.6 (*)     All other components within normal limits   ESTIMATED GFR - Abnormal; Notable for the following:     GFR If  55 (*)     GFR If Non  45 (*)     All other components within normal limits   TROPONIN      All labs reviewed by me.    EKG Interpretation:  Results for orders placed or performed during the hospital encounter of 19   EKG   Result Value Ref Range    Report       [facility] Emergency Dept.    Test Date:  2019  Pt Name:    GEO WHITESIDE                 Department: ER  MRN:        [id number]                      Room:       RD 09  Gender:     Female                       Technician: [id number]  :        [id number]                   Requested By:ER TRIAGE PROTOCOL  Order #:    [id number]                    Reading MD:    Measurements  Intervals                                Axis  Rate:       65                           P:          49  CO:         168                          QRS:        25  QRSD:       152                          T:          225  QT:         464  QTc:         483    Interpretive Statements  SINUS RHYTHM  MULTIPLE ATRIAL PREMATURE COMPLEXES  LEFT BUNDLE BRANCH BLOCK  Compared to ECG 05/27/2019 23:45:40  Atrial premature complex(es) now present  Atrial abnormality no longer present  First degree AV block no longer present         COURSE & MEDICAL DECISION MAKING  Nursing notes, VS, PMSFHx reviewed in chart.    2:28 PM - EKG and old EKG ordered to evaluate.  EKG shows no evidence of acute dysrhythmia or new ischemic changes.  There is some first-degree AV block in the past EKG but no longer present on this EKG    2:41 PM - Reviewed past medical records of the patient.     2:43 PM Patient seen and examined at bedside.     2:52 PM - Ordered labs to evaluate. Patient was treated with   Medications   NS infusion 1,000 mL (0 mL Intravenous Stopped 6/5/19 1620)   for her symptoms.      Laboratory evaluation revealed no leukocytosis, shift, anemia, electrolyte derangements.  Patient has slightly elevated GFR at 55.  Creatinine is normal.  Troponin is also normal.    HYDRATION: Based on the patient's presentation of Dehydration the patient was given IV fluids. IV Hydration was used because oral hydration was not adequate alone. Upon recheck following hydration, the patient was feeling improved.     4:18 PM - Patient was reevaluated at bedside. Blood pressure is down to 164. Patient reports feeling improved. Will proceed with PO challenge and ambulation. She will be discharged following interventions.  I think she needs to stop using caffeine products and stay compliant with her blood pressure medication.  She is feeling much better on ambulation    Patient has had high blood pressure while in the emergency department, felt likely secondary to medical condition. Counseled patient to monitor blood pressure at home and follow up with primary care physician.      The patient will return for new or worsening symptoms and is stable at the time of discharge.    DISPOSITION:  Patient will  be discharged home in stable condition.    FINAL IMPRESSION  1. Dizziness    2. Dehydration    3. Weakness    4. Essential hypertension          IJessika (Scribe), am scribing for, and in the presence of, Bear Naranjo M.D..    Electronically signed by: Jessika Dowell (Scribe), 6/5/2019    Bear BARBOZA M.D. personally performed the services described in this documentation, as scribed by Jessika Dowell in my presence, and it is both accurate and complete. C.     The note accurately reflects work and decisions made by me.  Bear Naranjo  6/5/2019  4:54 PM

## 2019-06-08 ENCOUNTER — HOSPITAL ENCOUNTER (OUTPATIENT)
Facility: MEDICAL CENTER | Age: 84
End: 2019-06-09
Attending: EMERGENCY MEDICINE | Admitting: FAMILY MEDICINE
Payer: MEDICARE

## 2019-06-08 ENCOUNTER — APPOINTMENT (OUTPATIENT)
Dept: RADIOLOGY | Facility: MEDICAL CENTER | Age: 84
End: 2019-06-08
Attending: STUDENT IN AN ORGANIZED HEALTH CARE EDUCATION/TRAINING PROGRAM
Payer: MEDICARE

## 2019-06-08 ENCOUNTER — TELEPHONE (OUTPATIENT)
Dept: CARDIOLOGY | Facility: MEDICAL CENTER | Age: 84
End: 2019-06-08

## 2019-06-08 DIAGNOSIS — G25.2 COARSE TREMORS: ICD-10-CM

## 2019-06-08 DIAGNOSIS — R53.1 GENERALIZED WEAKNESS: ICD-10-CM

## 2019-06-08 LAB
ALBUMIN SERPL BCP-MCNC: 4 G/DL (ref 3.2–4.9)
ALBUMIN/GLOB SERPL: 1.1 G/DL
ALP SERPL-CCNC: 89 U/L (ref 30–99)
ALT SERPL-CCNC: 5 U/L (ref 2–50)
ANION GAP SERPL CALC-SCNC: 10 MMOL/L (ref 0–11.9)
APPEARANCE UR: CLEAR
AST SERPL-CCNC: 19 U/L (ref 12–45)
BACTERIA #/AREA URNS HPF: NEGATIVE /HPF
BASOPHILS # BLD AUTO: 1.2 % (ref 0–1.8)
BASOPHILS # BLD: 0.06 K/UL (ref 0–0.12)
BILIRUB SERPL-MCNC: 0.5 MG/DL (ref 0.1–1.5)
BILIRUB UR QL STRIP.AUTO: NEGATIVE
BUN SERPL-MCNC: 22 MG/DL (ref 8–22)
CALCIUM SERPL-MCNC: 9.5 MG/DL (ref 8.5–10.5)
CHLORIDE SERPL-SCNC: 107 MMOL/L (ref 96–112)
CO2 SERPL-SCNC: 22 MMOL/L (ref 20–33)
COLOR UR: YELLOW
CREAT SERPL-MCNC: 1 MG/DL (ref 0.5–1.4)
EOSINOPHIL # BLD AUTO: 0.13 K/UL (ref 0–0.51)
EOSINOPHIL NFR BLD: 2.6 % (ref 0–6.9)
EPI CELLS #/AREA URNS HPF: NEGATIVE /HPF
ERYTHROCYTE [DISTWIDTH] IN BLOOD BY AUTOMATED COUNT: 45.7 FL (ref 35.9–50)
GLOBULIN SER CALC-MCNC: 3.5 G/DL (ref 1.9–3.5)
GLUCOSE SERPL-MCNC: 98 MG/DL (ref 65–99)
GLUCOSE UR STRIP.AUTO-MCNC: NEGATIVE MG/DL
HCT VFR BLD AUTO: 39.5 % (ref 37–47)
HGB BLD-MCNC: 12.8 G/DL (ref 12–16)
HYALINE CASTS #/AREA URNS LPF: NORMAL /LPF
IMM GRANULOCYTES # BLD AUTO: 0.01 K/UL (ref 0–0.11)
IMM GRANULOCYTES NFR BLD AUTO: 0.2 % (ref 0–0.9)
KETONES UR STRIP.AUTO-MCNC: NEGATIVE MG/DL
LEUKOCYTE ESTERASE UR QL STRIP.AUTO: ABNORMAL
LYMPHOCYTES # BLD AUTO: 1.54 K/UL (ref 1–4.8)
LYMPHOCYTES NFR BLD: 31 % (ref 22–41)
MCH RBC QN AUTO: 29.6 PG (ref 27–33)
MCHC RBC AUTO-ENTMCNC: 32.4 G/DL (ref 33.6–35)
MCV RBC AUTO: 91.4 FL (ref 81.4–97.8)
MICRO URNS: ABNORMAL
MONOCYTES # BLD AUTO: 0.7 K/UL (ref 0–0.85)
MONOCYTES NFR BLD AUTO: 14.1 % (ref 0–13.4)
NEUTROPHILS # BLD AUTO: 2.52 K/UL (ref 2–7.15)
NEUTROPHILS NFR BLD: 50.9 % (ref 44–72)
NITRITE UR QL STRIP.AUTO: NEGATIVE
NRBC # BLD AUTO: 0 K/UL
NRBC BLD-RTO: 0 /100 WBC
PH UR STRIP.AUTO: 8 [PH]
PLATELET # BLD AUTO: 316 K/UL (ref 164–446)
PMV BLD AUTO: 9.3 FL (ref 9–12.9)
POTASSIUM SERPL-SCNC: 4.1 MMOL/L (ref 3.6–5.5)
PROT SERPL-MCNC: 7.5 G/DL (ref 6–8.2)
PROT UR QL STRIP: NEGATIVE MG/DL
RBC # BLD AUTO: 4.32 M/UL (ref 4.2–5.4)
RBC # URNS HPF: NORMAL /HPF
RBC UR QL AUTO: NEGATIVE
SODIUM SERPL-SCNC: 139 MMOL/L (ref 135–145)
SP GR UR STRIP.AUTO: 1.01
TROPONIN I SERPL-MCNC: 0.01 NG/ML (ref 0–0.04)
UROBILINOGEN UR STRIP.AUTO-MCNC: 0.2 MG/DL
WBC # BLD AUTO: 5 K/UL (ref 4.8–10.8)
WBC #/AREA URNS HPF: NORMAL /HPF

## 2019-06-08 PROCEDURE — 99285 EMERGENCY DEPT VISIT HI MDM: CPT

## 2019-06-08 PROCEDURE — 81001 URINALYSIS AUTO W/SCOPE: CPT

## 2019-06-08 PROCEDURE — 87086 URINE CULTURE/COLONY COUNT: CPT

## 2019-06-08 PROCEDURE — G0378 HOSPITAL OBSERVATION PER HR: HCPCS

## 2019-06-08 PROCEDURE — 93010 ELECTROCARDIOGRAM REPORT: CPT | Performed by: INTERNAL MEDICINE

## 2019-06-08 PROCEDURE — 93005 ELECTROCARDIOGRAM TRACING: CPT | Performed by: FAMILY MEDICINE

## 2019-06-08 PROCEDURE — 700102 HCHG RX REV CODE 250 W/ 637 OVERRIDE(OP): Performed by: STUDENT IN AN ORGANIZED HEALTH CARE EDUCATION/TRAINING PROGRAM

## 2019-06-08 PROCEDURE — A9270 NON-COVERED ITEM OR SERVICE: HCPCS | Performed by: STUDENT IN AN ORGANIZED HEALTH CARE EDUCATION/TRAINING PROGRAM

## 2019-06-08 PROCEDURE — 36415 COLL VENOUS BLD VENIPUNCTURE: CPT

## 2019-06-08 PROCEDURE — 80053 COMPREHEN METABOLIC PANEL: CPT

## 2019-06-08 PROCEDURE — 700111 HCHG RX REV CODE 636 W/ 250 OVERRIDE (IP): Performed by: FAMILY MEDICINE

## 2019-06-08 PROCEDURE — 94760 N-INVAS EAR/PLS OXIMETRY 1: CPT

## 2019-06-08 PROCEDURE — 84484 ASSAY OF TROPONIN QUANT: CPT

## 2019-06-08 PROCEDURE — 85025 COMPLETE CBC W/AUTO DIFF WBC: CPT

## 2019-06-08 PROCEDURE — 96372 THER/PROPH/DIAG INJ SC/IM: CPT

## 2019-06-08 RX ORDER — IPRATROPIUM BROMIDE AND ALBUTEROL SULFATE 2.5; .5 MG/3ML; MG/3ML
3 SOLUTION RESPIRATORY (INHALATION)
Status: DISCONTINUED | OUTPATIENT
Start: 2019-06-08 | End: 2019-06-08

## 2019-06-08 RX ORDER — AMLODIPINE BESYLATE 5 MG/1
5 TABLET ORAL 2 TIMES DAILY
Status: ON HOLD | COMMUNITY
End: 2019-07-01

## 2019-06-08 RX ORDER — HEPARIN SODIUM 5000 [USP'U]/ML
5000 INJECTION, SOLUTION INTRAVENOUS; SUBCUTANEOUS EVERY 8 HOURS
Status: DISCONTINUED | OUTPATIENT
Start: 2019-06-08 | End: 2019-06-09 | Stop reason: HOSPADM

## 2019-06-08 RX ORDER — BISACODYL 10 MG
10 SUPPOSITORY, RECTAL RECTAL
Status: DISCONTINUED | OUTPATIENT
Start: 2019-06-08 | End: 2019-06-09 | Stop reason: HOSPADM

## 2019-06-08 RX ORDER — AMOXICILLIN 250 MG
2 CAPSULE ORAL 2 TIMES DAILY
Status: DISCONTINUED | OUTPATIENT
Start: 2019-06-08 | End: 2019-06-09 | Stop reason: HOSPADM

## 2019-06-08 RX ORDER — HYDROCHLOROTHIAZIDE 12.5 MG/1
12.5 TABLET ORAL
Status: DISCONTINUED | OUTPATIENT
Start: 2019-06-09 | End: 2019-06-09 | Stop reason: HOSPADM

## 2019-06-08 RX ORDER — AMLODIPINE BESYLATE 5 MG/1
5 TABLET ORAL 2 TIMES DAILY
Status: DISCONTINUED | OUTPATIENT
Start: 2019-06-08 | End: 2019-06-08

## 2019-06-08 RX ORDER — POLYETHYLENE GLYCOL 3350 17 G/17G
1 POWDER, FOR SOLUTION ORAL
Status: DISCONTINUED | OUTPATIENT
Start: 2019-06-08 | End: 2019-06-09 | Stop reason: HOSPADM

## 2019-06-08 RX ORDER — LABETALOL HYDROCHLORIDE 5 MG/ML
10 INJECTION, SOLUTION INTRAVENOUS EVERY 4 HOURS PRN
Status: DISCONTINUED | OUTPATIENT
Start: 2019-06-08 | End: 2019-06-09 | Stop reason: HOSPADM

## 2019-06-08 RX ORDER — ACETAMINOPHEN 325 MG/1
650 TABLET ORAL EVERY 6 HOURS PRN
Status: DISCONTINUED | OUTPATIENT
Start: 2019-06-08 | End: 2019-06-09 | Stop reason: HOSPADM

## 2019-06-08 RX ADMIN — LIDOCAINE HYDROCHLORIDE 15 ML: 20 SOLUTION OROPHARYNGEAL at 23:00

## 2019-06-08 RX ADMIN — AMLODIPINE BESYLATE 5 MG: 5 TABLET ORAL at 19:50

## 2019-06-08 RX ADMIN — HEPARIN SODIUM 5000 UNITS: 5000 INJECTION, SOLUTION INTRAVENOUS; SUBCUTANEOUS at 21:04

## 2019-06-08 ASSESSMENT — LIFESTYLE VARIABLES: EVER_SMOKED: YES

## 2019-06-08 ASSESSMENT — PATIENT HEALTH QUESTIONNAIRE - PHQ9
SUM OF ALL RESPONSES TO PHQ9 QUESTIONS 1 AND 2: 0
1. LITTLE INTEREST OR PLEASURE IN DOING THINGS: NOT AT ALL
2. FEELING DOWN, DEPRESSED, IRRITABLE, OR HOPELESS: NOT AT ALL

## 2019-06-08 NOTE — TELEPHONE ENCOUNTER
Telephone call with patient at 1250.  Patient reports that she was in the emergency department approximately 2 days ago with dehydration.  She was given a liter of fluids.  She had to urinate multiple times the next day which she was concerned about.  Today she is very weak and feels as though she almost passed out.  She is a walker to ambulate and finds it difficult to even push her walker.  She reports that her urine is basically clear in color now.  Her blood pressure is 139-140/70-80.  She is very concerned about being some fatigue and dizzy and has some shortness of breath.  I have called [facility] to do a well check on her.  She is awaiting their arrival.

## 2019-06-08 NOTE — ED PROVIDER NOTES
ED Provider Note    CHIEF COMPLAINT  Chief Complaint   Patient presents with   • Weakness     onset since yesterday.  Pt seen here for the same on Wednesday and was told she was dehydrated.  Pt reports she was given a fluid bolus and discharged home.       HPI  Livia Corea is a 89 y.o. female who presents for evaluation of generalized weakness.  The patient was seen here in the emergency department 3 days ago for the same symptoms.  She was thought to be mildly dehydrated and improved with hydration.  She was sent home and was doing fine until yesterday.  She has had some generalized weakness.  She states she feels as if she is been drinking plenty of fluids.  She is had no fevers.  She is had no vomiting or diarrhea.  She states she feels essentially normal right now.  She is had no urinary symptoms.    REVIEW OF SYSTEMS  See HPI for further details. All other systems negative.    PAST MEDICAL HISTORY  Past Medical History:   Diagnosis Date   • Circulation problem 01/2018   • Hyperlipidemia    • Hypertension        FAMILY HISTORY  Family History   Problem Relation Age of Onset   • Heart Attack Mother 64   • Heart Attack Father 72       SOCIAL HISTORY  Social History     Social History   • Marital status: Single     Spouse name: N/A   • Number of children: N/A   • Years of education: N/A     Social History Main Topics   • Smoking status: Former Smoker     Quit date: 2/25/1988   • Smokeless tobacco: Never Used   • Alcohol use Yes      Comment: 1 glass of wine x 2 days a week   • Drug use: No   • Sexual activity: Not on file     Other Topics Concern   • Not on file     Social History Narrative   • No narrative on file       SURGICAL HISTORY  Past Surgical History:   Procedure Laterality Date   • CAROTID ENDARTERECTOMY Right 2000   • GYN SURGERY      hysterectomy       CURRENT MEDICATIONS  Home Medications     Reviewed by Jacque Maciel R.N. (Registered Nurse) on 06/08/19 at 1516  Med List Status: Partial  "  Medication Last Dose Status   amLODIPine (NORVASC) 10 MG Tab 6/8/2019 Active   ascorbic acid (ASCORBIC ACID) 500 MG Tab 6/7/2019 Active   aspirin EC (ECOTRIN) 81 MG Tablet Delayed Response 6/7/2019 Active   cyanocobalamin (VITAMIN B-12) 500 MCG Tab 6/7/2019 Active   Garlic 10 MG Cap not taking Active   Omega-3 Fatty Acids (FISH OIL) 1000 MG Cap capsule 6/7/2019 Active                ALLERGIES  Allergies   Allergen Reactions   • Chlorthalidone      Headaches severe. Heart racing    • Lisinopril Swelling     Of lower extremities        PHYSICAL EXAM  VITAL SIGNS: /103   Pulse 76   Temp 37 °C (98.6 °F) (Temporal)   Resp 18   Ht 1.645 m (5' 4.75\")   Wt 59.6 kg (131 lb 6.3 oz)   SpO2 97%   BMI 22.03 kg/m²   Constitutional: Well developed, Well nourished, No acute distress, Non-toxic appearance.   HENT: Normocephalic, Atraumatic, Oropharynx slightly dry.    Eyes:  EOMI, Conjunctiva normal, No discharge.   Cardiovascular: Normal heart rate, Normal rhythm, No murmurs, No rubs, No gallops.   Thorax & Lungs: Clear to auscultation without wheezes, rales, or rhonchi.   Abdomen: Soft and nontender..   Skin: Warm, Dry.  Musculoskeletal: Good range of motion in all major joints.   Neurologic: Awake and alert.  No focal deficits.              COURSE & MEDICAL DECISION MAKING  Pertinent Labs & Imaging studies reviewed. (See chart for details)  This is a 89-year-old here for evaluation of generalized weakness.  Patient was seen here couple of days ago for the same symptoms and was thought to be dehydrated.  She was hydrated with normal saline and sent home and states that she felt fine until yesterday.  She started feeling generalized weakness.  Today it was worse and she felt like she was very shaky and was afraid she was going a fall.  She called 911 and upon arrival here she actually stated she is feeling very much better.  Her work-up today is unremarkable.  Her troponin I is negative.  Urinalysis is unremarkable.  " Chemistries and CBC are unremarkable.  The patient got up and was attempting to go to the restroom and became very weak and shaky all over and was assisted back to bed.  I went and spoke with the patient she states that that sweats been happening at home.  The patient clearly is not able to go home and live alone at this point as she is a danger to fall.  I have contacted the [facility] resident we have discussed the case and they will admit the patient for further evaluation and treatment.    FINAL IMPRESSION  1.  Generalized weakness  2.  Tremors  3.         Electronically signed by: Dago Tucker, 6/8/2019 4:19 PM

## 2019-06-08 NOTE — ED TRIAGE NOTES
"Livia Nolasco Anmol 89 y.o. female bib EMS from home for     Chief Complaint   Patient presents with   • Weakness     onset since yesterday.  Pt seen here for the same on Wednesday and was told she was dehydrated.  Pt reports she was given a fluid bolus and discharged home.     Pt reports her dose of amlodipine was increased from 5mg BID to 10mg BID approx 2 weeks ago.  Pt reports she was feeling \"so crummy\" from it that she decreased her dose back down to 5mg BID and has a follow-up appointment with her PCP this Tuesday to discuss this.  PIV placed by EMS pta with FSBS 101.  Pt was given 250cc NS folus PTA.  Pt denies any pain, sob, n/v, fever or other c/c  /103   Pulse 76   Temp 37 °C (98.6 °F) (Temporal)   Resp 18   Ht 1.645 m (5' 4.75\")   Wt 59.6 kg (131 lb 6.3 oz)   SpO2 97%   BMI 22.03 kg/m²     "

## 2019-06-09 ENCOUNTER — APPOINTMENT (OUTPATIENT)
Dept: RADIOLOGY | Facility: MEDICAL CENTER | Age: 84
End: 2019-06-09
Attending: STUDENT IN AN ORGANIZED HEALTH CARE EDUCATION/TRAINING PROGRAM
Payer: MEDICARE

## 2019-06-09 VITALS
BODY MASS INDEX: 21.34 KG/M2 | WEIGHT: 125 LBS | HEIGHT: 64 IN | DIASTOLIC BLOOD PRESSURE: 78 MMHG | TEMPERATURE: 98.2 F | SYSTOLIC BLOOD PRESSURE: 157 MMHG | HEART RATE: 65 BPM | RESPIRATION RATE: 15 BRPM | OXYGEN SATURATION: 95 %

## 2019-06-09 LAB
ANION GAP SERPL CALC-SCNC: 10 MMOL/L (ref 0–11.9)
BASOPHILS # BLD AUTO: 1.4 % (ref 0–1.8)
BASOPHILS # BLD: 0.06 K/UL (ref 0–0.12)
BUN SERPL-MCNC: 19 MG/DL (ref 8–22)
CALCIUM SERPL-MCNC: 9.2 MG/DL (ref 8.5–10.5)
CHLORIDE SERPL-SCNC: 110 MMOL/L (ref 96–112)
CO2 SERPL-SCNC: 21 MMOL/L (ref 20–33)
CREAT SERPL-MCNC: 0.89 MG/DL (ref 0.5–1.4)
EKG IMPRESSION: NORMAL
EOSINOPHIL # BLD AUTO: 0.13 K/UL (ref 0–0.51)
EOSINOPHIL NFR BLD: 3.1 % (ref 0–6.9)
ERYTHROCYTE [DISTWIDTH] IN BLOOD BY AUTOMATED COUNT: 45.7 FL (ref 35.9–50)
GLUCOSE SERPL-MCNC: 99 MG/DL (ref 65–99)
HCT VFR BLD AUTO: 38.6 % (ref 37–47)
HGB BLD-MCNC: 12.7 G/DL (ref 12–16)
IMM GRANULOCYTES # BLD AUTO: 0 K/UL (ref 0–0.11)
IMM GRANULOCYTES NFR BLD AUTO: 0 % (ref 0–0.9)
LYMPHOCYTES # BLD AUTO: 1.4 K/UL (ref 1–4.8)
LYMPHOCYTES NFR BLD: 33.3 % (ref 22–41)
MCH RBC QN AUTO: 30 PG (ref 27–33)
MCHC RBC AUTO-ENTMCNC: 32.9 G/DL (ref 33.6–35)
MCV RBC AUTO: 91.3 FL (ref 81.4–97.8)
MONOCYTES # BLD AUTO: 0.61 K/UL (ref 0–0.85)
MONOCYTES NFR BLD AUTO: 14.5 % (ref 0–13.4)
NEUTROPHILS # BLD AUTO: 2.01 K/UL (ref 2–7.15)
NEUTROPHILS NFR BLD: 47.7 % (ref 44–72)
NRBC # BLD AUTO: 0 K/UL
NRBC BLD-RTO: 0 /100 WBC
PLATELET # BLD AUTO: 308 K/UL (ref 164–446)
PMV BLD AUTO: 9.3 FL (ref 9–12.9)
POTASSIUM SERPL-SCNC: 3.9 MMOL/L (ref 3.6–5.5)
RBC # BLD AUTO: 4.23 M/UL (ref 4.2–5.4)
SODIUM SERPL-SCNC: 141 MMOL/L (ref 135–145)
TROPONIN I SERPL-MCNC: 0.02 NG/ML (ref 0–0.04)
WBC # BLD AUTO: 4.2 K/UL (ref 4.8–10.8)

## 2019-06-09 PROCEDURE — 85025 COMPLETE CBC W/AUTO DIFF WBC: CPT

## 2019-06-09 PROCEDURE — 700102 HCHG RX REV CODE 250 W/ 637 OVERRIDE(OP): Performed by: STUDENT IN AN ORGANIZED HEALTH CARE EDUCATION/TRAINING PROGRAM

## 2019-06-09 PROCEDURE — 84484 ASSAY OF TROPONIN QUANT: CPT

## 2019-06-09 PROCEDURE — 71045 X-RAY EXAM CHEST 1 VIEW: CPT

## 2019-06-09 PROCEDURE — A9270 NON-COVERED ITEM OR SERVICE: HCPCS | Performed by: STUDENT IN AN ORGANIZED HEALTH CARE EDUCATION/TRAINING PROGRAM

## 2019-06-09 PROCEDURE — 97166 OT EVAL MOD COMPLEX 45 MIN: CPT

## 2019-06-09 PROCEDURE — 97161 PT EVAL LOW COMPLEX 20 MIN: CPT

## 2019-06-09 PROCEDURE — G0378 HOSPITAL OBSERVATION PER HR: HCPCS

## 2019-06-09 PROCEDURE — 36415 COLL VENOUS BLD VENIPUNCTURE: CPT

## 2019-06-09 PROCEDURE — 80048 BASIC METABOLIC PNL TOTAL CA: CPT

## 2019-06-09 RX ORDER — AMLODIPINE BESYLATE 5 MG/1
5 TABLET ORAL
Status: DISCONTINUED | OUTPATIENT
Start: 2019-06-09 | End: 2019-06-09 | Stop reason: HOSPADM

## 2019-06-09 RX ORDER — CALCIUM CARBONATE 500 MG/1
500 TABLET, CHEWABLE ORAL DAILY
Status: DISCONTINUED | OUTPATIENT
Start: 2019-06-09 | End: 2019-06-09 | Stop reason: HOSPADM

## 2019-06-09 RX ADMIN — ASPIRIN 81 MG: 81 TABLET ORAL at 05:30

## 2019-06-09 RX ADMIN — AMLODIPINE BESYLATE 5 MG: 5 TABLET ORAL at 09:54

## 2019-06-09 RX ADMIN — HYDROCHLOROTHIAZIDE 12.5 MG: 12.5 TABLET ORAL at 05:30

## 2019-06-09 ASSESSMENT — COGNITIVE AND FUNCTIONAL STATUS - GENERAL
SUGGESTED CMS G CODE MODIFIER MOBILITY: CJ
SUGGESTED CMS G CODE MODIFIER DAILY ACTIVITY: CH
WALKING IN HOSPITAL ROOM: A LITTLE
MOVING FROM LYING ON BACK TO SITTING ON SIDE OF FLAT BED: A LITTLE
DAILY ACTIVITIY SCORE: 24
MOBILITY SCORE: 20
CLIMB 3 TO 5 STEPS WITH RAILING: A LITTLE
MOVING TO AND FROM BED TO CHAIR: A LITTLE

## 2019-06-09 ASSESSMENT — GAIT ASSESSMENTS
DEVIATION: STEP TO
DISTANCE (FEET): 150
ASSISTIVE DEVICE: FRONT WHEEL WALKER
GAIT LEVEL OF ASSIST: SUPERVISED

## 2019-06-09 ASSESSMENT — ACTIVITIES OF DAILY LIVING (ADL): TOILETING: INDEPENDENT

## 2019-06-09 NOTE — PROGRESS NOTES
[facility] MEDICINE PROGRESS NOTE     Attending: Dr. Sonia Paredes    Resident: Dr. Kelly Mandel     PATIENT: Livia Corea; [id number]; 11/21/1926    ID: 89 y.o. female admitted for weakness    SUBJECTIVE: No acute events overnight, doing better this am, feeling better, walking with PT/OT    OBJECTIVE:     Vitals:    06/08/19 2125 06/09/19 0110 06/09/19 0432 06/09/19 0636   BP: (!) 161/83 156/73 (!) 175/73 (!) 174/80   Pulse: 87 (!) 57 (!) 55    Resp: 20 20 18    Temp:  36.5 °C (97.7 °F) 36.7 °C (98 °F)    TempSrc:  Temporal Temporal    SpO2:  91% 92%    Weight:       Height:         No intake or output data in the 24 hours ending 06/09/19 0837    PE:  General: No acute distress, resting comfortably in bed.  HEENT: NC/AT. PERRLA. EOMI. MMM  Cardiovascular: RRR with no M/R/G.  Respiratory: Symmetrical chest. CTAB with no W/R/R  Abdomen: soft, NT/ND, no masses, +BS   EXT:  GAMBINO, 5/5 strength, L leg shorter than R, No C/C/E 2+ pulses   Neuro: non focal with no numbness, tingling or changes in sensation    LABS:  Recent Labs      06/08/19   1609 06/09/19 0031   WBC  5.0  4.2*   RBC  4.32  4.23   HEMOGLOBIN  12.8  12.7   HEMATOCRIT  39.5  38.6   MCV  91.4  91.3   MCH  29.6  30.0   RDW  45.7  45.7   PLATELETCT  316  308   MPV  9.3  9.3   NEUTSPOLYS  50.90  47.70   LYMPHOCYTES  31.00  33.30   MONOCYTES  14.10*  14.50*   EOSINOPHILS  2.60  3.10   BASOPHILS  1.20  1.40     Recent Labs      06/08/19   1609  06/09/19   0031   SODIUM  139  141   POTASSIUM  4.1  3.9   CHLORIDE  107  110   CO2  22  21   BUN  22  19   CREATININE  1.00  0.89   CALCIUM  9.5  9.2   ALBUMIN  4.0   --      Estimated GFR/CRCL = Estimated Creatinine Clearance: 34.8 mL/min (by C-G formula based on SCr of 0.89 mg/dL).  Recent Labs      06/08/19   1609  06/09/19   0031   GLUCOSE  98  99     Recent Labs      06/08/19   1609   ASTSGOT  19   ALTSGPT  5   TBILIRUBIN  0.5   ALKPHOSPHAT  89   GLOBULIN  3.5     Recent Labs      06/08/19   1609  06/09/19   0031    TROPONINI  0.01  0.02         No results for input(s): INR, APTT, FIBRINOGEN in the last 72 hours.    Invalid input(s): DIMER    IMAGING:   DX-CHEST-PORTABLE (1 VIEW)   Final Result         1.  No acute cardiopulmonary disease.   2.  Cardiomegaly   3.  Atherosclerosis            MEDS:  Current Facility-Administered Medications   Medication Last Dose   • calcium carbonate (TUMS) chewable tab 500 mg Stopped at 06/09/19 0800   • amLODIPine (NORVASC) tablet 5 mg     • senna-docusate (PERICOLACE or SENOKOT S) 8.6-50 MG per tablet 2 Tab      And   • polyethylene glycol/lytes (MIRALAX) PACKET 1 Packet      And   • magnesium hydroxide (MILK OF MAGNESIA) suspension 30 mL      And   • bisacodyl (DULCOLAX) suppository 10 mg     • heparin injection 5,000 Units 5,000 Units at 06/08/19 2104   • acetaminophen (TYLENOL) tablet 650 mg     • aspirin EC (ECOTRIN) tablet 81 mg 81 mg at 06/09/19 0530   • hydroCHLOROthiazide (HYDRODIURIL) tablet 12.5 mg 12.5 mg at 06/09/19 0530   • labetalol (NORMODYNE,TRANDATE) injection 10 mg         PROBLEM LIST:  No problems updated.    ASSESSMENT/PLAN:   ID: [id number] with PMHx of HTN admitted for weakness.      #Weakness  - no obvious etiology  - chest Xray shwoing cardiomegaly, but no acute cardiopulmonary disease  - concern for poor diet and poor fluid intake              - received 250cc bolus in EMS  - given small leukocyte esterase urine cx orderd  - encourage PO fluid intake  - no EKG at this visit- last EKG wnl and patient denies CP  - PT/OT consult     #HTN  BP between 150s-160s systolic  - restart amlodipine 5mg BID  - continue monitor BPs   - f/u at [facility] in 1 week      #FEN/PPx/Core   - IVF: none  - Diet: regular diet  - Bowel Regimen: PRNs  - DVT PPx: heparin  - Lines: PIV  - Tubes: None     Dispo: DC home today     CODE STATUS: DNR/DNI

## 2019-06-09 NOTE — PROGRESS NOTES
Spoke to Dr. Anand, updated about pt having new onset of mid epigastric abdominal pain and slight elevated BP, per MD will put in new orders

## 2019-06-09 NOTE — ED NOTES
The Medication Reconciliation process has been completed by interviewing the patient    Allergies have been reviewed  Antibiotic use in 30 days - none    [facility]: [facility]

## 2019-06-09 NOTE — ED NOTES
Pt back to bed, reports onset of weakness and hand shaking after being up on the commode.  Urine specimen sent to the lab.

## 2019-06-09 NOTE — CARE PLAN
Problem: Psychosocial Needs:  Goal: Level of anxiety will decrease  Outcome: PROGRESSING AS EXPECTED      Problem: Fluid Volume:  Goal: Will maintain balanced intake and output  Outcome: PROGRESSING AS EXPECTED

## 2019-06-09 NOTE — ED NOTES
Bed assigned to T 210.  Report called to Vanessa ARANA.  Patient updated on POC.  All needs met.  VSS.  Awaiting transport.

## 2019-06-09 NOTE — H&P
[facility] [place] FAMILY MEDICINE HISTORY AND PHYSICAL    Date & Time:    6/8/2019   7:46 PM        PATIENT ID  Name:             Livia Corea   YOB: 1926  Age:                 89 y.o.  female   MRN:               [id number]    Admitting Senior (R2/R3):  Dr. Angelica Anand  Admitting Attending:  Dr. Sonia Paredes    PCP: Dr. Estuardo Carrero                                                               ID:  [id number] with PMHx of HTN admitted for weakness.     CHIEF COMPLAINT:       weakness    HISTORY OF PRESENT ILLNESS:  Pleasant 89F presenting with weakness for 2 days. Patient was seen in the ED 3 days ago with similar complaint and was given fluid bolus and discharged. She states that she felt better when she was initially discharged and then starting yesterday, her weakness returned. Normally she can perform all of her ADLs without difficulty and she lives alone; uses a walker. However she feels too weak to do everything she normally does. She was concerned about getting up to go to the bathroom because she was worried she might fall. Endorses weakness no matter her activity, even if she is sitting. Concerned she may not be eating or drinking enough. Currently is only on amlodipine 5mg.     REVIEW OF SYSTEMS:     Constitutional: Endorses fatigue and weakness. Denies fever, chills, weight loss.  Cardiovascular:  Denies chest pain, palpitations, orthopnea, edema, dyspnea on exertion  Respiratory:  Denies cough, wheezing, shortness of breath, hemoptysis.  Gastrointestinal/Hepatic:  Denies abd pain, nausea, vomiting, diarrhea, constipation, melena, BRBPR.  Genitourinary:  Denies dysuria, frequency, incontinence, nocturia.  Musculoskeletal/Rheum: Denies joint pain, muscle pain.  Skin/Breast: Denies rashes.  Neurological: Denies focal weakness, numbness, tingling.  Pyschiatric: Denies depressed mood  Heme/Oncology: Denies easy bruising, easy bleeding.        PAST MEDICAL/ FAMILY/ SOCIAL HISTORY (PFSH):   Past  Medical History:   HTN    Past Surgical History:  Hysterectomy and tonsillectomy    Current Outpatient Medications:  No current facility-administered medications on file prior to encounter.      Current Outpatient Prescriptions on File Prior to Encounter   Medication Sig Dispense Refill   • Omega-3 Fatty Acids (FISH OIL) 1000 MG Cap capsule Take 2,000 mg by mouth every evening.     • ascorbic acid (ASCORBIC ACID) 500 MG Tab Take 500 mg by mouth every day.     • cyanocobalamin (VITAMIN B-12) 500 MCG Tab Take 500 mcg by mouth every day.     • aspirin EC (ECOTRIN) 81 MG Tablet Delayed Response Take 81 mg by mouth every day.         Current Inpatient Medications:  Current Facility-Administered Medications   Medication Last Dose   • senna-docusate (PERICOLACE or SENOKOT S) 8.6-50 MG per tablet 2 Tab      And   • polyethylene glycol/lytes (MIRALAX) PACKET 1 Packet      And   • magnesium hydroxide (MILK OF MAGNESIA) suspension 30 mL      And   • bisacodyl (DULCOLAX) suppository 10 mg     • heparin injection 5,000 Units     • acetaminophen (TYLENOL) tablet 650 mg     • amLODIPine (NORVASC) tablet 5 mg     • [START ON 2019] aspirin EC (ECOTRIN) tablet 81 mg       Current Outpatient Prescriptions   Medication   • amLODIPine (NORVASC) 5 MG Tab   • GARLIC PO   • Omega-3 Fatty Acids (FISH OIL) 1000 MG Cap capsule   • ascorbic acid (ASCORBIC ACID) 500 MG Tab   • cyanocobalamin (VITAMIN B-12) 500 MCG Tab   • aspirin EC (ECOTRIN) 81 MG Tablet Delayed Response       Medication Allergy/Sensitivities:  Allergies   Allergen Reactions   • Chlorthalidone      Headaches severe. Heart racing    • Lisinopril Swelling     Of lower extremities        Family History:  Both parents  of MI    Social History:  Lives alone, retired, performs all ADLs.  Smoking: denies   Alcohol: denies   Illicit drugs: denies     PHYSICAL EXAM:    Vitals/ General Appearance:   Vitals:    19 1731 19 1801 19 1816 19 1832   BP:        Pulse: 71 70 76 82   Resp: 17 16 17 18   Temp:       TempSrc:       SpO2: 96% 95% 95% 95%   Weight:       Height:         Oxygen Therapy:  Pulse Oximetry: 95 %, O2 Delivery: None (Room Air)  Weight/BMI: Body mass index is 22.03 kg/m².    General: well-nourished, well-hydrated, seated in gurney in no acute distress, alert and oriented  HEENT: normocephalic, atraumatic, EOMI, PERRL, MMM  Neck: normal ROM, supple without tenderness, no lymphadenopathy, no JVD  Cardiovascular: RRR with normal S1/S2, no murmurs or extra heart sounds appreciated  Lungs: normal respiratory effort, diminshed breath sounds on the L, no wheezing, no crackles, R side clear to auscultation  Abdomen: non-distended, hypoactive bowel sounds, soft, non-tender to deep palpation, no guarding or rebound, no appreciable masses or hepatosplenomegaly  Skin: no lesions or rashes noted  Extremities: no edema, clubbing, or cyanosis  MSK: moving all extremities, full ROM, no deformities noted  Neurologic: CN II-XII intact, strength 5/5 to all extremities, sensation to light touch intact  Psychiatric: normal mood, affect, and judgement    LAB DATA REVIEWED:  Recent Results (from the past 24 hour(s))   CBC WITH DIFFERENTIAL    Collection Time: 06/08/19  4:09 PM   Result Value Ref Range    WBC 5.0 4.8 - 10.8 K/uL    RBC 4.32 4.20 - 5.40 M/uL    Hemoglobin 12.8 12.0 - 16.0 g/dL    Hematocrit 39.5 37.0 - 47.0 %    MCV 91.4 81.4 - 97.8 fL    MCH 29.6 27.0 - 33.0 pg    MCHC 32.4 (L) 33.6 - 35.0 g/dL    RDW 45.7 35.9 - 50.0 fL    Platelet Count 316 164 - 446 K/uL    MPV 9.3 9.0 - 12.9 fL    Neutrophils-Polys 50.90 44.00 - 72.00 %    Lymphocytes 31.00 22.00 - 41.00 %    Monocytes 14.10 (H) 0.00 - 13.40 %    Eosinophils 2.60 0.00 - 6.90 %    Basophils 1.20 0.00 - 1.80 %    Immature Granulocytes 0.20 0.00 - 0.90 %    Nucleated RBC 0.00 /100 WBC    Neutrophils (Absolute) 2.52 2.00 - 7.15 K/uL    Lymphs (Absolute) 1.54 1.00 - 4.80 K/uL    Monos (Absolute) 0.70 0.00 -  0.85 K/uL    Eos (Absolute) 0.13 0.00 - 0.51 K/uL    Baso (Absolute) 0.06 0.00 - 0.12 K/uL    Immature Granulocytes (abs) 0.01 0.00 - 0.11 K/uL    NRBC (Absolute) 0.00 K/uL   COMP METABOLIC PANEL    Collection Time: 06/08/19  4:09 PM   Result Value Ref Range    Sodium 139 135 - 145 mmol/L    Potassium 4.1 3.6 - 5.5 mmol/L    Chloride 107 96 - 112 mmol/L    Co2 22 20 - 33 mmol/L    Anion Gap 10.0 0.0 - 11.9    Glucose 98 65 - 99 mg/dL    Bun 22 8 - 22 mg/dL    Creatinine 1.00 0.50 - 1.40 mg/dL    Calcium 9.5 8.5 - 10.5 mg/dL    AST(SGOT) 19 12 - 45 U/L    ALT(SGPT) 5 2 - 50 U/L    Alkaline Phosphatase 89 30 - 99 U/L    Total Bilirubin 0.5 0.1 - 1.5 mg/dL    Albumin 4.0 3.2 - 4.9 g/dL    Total Protein 7.5 6.0 - 8.2 g/dL    Globulin 3.5 1.9 - 3.5 g/dL    A-G Ratio 1.1 g/dL   TROPONIN    Collection Time: 06/08/19  4:09 PM   Result Value Ref Range    Troponin I 0.01 0.00 - 0.04 ng/mL   ESTIMATED GFR    Collection Time: 06/08/19  4:09 PM   Result Value Ref Range    GFR If African American >60 >60 mL/min/1.73 m 2    GFR If Non African American 52 (A) >60 mL/min/1.73 m 2   URINALYSIS CULTURE, IF INDICATED    Collection Time: 06/08/19  5:04 PM   Result Value Ref Range    Color Yellow     Character Clear     Specific Gravity 1.006 <1.035    Ph 8.0 5.0 - 8.0    Glucose Negative Negative mg/dL    Ketones Negative Negative mg/dL    Protein Negative Negative mg/dL    Bilirubin Negative Negative    Urobilinogen, Urine 0.2 Negative    Nitrite Negative Negative    Leukocyte Esterase Small (A) Negative    Occult Blood Negative Negative    Micro Urine Req Microscopic    URINE MICROSCOPIC (W/UA)    Collection Time: 06/08/19  5:04 PM   Result Value Ref Range    WBC 0-2 /hpf    RBC 0-2 /hpf    Bacteria Negative None /hpf    Epithelial Cells Negative /hpf    Hyaline Cast 0-2 /lpf       IMAGING/PROCEDURES:   No orders to display     EKG:   Independently reviewed previous EKG    ASSESSMENT/PLAN (MEDICAL DECISION MAKING):  ID: [id number] with PMx  of HTN admitted for weakness.     #Weakness  - no obvious etiology  - concern for poor diet and poor fluid intake   - received 250cc bolus in EMS  - given diminished breath sounds and smoking history, will order CXR   - f/u results  - given small leukocyte esterase, will order urine culture   - will hold abx for now  - encourage PO fluid intake   - will hold IVF at this time  - cont new BP medication as no low BPs   - see below  - will obtain orthostatics  - no EKG at this visit   - last EKG wnl and patient denies CP  -  consult for services  - PT/OT consult  - may need placement    #HTN  - stop amlodipine  - start HCTZ 12.5mg  - closely monitor BPs overnight    #FEN/PPx/Core   - IVF: none  - Diet: regular diet  - Bowel Regimen: PRNs  - DVT PPx: heparin  - Lines: PIV  - Tubes: None  - Code: DNR/DNI    Dispo: observation for weakness

## 2019-06-09 NOTE — PROGRESS NOTES
Up to BSC with SBA. Tolerated well, denies pain and states that she is ready to go home after meeting with MD to discuss norvasc.

## 2019-06-09 NOTE — ED NOTES
"Pt has been on call light x 3, feels \"shaky for no reason\" and \"a small pain in my arm\".  Pt reports she is not cold, has slight shaking of bilat hands.  Discussed patients concerns w/ ERP.  "

## 2019-06-09 NOTE — PROGRESS NOTES
Assumed care of patient at 0700. Bedside report received from SUMIT Palacio. Initial assessment completed. Patient is A&Ox4 and able to make needs known. Pt denies pain at this time, states that she is ready to go home after meeting with MD to discuss Norvasc. BP elevated but within parameters for PRN. POC discussed with pt. No further questions at this time. Fall precautions in place. Bed locked and in the lowest position, call light instructions provided, call light within reach. Needs met.

## 2019-06-09 NOTE — THERAPY
"Occupational Therapy Evaluation completed.   Functional Status:  Appears to be at functional baseline. Has friends who can assist as needed  Plan of Care: eval only.  Discharge Recommendations:  Equipment: TBD. Post-acute therapy Anticipate that the patient will have no further occupational therapy needs after discharge from the hospital.     See \"Rehab Therapy-Acute\" Patient Summary Report for complete documentation.    "

## 2019-06-09 NOTE — PROGRESS NOTES
Pt so upset, why norvasc is not given to her now and she is taking this med for almost 5 years now. Pt demanding to take norvasc now together with hydrochlorothiazide. Educated pt, reassurance given. Pt now relaxed.

## 2019-06-09 NOTE — PROGRESS NOTES
Answered call light, pt complaining for left upper abdominal pain, per pt GI cocktail works but does not want it because it numbs her throat, on call paged, waiting for call back

## 2019-06-09 NOTE — THERAPY
"89 y/o female adm for weakness, was at ER 3 days ago for dehydration. She reportsd she is feelin gbetter during eval.  Intact motr and sensory components BLE. No LOB with level ground amb with fww, LLE shorter than RLE due to Right hip OA. Pt to benefit from  services upon DC home. No further acute PT services required at [facility].    Physical Therapy Evaluation completed.   Bed Mobility:  Supine to Sit: Supervised  Transfers: Sit to Stand: Supervised  Gait: Level Of Assist: Supervised with Front-Wheel Walker       Plan of Care: Patient with no further skilled PT needs in the acute care setting at this time  Discharge Recommendations: Equipment: No Equipment Needed. Post-acute therapy Discharge to home with outpatient or home health for additional skilled therapy services.    See \"Rehab Therapy-Acute\" Patient Summary Report for complete documentation.     "

## 2019-06-09 NOTE — DISCHARGE INSTRUCTIONS
Discharge Instructions      Call [phone] to set up appointment for Outpatient Physical Therapy.      Discharged to home by taxi with self. Discharged via wheelchair, hospital escort: Yes.  Special equipment needed: Not Applicable    Be sure to schedule a follow-up appointment with your primary care doctor or any specialists as instructed.     Discharge Plan:   Diet Plan: Discussed  Activity Level: Discussed  Confirmed Follow up Appointment: Appointment Scheduled  Confirmed Symptoms Management: Discussed  Medication Reconciliation Updated: Yes  Influenza Vaccine Indication: Not indicated: Previously immunized this influenza season and > 8 years of age    I understand that a diet low in cholesterol, fat, and sodium is recommended for good health. Unless I have been given specific instructions below for another diet, I accept this instruction as my diet prescription.   Other diet: Heart healthy    Special Instructions: None    · Is patient discharged on Warfarin / Coumadin?   No         Dehydration, Adult  Dehydration is when there is not enough fluid or water in your body. This happens when you lose more fluids than you take in. Dehydration can range from mild to very bad. It should be treated right away to keep it from getting very bad.  Symptoms of mild dehydration may include:  · Thirst.  · Dry lips.  · Slightly dry mouth.  · Dry, warm skin.  · Dizziness.  Symptoms of moderate dehydration may include:  · Very dry mouth.  · Muscle cramps.  · Dark pee (urine). Pee may be the color of tea.  · Your body making less pee.  · Your eyes making fewer tears.  · Heartbeat that is uneven or faster than normal (palpitations).  · Headache.  · Light-headedness, especially when you stand up from sitting.  · Fainting (syncope).  Symptoms of very bad dehydration may include:  · Changes in skin, such as:  ¨ Cold and clammy skin.  ¨ Blotchy (mottled) or pale skin.  ¨ Skin that does not quickly return to normal after being lightly  pinched and let go (poor skin turgor).  · Changes in body fluids, such as:  ¨ Feeling very thirsty.  ¨ Your eyes making fewer tears.  ¨ Not sweating when body temperature is high, such as in hot weather.  ¨ Your body making very little pee.  · Changes in vital signs, such as:  ¨ Weak pulse.  ¨ Pulse that is more than 100 beats a minute when you are sitting still.  ¨ Fast breathing.  ¨ Low blood pressure.  · Other changes, such as:  ¨ Sunken eyes.  ¨ Cold hands and feet.  ¨ Confusion.  ¨ Lack of energy (lethargy).  ¨ Trouble waking up from sleep.  ¨ Short-term weight loss.  ¨ Unconsciousness.  Follow these instructions at home:  · If told by your doctor, drink an ORS:  ¨ Make an ORS by using instructions on the package.  ¨ Start by drinking small amounts, about ½ cup (120 mL) every 5-10 minutes.  ¨ Slowly drink more until you have had the amount that your doctor said to have.  · Drink enough clear fluid to keep your pee clear or pale yellow. If you were told to drink an ORS, finish the ORS first, then start slowly drinking clear fluids. Drink fluids such as:  ¨ Water. Do not drink only water by itself. Doing that can make the salt (sodium) level in your body get too low (hyponatremia).  ¨ Ice chips.  ¨ Fruit juice that you have added water to (diluted).  ¨ Low-calorie sports drinks.  · Avoid:  ¨ Alcohol.  ¨ Drinks that have a lot of sugar. These include high-calorie sports drinks, fruit juice that does not have water added, and soda.  ¨ Caffeine.  ¨ Foods that are greasy or have a lot of fat or sugar.  · Take over-the-counter and prescription medicines only as told by your doctor.  · Do not take salt tablets. Doing that can make the salt level in your body get too high (hypernatremia).  · Eat foods that have minerals (electrolytes). Examples include bananas, oranges, potatoes, tomatoes, and spinach.  · Keep all follow-up visits as told by your doctor. This is important.  Contact a doctor if:  · You have belly  (abdominal) pain that:  ¨ Gets worse.  ¨ Stays in one area (localizes).  · You have a rash.  · You have a stiff neck.  · You get angry or annoyed more easily than normal (irritability).  · You are more sleepy than normal.  · You have a harder time waking up than normal.  · You feel:  ¨ Weak.  ¨ Dizzy.  ¨ Very thirsty.  · You have peed (urinated) only a small amount of very dark pee during 6-8 hours.  Get help right away if:  · You have symptoms of very bad dehydration.  · You cannot drink fluids without throwing up (vomiting).  · Your symptoms get worse with treatment.  · You have a fever.  · You have a very bad headache.  · You are throwing up or having watery poop (diarrhea) and it:  ¨ Gets worse.  ¨ Does not go away.  · You have blood or something green (bile) in your throw-up.  · You have blood in your poop (stool). This may cause poop to look black and tarry.  · You have not peed in 6-8 hours.  · You pass out (faint).  · Your heart rate when you are sitting still is more than 100 beats a minute.  · You have trouble breathing.  This information is not intended to replace advice given to you by your health care provider. Make sure you discuss any questions you have with your health care provider.  Document Released: 10/14/2010 Document Revised: 07/07/2017 Document Reviewed: 02/10/2017  [employer] Interactive Patient Education © 2017 [employer]          Depression / Suicide Risk    As you are discharged from this [facility] facility, it is important to learn how to keep safe from harming yourself.    Recognize the warning signs:  · Abrupt changes in personality, positive or negative- including increase in energy   · Giving away possessions  · Change in eating patterns- significant weight changes-  positive or negative  · Change in sleeping patterns- unable to sleep or sleeping all the time   · Unwillingness or inability to communicate  · Depression  · Unusual sadness, discouragement and loneliness  · Talk of  wanting to die  · Neglect of personal appearance   · Rebelliousness- reckless behavior  · Withdrawal from people/activities they love  · Confusion- inability to concentrate     If you or a loved one observes any of these behaviors or has concerns about self-harm, here's what you can do:  · Talk about it- your feelings and reasons for harming yourself  · Remove any means that you might use to hurt yourself (examples: pills, rope, extension cords, firearm)  · Get professional help from the community (Mental Health, Substance Abuse, psychological counseling)  · Do not be alone:Call your Safe Contact- someone whom you trust who will be there for you.  · Call your local CRISIS HOTLINE 708-7837 or 875-339-0258  · Call your local Children's Mobile Crisis Response Team [place] (162) 924-1813 or www.[employer]  · Call the toll free National Suicide Prevention Hotlines   · National Suicide Prevention Lifeline 397-759-JGJA (4461)  · National Hope Line Network 800-SUICIDE (699-0685)

## 2019-06-09 NOTE — PROGRESS NOTES
A/o, respirations are even and unlabored on room air,assessment completed, vital signs stable except for elevated BP,  updated communication board,  poc discussed and understood, verbalized understanding, box meal given, all questions answered at this time , fall precautions in place, call button within reach, will continue to monitor

## 2019-06-09 NOTE — PROGRESS NOTES
Discharge instructions, follow ups, and medications discussed with patient. Patient given 1 new paper prescription for PT. Patient given time to ask questions. Call taxi for pt. Wristband removed and paperwork signed. Patient escorted off unit in WC with unit clerk without issue.

## 2019-06-09 NOTE — THERAPY
"Occupational Therapy Evaluation completed.   Functional Status:  Appears to be at functional baseline. Lives in a group home, assisted for all care, including transfers.  Plan of Care: eval only  Discharge Recommendations:  Equipment: NA. Post-acute therapy Recommend home health or outpatient transitional care services for continued occupational therapy services    See \"Rehab Therapy-Acute\" Patient Summary Report for complete documentation.    "

## 2019-06-09 NOTE — DISCHARGE SUMMARY
[facility] DISCHARGE SUMMARY     PATIENT ID:    Name:             Livia Corea   YOB: 1926  Age:                 89 y.o.  female   MRN:               [id number]  Address:         [address] [address]  Phone:            [phone] (home)    ADMISSION DATE: 6/8/2019    DISCHARGE DATE: 6/9/2019    DISCHARGE DIAGNOSES:   No problems updated.    ATTENDING PHYSICIAN: Dr. Reggie PIÑA RESIDENT: Ministerio MAXWELL RESIDENT: Maico    CONSULTANTS:  None    PROCEDURES:  None    IMAGING:   DX-CHEST-PORTABLE (1 VIEW)   Final Result         1.  No acute cardiopulmonary disease.   2.  Cardiomegaly   3.  Atherosclerosis            LABS:  Recent Labs      06/08/19   1609  06/09/19   0031   WBC  5.0  4.2*   RBC  4.32  4.23   HEMOGLOBIN  12.8  12.7   HEMATOCRIT  39.5  38.6   MCV  91.4  91.3   MCH  29.6  30.0   RDW  45.7  45.7   PLATELETCT  316  308   MPV  9.3  9.3   NEUTSPOLYS  50.90  47.70   LYMPHOCYTES  31.00  33.30   MONOCYTES  14.10*  14.50*   EOSINOPHILS  2.60  3.10   BASOPHILS  1.20  1.40     Recent Labs      06/08/19   1609  06/09/19   0031   SODIUM  139  141   POTASSIUM  4.1  3.9   CHLORIDE  107  110   CO2  22  21   GLUCOSE  98  99   BUN  22  19     Lab Results   Component Value Date/Time    CHOLSTRLTOT 182 05/30/2019 08:51 AM     (H) 05/30/2019 08:51 AM    HDL 51 05/30/2019 08:51 AM    TRIGLYCERIDE 72 05/30/2019 08:51 AM       Lab Results   Component Value Date/Time    TROPONINI 0.02 06/09/2019 12:31 AM       Lab Results   Component Value Date/Time    TROPONINI 0.02 06/09/2019 12:31 AM       EKG:   Measurements   Intervals                                Axis   Rate:       67                           P:          51   ND:         184                          QRS:        39   QRSD:       156                          T:          -68   QT:         447   QTc:        472     Interpretive Statements   SINUS RHYTHM   LEFT BUNDLE BRANCH BLOCK   Compared to ECG 06/05/2019 14:32:50   Atrial  "premature complex(es) no longer present       HISTORY OF PRESENT ILLNESS: Per admission HPI   \"Pleasant 89F presenting with weakness for 2 days. Patient was seen in the ED 3 days ago with similar complaint and was given fluid bolus and discharged. She states that she felt better when she was initially discharged and then starting yesterday, her weakness returned. Normally she can perform all of her ADLs without difficulty and she lives alone; uses a walker. However she feels too weak to do everything she normally does. She was concerned about getting up to go to the bathroom because she was worried she might fall. Endorses weakness no matter her activity, even if she is sitting. Concerned she may not be eating or drinking enough. Currently is only on amlodipine 5mg.\"       HOSPITAL COURSE:   Briefly 89 y.o. female who was admitted for observation for generalized weakness. Workup including CXR, EKG, troponin, UA, CMP, CBC- were all unremarkable (see results above). She was given  250cc bolus of fluids in EMS, no other fluids given. Her medications were briefly changed: amlodipine was stopped with concern that it was causing her symptoms, and HCTZ was added. After reevaluation, it was determined that her medications were unlikely to be the cause of her sx, and she was discharged on her home medication Amplodipine 5mg BID, and NOT given the HCTZ. Pt did evaluate her and recommended outpatient PT. Patient was given handwritten prescription and instructed to follow up with here PCP early this week. She had no other episodes of weakness and wished to be discharged home- where she lives alone, but is able to do her own ADLs and has several friends who check in on her daily.     DISCHARGE CONDITION:  Stable    DISPOSITION: Home with outpatient PT.     DISCHARGE MEDICATIONS:    Livia Corea   Home Medication Instructions [place]:[id number]    Printed on:06/09/19 1506   Medication Information                    "   amLODIPine (NORVASC) 5 MG Tab  Take 5 mg by mouth 2 Times a Day.             ascorbic acid (ASCORBIC ACID) 500 MG Tab  Take 500 mg by mouth every day.             aspirin EC (ECOTRIN) 81 MG Tablet Delayed Response  Take 81 mg by mouth every day.             cyanocobalamin (VITAMIN B-12) 500 MCG Tab  Take 500 mcg by mouth every day.             GARLIC PO  Take 2 Tabs by mouth every evening.             Omega-3 Fatty Acids (FISH OIL) 1000 MG Cap capsule  Take 2,000 mg by mouth every evening.                  ACTIVITY:  Normal Activity as Tolerated.     DIET: Healthy    DISCHARGE INSTRUCTIONS AND FOLLOW UP:  Patient is medically stable for discharge and will be discharged to home.     Follow Up: With [facility] in 1-3 days for hospital follow up.     Discharge Instructions:   Patient was instructed to return the ER in the event of worsening symptoms including but not limited to increased fatigue., weakness, SOB, chest pain, palpitations, sudden vision changes or any other major concerns. Patient understands that failure to do so may indicate worsening of his/her medical condition(s) and result in adverse clinical outcomes including fatality. We have counseled the patient on the importance of compliance and the patient has agreed to proceed with all medical recommendations and follow up plan indicated above. The patient understands that the failure to do so may result in result in adverse clinical outcomes including fatality.     CC:    Estuardo Carrero M.D.

## 2019-06-09 NOTE — PROGRESS NOTES
IV DC'd and patient taken off monitor. Patient toileted, dressed and ready. Paged MD for outpt PT orders.

## 2019-06-10 ENCOUNTER — PATIENT OUTREACH (OUTPATIENT)
Dept: HEALTH INFORMATION MANAGEMENT | Facility: OTHER | Age: 84
End: 2019-06-10

## 2019-06-11 ENCOUNTER — OFFICE VISIT (OUTPATIENT)
Dept: CARDIOLOGY | Facility: MEDICAL CENTER | Age: 84
End: 2019-06-11
Payer: MEDICARE

## 2019-06-11 VITALS
DIASTOLIC BLOOD PRESSURE: 72 MMHG | SYSTOLIC BLOOD PRESSURE: 142 MMHG | WEIGHT: 126 LBS | BODY MASS INDEX: 21.51 KG/M2 | OXYGEN SATURATION: 96 % | HEIGHT: 64 IN | HEART RATE: 78 BPM

## 2019-06-11 DIAGNOSIS — I10 HTN (HYPERTENSION), MALIGNANT: ICD-10-CM

## 2019-06-11 DIAGNOSIS — Z86.73 HISTORY OF STROKE: ICD-10-CM

## 2019-06-11 DIAGNOSIS — I10 ESSENTIAL HYPERTENSION, BENIGN: ICD-10-CM

## 2019-06-11 DIAGNOSIS — F41.9 ANXIETY: ICD-10-CM

## 2019-06-11 DIAGNOSIS — F43.21 GRIEF: ICD-10-CM

## 2019-06-11 DIAGNOSIS — E78.5 DYSLIPIDEMIA: ICD-10-CM

## 2019-06-11 DIAGNOSIS — I65.23 ATHEROSCLEROSIS OF BOTH CAROTID ARTERIES: ICD-10-CM

## 2019-06-11 DIAGNOSIS — R94.39 ABNORMAL MYOCARDIAL PERFUSION STUDY: ICD-10-CM

## 2019-06-11 LAB
BACTERIA UR CULT: NORMAL
SIGNIFICANT IND 70042: NORMAL
SITE SITE: NORMAL
SOURCE SOURCE: NORMAL

## 2019-06-11 PROCEDURE — 99214 OFFICE O/P EST MOD 30 MIN: CPT | Performed by: NURSE PRACTITIONER

## 2019-06-11 ASSESSMENT — ENCOUNTER SYMPTOMS
WEIGHT LOSS: 0
PALPITATIONS: 0
PND: 0
CLAUDICATION: 0
DIZZINESS: 0
DEPRESSION: 1
WEAKNESS: 0
SHORTNESS OF BREATH: 0
ORTHOPNEA: 0

## 2019-06-11 NOTE — PROGRESS NOTES
Chief Complaint   Patient presents with   • Hypertension       Subjective:   Livia Corea is a 89 y.o. pleasant retired  for [employer] who presents today for hospital follow-up.    Patient was admitted on 6/8/2019 with weakness x2 days (she had been previously seen in the ER 3 days prior with similar complaints and was given IV fluid bolus and discharged).  Work-up including chest x-ray, EKG, troponin, UA, CMP, CBC were all unremarkable.  Physical therapy did evaluate her and recommended outpatient physical therapy.  She was discharged on 6/9/2019 with close cardiology follow-up here today.    Past medical history significant for hypertension, dyslipidemia and carotid artery disease.     Today patient states that she is feeling much better.  Patient thinks that the problem was that she was not eating enough, she has increased her oral intake and that has seemed to make all the difference.  Her blood pressure has been well controlled at home averaging in the 140s-150s/70s-90s.  She tells me that she was not able to tolerate the Norvasc 10 mg twice daily as prescribed by Dr. Luis F Bernabe, so she has only been taking 5 mg twice daily.  She denies chest pain, palpitations, lower extremity edema, shortness of breath or dizziness/syncope.    Past Medical History:   Diagnosis Date   • Circulation problem 01/2018   • Hyperlipidemia    • Hypertension      Past Surgical History:   Procedure Laterality Date   • CAROTID ENDARTERECTOMY Right 2000   • GYN SURGERY      hysterectomy     Family History   Problem Relation Age of Onset   • Heart Attack Mother 64   • Heart Attack Father 72     Social History     Social History   • Marital status: Single     Spouse name: N/A   • Number of children: N/A   • Years of education: N/A     Occupational History   • Not on file.     Social History Main Topics   • Smoking status: Former Smoker     Quit date: 2/25/1988   • Smokeless tobacco: Never Used   • Alcohol  "use Yes      Comment: 1 glass of wine x 2 days a week   • Drug use: No   • Sexual activity: Not on file     Other Topics Concern   • Not on file     Social History Narrative   • No narrative on file     Allergies   Allergen Reactions   • Chlorthalidone      Headaches severe. Heart racing    • Lisinopril Swelling     Of lower extremities      Outpatient Encounter Prescriptions as of 6/11/2019   Medication Sig Dispense Refill   • amLODIPine (NORVASC) 5 MG Tab Take 5 mg by mouth 2 Times a Day.     • GARLIC PO Take 2 Tabs by mouth every evening.     • Omega-3 Fatty Acids (FISH OIL) 1000 MG Cap capsule Take 2,000 mg by mouth every evening.     • ascorbic acid (ASCORBIC ACID) 500 MG Tab Take 500 mg by mouth every day.     • cyanocobalamin (VITAMIN B-12) 500 MCG Tab Take 500 mcg by mouth every day.     • aspirin EC (ECOTRIN) 81 MG Tablet Delayed Response Take 81 mg by mouth every day.       No facility-administered encounter medications on file as of 6/11/2019.      Review of Systems   Constitutional: Negative for malaise/fatigue and weight loss.   Respiratory: Negative for shortness of breath.    Cardiovascular: Negative for chest pain, palpitations, orthopnea, claudication, leg swelling and PND.   Neurological: Negative for dizziness and weakness.   Psychiatric/Behavioral: Positive for depression.   All other systems reviewed and are negative.       Objective:   /72 (BP Location: Left arm, Patient Position: Sitting, BP Cuff Size: Adult)   Pulse 78   Ht 1.626 m (5' 4\")   Wt 57.2 kg (126 lb)   SpO2 96%   BMI 21.63 kg/m²     Physical Exam   Constitutional: She is oriented to person, place, and time. She appears well-developed and well-nourished. No distress.   HENT:   Head: Normocephalic.   Eyes: EOM are normal.   Neck: No JVD present.   Cardiovascular: Normal rate, regular rhythm and normal heart sounds.    Pulmonary/Chest: Effort normal and breath sounds normal. No respiratory distress.   Abdominal: Soft. There " is no tenderness.   Musculoskeletal: She exhibits no edema.   Neurological: She is alert and oriented to person, place, and time.   Skin: Skin is warm and dry.   Psychiatric: She has a normal mood and affect. Her behavior is normal.        TTE 3/10/18:  No prior study is available for comparison.   Normal left ventricular systolic function.  Left ventricular ejection fraction is visually estimated to be 65%.  Mild left ventricular hypertrophy.  Mild aortic stenosis.  Right heart pressures are consistent with moderate pulmonary   hypertension.    Left Ventricle  Left ventricle is small in size. Mild left ventricular hypertrophy. Sigmoid septum. Normal left ventricular systolic function. Left ventricular ejection fraction is visually estimated to be 65%. Normal regional wall motion. Grade I diastolic dysfunction.    Chest X-Ray 9/15/18:  FINDINGS:  Cardiomediastinal contour is within normal limits.  Atherosclerotic calcification and tortuosity of thoracic aorta.  Minimal curvilinear opacity RIGHT lung base.  No pleural fluid collection or pneumothorax.  S-shaped curvature of thoracic spine    Carotid US 6/12/18:  CONCLUSIONS  Mild bilateral internal carotid artery stenosis (<50%).     5/21/19:  Sodium 134   135 - 145 mmol/L Final   Potassium 3.7  3.6 - 5.5 mmol/L Final   Chloride 96  96 - 112 mmol/L Final   Co2 28  20 - 33 mmol/L Final   Glucose 66  65 - 99 mg/dL Final   Bun 20  8 - 22 mg/dL Final   Creatinine 0.98  0.50 - 1.40 mg/dL Final   Calcium 9.4  8.5 - 10.5 mg/dL Final   Anion Gap 10.0  0.0 - 11.9 Final     GFR If African American >60  >60 mL/min/1.73 m 2 Final   GFR If Non African American 53   >60 mL/min/1.73 m 2 Final     Cholesterol,Tot 212   100 - 199 mg/dL Final   Triglycerides 81  0 - 149 mg/dL Final   HDL 60  >=40 mg/dL Final      <100 mg/dL Final           Assessment:     1. Essential hypertension, benign  Basic Metabolic Panel   2. HTN (hypertension), malignant     3. Abnormal myocardial  perfusion study: Small reversible apical abnormality     4. Atherosclerosis of both carotid arteries: Mild in 2018     5. History of stroke     6. Dyslipidemia     7. Grief     8. Anxiety         Medical Decision Making:  Today's Assessment / Status / Plan:     HTN:  -Stable.  -Averaging 140's-150's/70's-90's.   -Continue Norvasc 5 mg BID.   -BMP in 6-8 weeks.    Abnormal MPI:  -Mildly abnormal MPI on 3/18/18, Dr. Eden managing with ASA and statin therapy.  -Normal regional wall motion on noted on echo completed on 3/10/18.  -Denies chest pain.  -Continue ASA 81 mg daily.     Carotid Stenosis:  -Carotid US on 6/12/18 showed < 50% stenosis bilaterally.  -Denies dizziness/presynsope/syncope.   -Continue ASA 81 mg daily.  -Unable to tolerate statin therapy.     HLD:  -Last LDL on 5/6/19 was 136.  -Experienced nausea with Lipitor and Crestor.   -Continue Zetia 10 mg daily.      Patient will follow-up with myself as scheduled on 8/22/2019 or earlier if needed.  Encouraged patient to contact office should any questions or concerns arise meantime.  Patient understands and agrees plan of care.      Collaborating Provider: Dr. Crescencio Kelley     Please note that this dictation was created using voice recognition software. I have made every reasonable attempt to correct obvious errors, but I expect that there are errors of grammar and possibly content I did not discover before finalizing the note.

## 2019-06-12 ENCOUNTER — TELEPHONE (OUTPATIENT)
Dept: CARDIOLOGY | Facility: MEDICAL CENTER | Age: 84
End: 2019-06-12

## 2019-06-12 ENCOUNTER — HOSPITAL ENCOUNTER (OUTPATIENT)
Facility: MEDICAL CENTER | Age: 84
End: 2019-06-13
Attending: EMERGENCY MEDICINE | Admitting: HOSPITALIST
Payer: MEDICARE

## 2019-06-12 ENCOUNTER — APPOINTMENT (OUTPATIENT)
Dept: RADIOLOGY | Facility: MEDICAL CENTER | Age: 84
End: 2019-06-12
Attending: HOSPITALIST
Payer: MEDICARE

## 2019-06-12 ENCOUNTER — APPOINTMENT (OUTPATIENT)
Dept: RADIOLOGY | Facility: MEDICAL CENTER | Age: 84
End: 2019-06-12
Attending: EMERGENCY MEDICINE
Payer: MEDICARE

## 2019-06-12 DIAGNOSIS — R53.1 WEAKNESS: ICD-10-CM

## 2019-06-12 DIAGNOSIS — E86.0 DEHYDRATION: ICD-10-CM

## 2019-06-12 LAB
ALBUMIN SERPL BCP-MCNC: 3.8 G/DL (ref 3.2–4.9)
ALBUMIN/GLOB SERPL: 1.1 G/DL
ALP SERPL-CCNC: 88 U/L (ref 30–99)
ALT SERPL-CCNC: 9 U/L (ref 2–50)
ANION GAP SERPL CALC-SCNC: 11 MMOL/L (ref 0–11.9)
APPEARANCE UR: CLEAR
AST SERPL-CCNC: 19 U/L (ref 12–45)
BACTERIA #/AREA URNS HPF: ABNORMAL /HPF
BASOPHILS # BLD AUTO: 0.6 % (ref 0–1.8)
BASOPHILS # BLD: 0.03 K/UL (ref 0–0.12)
BILIRUB SERPL-MCNC: 0.7 MG/DL (ref 0.1–1.5)
BILIRUB UR QL STRIP.AUTO: NEGATIVE
BUN SERPL-MCNC: 26 MG/DL (ref 8–22)
CALCIUM SERPL-MCNC: 8.8 MG/DL (ref 8.4–10.2)
CHLORIDE SERPL-SCNC: 105 MMOL/L (ref 96–112)
CO2 SERPL-SCNC: 21 MMOL/L (ref 20–33)
COLOR UR: YELLOW
CREAT SERPL-MCNC: 1.04 MG/DL (ref 0.5–1.4)
EKG IMPRESSION: NORMAL
EOSINOPHIL # BLD AUTO: 0.12 K/UL (ref 0–0.51)
EOSINOPHIL NFR BLD: 2.4 % (ref 0–6.9)
EPI CELLS #/AREA URNS HPF: ABNORMAL /HPF
ERYTHROCYTE [DISTWIDTH] IN BLOOD BY AUTOMATED COUNT: 44.8 FL (ref 35.9–50)
GLOBULIN SER CALC-MCNC: 3.4 G/DL (ref 1.9–3.5)
GLUCOSE SERPL-MCNC: 98 MG/DL (ref 65–99)
GLUCOSE UR STRIP.AUTO-MCNC: NEGATIVE MG/DL
HCT VFR BLD AUTO: 36.5 % (ref 37–47)
HGB BLD-MCNC: 12 G/DL (ref 12–16)
IMM GRANULOCYTES # BLD AUTO: 0.02 K/UL (ref 0–0.11)
IMM GRANULOCYTES NFR BLD AUTO: 0.4 % (ref 0–0.9)
KETONES UR STRIP.AUTO-MCNC: NEGATIVE MG/DL
LEUKOCYTE ESTERASE UR QL STRIP.AUTO: ABNORMAL
LYMPHOCYTES # BLD AUTO: 1.58 K/UL (ref 1–4.8)
LYMPHOCYTES NFR BLD: 31.2 % (ref 22–41)
MAGNESIUM SERPL-MCNC: 2.4 MG/DL (ref 1.5–2.5)
MCH RBC QN AUTO: 29.8 PG (ref 27–33)
MCHC RBC AUTO-ENTMCNC: 32.9 G/DL (ref 33.6–35)
MCV RBC AUTO: 90.6 FL (ref 81.4–97.8)
MICRO URNS: ABNORMAL
MONOCYTES # BLD AUTO: 0.52 K/UL (ref 0–0.85)
MONOCYTES NFR BLD AUTO: 10.3 % (ref 0–13.4)
NEUTROPHILS # BLD AUTO: 2.8 K/UL (ref 2–7.15)
NEUTROPHILS NFR BLD: 55.1 % (ref 44–72)
NITRITE UR QL STRIP.AUTO: NEGATIVE
NRBC # BLD AUTO: 0 K/UL
NRBC BLD-RTO: 0 /100 WBC
PH UR STRIP.AUTO: 6.5 [PH]
PHOSPHATE SERPL-MCNC: 2.9 MG/DL (ref 2.5–4.5)
PLATELET # BLD AUTO: 248 K/UL (ref 164–446)
PMV BLD AUTO: 9.3 FL (ref 9–12.9)
POTASSIUM SERPL-SCNC: 3.9 MMOL/L (ref 3.6–5.5)
PROT SERPL-MCNC: 7.2 G/DL (ref 6–8.2)
PROT UR QL STRIP: NEGATIVE MG/DL
RBC # BLD AUTO: 4.03 M/UL (ref 4.2–5.4)
RBC # URNS HPF: ABNORMAL /HPF
RBC UR QL AUTO: NEGATIVE
SODIUM SERPL-SCNC: 137 MMOL/L (ref 135–145)
SP GR UR STRIP.AUTO: <=1.005
T4 FREE SERPL-MCNC: 0.99 NG/DL (ref 0.58–1.64)
TROPONIN I SERPL-MCNC: 0.02 NG/ML (ref 0–0.04)
TROPONIN I SERPL-MCNC: <0.02 NG/ML (ref 0–0.04)
TROPONIN I SERPL-MCNC: <0.02 NG/ML (ref 0–0.04)
TSH SERPL DL<=0.005 MIU/L-ACNC: 0.9 UIU/ML (ref 0.38–5.33)
WBC # BLD AUTO: 5.1 K/UL (ref 4.8–10.8)
WBC #/AREA URNS HPF: ABNORMAL /HPF

## 2019-06-12 PROCEDURE — 74177 CT ABD & PELVIS W/CONTRAST: CPT

## 2019-06-12 PROCEDURE — 93005 ELECTROCARDIOGRAM TRACING: CPT | Performed by: HOSPITALIST

## 2019-06-12 PROCEDURE — 93005 ELECTROCARDIOGRAM TRACING: CPT | Performed by: EMERGENCY MEDICINE

## 2019-06-12 PROCEDURE — 84484 ASSAY OF TROPONIN QUANT: CPT

## 2019-06-12 PROCEDURE — 85025 COMPLETE CBC W/AUTO DIFF WBC: CPT

## 2019-06-12 PROCEDURE — 82436 ASSAY OF URINE CHLORIDE: CPT

## 2019-06-12 PROCEDURE — 84100 ASSAY OF PHOSPHORUS: CPT

## 2019-06-12 PROCEDURE — 83735 ASSAY OF MAGNESIUM: CPT

## 2019-06-12 PROCEDURE — G0378 HOSPITAL OBSERVATION PER HR: HCPCS

## 2019-06-12 PROCEDURE — 81001 URINALYSIS AUTO W/SCOPE: CPT

## 2019-06-12 PROCEDURE — 93010 ELECTROCARDIOGRAM REPORT: CPT | Performed by: INTERNAL MEDICINE

## 2019-06-12 PROCEDURE — 700111 HCHG RX REV CODE 636 W/ 250 OVERRIDE (IP): Performed by: HOSPITALIST

## 2019-06-12 PROCEDURE — 84300 ASSAY OF URINE SODIUM: CPT

## 2019-06-12 PROCEDURE — 700101 HCHG RX REV CODE 250

## 2019-06-12 PROCEDURE — A9270 NON-COVERED ITEM OR SERVICE: HCPCS | Performed by: HOSPITALIST

## 2019-06-12 PROCEDURE — 700105 HCHG RX REV CODE 258: Performed by: HOSPITALIST

## 2019-06-12 PROCEDURE — 80053 COMPREHEN METABOLIC PANEL: CPT

## 2019-06-12 PROCEDURE — 700102 HCHG RX REV CODE 250 W/ 637 OVERRIDE(OP): Performed by: HOSPITALIST

## 2019-06-12 PROCEDURE — 700105 HCHG RX REV CODE 258: Performed by: EMERGENCY MEDICINE

## 2019-06-12 PROCEDURE — 71045 X-RAY EXAM CHEST 1 VIEW: CPT

## 2019-06-12 PROCEDURE — 700117 HCHG RX CONTRAST REV CODE 255: Performed by: HOSPITALIST

## 2019-06-12 PROCEDURE — 84156 ASSAY OF PROTEIN URINE: CPT

## 2019-06-12 PROCEDURE — 82570 ASSAY OF URINE CREATININE: CPT

## 2019-06-12 PROCEDURE — 96374 THER/PROPH/DIAG INJ IV PUSH: CPT

## 2019-06-12 PROCEDURE — 94760 N-INVAS EAR/PLS OXIMETRY 1: CPT

## 2019-06-12 PROCEDURE — 99220 PR INITIAL OBSERVATION CARE,LEVL III: CPT | Performed by: HOSPITALIST

## 2019-06-12 PROCEDURE — 36415 COLL VENOUS BLD VENIPUNCTURE: CPT

## 2019-06-12 PROCEDURE — 84443 ASSAY THYROID STIM HORMONE: CPT

## 2019-06-12 PROCEDURE — 84133 ASSAY OF URINE POTASSIUM: CPT

## 2019-06-12 PROCEDURE — 96372 THER/PROPH/DIAG INJ SC/IM: CPT

## 2019-06-12 PROCEDURE — 99285 EMERGENCY DEPT VISIT HI MDM: CPT

## 2019-06-12 PROCEDURE — 84439 ASSAY OF FREE THYROXINE: CPT

## 2019-06-12 RX ORDER — AMLODIPINE BESYLATE 5 MG/1
5 TABLET ORAL 2 TIMES DAILY
Status: DISCONTINUED | OUTPATIENT
Start: 2019-06-12 | End: 2019-06-13 | Stop reason: HOSPADM

## 2019-06-12 RX ORDER — HEPARIN SODIUM 5000 [USP'U]/ML
5000 INJECTION, SOLUTION INTRAVENOUS; SUBCUTANEOUS EVERY 8 HOURS
Status: DISCONTINUED | OUTPATIENT
Start: 2019-06-12 | End: 2019-06-13 | Stop reason: HOSPADM

## 2019-06-12 RX ORDER — ONDANSETRON 4 MG/1
4 TABLET, ORALLY DISINTEGRATING ORAL EVERY 4 HOURS PRN
Status: DISCONTINUED | OUTPATIENT
Start: 2019-06-12 | End: 2019-06-13 | Stop reason: HOSPADM

## 2019-06-12 RX ORDER — LABETALOL HYDROCHLORIDE 5 MG/ML
20 INJECTION, SOLUTION INTRAVENOUS EVERY 4 HOURS PRN
Status: DISCONTINUED | OUTPATIENT
Start: 2019-06-12 | End: 2019-06-13

## 2019-06-12 RX ORDER — BISACODYL 10 MG
10 SUPPOSITORY, RECTAL RECTAL
Status: DISCONTINUED | OUTPATIENT
Start: 2019-06-12 | End: 2019-06-13 | Stop reason: HOSPADM

## 2019-06-12 RX ORDER — SODIUM CHLORIDE 9 MG/ML
INJECTION, SOLUTION INTRAVENOUS CONTINUOUS
Status: DISCONTINUED | OUTPATIENT
Start: 2019-06-12 | End: 2019-06-13

## 2019-06-12 RX ORDER — ASCORBIC ACID 500 MG
500 TABLET ORAL DAILY
Status: DISCONTINUED | OUTPATIENT
Start: 2019-06-12 | End: 2019-06-13 | Stop reason: HOSPADM

## 2019-06-12 RX ORDER — ONDANSETRON 2 MG/ML
4 INJECTION INTRAMUSCULAR; INTRAVENOUS EVERY 4 HOURS PRN
Status: DISCONTINUED | OUTPATIENT
Start: 2019-06-12 | End: 2019-06-13 | Stop reason: HOSPADM

## 2019-06-12 RX ORDER — AMOXICILLIN 250 MG
2 CAPSULE ORAL 2 TIMES DAILY
Status: DISCONTINUED | OUTPATIENT
Start: 2019-06-12 | End: 2019-06-13 | Stop reason: HOSPADM

## 2019-06-12 RX ORDER — POLYETHYLENE GLYCOL 3350 17 G/17G
1 POWDER, FOR SOLUTION ORAL
Status: DISCONTINUED | OUTPATIENT
Start: 2019-06-12 | End: 2019-06-13 | Stop reason: HOSPADM

## 2019-06-12 RX ORDER — LABETALOL HYDROCHLORIDE 5 MG/ML
INJECTION, SOLUTION INTRAVENOUS
Status: COMPLETED
Start: 2019-06-12 | End: 2019-06-12

## 2019-06-12 RX ORDER — CHLORAL HYDRATE 500 MG
1000 CAPSULE ORAL EVERY EVENING
Status: DISCONTINUED | OUTPATIENT
Start: 2019-06-12 | End: 2019-06-13 | Stop reason: HOSPADM

## 2019-06-12 RX ORDER — SODIUM CHLORIDE 9 MG/ML
1000 INJECTION, SOLUTION INTRAVENOUS ONCE
Status: COMPLETED | OUTPATIENT
Start: 2019-06-12 | End: 2019-06-12

## 2019-06-12 RX ORDER — NITROGLYCERIN 0.4 MG/1
0.4 TABLET SUBLINGUAL PRN
Status: DISCONTINUED | OUTPATIENT
Start: 2019-06-12 | End: 2019-06-13

## 2019-06-12 RX ORDER — NITROGLYCERIN 0.4 MG/1
TABLET SUBLINGUAL
Status: ACTIVE
Start: 2019-06-12 | End: 2019-06-13

## 2019-06-12 RX ORDER — CHOLECALCIFEROL (VITAMIN D3) 125 MCG
500 CAPSULE ORAL DAILY
Status: DISCONTINUED | OUTPATIENT
Start: 2019-06-12 | End: 2019-06-13 | Stop reason: HOSPADM

## 2019-06-12 RX ADMIN — HEPARIN SODIUM 5000 UNITS: 5000 INJECTION, SOLUTION INTRAVENOUS; SUBCUTANEOUS at 18:37

## 2019-06-12 RX ADMIN — LABETALOL HYDROCHLORIDE 20 MG: 5 INJECTION, SOLUTION INTRAVENOUS at 16:47

## 2019-06-12 RX ADMIN — AMLODIPINE BESYLATE 5 MG: 5 TABLET ORAL at 18:36

## 2019-06-12 RX ADMIN — SODIUM CHLORIDE 1000 ML: 9 INJECTION, SOLUTION INTRAVENOUS at 13:24

## 2019-06-12 RX ADMIN — OMEGA-3 FATTY ACIDS CAP 1000 MG 1000 MG: 1000 CAP at 18:36

## 2019-06-12 RX ADMIN — OXYCODONE HYDROCHLORIDE AND ACETAMINOPHEN 500 MG: 500 TABLET ORAL at 18:36

## 2019-06-12 RX ADMIN — LABETALOL HYDROCHLORIDE 20 MG: 5 INJECTION INTRAVENOUS at 16:47

## 2019-06-12 RX ADMIN — ASPIRIN 81 MG: 81 TABLET, COATED ORAL at 18:40

## 2019-06-12 RX ADMIN — CYANOCOBALAMIN TAB 500 MCG 500 MCG: 500 TAB at 18:36

## 2019-06-12 RX ADMIN — IOHEXOL 80 ML: 350 INJECTION, SOLUTION INTRAVENOUS at 17:09

## 2019-06-12 RX ADMIN — SODIUM CHLORIDE: 9 INJECTION, SOLUTION INTRAVENOUS at 23:04

## 2019-06-12 RX ADMIN — SODIUM CHLORIDE 1000 ML: 9 INJECTION, SOLUTION INTRAVENOUS at 16:49

## 2019-06-12 ASSESSMENT — COGNITIVE AND FUNCTIONAL STATUS - GENERAL
PERSONAL GROOMING: A LITTLE
MOBILITY SCORE: 23
DRESSING REGULAR UPPER BODY CLOTHING: A LITTLE
SUGGESTED CMS G CODE MODIFIER MOBILITY: CI
WALKING IN HOSPITAL ROOM: A LITTLE
HELP NEEDED FOR BATHING: A LITTLE

## 2019-06-12 ASSESSMENT — ENCOUNTER SYMPTOMS
HEADACHES: 1
TINGLING: 0
DIZZINESS: 1
POLYDIPSIA: 0
MYALGIAS: 0
BLURRED VISION: 0
TREMORS: 0
DIARRHEA: 0
WHEEZING: 0
SINUS PAIN: 0
SPUTUM PRODUCTION: 0
STRIDOR: 0
PHOTOPHOBIA: 0
FEVER: 0
CLAUDICATION: 0
CHILLS: 0
BRUISES/BLEEDS EASILY: 0
VOMITING: 0
CONSTIPATION: 0
PALPITATIONS: 0
DOUBLE VISION: 0
NAUSEA: 1
SORE THROAT: 0
PND: 0
COUGH: 0
NECK PAIN: 0
EYE PAIN: 0
HEARTBURN: 1
BLOOD IN STOOL: 0
FLANK PAIN: 0
BACK PAIN: 0
ORTHOPNEA: 0
SHORTNESS OF BREATH: 0
ABDOMINAL PAIN: 0
WEAKNESS: 1
DIAPHORESIS: 0
FALLS: 0
HEMOPTYSIS: 0

## 2019-06-12 ASSESSMENT — COPD QUESTIONNAIRES
HAVE YOU SMOKED AT LEAST 100 CIGARETTES IN YOUR ENTIRE LIFE: NO/DON'T KNOW
IN THE PAST 12 MONTHS DO YOU DO LESS THAN YOU USED TO BECAUSE OF YOUR BREATHING PROBLEMS: DISAGREE/UNSURE
DO YOU EVER COUGH UP ANY MUCUS OR PHLEGM?: NO/ONLY WITH OCCASIONAL COLDS OR INFECTIONS
DURING THE PAST 4 WEEKS HOW MUCH DID YOU FEEL SHORT OF BREATH: NONE/LITTLE OF THE TIME
COPD SCREENING SCORE: 2

## 2019-06-12 ASSESSMENT — LIFESTYLE VARIABLES
EVER_SMOKED: NEVER
ALCOHOL_USE: NO

## 2019-06-12 ASSESSMENT — PATIENT HEALTH QUESTIONNAIRE - PHQ9
2. FEELING DOWN, DEPRESSED, IRRITABLE, OR HOPELESS: NOT AT ALL
1. LITTLE INTEREST OR PLEASURE IN DOING THINGS: NOT AT ALL
SUM OF ALL RESPONSES TO PHQ9 QUESTIONS 1 AND 2: 0

## 2019-06-12 NOTE — H&P
Hospital Medicine History & Physical Note    Date of Service  6/12/2019    Primary Care Physician  Estuardo Carrero M.D.    Consultants  None    Code Status  Full    Chief Complaint  Generalized weakness    History of Presenting Illness  89 y.o. female who presented 6/12/2019 with has medical history of hypertension, comes into the hospital complaining about generalized weakness.  It is associated with tremors, headaches and dizziness.  She was recently in the CDU at [facility] where she was found to be dehydrated secondary to hydrochlorthiazide 4 days ago.  UA, chest x-ray, EKG did not reveal any reason why she was generalized weak.  They stopped her hydrochlorothiazide and started on amlodipine 5 g twice daily.  Patient did complain about some burning abdominal pain that is infrequent.  Pain is mostly in epigastric region and is nonradiating.  Patient is independent in ADLs and lives alone.  She does have friends frequently visit her to make sure she is okay.    Arrived to the ED hypertensive with a blood pressure greater than 190.  She was given doses of labetalol and Norvasc.  He rated did not reveal any evidence of UTI.  Although she had a culture done 4 days ago that was negative. Sets of troponin were negative EKG interpreted by me found normal sinus rhythm with left bundle branch block.  Chest x-ray interpreted by me found right-sided atelectasis with no other acute bony process.  CT scan of chest abdomen pelvis was negative.    Rapid response was called when patient got to the floor, because the patient complained about shortness of breath and left hand numbness.  After evaluating patient she had no other focal deficits or sensory loss.  I believe is highly likely that she had a stroke.  EKG done at the time interval by me found normal sinus rhythm with left bundle branch block.  She did not meet the criteria for STEMI according to Sgarbossa criteria.  Troponins have been negative.    Review of Systems  Review of  Systems   Constitutional: Positive for malaise/fatigue. Negative for chills, diaphoresis and fever.   HENT: Negative for congestion, ear discharge, ear pain, hearing loss, nosebleeds, sinus pain, sore throat and tinnitus.    Eyes: Negative for blurred vision, double vision, photophobia and pain.   Respiratory: Negative for cough, hemoptysis, sputum production, shortness of breath, wheezing and stridor.    Cardiovascular: Negative for chest pain, palpitations, orthopnea, claudication, leg swelling and PND.   Gastrointestinal: Positive for heartburn and nausea. Negative for abdominal pain, blood in stool, constipation, diarrhea, melena and vomiting.   Genitourinary: Negative for dysuria, flank pain, frequency, hematuria and urgency.   Musculoskeletal: Negative for back pain, falls, joint pain, myalgias and neck pain.   Skin: Negative for itching and rash.   Neurological: Positive for dizziness, weakness and headaches. Negative for tingling and tremors.   Endo/Heme/Allergies: Negative for environmental allergies and polydipsia. Does not bruise/bleed easily.       Past Medical History   has a past medical history of Circulation problem (01/2018); Hyperlipidemia; and Hypertension.    Surgical History   has a past surgical history that includes gyn surgery and carotid endarterectomy (Right, 2000).     Family History  family history includes Heart Attack (age of onset: 64) in her mother; Heart Attack (age of onset: 72) in her father.     Social History   reports that she quit smoking about 31 years ago. She has never used smokeless tobacco. She reports that she drinks alcohol. She reports that she does not use drugs.    Allergies  Allergies   Allergen Reactions   • Chlorthalidone Unspecified     Headaches severe. Heart racing    • Lisinopril Swelling     Of lower extremities        Medications  Prior to Admission Medications   Prescriptions Last Dose Informant Patient Reported? Taking?   GARLIC PO <1week at k Patient Yes  No   Sig: Take 2 Tabs by mouth every evening.   Omega-3 Fatty Acids (FISH OIL) 1000 MG Cap capsule 6/11/2019 at pm Patient Yes No   Sig: Take 1,000 mg by mouth every evening.   amLODIPine (NORVASC) 5 MG Tab 6/12/2019 at am Patient Yes No   Sig: Take 5 mg by mouth 2 Times a Day.   ascorbic acid (ASCORBIC ACID) 500 MG Tab 6/11/2019 at am Patient Yes No   Sig: Take 500 mg by mouth every day.   aspirin EC (ECOTRIN) 81 MG Tablet Delayed Response 6/11/2019 at am Patient Yes No   Sig: Take 81 mg by mouth every day.   cyanocobalamin (VITAMIN B-12) 500 MCG Tab 6/11/2019 at am Patient Yes No   Sig: Take 500 mcg by mouth every day.      Facility-Administered Medications: None       Physical Exam  Temp:  [36.6 °C (97.9 °F)-37.1 °C (98.8 °F)] 36.6 °C (97.9 °F)  Pulse:  [51-75] 56  Resp:  [12-21] 18  BP: (130-179)/(57-91) 164/61  SpO2:  [93 %-100 %] 99 %    Physical Exam   Constitutional: She is oriented to person, place, and time. She appears well-developed and well-nourished.   Malnourished    HENT:   Head: Normocephalic and atraumatic.   Mouth/Throat: No oropharyngeal exudate.   Eyes: Pupils are equal, round, and reactive to light. EOM are normal. No scleral icterus.   Neck: Normal range of motion. Neck supple. No JVD present. No tracheal deviation present. No thyromegaly present.   Cardiovascular: Normal rate, regular rhythm and intact distal pulses.  Exam reveals no gallop and no friction rub.    No murmur heard.  Pulmonary/Chest: Effort normal and breath sounds normal. No stridor. No respiratory distress. She has no wheezes. She has no rales. She exhibits no tenderness.   Abdominal: Soft. Bowel sounds are normal. She exhibits no distension and no mass. There is no tenderness. There is no rebound and no guarding.   Musculoskeletal: Normal range of motion. She exhibits no edema.   Lymphadenopathy:     She has no cervical adenopathy.   Neurological: She is alert and oriented to person, place, and time. She has normal reflexes.  No cranial nerve deficit.   Skin: No rash noted. No erythema. No pallor.   Nursing note and vitals reviewed.      Laboratory:  Recent Labs      06/12/19   1322   WBC  5.1   RBC  4.03*   HEMOGLOBIN  12.0   HEMATOCRIT  36.5*   MCV  90.6   MCH  29.8   MCHC  32.9*   RDW  44.8   PLATELETCT  248   MPV  9.3     Recent Labs      06/12/19   1322   SODIUM  137   POTASSIUM  3.9   CHLORIDE  105   CO2  21   GLUCOSE  98   BUN  26*   CREATININE  1.04   CALCIUM  8.8     Recent Labs      06/12/19   1322   ALTSGPT  9   ASTSGOT  19   ALKPHOSPHAT  88   TBILIRUBIN  0.7   GLUCOSE  98                 Recent Labs      06/12/19   1322  06/12/19   1757  06/12/19   2156   TROPONINI  <0.02  <0.02  0.02       Urinalysis:    Recent Labs      06/12/19   1322   SPECGRAVITY  <=1.005   GLUCOSEUR  Negative   KETONES  Negative   NITRITE  Negative   LEUKESTERAS  Moderate*   WBCURINE  0-2   RBCURINE  Rare   BACTERIA  Rare*   EPITHELCELL  Rare        Imaging:  CT-ABDOMEN-PELVIS WITH   Final Result      No acute abnormality within the abdomen or pelvis      DX-CHEST-PORTABLE (1 VIEW)   Final Result      1.  Cardiomegaly.      2.  Linear atelectasis within the right lung base.      3.  Interstitial prominence.      US-RENAL ARTERY DUPLEX COMP    (Results Pending)         Assessment/Plan:  I anticipate this patient is appropriate for observation status at this time.    * HTN (hypertension), malignant- (present on admission)   Assessment & Plan    I suspect that that her headaches and dizziness may be from uncontrolled blood pressure  The patient states that no other medication works for her other than amlodipine.  She states that hydralazine was started in the past and dropped her blood pressure very quickly.  I will try to place her on labetalol orally to see if that improves her blood pressure.  Trending troponins have been negative.  Will order renal Doppler     BRITTNEY (acute kidney injury) (HCC)   Assessment & Plan    Likely from uncontrolled blood  pressure  IV fluid hydration with normal saline  Monitor BMP and assess response  Avoid IV contrast/nephrotoxins/NSAIDs  Dose adjust meds for decreased GFR  F/u Urine electrolytes       Anxiety- (present on admission)   Assessment & Plan    Having panic attacks  Start Xanax p.o. 3 times daily     Dyslipidemia- (present on admission)   Assessment & Plan    Follow-up lipid panel     Weakness- (present on admission)   Assessment & Plan    PT/OT eval     History of stroke- (present on admission)   Assessment & Plan    Currently the patient presents with no focal deficits         VTE prophylaxis: heparin    Time spent: I spent an additional 60 minutes of cumulative time taking care of the patient in addition to time. This includes face to face encounter, managing medical issues, coordination of care, counseling, and documentation. Start 12:00am end 1:00am

## 2019-06-12 NOTE — TELEPHONE ENCOUNTER
She is breathing heavily on the phone and feels like she will past out.  I instructed her to call 911 immediately.  She will comply.

## 2019-06-12 NOTE — ED TRIAGE NOTES
"Chief Complaint   Patient presents with   • Weakness     Seen recently for this, this is reported to be intermittent. Admitted for deyhdration she \"thinks.\"   PIV started by CARMINE right forearm as well as a 250cc fluid challenge. BS in field 116  "

## 2019-06-12 NOTE — ED NOTES
Med Rec updated and complete per pt at bedside  Allergies have been verified and updated  No oral ABX within the last 30 days  Pt Home Pharmacy:[facility]

## 2019-06-12 NOTE — ED PROVIDER NOTES
"ED Provider Note    CHIEF COMPLAINT  Chief Complaint   Patient presents with   • Weakness     Seen recently for this, this is reported to be intermittent. Admitted for deyhdration she \"thinks.\"       HPI  Livia Corea is a 89 y.o. female who presents for evaluation of weakness.  The patient presents complaining of weakness.  Patient states she has had dehydration in the past and states she feels similar.  She denies recent: Fever, chills, URI symptoms, cough, sputum, chest pain, shortness of breath, nausea, vomiting, hematemesis, melena hematochezia, hematuria, dysuria.  No other acute symptomatology or complaints.    REVIEW OF SYSTEMS  See HPI for further details. All other systems negative.    PAST MEDICAL HISTORY  Past Medical History:   Diagnosis Date   • Circulation problem 01/2018   • Hyperlipidemia    • Hypertension        FAMILY HISTORY  Family History   Problem Relation Age of Onset   • Heart Attack Mother 64   • Heart Attack Father 72       SOCIAL HISTORY  Past history of tobacco use; positive alcohol use;    SURGICAL HISTORY  Past Surgical History:   Procedure Laterality Date   • CAROTID ENDARTERECTOMY Right 2000   • GYN SURGERY      hysterectomy       CURRENT MEDICATIONS  See nurse's notes    ALLERGIES  Allergies   Allergen Reactions   • Chlorthalidone      Headaches severe. Heart racing    • Lisinopril Swelling     Of lower extremities        PHYSICAL EXAM  VITAL SIGNS: BP (!) 179/81 Comment: RN notified  Pulse 60   Temp 37.1 °C (98.8 °F) (Temporal)   Resp 18   Ht 1.626 m (5' 4\")   Wt 57.2 kg (126 lb)   SpO2 98%   BMI 21.63 kg/m²    Constitutional: 89-year-old female, awake, oriented x3  HENT: ,Atraumatic, Bilateral external ears normal, tympanic membranes clear, Oropharynx mildly dry, No oral exudates, Nose normal.   Eyes: PERRL, EOMI, Conjunctiva normal, No discharge.   Neck: Normal range of motion, No tenderness, Supple, No stridor.   Lymphatic: No lymphadenopathy noted. "   Cardiovascular: Normal heart rate, Normal rhythm, No murmurs, No rubs, No gallops.   Thorax & Lungs: Normal Equal breath sounds, No respiratory distress, No wheezing, no stridor, no rales. No chest tenderness.   Abdomen: Soft, nontender, nondistended, no organomegaly, positive bowel sounds normal in quality. No guarding or rebound.  Skin: Mildly decreased skin turgor, pink, warm, dry. No rashes, petechiae, purpura. Normal capillary refill.   Back: No tenderness, No CVA tenderness.   Extremities: Intact distal pulses, No edema, No tenderness, No cyanosis, No clubbing. Vascular: Pulses are 2+, symmetric in the upper and lower extremities.  Musculoskeletal: Diffuse arthritic changes. No tenderness to palpation or major deformities noted.   Neurologic: Alert & oriented x 3, Normal motor function, Normal sensory function, No gross focal deficits noted.   Psychiatric: Affect normal, Judgment normal, Mood normal.         EKG  I have interpreted: Rate 60, rhythm sinus, axis normal, left bundle branch block pattern,, diffuse nonspecific ST-T wave changes, twelve-lead EKG, no acute change compared to tracing of 6/8/2019;    RADIOLOGY/PROCEDURES  DX-CHEST-PORTABLE (1 VIEW)   Final Result      1.  Cardiomegaly.      2.  Linear atelectasis within the right lung base.      3.  Interstitial prominence.            COURSE & MEDICAL DECISION MAKING  Pertinent Labs & Imaging studies reviewed. (See chart for details)  1.  IV normal saline    Laboratory studies: CBC and differential within normal; CMP shows a BUN of 26 otherwise within normal; troponin less than 0.02; urinalysis shows moderate leukocyte esterase, WBC 0-2, RBCs rare, bacteria rare, epithelial cells rare;    Discussion/consultation: At this time, the patient presents for evaluation of weakness.  Clinically the patient appears mildly dehydrated.  Patient lives alone and is unable to care for herself at home.  She states she is quite weak and unsteady on her feet.  At  this time, patient will be admitted for rehydration and monitoring internal we can assure that she can be safely discharged home.    FINAL IMPRESSION  1. Weakness    2. Dehydration        PLAN  1.  The patient will be admitted for further monitoring, treatment, and care.    Electronically signed by: Guy G Gansert, 6/12/2019 12:42 PM

## 2019-06-12 NOTE — TELEPHONE ENCOUNTER
----- Message from Lin Aviles sent at 6/12/2019 10:05 AM PDT -----  Contact: [phone]  RODRIGO/Phoebe    Pt reports about an hour after taking amlodipine today she felt dizzy, also experiencing shortness of breath and feels like she was about to pass out. Thinks it's a side affect to medication and would please like a call back at [phone] to find out what to do.

## 2019-06-13 ENCOUNTER — APPOINTMENT (OUTPATIENT)
Dept: RADIOLOGY | Facility: MEDICAL CENTER | Age: 84
End: 2019-06-13
Attending: HOSPITALIST
Payer: MEDICARE

## 2019-06-13 ENCOUNTER — PATIENT OUTREACH (OUTPATIENT)
Dept: HEALTH INFORMATION MANAGEMENT | Facility: OTHER | Age: 84
End: 2019-06-13

## 2019-06-13 VITALS
HEIGHT: 64 IN | WEIGHT: 126 LBS | SYSTOLIC BLOOD PRESSURE: 168 MMHG | OXYGEN SATURATION: 90 % | DIASTOLIC BLOOD PRESSURE: 66 MMHG | RESPIRATION RATE: 18 BRPM | TEMPERATURE: 97.9 F | HEART RATE: 56 BPM | BODY MASS INDEX: 21.51 KG/M2

## 2019-06-13 PROBLEM — N17.9 AKI (ACUTE KIDNEY INJURY) (HCC): Status: ACTIVE | Noted: 2019-06-13

## 2019-06-13 LAB
ALBUMIN SERPL BCP-MCNC: 3 G/DL (ref 3.2–4.9)
ALBUMIN/GLOB SERPL: 0.9 G/DL
ALP SERPL-CCNC: 76 U/L (ref 30–99)
ALT SERPL-CCNC: 10 U/L (ref 2–50)
ANION GAP SERPL CALC-SCNC: 7 MMOL/L (ref 0–11.9)
AST SERPL-CCNC: 14 U/L (ref 12–45)
BASOPHILS # BLD AUTO: 0.9 % (ref 0–1.8)
BASOPHILS # BLD: 0.04 K/UL (ref 0–0.12)
BILIRUB SERPL-MCNC: 0.6 MG/DL (ref 0.1–1.5)
BUN SERPL-MCNC: 14 MG/DL (ref 8–22)
CALCIUM SERPL-MCNC: 8.7 MG/DL (ref 8.4–10.2)
CHLORIDE SERPL-SCNC: 110 MMOL/L (ref 96–112)
CHLORIDE UR-SCNC: <15 MMOL/L
CHOLEST SERPL-MCNC: 147 MG/DL (ref 100–199)
CO2 SERPL-SCNC: 23 MMOL/L (ref 20–33)
CREAT SERPL-MCNC: 0.85 MG/DL (ref 0.5–1.4)
CREAT UR-MCNC: 81.1 MG/DL
EKG IMPRESSION: NORMAL
EOSINOPHIL # BLD AUTO: 0.21 K/UL (ref 0–0.51)
EOSINOPHIL NFR BLD: 4.6 % (ref 0–6.9)
ERYTHROCYTE [DISTWIDTH] IN BLOOD BY AUTOMATED COUNT: 47 FL (ref 35.9–50)
GLOBULIN SER CALC-MCNC: 3.4 G/DL (ref 1.9–3.5)
GLUCOSE SERPL-MCNC: 95 MG/DL (ref 65–99)
HCT VFR BLD AUTO: 35 % (ref 37–47)
HDLC SERPL-MCNC: 42 MG/DL
HGB BLD-MCNC: 11.5 G/DL (ref 12–16)
IMM GRANULOCYTES # BLD AUTO: 0 K/UL (ref 0–0.11)
IMM GRANULOCYTES NFR BLD AUTO: 0 % (ref 0–0.9)
LDLC SERPL CALC-MCNC: 90 MG/DL
LYMPHOCYTES # BLD AUTO: 1.76 K/UL (ref 1–4.8)
LYMPHOCYTES NFR BLD: 38.7 % (ref 22–41)
MCH RBC QN AUTO: 30.2 PG (ref 27–33)
MCHC RBC AUTO-ENTMCNC: 32.9 G/DL (ref 33.6–35)
MCV RBC AUTO: 91.9 FL (ref 81.4–97.8)
MONOCYTES # BLD AUTO: 0.53 K/UL (ref 0–0.85)
MONOCYTES NFR BLD AUTO: 11.6 % (ref 0–13.4)
NEUTROPHILS # BLD AUTO: 2.01 K/UL (ref 2–7.15)
NEUTROPHILS NFR BLD: 44.2 % (ref 44–72)
NRBC # BLD AUTO: 0 K/UL
NRBC BLD-RTO: 0 /100 WBC
PLATELET # BLD AUTO: 247 K/UL (ref 164–446)
PMV BLD AUTO: 9.8 FL (ref 9–12.9)
POTASSIUM SERPL-SCNC: 3.5 MMOL/L (ref 3.6–5.5)
POTASSIUM UR-SCNC: 47 MMOL/L
PROT SERPL-MCNC: 6.4 G/DL (ref 6–8.2)
PROT UR-MCNC: 7.6 MG/DL (ref 0–15)
RBC # BLD AUTO: 3.81 M/UL (ref 4.2–5.4)
SODIUM SERPL-SCNC: 140 MMOL/L (ref 135–145)
SODIUM UR-SCNC: <10 MMOL/L
TRIGL SERPL-MCNC: 73 MG/DL (ref 0–149)
WBC # BLD AUTO: 4.6 K/UL (ref 4.8–10.8)

## 2019-06-13 PROCEDURE — 80053 COMPREHEN METABOLIC PANEL: CPT

## 2019-06-13 PROCEDURE — 93975 VASCULAR STUDY: CPT

## 2019-06-13 PROCEDURE — 80061 LIPID PANEL: CPT

## 2019-06-13 PROCEDURE — A9270 NON-COVERED ITEM OR SERVICE: HCPCS | Performed by: HOSPITALIST

## 2019-06-13 PROCEDURE — 700102 HCHG RX REV CODE 250 W/ 637 OVERRIDE(OP): Performed by: HOSPITALIST

## 2019-06-13 PROCEDURE — 85025 COMPLETE CBC W/AUTO DIFF WBC: CPT

## 2019-06-13 PROCEDURE — 36415 COLL VENOUS BLD VENIPUNCTURE: CPT

## 2019-06-13 PROCEDURE — G0378 HOSPITAL OBSERVATION PER HR: HCPCS

## 2019-06-13 PROCEDURE — 99217 PR OBSERVATION CARE DISCHARGE: CPT | Performed by: HOSPITALIST

## 2019-06-13 RX ORDER — LABETALOL 100 MG/1
100 TABLET, FILM COATED ORAL TWICE DAILY
Status: DISCONTINUED | OUTPATIENT
Start: 2019-06-13 | End: 2019-06-13

## 2019-06-13 RX ORDER — CLONIDINE HYDROCHLORIDE 0.1 MG/1
0.1 TABLET ORAL 3 TIMES DAILY PRN
Qty: 30 TAB | Refills: 0 | Status: SHIPPED | OUTPATIENT
Start: 2019-06-13 | End: 2019-06-25 | Stop reason: SDUPTHER

## 2019-06-13 RX ORDER — ALPRAZOLAM 0.5 MG/1
0.5 TABLET ORAL 3 TIMES DAILY PRN
Status: DISCONTINUED | OUTPATIENT
Start: 2019-06-13 | End: 2019-06-13 | Stop reason: HOSPADM

## 2019-06-13 RX ADMIN — OXYCODONE HYDROCHLORIDE AND ACETAMINOPHEN 500 MG: 500 TABLET ORAL at 07:00

## 2019-06-13 RX ADMIN — AMLODIPINE BESYLATE 5 MG: 5 TABLET ORAL at 07:00

## 2019-06-13 RX ADMIN — CYANOCOBALAMIN TAB 500 MCG 500 MCG: 500 TAB at 07:00

## 2019-06-13 NOTE — PROGRESS NOTES
Pt arrived on floor at 1800. Pt settled, vitals stable, tele monitor applied, evening medications given. Full report given to oncoming RN.

## 2019-06-13 NOTE — PROGRESS NOTES
RE: HOSPITALIST PAGED    HOSPITALIST (SHELBY) PAGED REGARDING REQUEST FOR THE FOLLOWING ORDERS TO BE D/C DUE TO HEART RATE SUSTAINED MID TO LOW 50S:  PRN S/L NITROGLYCRIN  PRN/SCHEDULED LABETOLOL

## 2019-06-13 NOTE — CODE DOCUMENTATION
MD at bedside assessing pt. Pt VSS at this time. Per MD no additional intervention needed. Monitor pt overnight. Medication ordered for pt for anxiety. Pt reports that numbness in fingers is going away and that at baseline she has some numbness.

## 2019-06-13 NOTE — DISCHARGE INSTRUCTIONS
Discharge Instructions    Discharged to home by car with friend. Discharged via wheelchair, hospital escort: Yes.  Special equipment needed: Not Applicable    Be sure to schedule a follow-up appointment with your primary care doctor or any specialists as instructed.     Discharge Plan:   Influenza Vaccine Indication: Not indicated: Previously immunized this influenza season and > 8 years of age    I understand that a diet low in cholesterol, fat, and sodium is recommended for good health. Unless I have been given specific instructions below for another diet, I accept this instruction as my diet prescription.   Other diet: regular    Special Instructions: None    · Is patient discharged on Warfarin / Coumadin?   No     Depression / Suicide Risk    As you are discharged from this [facility] facility, it is important to learn how to keep safe from harming yourself.    Recognize the warning signs:  · Abrupt changes in personality, positive or negative- including increase in energy   · Giving away possessions  · Change in eating patterns- significant weight changes-  positive or negative  · Change in sleeping patterns- unable to sleep or sleeping all the time   · Unwillingness or inability to communicate  · Depression  · Unusual sadness, discouragement and loneliness  · Talk of wanting to die  · Neglect of personal appearance   · Rebelliousness- reckless behavior  · Withdrawal from people/activities they love  · Confusion- inability to concentrate     If you or a loved one observes any of these behaviors or has concerns about self-harm, here's what you can do:  · Talk about it- your feelings and reasons for harming yourself  · Remove any means that you might use to hurt yourself (examples: pills, rope, extension cords, firearm)  · Get professional help from the community (Mental Health, Substance Abuse, psychological counseling)  · Do not be alone:Call your Safe Contact- someone whom you trust who will be there for  you.  · Call your local CRISIS HOTLINE 125-3703 or 644-094-4373  · Call your local Children's Mobile Crisis Response Team [place] (176) 747-3837 or www.YCharts  · Call the toll free National Suicide Prevention Hotlines   · National Suicide Prevention Lifeline 705-160-OHAA (7144)  · National RSens Line Network 800-SUICIDE (767-8994)

## 2019-06-13 NOTE — DISCHARGE SUMMARY
"Discharge Summary    CHIEF COMPLAINT ON ADMISSION  Chief Complaint   Patient presents with   • Weakness     Seen recently for this, this is reported to be intermittent. Admitted for deyhdration she \"thinks.\"       Reason for Admission  Weakness; Dizziness     Admission Date  6/12/2019    CODE STATUS  DNAR/DNI    HPI & HOSPITAL COURSE  This is a 89 y.o. female here with weakness, hypertension. Blood pressure improved, recommend to take clonidine prn for pressure over 190. Stop taking hydrochlorothiazide, was dehydrated, fluids given with improvement of renal function.       Therefore, she is discharged in good and stable condition to home with close outpatient follow-up.    Discharge Date  6/13/19    FOLLOW UP ITEMS POST DISCHARGE  Primary care    DISCHARGE DIAGNOSES  Principal Problem:    HTN (hypertension), malignant POA: Yes  Active Problems:    History of stroke POA: Yes    Weakness POA: Yes    Dyslipidemia POA: Yes    Anxiety POA: Yes    BRITTNEY (acute kidney injury) (HCC) POA: Unknown  Resolved Problems:    * No resolved hospital problems. *      FOLLOW UP  Future Appointments  Date Time Provider Department Center   8/22/2019 10:30 AM JEN Jauregui [facility] None     No follow-up provider specified.    MEDICATIONS ON DISCHARGE     Medication List      START taking these medications      Instructions   cloNIDine 0.1 MG Tabs  Commonly known as:  CATAPRES   Take 1 Tab by mouth 3 times a day as needed (systolic pressure over 190).  Dose:  0.1 mg        CONTINUE taking these medications      Instructions   amLODIPine 5 MG Tabs  Commonly known as:  NORVASC   Take 5 mg by mouth 2 Times a Day.  Dose:  5 mg     ascorbic acid 500 MG Tabs  Commonly known as:  ascorbic acid   Take 500 mg by mouth every day.  Dose:  500 mg     aspirin EC 81 MG Tbec  Commonly known as:  ECOTRIN   Take 81 mg by mouth every day.  Dose:  81 mg     cyanocobalamin 500 MCG Tabs  Commonly known as:  VITAMIN B-12   Take 500 mcg by mouth every " day.  Dose:  500 mcg     fish oil 1000 MG Caps capsule   Take 1,000 mg by mouth every evening.  Dose:  1000 mg     GARLIC PO   Take 2 Tabs by mouth every evening.  Dose:  2 Tab            Allergies  Allergies   Allergen Reactions   • Chlorthalidone Unspecified     Headaches severe. Heart racing    • Lisinopril Swelling     Of lower extremities        DIET  Orders Placed This Encounter   Procedures   • Diet Order 2 Gram Sodium     Standing Status:   Standing     Number of Occurrences:   1     Order Specific Question:   Diet:     Answer:   2 Gram Sodium [7]       ACTIVITY  As tolerated.  Weight bearing as tolerated    CONSULTATIONS  none    PROCEDURES  none    LABORATORY  Lab Results   Component Value Date    SODIUM 140 06/13/2019    POTASSIUM 3.5 (L) 06/13/2019    CHLORIDE 110 06/13/2019    CO2 23 06/13/2019    GLUCOSE 95 06/13/2019    BUN 14 06/13/2019    CREATININE 0.85 06/13/2019        Lab Results   Component Value Date    WBC 4.6 (L) 06/13/2019    HEMOGLOBIN 11.5 (L) 06/13/2019    HEMATOCRIT 35.0 (L) 06/13/2019    PLATELETCT 247 06/13/2019

## 2019-06-13 NOTE — DISCHARGE PLANNING
Care Transition Team Assessment    Patient lives alone with friend support.    Information Source  Orientation : Oriented x 4  Information Given By: Patient  Informant's Name: corona  Who is responsible for making decisions for patient? : Patient    Elopement Risk  Legal Hold: No  Ambulatory or Self Mobile in Wheelchair: Yes  Disoriented: No  Psychiatric Symptoms: None  History of Wandering: No  Elopement this Admit: No  Vocalizing Wanting to Leave: No  Displays Behaviors, Body Language Wanting to Leave: No-Not at Risk for Elopement  Elopement Risk: Not at Risk for Elopement    Interdisciplinary Discharge Planning  Lives with - Patient's Self Care Capacity: Alone and Able to Care For Self  Patient or legal guardian wants to designate a caregiver (see row info): No  Durable Medical Equipment: Walker    Discharge Preparedness  What is your plan after discharge?: Home with help  Prior Functional Level: Independent with Activities of Daily Living    Functional Assesment  Prior Functional Level: Independent with Activities of Daily Living    Finances  Financial Barriers to Discharge: No  Prescription Coverage: Yes    Vision / Hearing Impairment  Vision Impairment : Yes  Right Eye Vision: Wears Glasses  Left Eye Vision: Wears Glasses  Hearing Impairment : No    Domestic Abuse  Have you ever been the victim of abuse or violence?: No  Physical Abuse or Sexual Abuse: No  Verbal Abuse or Emotional Abuse: No  Possible Abuse Reported to:: Not Applicable    Discharge Risks or Barriers  Discharge risks or barriers?: No    Anticipated Discharge Information  Anticipated discharge disposition: Home

## 2019-06-13 NOTE — CARE PLAN
Problem: Communication  Goal: The ability to communicate needs accurately and effectively will improve  Outcome: PROGRESSING AS EXPECTED  PATIENT A/O X 4 AND APPROPRIATELY  MAKES ALL NEEDS KNOWN; PLAN OF CARE REVIEWED WITH PATIENT, PATIENT VERBALIZES UNDERSTANDING.

## 2019-06-13 NOTE — RESPIRATORY CARE
Respiratory Rapid Response Note    Symptoms: chest pain (non-specific)     Breath Sounds  RUL Breath Sounds: Clear (06/12/19 2230)  RML Breath Sounds: Clear (06/12/19 2230)  RLL Breath Sounds: Clear;Diminished (06/12/19 2230)  ELIZABETH Breath Sounds: Clear (06/12/19 2230)  LLL Breath Sounds: Clear;Diminished (06/12/19 2230)   ABG Results: N/A    O2 (LPM): 2 (06/12/19 2248)  O2 Daily Delivery Respiratory : Silicone Nasal Cannula (06/12/19 2248)  Events/Summary/Plan: pt with elusive complaints to RN, called RR (06/12/19 2230)

## 2019-06-13 NOTE — PROGRESS NOTES
RE: PATIENT INQUIRING WHY SHE IS ON CLEAR LIQUID DIET    PATIENT REQUESTING REGULAR DIET; Adm:   Ale Esquivel M.D. PAGED: AWAITING CALL BACK

## 2019-06-13 NOTE — ASSESSMENT & PLAN NOTE
Continue Regimen: Stop immiqumod for 4 weeks then repeat for 4 more weeks
Currently the patient presents with no focal deficits  
Follow-up lipid panel  
Having panic attacks  Start Xanax p.o. 3 times daily  
I suspect that that her headaches and dizziness may be from uncontrolled blood pressure  The patient states that no other medication works for her other than amlodipine.  She states that hydralazine was started in the past and dropped her blood pressure very quickly.  I will try to place her on labetalol orally to see if that improves her blood pressure.  Trending troponins have been negative.  Will order renal Doppler  
Likely from uncontrolled blood pressure  IV fluid hydration with normal saline  Monitor BMP and assess response  Avoid IV contrast/nephrotoxins/NSAIDs  Dose adjust meds for decreased GFR  F/u Urine electrolytes    
PT/OT eval  
Detail Level: Zone

## 2019-06-13 NOTE — PROGRESS NOTES
Telemetry Shift Summary    Rhythm SR/SB w/ BBB  HR Range 48-80s  Ectopy fPVC  Measurements 0.18/0.14/0.44        Normal Values  Rhythm SR  HR Range    Measurements 0.12-0.20 / 0.06-0.10  / 0.30-0.52

## 2019-06-13 NOTE — PROGRESS NOTES
"RE: RAPID RESPONSE    RAPID RESPONSE CALLED AFTER PATIENT WITH VAGUE C/O CHEST DISCOMFORT, LEFT HAND NUMBNESS AND TINGLING STATING, \"I HAD A MINI STROKE IN THE PAST AND IT FELT LIKE THIS\".    Adm: Ale Esquivel M.D. @ BEDSIDE: NIH NEGATIVE, NO ACUTE CHANGES ON EKG/TELE, SERIAL TROPONIN ONGOING AND ALL NEGATIVE. PATIENT PLACES ON O2 2 LITERS BY RT FOR COMFORT.          "

## 2019-06-21 ENCOUNTER — OFFICE VISIT (OUTPATIENT)
Dept: CARDIOLOGY | Facility: MEDICAL CENTER | Age: 84
End: 2019-06-21
Payer: MEDICARE

## 2019-06-21 VITALS
WEIGHT: 126 LBS | HEIGHT: 64 IN | HEART RATE: 60 BPM | SYSTOLIC BLOOD PRESSURE: 170 MMHG | OXYGEN SATURATION: 97 % | BODY MASS INDEX: 21.51 KG/M2 | DIASTOLIC BLOOD PRESSURE: 100 MMHG

## 2019-06-21 DIAGNOSIS — E78.5 DYSLIPIDEMIA: ICD-10-CM

## 2019-06-21 DIAGNOSIS — I65.23 ATHEROSCLEROSIS OF BOTH CAROTID ARTERIES: ICD-10-CM

## 2019-06-21 DIAGNOSIS — I10 HTN (HYPERTENSION), MALIGNANT: ICD-10-CM

## 2019-06-21 PROCEDURE — 99214 OFFICE O/P EST MOD 30 MIN: CPT | Performed by: INTERNAL MEDICINE

## 2019-06-21 RX ORDER — HYDROCHLOROTHIAZIDE 12.5 MG/1
12.5 TABLET ORAL DAILY
Qty: 30 TAB | Refills: 11 | Status: ON HOLD | OUTPATIENT
Start: 2019-06-21 | End: 2019-07-01

## 2019-06-21 NOTE — LETTER
[facility]   [address], [address]  [place] [address]  Phone: [phone]  Fax: [phone]              Livia Corea  11/21/1926    Encounter Date: 6/21/2019    Napoleon Eden M.D.          PROGRESS NOTE:  Chief Complaint   Patient presents with   • Hypertension       Subjective:   Livia Corea is a 89 y.o. female who presents today for follow-up of her hypertension, dyslipidemia and carotid plaque.  She had difficulties with atorvastatin causing nausea.     She has had difficulty with a significant elevation in her blood pressure.  Is been running about 200/100.  She is had difficulty with antihypertensive therapy.    When her blood pressures up she gets quite anxious and gets dyspneic with this.  She denies any chest discomfort or edema.  She occasionally awakens at night dyspneic.    Past Medical History:   Diagnosis Date   • Circulation problem 01/2018   • Hyperlipidemia    • Hypertension      Past Surgical History:   Procedure Laterality Date   • CAROTID ENDARTERECTOMY Right 2000   • GYN SURGERY      hysterectomy     Family History   Problem Relation Age of Onset   • Heart Attack Mother 64   • Heart Attack Father 72     Social History     Social History   • Marital status: Single     Spouse name: N/A   • Number of children: N/A   • Years of education: N/A     Occupational History   • Not on file.     Social History Main Topics   • Smoking status: Former Smoker     Quit date: 2/25/1988   • Smokeless tobacco: Never Used   • Alcohol use Yes      Comment: 1 glass of wine x 2 days a week   • Drug use: No   • Sexual activity: Not on file     Other Topics Concern   • Not on file     Social History Narrative   • No narrative on file     Allergies   Allergen Reactions   • Chlorthalidone Unspecified     Headaches severe. Heart racing    • Lisinopril Swelling     Of lower extremities      Outpatient Encounter Prescriptions as of 6/21/2019    "  Medication Sig Dispense Refill   • cloNIDine (CATAPRES) 0.1 MG Tab Take 1 Tab by mouth 3 times a day as needed (systolic pressure over 190). 30 Tab 0   • amLODIPine (NORVASC) 5 MG Tab Take 5 mg by mouth 2 Times a Day.     • Omega-3 Fatty Acids (FISH OIL) 1000 MG Cap capsule Take 1,000 mg by mouth every evening.     • ascorbic acid (ASCORBIC ACID) 500 MG Tab Take 500 mg by mouth every day.     • aspirin EC (ECOTRIN) 81 MG Tablet Delayed Response Take 81 mg by mouth every day.     • GARLIC PO Take 2 Tabs by mouth every evening.     • cyanocobalamin (VITAMIN B-12) 500 MCG Tab Take 500 mcg by mouth every day.       No facility-administered encounter medications on file as of 6/21/2019.      ROS     Objective:   BP (!) 170/100 (BP Location: Left arm, Patient Position: Sitting, BP Cuff Size: Adult)   Pulse 60   Ht 1.626 m (5' 4\")   Wt 57.2 kg (126 lb)   SpO2 97%   BMI 21.63 kg/m²      Physical Exam   Constitutional: She appears well-developed and well-nourished.   Neck: No JVD present.   Cardiovascular: Normal rate and regular rhythm.    No murmur heard.  Pulmonary/Chest: Effort normal and breath sounds normal. She has no rales.   Abdominal: Soft. There is no tenderness.   Musculoskeletal: She exhibits no edema.     Lab Results   Component Value Date/Time    CHOLSTRLTOT 147 06/13/2019 04:45 AM    LDL 90 06/13/2019 04:45 AM    HDL 42 06/13/2019 04:45 AM    TRIGLYCERIDE 73 06/13/2019 04:45 AM       Lab Results   Component Value Date/Time    SODIUM 140 06/13/2019 04:45 AM    POTASSIUM 3.5 (L) 06/13/2019 04:45 AM    CHLORIDE 110 06/13/2019 04:45 AM    CO2 23 06/13/2019 04:45 AM    GLUCOSE 95 06/13/2019 04:45 AM    BUN 14 06/13/2019 04:45 AM    CREATININE 0.85 06/13/2019 04:45 AM     Lab Results   Component Value Date/Time    ALKPHOSPHAT 76 06/13/2019 04:45 AM    ASTSGOT 14 06/13/2019 04:45 AM    ALTSGPT 10 06/13/2019 04:45 AM    TBILIRUBIN 0.6 06/13/2019 04:45 AM      Lab Results   Component Value Date/Time    " BNPBTYPENAT 45 12/12/2018 01:00 PM       Vascular Laboratory   CONCLUSIONS   No evidence of abdominal aortic aneurysm.    Bilateral kidney size, perfusion and tissue densities appear normal.    Resistive indices are elevated at the bilateral renal mart. (Normal <0.7)   No evidence renal artery stenosis     GEO WHITESIDE     Exam Date:     06/13/2019 07:08    CT abdomen:    No acute abnormality within the abdomen or pelvis   Reading Provider Reading Date   Niko Kelly M.D. Jun 12, 2019           Assessment:     1. HTN (hypertension), malignant     2. Dyslipidemia     3. Atherosclerosis of both carotid arteries: Mild in 2018         Medical Decision Making:  Today's Assessment / Status / Plan:     Ms. Whitseide is having difficulty with rather severe hypertension.  Unfortunately, she has been intolerant of multiple medications.  Her elevated blood pressures seem to correlate with discontinuing hydrochlorothiazide.  She was told she was dehydrated.  We are going to place her back on hydrochlorothiazide and have her increase her fluid intake.  She still needs to be careful with her salt intake.  She is also had difficulty with hydralazine causing hypotension.  The exact details of this are unclear.  She has had difficulty with multiple other medications and we will initially start with the HCTZ.  She will also increase clonidine to 0.2 mg 3 times daily.  She will follow-up in about a week.  She will have a BMP prior to follow-up.      Estuardo Carrero M.D.  [facility] [address]  [address] [place] [address]  VIA Mail

## 2019-06-21 NOTE — PROGRESS NOTES
Chief Complaint   Patient presents with   • Hypertension       Subjective:   Livia Corea is a 89 y.o. female who presents today for follow-up of her hypertension, dyslipidemia and carotid plaque.  She had difficulties with atorvastatin causing nausea.     She has had difficulty with a significant elevation in her blood pressure.  Is been running about 200/100.  She is had difficulty with antihypertensive therapy.    When her blood pressures up she gets quite anxious and gets dyspneic with this.  She denies any chest discomfort or edema.  She occasionally awakens at night dyspneic.    Past Medical History:   Diagnosis Date   • Circulation problem 01/2018   • Hyperlipidemia    • Hypertension      Past Surgical History:   Procedure Laterality Date   • CAROTID ENDARTERECTOMY Right 2000   • GYN SURGERY      hysterectomy     Family History   Problem Relation Age of Onset   • Heart Attack Mother 64   • Heart Attack Father 72     Social History     Social History   • Marital status: Single     Spouse name: N/A   • Number of children: N/A   • Years of education: N/A     Occupational History   • Not on file.     Social History Main Topics   • Smoking status: Former Smoker     Quit date: 2/25/1988   • Smokeless tobacco: Never Used   • Alcohol use Yes      Comment: 1 glass of wine x 2 days a week   • Drug use: No   • Sexual activity: Not on file     Other Topics Concern   • Not on file     Social History Narrative   • No narrative on file     Allergies   Allergen Reactions   • Chlorthalidone Unspecified     Headaches severe. Heart racing    • Lisinopril Swelling     Of lower extremities      Outpatient Encounter Prescriptions as of 6/21/2019   Medication Sig Dispense Refill   • cloNIDine (CATAPRES) 0.1 MG Tab Take 1 Tab by mouth 3 times a day as needed (systolic pressure over 190). 30 Tab 0   • amLODIPine (NORVASC) 5 MG Tab Take 5 mg by mouth 2 Times a Day.     • Omega-3 Fatty Acids (FISH OIL) 1000 MG Cap capsule  "Take 1,000 mg by mouth every evening.     • ascorbic acid (ASCORBIC ACID) 500 MG Tab Take 500 mg by mouth every day.     • aspirin EC (ECOTRIN) 81 MG Tablet Delayed Response Take 81 mg by mouth every day.     • GARLIC PO Take 2 Tabs by mouth every evening.     • cyanocobalamin (VITAMIN B-12) 500 MCG Tab Take 500 mcg by mouth every day.       No facility-administered encounter medications on file as of 6/21/2019.      ROS     Objective:   BP (!) 170/100 (BP Location: Left arm, Patient Position: Sitting, BP Cuff Size: Adult)   Pulse 60   Ht 1.626 m (5' 4\")   Wt 57.2 kg (126 lb)   SpO2 97%   BMI 21.63 kg/m²     Physical Exam   Constitutional: She appears well-developed and well-nourished.   Neck: No JVD present.   Cardiovascular: Normal rate and regular rhythm.    No murmur heard.  Pulmonary/Chest: Effort normal and breath sounds normal. She has no rales.   Abdominal: Soft. There is no tenderness.   Musculoskeletal: She exhibits no edema.     Lab Results   Component Value Date/Time    CHOLSTRLTOT 147 06/13/2019 04:45 AM    LDL 90 06/13/2019 04:45 AM    HDL 42 06/13/2019 04:45 AM    TRIGLYCERIDE 73 06/13/2019 04:45 AM       Lab Results   Component Value Date/Time    SODIUM 140 06/13/2019 04:45 AM    POTASSIUM 3.5 (L) 06/13/2019 04:45 AM    CHLORIDE 110 06/13/2019 04:45 AM    CO2 23 06/13/2019 04:45 AM    GLUCOSE 95 06/13/2019 04:45 AM    BUN 14 06/13/2019 04:45 AM    CREATININE 0.85 06/13/2019 04:45 AM     Lab Results   Component Value Date/Time    ALKPHOSPHAT 76 06/13/2019 04:45 AM    ASTSGOT 14 06/13/2019 04:45 AM    ALTSGPT 10 06/13/2019 04:45 AM    TBILIRUBIN 0.6 06/13/2019 04:45 AM      Lab Results   Component Value Date/Time    BNPBTYPENAT 45 12/12/2018 01:00 PM       Vascular Laboratory   CONCLUSIONS   No evidence of abdominal aortic aneurysm.    Bilateral kidney size, perfusion and tissue densities appear normal.    Resistive indices are elevated at the bilateral renal mart. (Normal <0.7)   No evidence " renal artery stenosis     GEO WHITESIDE     Exam Date:     06/13/2019 07:08    CT abdomen:    No acute abnormality within the abdomen or pelvis   Reading Provider Reading Date   Niko Kelly M.D. Jun 12, 2019           Assessment:     1. HTN (hypertension), malignant     2. Dyslipidemia     3. Atherosclerosis of both carotid arteries: Mild in 2018         Medical Decision Making:  Today's Assessment / Status / Plan:     Ms. Whiteside is having difficulty with rather severe hypertension.  Unfortunately, she has been intolerant of multiple medications.  Her elevated blood pressures seem to correlate with discontinuing hydrochlorothiazide.  She was told she was dehydrated.  We are going to place her back on hydrochlorothiazide and have her increase her fluid intake.  She still needs to be careful with her salt intake.  She is also had difficulty with hydralazine causing hypotension.  The exact details of this are unclear.  She has had difficulty with multiple other medications and we will initially start with the HCTZ.  She will also increase clonidine to 0.2 mg 3 times daily.  She will follow-up in about a week with a BMP prior

## 2019-06-25 RX ORDER — LOSARTAN POTASSIUM 25 MG/1
25 TABLET ORAL DAILY
Qty: 30 TAB | Refills: 2 | Status: ON HOLD | OUTPATIENT
Start: 2019-06-25 | End: 2019-07-01

## 2019-06-25 RX ORDER — CLONIDINE HYDROCHLORIDE 0.1 MG/1
0.2 TABLET ORAL 2 TIMES DAILY
Qty: 120 TAB | Refills: 2 | Status: ON HOLD | OUTPATIENT
Start: 2019-06-25 | End: 2019-07-01

## 2019-06-27 ENCOUNTER — APPOINTMENT (OUTPATIENT)
Dept: RADIOLOGY | Facility: MEDICAL CENTER | Age: 84
DRG: 305 | End: 2019-06-27
Attending: EMERGENCY MEDICINE
Payer: MEDICARE

## 2019-06-27 ENCOUNTER — HOSPITAL ENCOUNTER (INPATIENT)
Facility: MEDICAL CENTER | Age: 84
LOS: 1 days | DRG: 305 | End: 2019-07-01
Attending: EMERGENCY MEDICINE | Admitting: INTERNAL MEDICINE
Payer: MEDICARE

## 2019-06-27 DIAGNOSIS — I16.0 HYPERTENSIVE URGENCY: ICD-10-CM

## 2019-06-27 PROBLEM — Z66 DNR (DO NOT RESUSCITATE): Chronic | Status: ACTIVE | Noted: 2018-03-10

## 2019-06-27 PROBLEM — R00.1 SINUS BRADYCARDIA: Status: ACTIVE | Noted: 2019-06-27

## 2019-06-27 LAB
ALBUMIN SERPL BCP-MCNC: 3.5 G/DL (ref 3.2–4.9)
ALBUMIN/GLOB SERPL: 1 G/DL
ALP SERPL-CCNC: 94 U/L (ref 30–99)
ALT SERPL-CCNC: 11 U/L (ref 2–50)
ANION GAP SERPL CALC-SCNC: 11 MMOL/L (ref 0–11.9)
APPEARANCE UR: CLEAR
AST SERPL-CCNC: 17 U/L (ref 12–45)
BASOPHILS # BLD AUTO: 1.2 % (ref 0–1.8)
BASOPHILS # BLD: 0.05 K/UL (ref 0–0.12)
BILIRUB SERPL-MCNC: 0.7 MG/DL (ref 0.1–1.5)
BILIRUB UR QL STRIP.AUTO: NEGATIVE
BNP SERPL-MCNC: 229 PG/ML (ref 0–100)
BUN SERPL-MCNC: 16 MG/DL (ref 8–22)
CALCIUM SERPL-MCNC: 8.8 MG/DL (ref 8.4–10.2)
CHLORIDE SERPL-SCNC: 97 MMOL/L (ref 96–112)
CO2 SERPL-SCNC: 25 MMOL/L (ref 20–33)
COLOR UR: YELLOW
CREAT SERPL-MCNC: 1.17 MG/DL (ref 0.5–1.4)
EKG IMPRESSION: NORMAL
EOSINOPHIL # BLD AUTO: 0.12 K/UL (ref 0–0.51)
EOSINOPHIL NFR BLD: 2.9 % (ref 0–6.9)
ERYTHROCYTE [DISTWIDTH] IN BLOOD BY AUTOMATED COUNT: 46.5 FL (ref 35.9–50)
GLOBULIN SER CALC-MCNC: 3.6 G/DL (ref 1.9–3.5)
GLUCOSE SERPL-MCNC: 100 MG/DL (ref 65–99)
GLUCOSE UR STRIP.AUTO-MCNC: NEGATIVE MG/DL
HCT VFR BLD AUTO: 38.8 % (ref 37–47)
HGB BLD-MCNC: 13 G/DL (ref 12–16)
IMM GRANULOCYTES # BLD AUTO: 0 K/UL (ref 0–0.11)
IMM GRANULOCYTES NFR BLD AUTO: 0 % (ref 0–0.9)
KETONES UR STRIP.AUTO-MCNC: NEGATIVE MG/DL
LEUKOCYTE ESTERASE UR QL STRIP.AUTO: NEGATIVE
LIPASE SERPL-CCNC: 27 U/L (ref 7–58)
LYMPHOCYTES # BLD AUTO: 1.57 K/UL (ref 1–4.8)
LYMPHOCYTES NFR BLD: 37.8 % (ref 22–41)
MCH RBC QN AUTO: 30.4 PG (ref 27–33)
MCHC RBC AUTO-ENTMCNC: 33.5 G/DL (ref 33.6–35)
MCV RBC AUTO: 90.7 FL (ref 81.4–97.8)
MICRO URNS: NORMAL
MONOCYTES # BLD AUTO: 0.52 K/UL (ref 0–0.85)
MONOCYTES NFR BLD AUTO: 12.5 % (ref 0–13.4)
NEUTROPHILS # BLD AUTO: 1.89 K/UL (ref 2–7.15)
NEUTROPHILS NFR BLD: 45.6 % (ref 44–72)
NITRITE UR QL STRIP.AUTO: NEGATIVE
NRBC # BLD AUTO: 0 K/UL
NRBC BLD-RTO: 0 /100 WBC
PH UR STRIP.AUTO: 7 [PH]
PLATELET # BLD AUTO: 220 K/UL (ref 164–446)
PMV BLD AUTO: 9.8 FL (ref 9–12.9)
POTASSIUM SERPL-SCNC: 3.3 MMOL/L (ref 3.6–5.5)
PROT SERPL-MCNC: 7.1 G/DL (ref 6–8.2)
PROT UR QL STRIP: NEGATIVE MG/DL
RBC # BLD AUTO: 4.28 M/UL (ref 4.2–5.4)
RBC UR QL AUTO: NEGATIVE
SODIUM SERPL-SCNC: 133 MMOL/L (ref 135–145)
SP GR UR STRIP.AUTO: <=1.005
TROPONIN I SERPL-MCNC: 0.02 NG/ML (ref 0–0.04)
WBC # BLD AUTO: 4.2 K/UL (ref 4.8–10.8)

## 2019-06-27 PROCEDURE — 36415 COLL VENOUS BLD VENIPUNCTURE: CPT

## 2019-06-27 PROCEDURE — 99218 PR INITIAL OBSERVATION CARE,LEVL I: CPT | Performed by: HOSPITALIST

## 2019-06-27 PROCEDURE — 85025 COMPLETE CBC W/AUTO DIFF WBC: CPT

## 2019-06-27 PROCEDURE — 700102 HCHG RX REV CODE 250 W/ 637 OVERRIDE(OP): Performed by: HOSPITALIST

## 2019-06-27 PROCEDURE — 71045 X-RAY EXAM CHEST 1 VIEW: CPT

## 2019-06-27 PROCEDURE — 700111 HCHG RX REV CODE 636 W/ 250 OVERRIDE (IP): Performed by: HOSPITALIST

## 2019-06-27 PROCEDURE — G0378 HOSPITAL OBSERVATION PER HR: HCPCS

## 2019-06-27 PROCEDURE — 93005 ELECTROCARDIOGRAM TRACING: CPT | Performed by: EMERGENCY MEDICINE

## 2019-06-27 PROCEDURE — A9270 NON-COVERED ITEM OR SERVICE: HCPCS | Performed by: HOSPITALIST

## 2019-06-27 PROCEDURE — 83880 ASSAY OF NATRIURETIC PEPTIDE: CPT

## 2019-06-27 PROCEDURE — 81003 URINALYSIS AUTO W/O SCOPE: CPT

## 2019-06-27 PROCEDURE — 96372 THER/PROPH/DIAG INJ SC/IM: CPT

## 2019-06-27 PROCEDURE — 80053 COMPREHEN METABOLIC PANEL: CPT

## 2019-06-27 PROCEDURE — 84484 ASSAY OF TROPONIN QUANT: CPT

## 2019-06-27 PROCEDURE — 99285 EMERGENCY DEPT VISIT HI MDM: CPT

## 2019-06-27 PROCEDURE — 83690 ASSAY OF LIPASE: CPT

## 2019-06-27 RX ORDER — ONDANSETRON 2 MG/ML
4 INJECTION INTRAMUSCULAR; INTRAVENOUS EVERY 4 HOURS PRN
Status: DISCONTINUED | OUTPATIENT
Start: 2019-06-27 | End: 2019-07-01 | Stop reason: HOSPADM

## 2019-06-27 RX ORDER — AMOXICILLIN 250 MG
2 CAPSULE ORAL 2 TIMES DAILY
Status: DISCONTINUED | OUTPATIENT
Start: 2019-06-27 | End: 2019-07-01 | Stop reason: HOSPADM

## 2019-06-27 RX ORDER — AMLODIPINE BESYLATE 5 MG/1
5 TABLET ORAL 2 TIMES DAILY
Status: DISCONTINUED | OUTPATIENT
Start: 2019-06-27 | End: 2019-06-28

## 2019-06-27 RX ORDER — ACETAMINOPHEN 325 MG/1
650 TABLET ORAL EVERY 6 HOURS PRN
Status: DISCONTINUED | OUTPATIENT
Start: 2019-06-27 | End: 2019-07-01 | Stop reason: HOSPADM

## 2019-06-27 RX ORDER — LOSARTAN POTASSIUM 25 MG/1
25 TABLET ORAL ONCE
Status: COMPLETED | OUTPATIENT
Start: 2019-06-27 | End: 2019-06-27

## 2019-06-27 RX ORDER — CHOLECALCIFEROL (VITAMIN D3) 125 MCG
500 CAPSULE ORAL DAILY
Status: DISCONTINUED | OUTPATIENT
Start: 2019-06-28 | End: 2019-07-01 | Stop reason: HOSPADM

## 2019-06-27 RX ORDER — LOSARTAN POTASSIUM 25 MG/1
50 TABLET ORAL
Status: DISCONTINUED | OUTPATIENT
Start: 2019-06-28 | End: 2019-06-30

## 2019-06-27 RX ORDER — POLYETHYLENE GLYCOL 3350 17 G/17G
1 POWDER, FOR SOLUTION ORAL DAILY
Status: DISCONTINUED | OUTPATIENT
Start: 2019-06-28 | End: 2019-07-01 | Stop reason: HOSPADM

## 2019-06-27 RX ORDER — HEPARIN SODIUM 5000 [USP'U]/ML
5000 INJECTION, SOLUTION INTRAVENOUS; SUBCUTANEOUS EVERY 8 HOURS
Status: DISCONTINUED | OUTPATIENT
Start: 2019-06-27 | End: 2019-07-01 | Stop reason: HOSPADM

## 2019-06-27 RX ORDER — CHLORAL HYDRATE 500 MG
1000 CAPSULE ORAL EVERY EVENING
Status: DISCONTINUED | OUTPATIENT
Start: 2019-06-27 | End: 2019-07-01 | Stop reason: HOSPADM

## 2019-06-27 RX ORDER — ONDANSETRON 4 MG/1
4 TABLET, ORALLY DISINTEGRATING ORAL EVERY 4 HOURS PRN
Status: DISCONTINUED | OUTPATIENT
Start: 2019-06-27 | End: 2019-07-01 | Stop reason: HOSPADM

## 2019-06-27 RX ORDER — BISACODYL 10 MG
10 SUPPOSITORY, RECTAL RECTAL
Status: DISCONTINUED | OUTPATIENT
Start: 2019-06-27 | End: 2019-07-01 | Stop reason: HOSPADM

## 2019-06-27 RX ORDER — POTASSIUM CHLORIDE 20 MEQ/1
40 TABLET, EXTENDED RELEASE ORAL ONCE
Status: COMPLETED | OUTPATIENT
Start: 2019-06-27 | End: 2019-06-27

## 2019-06-27 RX ORDER — HYDROCHLOROTHIAZIDE 25 MG/1
12.5 TABLET ORAL DAILY
Status: DISCONTINUED | OUTPATIENT
Start: 2019-06-28 | End: 2019-06-28

## 2019-06-27 RX ADMIN — HEPARIN SODIUM 5000 UNITS: 5000 INJECTION, SOLUTION INTRAVENOUS; SUBCUTANEOUS at 21:59

## 2019-06-27 RX ADMIN — POTASSIUM CHLORIDE 40 MEQ: 1500 TABLET, EXTENDED RELEASE ORAL at 22:08

## 2019-06-27 RX ADMIN — LOSARTAN POTASSIUM 25 MG: 25 TABLET, FILM COATED ORAL at 22:08

## 2019-06-27 RX ADMIN — AMLODIPINE BESYLATE 5 MG: 5 TABLET ORAL at 22:00

## 2019-06-27 RX ADMIN — SENNOSIDES,DOCUSATE SODIUM 2 TABLET: 8.6; 5 TABLET, FILM COATED ORAL at 22:00

## 2019-06-27 RX ADMIN — OMEGA-3 FATTY ACIDS CAP 1000 MG 1000 MG: 1000 CAP at 22:00

## 2019-06-27 ASSESSMENT — ENCOUNTER SYMPTOMS
WHEEZING: 0
WEAKNESS: 1
VOMITING: 0
CONSTIPATION: 1
CHILLS: 0
COUGH: 0
FEVER: 0
DIARRHEA: 0
HEADACHES: 0
NAUSEA: 0
SHORTNESS OF BREATH: 1
DIZZINESS: 1

## 2019-06-27 ASSESSMENT — COGNITIVE AND FUNCTIONAL STATUS - GENERAL
MOBILITY SCORE: 24
SUGGESTED CMS G CODE MODIFIER MOBILITY: CH
SUGGESTED CMS G CODE MODIFIER DAILY ACTIVITY: CH
SUGGESTED CMS G CODE MODIFIER MOBILITY: CH
DAILY ACTIVITIY SCORE: 24
MOBILITY SCORE: 24

## 2019-06-27 ASSESSMENT — LIFESTYLE VARIABLES: EVER_SMOKED: YES

## 2019-06-28 LAB — MAGNESIUM SERPL-MCNC: 2.2 MG/DL (ref 1.5–2.5)

## 2019-06-28 PROCEDURE — 83735 ASSAY OF MAGNESIUM: CPT

## 2019-06-28 PROCEDURE — 700111 HCHG RX REV CODE 636 W/ 250 OVERRIDE (IP): Performed by: HOSPITALIST

## 2019-06-28 PROCEDURE — 96372 THER/PROPH/DIAG INJ SC/IM: CPT

## 2019-06-28 PROCEDURE — A9270 NON-COVERED ITEM OR SERVICE: HCPCS | Performed by: INTERNAL MEDICINE

## 2019-06-28 PROCEDURE — 99226 PR SUBSEQUENT OBSERVATION CARE,LEVEL III: CPT | Performed by: INTERNAL MEDICINE

## 2019-06-28 PROCEDURE — 700102 HCHG RX REV CODE 250 W/ 637 OVERRIDE(OP): Performed by: INTERNAL MEDICINE

## 2019-06-28 PROCEDURE — 700102 HCHG RX REV CODE 250 W/ 637 OVERRIDE(OP): Performed by: HOSPITALIST

## 2019-06-28 PROCEDURE — A9270 NON-COVERED ITEM OR SERVICE: HCPCS | Performed by: HOSPITALIST

## 2019-06-28 PROCEDURE — G0378 HOSPITAL OBSERVATION PER HR: HCPCS

## 2019-06-28 RX ORDER — METOLAZONE 5 MG/1
2.5 TABLET ORAL
Status: DISCONTINUED | OUTPATIENT
Start: 2019-06-28 | End: 2019-06-29

## 2019-06-28 RX ORDER — MINOXIDIL 2.5 MG/1
2.5 TABLET ORAL
Status: DISCONTINUED | OUTPATIENT
Start: 2019-06-28 | End: 2019-06-29

## 2019-06-28 RX ORDER — POTASSIUM CHLORIDE 20 MEQ/1
40 TABLET, EXTENDED RELEASE ORAL 2 TIMES DAILY
Status: COMPLETED | OUTPATIENT
Start: 2019-06-28 | End: 2019-06-29

## 2019-06-28 RX ADMIN — CYANOCOBALAMIN TAB 500 MCG 500 MCG: 500 TAB at 05:48

## 2019-06-28 RX ADMIN — OMEGA-3 FATTY ACIDS CAP 1000 MG 1000 MG: 1000 CAP at 17:06

## 2019-06-28 RX ADMIN — MINOXIDIL 2.5 MG: 2.5 TABLET ORAL at 08:24

## 2019-06-28 RX ADMIN — HEPARIN SODIUM 5000 UNITS: 5000 INJECTION, SOLUTION INTRAVENOUS; SUBCUTANEOUS at 21:28

## 2019-06-28 RX ADMIN — HEPARIN SODIUM 5000 UNITS: 5000 INJECTION, SOLUTION INTRAVENOUS; SUBCUTANEOUS at 14:32

## 2019-06-28 RX ADMIN — SENNOSIDES,DOCUSATE SODIUM 2 TABLET: 8.6; 5 TABLET, FILM COATED ORAL at 05:50

## 2019-06-28 RX ADMIN — HEPARIN SODIUM 5000 UNITS: 5000 INJECTION, SOLUTION INTRAVENOUS; SUBCUTANEOUS at 05:47

## 2019-06-28 RX ADMIN — AMLODIPINE BESYLATE 5 MG: 5 TABLET ORAL at 05:48

## 2019-06-28 RX ADMIN — POTASSIUM CHLORIDE 40 MEQ: 1500 TABLET, EXTENDED RELEASE ORAL at 11:17

## 2019-06-28 RX ADMIN — ASPIRIN 81 MG: 81 TABLET, COATED ORAL at 05:48

## 2019-06-28 RX ADMIN — HYDROCHLOROTHIAZIDE 12.5 MG: 25 TABLET ORAL at 05:49

## 2019-06-28 RX ADMIN — METOLAZONE 2.5 MG: 5 TABLET ORAL at 11:18

## 2019-06-28 RX ADMIN — POTASSIUM CHLORIDE 40 MEQ: 1500 TABLET, EXTENDED RELEASE ORAL at 17:06

## 2019-06-28 RX ADMIN — LOSARTAN POTASSIUM 50 MG: 25 TABLET, FILM COATED ORAL at 05:47

## 2019-06-28 ASSESSMENT — ENCOUNTER SYMPTOMS
DIZZINESS: 1
SHORTNESS OF BREATH: 0
WEIGHT LOSS: 0
NAUSEA: 0
ABDOMINAL PAIN: 0
COUGH: 0
CHILLS: 0
VOMITING: 0
FEVER: 0
SORE THROAT: 0
NERVOUS/ANXIOUS: 0

## 2019-06-28 NOTE — PROGRESS NOTES
Pt's HR from 30s to 40s during the night. A couple of times Pt c/o SOB that lasted only for less than a couple of minutes each time; no other symptoms or changes on VS.

## 2019-06-28 NOTE — ED NOTES
VSS . NAD noted. Remains on monitoring. Fall precautions in place. Call light in reach. Pt greeted and aware of shift change. Denies need.

## 2019-06-28 NOTE — PROGRESS NOTES
2 RN skin check complete with Emma ARANA.   Devices in place n/a.  Skin assessed under devices n/a.  Confirmed pressure ulcers found on n/a.  New potential pressure ulcers noted on n/a. Wound consult placed n/a.  The following interventions in place encouraged Pt to reposition frequently.     Skin Intact except 1 cm in per, healing skin tear om R forearm.

## 2019-06-28 NOTE — CARE PLAN
Problem: Safety  Goal: Will remain free from injury  Outcome: PROGRESSING AS EXPECTED  Check on Pt hourly. Pt encouraged to call for assistance as needed. Bed in low position, upper side rails up, anti slippery socks on, call light within reach, bed alarm on.       Problem: Knowledge Deficit  Goal: Knowledge of disease process/condition, treatment plan, diagnostic tests, and medications will improve  Outcome: PROGRESSING AS EXPECTED  Discuss POC with Pt. Assess Pt's knowledge of disease process, treatment plan, diagnostic test, labs, and medications; and explain and give information as needed.

## 2019-06-28 NOTE — H&P
Hospital Medicine History & Physical Note    Date of Service  6/27/2019    Primary Care Physician  Estuardo Carrero M.D.    Outpatient cardiologist  Dr. Eden    Code Status  DNAR/DNI per patient     Chief Complaint  Hypertensive urgency, weakness and malaise from clonidine    History of Presenting Illness  89 y.o. female w/h/o hypertensive urgency, hyperlipidemia who presented 6/27/2019 with hypertensive urgency and weakness and malaise from clonidine.  Patient has a long-standing history of hypertension.  She has been admitted multiple times for hypertensive urgency including a discharge on 5/29 as well as another discharge on 6/9/2019.  Patient has tried multiple blood pressure medications in the past including some that were problematic.  Hydralazine caused her to have hypotension.  Spironolactone caused her to have a fall while she was in the hospital reportedly.  HCTZ did cause some renal dysfunction previously.  She normally follows with cardiology Dr. Concepcion and has also been working with him on adjusting her medications.  Recently he resumed HCTZ and started her on clonidine 0.2.  She tried the clonidine today and it made her bradycardic and weak and malaise.  Thus, she came to the hospital.    Review of Systems  Review of Systems   Constitutional: Negative for chills and fever.   Respiratory: Positive for shortness of breath. Negative for cough and wheezing.    Cardiovascular: Negative for chest pain.   Gastrointestinal: Positive for constipation. Negative for diarrhea, nausea and vomiting.   Genitourinary: Negative for dysuria.   Neurological: Positive for dizziness and weakness. Negative for headaches.     Otherwise negative per AMA/CMS criteria    Past Medical History   has a past medical history of Circulation problem (01/2018); Hyperlipidemia; and Hypertension.    Surgical History   has a past surgical history that includes gyn surgery and carotid endarterectomy (Right, 2000).    Family History  family  history includes Heart Attack (age of onset: 64) in her mother; Heart Attack (age of onset: 72) in her father.    Social History   reports that she quit smoking about 31 years ago. She has never used smokeless tobacco. She reports that she drinks alcohol. She reports that she does not use drugs.    Allergies  Allergies   Allergen Reactions   • Chlorthalidone Unspecified     Headaches severe. Heart racing    • Lisinopril Swelling     Of lower extremities    • Hydralazine        Medications  No current facility-administered medications on file prior to encounter.      Current Outpatient Prescriptions on File Prior to Encounter   Medication Sig Dispense Refill   • cloNIDine (CATAPRES) 0.1 MG Tab Take 2 Tabs by mouth 2 times a day. 120 Tab 2   • losartan (COZAAR) 25 MG Tab Take 1 Tab by mouth every day. 30 Tab 2   • hydroCHLOROthiazide (HYDRODIURIL) 12.5 MG tablet Take 1 Tab by mouth every day. 30 Tab 11   • amLODIPine (NORVASC) 5 MG Tab Take 5 mg by mouth 2 Times a Day.     • GARLIC PO Take 2 Tabs by mouth every evening.     • Omega-3 Fatty Acids (FISH OIL) 1000 MG Cap capsule Take 1,000 mg by mouth every evening.     • ascorbic acid (ASCORBIC ACID) 500 MG Tab Take 500 mg by mouth every day.     • cyanocobalamin (VITAMIN B-12) 500 MCG Tab Take 500 mcg by mouth every day.     • aspirin EC (ECOTRIN) 81 MG Tablet Delayed Response Take 81 mg by mouth every day.         Physical Exam  Weight/BMI: Body mass index is 21.57 kg/m².  BP (!) 193/78   Pulse (!) 41   Temp 36.6 °C (97.8 °F) (Temporal)   Resp 16   Wt 57 kg (125 lb 10.6 oz)   SpO2 95%    Vitals:    06/27/19 1722 06/27/19 1750 06/27/19 1805 06/27/19 1912   BP: (!) 190/83   (!) 193/78   Pulse: (!) 48 (!) 44 (!) 44 (!) 41   Resp: 18 (!) 22 19 16   Temp: 36.6 °C (97.8 °F)      TempSrc: Temporal      SpO2: 96% 94% 96% 95%   Weight: 57 kg (125 lb 10.6 oz)       Oxygen Therapy:  Pulse Oximetry: 95 %, O2 Delivery: None (Room Air)  Physical Exam   Constitutional: She is  oriented to person, place, and time. She appears well-developed.   Frail   HENT:   Head: Normocephalic.   Right Ear: External ear normal.   Left Ear: External ear normal.   Nose: Nose normal.   Eyes: Pupils are equal, round, and reactive to light. Conjunctivae and EOM are normal. Right eye exhibits no discharge. Left eye exhibits no discharge. No scleral icterus.   Neck: Neck supple. No tracheal deviation present.   Cardiovascular: Normal rate.  Exam reveals no gallop and no friction rub.    Pulmonary/Chest: No respiratory distress. She has no wheezes. She has no rales.   Abdominal: Soft. She exhibits no distension. There is no tenderness. There is no rebound and no guarding.   Gas on percussion   Musculoskeletal: She exhibits no edema.   Neurological: She is alert and oriented to person, place, and time.   Skin: Skin is warm and dry. No rash noted. No erythema.   Psychiatric: She has a normal mood and affect.       Laboratory:   Objective   Recent Results (from the past 24 hour(s))   EKG    Collection Time: 19  5:38 PM   Result Value Ref Range    Report       [facility] Emergency Dept.    Test Date:  2019  Pt Name:    GEO WHITESIDE                 Department: EDS  MRN:        [id number]                      Room:       -ROOM 5  Gender:     Female                       Technician: [facility]  :        [id number]                   Requested By:JASMIN POZO  Order #:    [id number]                    Reading MD: JASMIN POZO MD    Measurements  Intervals                                Axis  Rate:       47                           P:          27  DC:         208                          QRS:        9  QRSD:       166                          T:          185  QT:         544  QTc:        481    Interpretive Statements  SINUS BRADYCARDIA  LEFT BUNDLE BRANCH BLOCK  Compared to ECG 2019 22:21:32  No significant changes    Electronically Signed On 2019 18:52:27 PDT by JASMIN LEIGH  MD NOHELIA     CBC WITH DIFFERENTIAL    Collection Time: 06/27/19  6:01 PM   Result Value Ref Range    WBC 4.2 (L) 4.8 - 10.8 K/uL    RBC 4.28 4.20 - 5.40 M/uL    Hemoglobin 13.0 12.0 - 16.0 g/dL    Hematocrit 38.8 37.0 - 47.0 %    MCV 90.7 81.4 - 97.8 fL    MCH 30.4 27.0 - 33.0 pg    MCHC 33.5 (L) 33.6 - 35.0 g/dL    RDW 46.5 35.9 - 50.0 fL    Platelet Count 220 164 - 446 K/uL    MPV 9.8 9.0 - 12.9 fL    Neutrophils-Polys 45.60 44.00 - 72.00 %    Lymphocytes 37.80 22.00 - 41.00 %    Monocytes 12.50 0.00 - 13.40 %    Eosinophils 2.90 0.00 - 6.90 %    Basophils 1.20 0.00 - 1.80 %    Immature Granulocytes 0.00 0.00 - 0.90 %    Nucleated RBC 0.00 /100 WBC    Neutrophils (Absolute) 1.89 (L) 2.00 - 7.15 K/uL    Lymphs (Absolute) 1.57 1.00 - 4.80 K/uL    Monos (Absolute) 0.52 0.00 - 0.85 K/uL    Eos (Absolute) 0.12 0.00 - 0.51 K/uL    Baso (Absolute) 0.05 0.00 - 0.12 K/uL    Immature Granulocytes (abs) 0.00 0.00 - 0.11 K/uL    NRBC (Absolute) 0.00 K/uL   Comp Metabolic Panel    Collection Time: 06/27/19  6:01 PM   Result Value Ref Range    Sodium 133 (L) 135 - 145 mmol/L    Potassium 3.3 (L) 3.6 - 5.5 mmol/L    Chloride 97 96 - 112 mmol/L    Co2 25 20 - 33 mmol/L    Anion Gap 11.0 0.0 - 11.9    Glucose 100 (H) 65 - 99 mg/dL    Bun 16 8 - 22 mg/dL    Creatinine 1.17 0.50 - 1.40 mg/dL    Calcium 8.8 8.4 - 10.2 mg/dL    AST(SGOT) 17 12 - 45 U/L    ALT(SGPT) 11 2 - 50 U/L    Alkaline Phosphatase 94 30 - 99 U/L    Total Bilirubin 0.7 0.1 - 1.5 mg/dL    Albumin 3.5 3.2 - 4.9 g/dL    Total Protein 7.1 6.0 - 8.2 g/dL    Globulin 3.6 (H) 1.9 - 3.5 g/dL    A-G Ratio 1.0 g/dL   LIPASE    Collection Time: 06/27/19  6:01 PM   Result Value Ref Range    Lipase 27 7 - 58 U/L   TROPONIN    Collection Time: 06/27/19  6:01 PM   Result Value Ref Range    Troponin I 0.02 0.00 - 0.04 ng/mL   BTYPE NATRIURETIC PEPTIDE    Collection Time: 06/27/19  6:01 PM   Result Value Ref Range    B Natriuretic Peptide 229 (H) 0 - 100 pg/mL   ESTIMATED GFR     Collection Time: 06/27/19  6:01 PM   Result Value Ref Range    GFR If  52 (A) >60 mL/min/1.73 m 2    GFR If Non  43 (A) >60 mL/min/1.73 m 2       (click the triangle to expand results)    Imaging:  DX-CHEST-PORTABLE (1 VIEW)   Final Result      Patchy bibasilar airspace opacities, right greater than left may represent atelectasis or pneumonitis.      Stable cardiomegaly.      Atherosclerotic plaque.             EKG:   per my independant read:  QTc: 481, HR: 47, sinus bradycardia, LBBB    Assessment/Plan:  I anticipate this patient is appropriate for observation status at this time.    Hypertensive urgency- (present on admission)   Assessment & Plan    Had problems with hydralazine (hypotension) and spironolactone (falls) in the past  Tried clonidine at home but this made her bradycardic and fatigued and weak  DC clonidine for now  Will now try patient on a regimen of increased losartan (from 25 to 50)  Continuing hydrochlorothiazide at current dosing of 12.5  Added amlodipine at 5 twice daily     Sinus bradycardia- (present on admission)   Assessment & Plan    -Likely due to clonidine  -will DC clonidine     Dyslipidemia- (present on admission)   Assessment & Plan    Last LDL was 90 earlier in June  Was not on meds outpatient     Hypokalemia- (present on admission)   Assessment & Plan    Repleting orally  Magnesium pending     DNR (do not resuscitate)- (present on admission)   Assessment & Plan    Confirmed with patient         VTE prophylaxis:  sc heparin

## 2019-06-28 NOTE — CARE PLAN
Problem: Safety  Goal: Will remain free from injury  Outcome: PROGRESSING AS EXPECTED  Pt A&O x 4, calls before getting out of bed. Pt up with standby assist to BSC. Gait steady. Non-slip socks on. Call light and personal belongings within reach. Bed in lowest position with wheels locked.       Problem: Knowledge Deficit  Goal: Knowledge of disease process/condition, treatment plan, diagnostic tests, and medications will improve  Outcome: PROGRESSING AS EXPECTED  Pt updated on plan of care, and education provided regarding minoxidil. Questions answered.

## 2019-06-28 NOTE — ED PROVIDER NOTES
ED Provider Note    CHIEF COMPLAINT  Chief Complaint   Patient presents with   • Hypertension   • Weakness       HPI  Livia Corea is a 89 y.o. female who presents for evaluation of ongoing high blood pressure weakness intermittent ataxia.  The patient was recently admitted at this facility for similar symptoms.  Apparently she got restarted on clonidine.  She has been checking her blood pressure at home and noted that her blood pressures consistently over 200 systolic and her pulse as low as well.  She lives alone she has not had any falls but she has had episodes of near syncope and nearly falling.  She specifically denies any injury to the head.  No focal numbness weakness tingling to the arms legs or face    REVIEW OF SYSTEMS  See HPI for further details.  No high fevers chills night sweats weight loss numbness tingling weakness all other systems are negative.     PAST MEDICAL HISTORY  Past Medical History:   Diagnosis Date   • Circulation problem 01/2018   • Hyperlipidemia    • Hypertension        FAMILY HISTORY  Noncontributory  SOCIAL HISTORY  Social History     Social History   • Marital status: Single     Spouse name: N/A   • Number of children: N/A   • Years of education: N/A     Social History Main Topics   • Smoking status: Former Smoker     Quit date: 2/25/1988   • Smokeless tobacco: Never Used   • Alcohol use Yes      Comment: 1 glass of wine x 2 days a week   • Drug use: No   • Sexual activity: Not on file     Other Topics Concern   • Not on file     Social History Narrative   • No narrative on file   Lives alone    SURGICAL HISTORY  Past Surgical History:   Procedure Laterality Date   • CAROTID ENDARTERECTOMY Right 2000   • GYN SURGERY      hysterectomy       CURRENT MEDICATIONS  No current facility-administered medications for this encounter.     Current Outpatient Prescriptions:   •  cloNIDine (CATAPRES) 0.1 MG Tab, Take 2 Tabs by mouth 2 times a day., Disp: 120 Tab, Rfl: 2  •  losartan  (COZAAR) 25 MG Tab, Take 1 Tab by mouth every day., Disp: 30 Tab, Rfl: 2  •  hydroCHLOROthiazide (HYDRODIURIL) 12.5 MG tablet, Take 1 Tab by mouth every day., Disp: 30 Tab, Rfl: 11  •  amLODIPine (NORVASC) 5 MG Tab, Take 5 mg by mouth 2 Times a Day., Disp: , Rfl:   •  GARLIC PO, Take 2 Tabs by mouth every evening., Disp: , Rfl:   •  Omega-3 Fatty Acids (FISH OIL) 1000 MG Cap capsule, Take 1,000 mg by mouth every evening., Disp: , Rfl:   •  ascorbic acid (ASCORBIC ACID) 500 MG Tab, Take 500 mg by mouth every day., Disp: , Rfl:   •  cyanocobalamin (VITAMIN B-12) 500 MCG Tab, Take 500 mcg by mouth every day., Disp: , Rfl:   •  aspirin EC (ECOTRIN) 81 MG Tablet Delayed Response, Take 81 mg by mouth every day., Disp: , Rfl:       ALLERGIES  Allergies   Allergen Reactions   • Chlorthalidone Unspecified     Headaches severe. Heart racing    • Lisinopril Swelling     Of lower extremities    • Hydralazine        PHYSICAL EXAM  VITAL SIGNS: BP (!) 190/83   Pulse (!) 44   Temp 36.6 °C (97.8 °F) (Temporal)   Resp 19   Wt 57 kg (125 lb 10.6 oz)   SpO2 96%   BMI 21.57 kg/m²  Room air O2: 96    Constitutional: Well developed, Well nourished, No acute distress, Non-toxic appearance.   HENT: Normocephalic, Atraumatic, Bilateral external ears normal, Oropharynx moist, No oral exudates, Nose normal.   Eyes: PERRLA, EOMI, Conjunctiva normal, No discharge.   Neck: Normal range of motion, No tenderness, Supple, No stridor.   Cardiovascular: Bradycardic, Normal rhythm, No murmurs, No rubs, No gallops.   Thorax & Lungs: Normal breath sounds, No respiratory distress, No wheezing, No chest tenderness.   Abdomen: Bowel sounds normal, Soft, No tenderness, No masses, No pulsatile masses.   Skin: Warm, Dry, No erythema, No rash.   Back: No tenderness, No CVA tenderness.   Extremities: Intact distal pulses, No edema, No tenderness, No cyanosis, No clubbing.   Neurologic: Alert & oriented x 3, Normal motor function, Normal sensory function,  No focal deficits noted.   Psychiatric: Anxious  EKG  EKG interpretation by me sinus bradycardia rate 47 left bundle branch block there is some voltage abnormalities consistent with ST segment elevation but not an ischemic pattern.  Nonspecific ST depression in V4 5 and 6    RADIOLOGY/PROCEDURES  DX-CHEST-PORTABLE (1 VIEW)   Final Result      Patchy bibasilar airspace opacities, right greater than left may represent atelectasis or pneumonitis.      Stable cardiomegaly.      Atherosclerotic plaque.           Results for orders placed or performed during the hospital encounter of 06/27/19   CBC WITH DIFFERENTIAL   Result Value Ref Range    WBC 4.2 (L) 4.8 - 10.8 K/uL    RBC 4.28 4.20 - 5.40 M/uL    Hemoglobin 13.0 12.0 - 16.0 g/dL    Hematocrit 38.8 37.0 - 47.0 %    MCV 90.7 81.4 - 97.8 fL    MCH 30.4 27.0 - 33.0 pg    MCHC 33.5 (L) 33.6 - 35.0 g/dL    RDW 46.5 35.9 - 50.0 fL    Platelet Count 220 164 - 446 K/uL    MPV 9.8 9.0 - 12.9 fL    Neutrophils-Polys 45.60 44.00 - 72.00 %    Lymphocytes 37.80 22.00 - 41.00 %    Monocytes 12.50 0.00 - 13.40 %    Eosinophils 2.90 0.00 - 6.90 %    Basophils 1.20 0.00 - 1.80 %    Immature Granulocytes 0.00 0.00 - 0.90 %    Nucleated RBC 0.00 /100 WBC    Neutrophils (Absolute) 1.89 (L) 2.00 - 7.15 K/uL    Lymphs (Absolute) 1.57 1.00 - 4.80 K/uL    Monos (Absolute) 0.52 0.00 - 0.85 K/uL    Eos (Absolute) 0.12 0.00 - 0.51 K/uL    Baso (Absolute) 0.05 0.00 - 0.12 K/uL    Immature Granulocytes (abs) 0.00 0.00 - 0.11 K/uL    NRBC (Absolute) 0.00 K/uL   Comp Metabolic Panel   Result Value Ref Range    Sodium 133 (L) 135 - 145 mmol/L    Potassium 3.3 (L) 3.6 - 5.5 mmol/L    Chloride 97 96 - 112 mmol/L    Co2 25 20 - 33 mmol/L    Anion Gap 11.0 0.0 - 11.9    Glucose 100 (H) 65 - 99 mg/dL    Bun 16 8 - 22 mg/dL    Creatinine 1.17 0.50 - 1.40 mg/dL    Calcium 8.8 8.4 - 10.2 mg/dL    AST(SGOT) 17 12 - 45 U/L    ALT(SGPT) 11 2 - 50 U/L    Alkaline Phosphatase 94 30 - 99 U/L    Total Bilirubin  0.7 0.1 - 1.5 mg/dL    Albumin 3.5 3.2 - 4.9 g/dL    Total Protein 7.1 6.0 - 8.2 g/dL    Globulin 3.6 (H) 1.9 - 3.5 g/dL    A-G Ratio 1.0 g/dL   LIPASE   Result Value Ref Range    Lipase 27 7 - 58 U/L   ESTIMATED GFR   Result Value Ref Range    GFR If  52 (A) >60 mL/min/1.73 m 2    GFR If Non  43 (A) >60 mL/min/1.73 m 2   EKG   Result Value Ref Range    Report       [facility] Emergency Dept.    Test Date:  2019  Pt Name:    GEO WHITESIDE                 Department: [facility]  MRN:        [id number]                      Room:       -ROOM 5  Gender:     Female                       Technician: OLGA  :        [id number]                   Requested By:JASMIN POZO  Order #:    [id number]                    Reading MD: JASMIN POZO MD    Measurements  Intervals                                Axis  Rate:       47                           P:          27  KY:         208                          QRS:        9  QRSD:       166                          T:          185  QT:         544  QTc:        481    Interpretive Statements  SINUS BRADYCARDIA  LEFT BUNDLE BRANCH BLOCK  Compared to ECG 2019 22:21:32  No significant changes    Electronically Signed On 2019 18:52:27 PDT by JASMIN POZO MD          COURSE & MEDICAL DECISION MAKING  Pertinent Labs & Imaging studies reviewed. (See chart for details)  An IV was established.  I reviewed the patient's extensive visit history.  Patient is unfortunately refractory hypertensive, elderly yet independent 89-year-old female.  She has been spot treating with clonidine which given her age and living situation is quite concerning.  She has had multiple episodes of near syncope.  Given her bradycardia lightheadedness and dizziness she will be admitted for hypertensive urgency and she will need adjustment of her antihypertensives and observation    FINAL IMPRESSION  1.  Hypertensive  urgency    Admission      Electronically signed by: Azael South, 6/27/2019 5:50 PM

## 2019-06-28 NOTE — PROGRESS NOTES
Hospital Medicine Daily Progress Note    Date of Service  6/28/2019    Chief Complaint  Weakness, hypertensive urgency    Hospital Course    *Very pleasant spry 89-year-old female with a history of hypertension, recently her blood pressures been uncontrolled for the last 6 months and they have tried many medications which have been poorly tolerated in the past.  She was recently placed back on HCTZ as well as clonidine.  She presents with hypertensive urgency and bradycardia*      Interval Problem Update  Bradycardic overnight seems improved in the morning  Blood pressure still high in the morning and still high 2 hours after minoxidil  Gave 1 dose of Zaroxolyn with significant improvement in blood pressures  We will need to evaluate electrolytes tomorrow to see if this is an option to be continued  Patient overall feels better    Consultants/Specialty  none    Code Status  dnr    Disposition  home    Review of Systems  Review of Systems   Constitutional: Negative for chills, fever, malaise/fatigue and weight loss.   HENT: Negative for congestion and sore throat.    Respiratory: Negative for cough and shortness of breath.    Cardiovascular: Positive for leg swelling (sl L>R). Negative for chest pain.   Gastrointestinal: Negative for abdominal pain, nausea and vomiting.   Genitourinary: Negative for dysuria.   Musculoskeletal: Negative for joint pain.   Skin: Negative for itching and rash.   Neurological: Positive for dizziness.   Psychiatric/Behavioral: The patient is not nervous/anxious.         Physical Exam  Temp:  [36.4 °C (97.6 °F)-36.8 °C (98.3 °F)] 36.7 °C (98.1 °F)  Pulse:  [41-65] 57  Resp:  [16-28] 16  BP: (107-193)/(46-83) 107/46  SpO2:  [94 %-98 %] 95 %    Physical Exam   Constitutional: She is oriented to person, place, and time. She appears well-developed and well-nourished. No distress.   HENT:   Head: Normocephalic and atraumatic.   Mouth/Throat: Oropharynx is clear and moist.   Eyes: Pupils are  equal, round, and reactive to light. Conjunctivae and EOM are normal. Right eye exhibits no discharge. Left eye exhibits no discharge. No scleral icterus.   Neck: Neck supple.   Cardiovascular: Normal rate and regular rhythm.    Pulmonary/Chest: Effort normal and breath sounds normal.   Abdominal: Soft. Bowel sounds are normal. She exhibits no distension. There is no tenderness.   Musculoskeletal: She exhibits edema (trace). She exhibits no tenderness.   Neurological: She is alert and oriented to person, place, and time. No cranial nerve deficit.   Skin: Skin is warm and dry. She is not diaphoretic.   Psychiatric: She has a normal mood and affect.   Nursing note and vitals reviewed.      Fluids    Intake/Output Summary (Last 24 hours) at 06/28/19 1435  Last data filed at 06/28/19 1222   Gross per 24 hour   Intake              480 ml   Output             2200 ml   Net            -1720 ml       Laboratory  Recent Labs      06/27/19   1801   WBC  4.2*   RBC  4.28   HEMOGLOBIN  13.0   HEMATOCRIT  38.8   MCV  90.7   MCH  30.4   MCHC  33.5*   RDW  46.5   PLATELETCT  220   MPV  9.8     Recent Labs      06/27/19   1801   SODIUM  133*   POTASSIUM  3.3*   CHLORIDE  97   CO2  25   GLUCOSE  100*   BUN  16   CREATININE  1.17   CALCIUM  8.8         Recent Labs      06/27/19   1801   BNPBTYPENAT  229*           Imaging  DX-CHEST-PORTABLE (1 VIEW)   Final Result      Patchy bibasilar airspace opacities, right greater than left may represent atelectasis or pneumonitis.      Stable cardiomegaly.      Atherosclerotic plaque.              Assessment/Plan  Hypertensive urgency- (present on admission)   Assessment & Plan    Patient states blood pressure control was not problematic until 6 months ago she has not had no significant change in her lifestyle diet etc. to explain this  She has intolerances to many medications  Low-dose minoxidil given without significant improvement 2 hours later, 1 dose of Zaroxolyn given with significant  improvement in blood pressure  She is at high risk however for hyponatremia due to Zaroxolyn so will need to titrate dose  Check morning electrolytes.     Sinus bradycardia- (present on admission)   Assessment & Plan    -Likely due to clonidine  Discontinued upon admission, continue to monitor     Dyslipidemia- (present on admission)   Assessment & Plan    Last LDL was 90 earlier in June  Was not on meds outpatient     Hypokalemia- (present on admission)   Assessment & Plan    Supplement increased, magnesium was okay     DNR (do not resuscitate)- (present on admission)   Assessment & Plan    Confirmed with patient          VTE prophylaxis: heparin

## 2019-06-28 NOTE — PROGRESS NOTES
Telemetry summary:  Rhythm: SB     HR: 30s to 40s    ND: 0.20   QRS 0.14   QT 0.48   Ectopies: rare PAC.

## 2019-06-28 NOTE — PROGRESS NOTES
Admitted Pt from ER via gurney. Pt awake, A&O x4.  Transferred to bed and assessment performed. Oriented Pt to room and discussed POC. Initiated safety measures.

## 2019-06-28 NOTE — PROGRESS NOTES
Telemetry Shift Summary    Rhythm SB/SR with BBB  HR Range 40's - 60's  Ectopy rare PVC/PACs  Measurements 0.20/0.16/0.50        Normal Values  Rhythm SR  HR Range    Measurements 0.12-0.20 / 0.06-0.10  / 0.30-0.52

## 2019-06-28 NOTE — DISCHARGE PLANNING
Care Transition Team Assessment    The information gathered for this assessment was provided by Ariadna from [facility] and chart review. Ariadna stated that pt reports to live alone and is very independent. Pt does use a walker. Her support system consists of friends. This is pt's 4th readmission due to medication not working. Was previously on Norvasc, but after 3 years stopped working. Cardiologist saw her and prescribed her new medication that dropped her blood sugar.  Pt gets her prescriptions from [facility] on [address].     No discharge plans in place at time of assessment.      Information Source  Orientation : Oriented x 4  Information Given By: Other (Comments)  Informant's Name:  (Ariadna ([facility]))  Who is responsible for making decisions for patient? : Patient    Readmission Evaluation  Is this a readmission?: Yes - unplanned readmission    Elopement Risk  Legal Hold: No  Ambulatory or Self Mobile in Wheelchair: Yes  Disoriented: No  Psychiatric Symptoms: None  History of Wandering: No  Elopement this Admit: No  Vocalizing Wanting to Leave: No  Displays Behaviors, Body Language Wanting to Leave: No-Not at Risk for Elopement  Elopement Risk: Not at Risk for Elopement    Interdisciplinary Discharge Planning  Does Admitting Nurse Feel This Could be a Complex Discharge?: No  Primary Care Physician: Dr Estuardo Carrero  Lives with - Patient's Self Care Capacity: Alone and Able to Care For Self  Patient or legal guardian wants to designate a caregiver (see row info): Yes  Caregiver name: Nidhi Christopher and Jarrett Schwartz  Caregiver relationship to patient: [facility]  ([facility] [place]) Authorization for Release of Health Information has been completed: Yes  Support Systems: Friends / Neighbors  Housing / Facility: 1 Story Apartment / Condo  Do You Take your Prescribed Medications Regularly: Yes  Able to Return to Previous ADL's: Yes  Mobility Issues: Yes  Prior Services: None  Patient Expects to be Discharged to:: home  Assistance  Needed: Unknown at this Time  Durable Medical Equipment: Walker    Discharge Preparedness  What is your plan after discharge?: Uncertain - pending medical team collaboration  What are your discharge supports?: Other (comment)  Prior Functional Level: Uses Walker  Difficulity with ADLs: None    Functional Assesment  Prior Functional Level: Uses Walker    Finances  Financial Barriers to Discharge: No  Prescription Coverage: Yes    Vision / Hearing Impairment  Vision Impairment : No  Right Eye Vision: Impaired  Left Eye Vision: Impaired  Hearing Impairment : No              Domestic Abuse  Have you ever been the victim of abuse or violence?: No  Physical Abuse or Sexual Abuse: No  Verbal Abuse or Emotional Abuse: No  Possible Abuse Reported to:: Not Applicable    Psychological Assessment  History of Substance Abuse: None  History of Psychiatric Problems: No  Non-compliant with Treatment: No    Discharge Risks or Barriers  Discharge risks or barriers?: Lives alone, no community support  Patient risk factors: Lives alone and no community support, Vulnerable adult    Anticipated Discharge Information  Anticipated discharge disposition: Discharge needs currently unknown

## 2019-06-29 LAB
ANION GAP SERPL CALC-SCNC: 10 MMOL/L (ref 0–11.9)
BUN SERPL-MCNC: 18 MG/DL (ref 8–22)
CALCIUM SERPL-MCNC: 9.1 MG/DL (ref 8.4–10.2)
CHLORIDE SERPL-SCNC: 106 MMOL/L (ref 96–112)
CO2 SERPL-SCNC: 21 MMOL/L (ref 20–33)
CREAT SERPL-MCNC: 1.04 MG/DL (ref 0.5–1.4)
EKG IMPRESSION: NORMAL
GLUCOSE SERPL-MCNC: 93 MG/DL (ref 65–99)
POTASSIUM SERPL-SCNC: 4.3 MMOL/L (ref 3.6–5.5)
SODIUM SERPL-SCNC: 137 MMOL/L (ref 135–145)

## 2019-06-29 PROCEDURE — 80048 BASIC METABOLIC PNL TOTAL CA: CPT

## 2019-06-29 PROCEDURE — 93005 ELECTROCARDIOGRAM TRACING: CPT | Performed by: INTERNAL MEDICINE

## 2019-06-29 PROCEDURE — 700111 HCHG RX REV CODE 636 W/ 250 OVERRIDE (IP): Performed by: HOSPITALIST

## 2019-06-29 PROCEDURE — 96374 THER/PROPH/DIAG INJ IV PUSH: CPT

## 2019-06-29 PROCEDURE — A9270 NON-COVERED ITEM OR SERVICE: HCPCS | Performed by: INTERNAL MEDICINE

## 2019-06-29 PROCEDURE — 700102 HCHG RX REV CODE 250 W/ 637 OVERRIDE(OP): Performed by: INTERNAL MEDICINE

## 2019-06-29 PROCEDURE — 96372 THER/PROPH/DIAG INJ SC/IM: CPT

## 2019-06-29 PROCEDURE — 700102 HCHG RX REV CODE 250 W/ 637 OVERRIDE(OP): Performed by: HOSPITALIST

## 2019-06-29 PROCEDURE — G0378 HOSPITAL OBSERVATION PER HR: HCPCS

## 2019-06-29 PROCEDURE — 700111 HCHG RX REV CODE 636 W/ 250 OVERRIDE (IP): Performed by: INTERNAL MEDICINE

## 2019-06-29 PROCEDURE — 93010 ELECTROCARDIOGRAM REPORT: CPT | Performed by: INTERNAL MEDICINE

## 2019-06-29 PROCEDURE — A9270 NON-COVERED ITEM OR SERVICE: HCPCS | Performed by: HOSPITALIST

## 2019-06-29 PROCEDURE — 99225 PR SUBSEQUENT OBSERVATION CARE,LEVEL II: CPT | Performed by: INTERNAL MEDICINE

## 2019-06-29 RX ORDER — MINOXIDIL 2.5 MG/1
2.5 TABLET ORAL
Status: DISCONTINUED | OUTPATIENT
Start: 2019-06-30 | End: 2019-07-01 | Stop reason: HOSPADM

## 2019-06-29 RX ORDER — ENALAPRILAT 1.25 MG/ML
1.25 INJECTION INTRAVENOUS EVERY 6 HOURS PRN
Status: DISCONTINUED | OUTPATIENT
Start: 2019-06-29 | End: 2019-07-01 | Stop reason: HOSPADM

## 2019-06-29 RX ORDER — METOLAZONE 5 MG/1
2.5 TABLET ORAL
Status: DISCONTINUED | OUTPATIENT
Start: 2019-07-01 | End: 2019-06-30

## 2019-06-29 RX ORDER — MINOXIDIL 2.5 MG/1
5 TABLET ORAL
Status: DISCONTINUED | OUTPATIENT
Start: 2019-06-30 | End: 2019-06-29

## 2019-06-29 RX ORDER — ENALAPRILAT 1.25 MG/ML
1.25 INJECTION INTRAVENOUS EVERY 6 HOURS PRN
Status: DISCONTINUED | OUTPATIENT
Start: 2019-06-29 | End: 2019-06-29

## 2019-06-29 RX ADMIN — POTASSIUM CHLORIDE 40 MEQ: 1500 TABLET, EXTENDED RELEASE ORAL at 05:49

## 2019-06-29 RX ADMIN — HEPARIN SODIUM 5000 UNITS: 5000 INJECTION, SOLUTION INTRAVENOUS; SUBCUTANEOUS at 05:49

## 2019-06-29 RX ADMIN — HEPARIN SODIUM 5000 UNITS: 5000 INJECTION, SOLUTION INTRAVENOUS; SUBCUTANEOUS at 14:42

## 2019-06-29 RX ADMIN — CYANOCOBALAMIN TAB 500 MCG 500 MCG: 500 TAB at 05:48

## 2019-06-29 RX ADMIN — ENALAPRILAT 1.25 MG: 2.5 INJECTION INTRAVENOUS at 03:26

## 2019-06-29 RX ADMIN — MINOXIDIL 2.5 MG: 2.5 TABLET ORAL at 05:48

## 2019-06-29 RX ADMIN — ASPIRIN 81 MG: 81 TABLET, COATED ORAL at 05:48

## 2019-06-29 RX ADMIN — HEPARIN SODIUM 5000 UNITS: 5000 INJECTION, SOLUTION INTRAVENOUS; SUBCUTANEOUS at 21:15

## 2019-06-29 RX ADMIN — METOLAZONE 2.5 MG: 5 TABLET ORAL at 05:46

## 2019-06-29 RX ADMIN — LOSARTAN POTASSIUM 50 MG: 25 TABLET, FILM COATED ORAL at 05:48

## 2019-06-29 RX ADMIN — OMEGA-3 FATTY ACIDS CAP 1000 MG 1000 MG: 1000 CAP at 18:45

## 2019-06-29 ASSESSMENT — ENCOUNTER SYMPTOMS
SHORTNESS OF BREATH: 0
CHILLS: 0
SORE THROAT: 0
WEIGHT LOSS: 0
FEVER: 0
NAUSEA: 0
ABDOMINAL PAIN: 0
NERVOUS/ANXIOUS: 1
DIZZINESS: 1
VOMITING: 0
COUGH: 0

## 2019-06-29 NOTE — CARE PLAN
Problem: Venous Thromboembolism (VTW)/Deep Vein Thrombosis (DVT) Prevention:  Goal: Patient will participate in Venous Thrombosis (VTE)/Deep Vein Thrombosis (DVT)Prevention Measures  Outcome: PROGRESSING AS EXPECTED  Patient receiving prophylactic medication per MAR

## 2019-06-29 NOTE — PROGRESS NOTES
ST depression of noted by monitor tech in leads II, III and AVf, starting at approximately 0930. Patient assessed and denies any symptoms. Will order repeat EKG and alert MD.

## 2019-06-29 NOTE — PROGRESS NOTES
Discussed patients BP meds with her. Will discuss with MD Bautista on rounds also. Pt up independently to the bedside commode.

## 2019-06-29 NOTE — PROGRESS NOTES
Rhythm SB  HR Range 40s-60s  Ectopy rPAC, rPVC  Measurements 0.18/0.14/0.54           Normal Values  Rhythm SR  HR Range    Measurements 0.12-0.20 / 0.06-0.10  / 0.30-0.52

## 2019-06-29 NOTE — PROGRESS NOTES
Telemetry Shift Summary     Rhythm SB c L BBB  HR Range 50s  Ectopy rare PVC, PAC  Measurements .18/.14/.48

## 2019-06-29 NOTE — PROGRESS NOTES
"Patient complaining of feeling \"lightheaded\". BP obtained and is 166/50. Pt denies any other symptoms.   "

## 2019-06-29 NOTE — PROGRESS NOTES
Pt BP elevated. Received telephone order for pt to receive Enalapril IV push. Will f/u. Pt states that the only reaction she gets from lisinopril (drug in same class) is swelling in her lower extremities. She however denies anaphylactic reactions, she also denies angioedema from having taken Lisinopril in past.

## 2019-06-30 LAB
ANION GAP SERPL CALC-SCNC: 10 MMOL/L (ref 0–11.9)
BUN SERPL-MCNC: 19 MG/DL (ref 8–22)
CALCIUM SERPL-MCNC: 9 MG/DL (ref 8.4–10.2)
CHLORIDE SERPL-SCNC: 104 MMOL/L (ref 96–112)
CO2 SERPL-SCNC: 23 MMOL/L (ref 20–33)
CREAT SERPL-MCNC: 1.16 MG/DL (ref 0.5–1.4)
GLUCOSE SERPL-MCNC: 102 MG/DL (ref 65–99)
POTASSIUM SERPL-SCNC: 3.8 MMOL/L (ref 3.6–5.5)
SODIUM SERPL-SCNC: 137 MMOL/L (ref 135–145)

## 2019-06-30 PROCEDURE — A9270 NON-COVERED ITEM OR SERVICE: HCPCS | Performed by: HOSPITALIST

## 2019-06-30 PROCEDURE — 700111 HCHG RX REV CODE 636 W/ 250 OVERRIDE (IP): Performed by: INTERNAL MEDICINE

## 2019-06-30 PROCEDURE — 700102 HCHG RX REV CODE 250 W/ 637 OVERRIDE(OP): Performed by: INTERNAL MEDICINE

## 2019-06-30 PROCEDURE — 700102 HCHG RX REV CODE 250 W/ 637 OVERRIDE(OP): Performed by: HOSPITALIST

## 2019-06-30 PROCEDURE — 700111 HCHG RX REV CODE 636 W/ 250 OVERRIDE (IP): Performed by: HOSPITALIST

## 2019-06-30 PROCEDURE — A9270 NON-COVERED ITEM OR SERVICE: HCPCS | Performed by: INTERNAL MEDICINE

## 2019-06-30 PROCEDURE — 99232 SBSQ HOSP IP/OBS MODERATE 35: CPT | Performed by: INTERNAL MEDICINE

## 2019-06-30 PROCEDURE — 80048 BASIC METABOLIC PNL TOTAL CA: CPT

## 2019-06-30 PROCEDURE — 96372 THER/PROPH/DIAG INJ SC/IM: CPT

## 2019-06-30 PROCEDURE — 96376 TX/PRO/DX INJ SAME DRUG ADON: CPT

## 2019-06-30 PROCEDURE — 770020 HCHG ROOM/CARE - TELE (206)

## 2019-06-30 RX ORDER — HYDRALAZINE HYDROCHLORIDE 25 MG/1
50 TABLET, FILM COATED ORAL 2 TIMES DAILY
Status: DISCONTINUED | OUTPATIENT
Start: 2019-06-30 | End: 2019-07-01

## 2019-06-30 RX ORDER — LOSARTAN POTASSIUM 25 MG/1
100 TABLET ORAL
Status: DISCONTINUED | OUTPATIENT
Start: 2019-07-01 | End: 2019-06-30

## 2019-06-30 RX ORDER — HYDRALAZINE HYDROCHLORIDE 25 MG/1
50 TABLET, FILM COATED ORAL 2 TIMES DAILY
Status: DISCONTINUED | OUTPATIENT
Start: 2019-06-30 | End: 2019-06-30

## 2019-06-30 RX ORDER — LOSARTAN POTASSIUM 25 MG/1
100 TABLET ORAL
Status: DISCONTINUED | OUTPATIENT
Start: 2019-06-30 | End: 2019-06-30

## 2019-06-30 RX ORDER — LOSARTAN POTASSIUM 25 MG/1
100 TABLET ORAL
Status: DISCONTINUED | OUTPATIENT
Start: 2019-07-01 | End: 2019-07-01

## 2019-06-30 RX ORDER — LOSARTAN POTASSIUM 25 MG/1
50 TABLET ORAL ONCE
Status: COMPLETED | OUTPATIENT
Start: 2019-06-30 | End: 2019-06-30

## 2019-06-30 RX ADMIN — CYANOCOBALAMIN TAB 500 MCG 500 MCG: 500 TAB at 05:54

## 2019-06-30 RX ADMIN — ASPIRIN 81 MG: 81 TABLET, COATED ORAL at 05:54

## 2019-06-30 RX ADMIN — ENALAPRILAT 1.25 MG: 2.5 INJECTION INTRAVENOUS at 11:57

## 2019-06-30 RX ADMIN — HEPARIN SODIUM 5000 UNITS: 5000 INJECTION, SOLUTION INTRAVENOUS; SUBCUTANEOUS at 05:54

## 2019-06-30 RX ADMIN — LOSARTAN POTASSIUM 50 MG: 25 TABLET, FILM COATED ORAL at 17:34

## 2019-06-30 RX ADMIN — OMEGA-3 FATTY ACIDS CAP 1000 MG 1000 MG: 1000 CAP at 18:00

## 2019-06-30 RX ADMIN — LOSARTAN POTASSIUM 50 MG: 25 TABLET, FILM COATED ORAL at 05:55

## 2019-06-30 RX ADMIN — MINOXIDIL 2.5 MG: 2.5 TABLET ORAL at 02:21

## 2019-06-30 ASSESSMENT — ENCOUNTER SYMPTOMS
NAUSEA: 0
WEIGHT LOSS: 0
FEVER: 0
NERVOUS/ANXIOUS: 1
ABDOMINAL PAIN: 0
SORE THROAT: 0
SHORTNESS OF BREATH: 0
COUGH: 0
CHILLS: 0
VOMITING: 0
DIZZINESS: 1

## 2019-06-30 NOTE — CARE PLAN
Problem: Infection  Goal: Will remain free from infection  Outcome: PROGRESSING AS EXPECTED  Pt shows no symptoms of infection, will continue to monitor

## 2019-06-30 NOTE — PROGRESS NOTES
Hospital Medicine Daily Progress Note    Date of Service  6/29/2019    Chief Complaint  Weakness, hypertensive urgency    Hospital Course    *Very pleasant spry 89-year-old female with a history of hypertension, recently her blood pressures been uncontrolled for the last 6 months and they have tried many medications which have been poorly tolerated in the past.  She was recently placed back on HCTZ as well as clonidine.  She presents with hypertensive urgency and bradycardia*      Interval Problem Update  Concern for ST dep on Tele, EKG w/ LBBB.  No CP but severe dizziness and very anxious  States dizzy when BP ~ 120, desired -150.  Discussed w/ RN    Consultants/Specialty  none    Code Status  dnr    Disposition  home    Review of Systems  Review of Systems   Constitutional: Negative for chills, fever, malaise/fatigue and weight loss.   HENT: Negative for congestion and sore throat.    Respiratory: Negative for cough and shortness of breath.    Cardiovascular: Negative for chest pain and leg swelling.   Gastrointestinal: Negative for abdominal pain, nausea and vomiting.   Genitourinary: Negative for dysuria.   Musculoskeletal: Negative for joint pain.   Skin: Negative for itching and rash.   Neurological: Positive for dizziness.   Psychiatric/Behavioral: The patient is nervous/anxious.         Physical Exam  Temp:  [36.5 °C (97.7 °F)-36.8 °C (98.2 °F)] 36.7 °C (98 °F)  Pulse:  [53-84] 84  Resp:  [18-20] 18  BP: (121-183)/(50-88) 168/63  SpO2:  [92 %-95 %] 94 %    Physical Exam   Constitutional: She is oriented to person, place, and time. She appears well-developed and well-nourished. No distress.   HENT:   Head: Normocephalic and atraumatic.   Mouth/Throat: Oropharynx is clear and moist.   Eyes: Pupils are equal, round, and reactive to light. Conjunctivae and EOM are normal. Right eye exhibits no discharge. Left eye exhibits no discharge. No scleral icterus.   Neck: Neck supple.   Cardiovascular: Normal rate  and regular rhythm.    Pulmonary/Chest: Effort normal and breath sounds normal.   Abdominal: Soft. Bowel sounds are normal. She exhibits no distension. There is no tenderness.   Musculoskeletal: She exhibits no edema or tenderness.   Neurological: She is alert and oriented to person, place, and time. No cranial nerve deficit.   Skin: Skin is warm and dry. She is not diaphoretic.   Psychiatric:   anxious   Nursing note and vitals reviewed.      Fluids    Intake/Output Summary (Last 24 hours) at 06/29/19 1920  Last data filed at 06/29/19 1254   Gross per 24 hour   Intake              840 ml   Output                0 ml   Net              840 ml       Laboratory  Recent Labs      06/27/19   1801   WBC  4.2*   RBC  4.28   HEMOGLOBIN  13.0   HEMATOCRIT  38.8   MCV  90.7   MCH  30.4   MCHC  33.5*   RDW  46.5   PLATELETCT  220   MPV  9.8     Recent Labs      06/27/19   1801  06/29/19   0440   SODIUM  133*  137   POTASSIUM  3.3*  4.3   CHLORIDE  97  106   CO2  25  21   GLUCOSE  100*  93   BUN  16  18   CREATININE  1.17  1.04   CALCIUM  8.8  9.1         Recent Labs      06/27/19   1801   BNPBTYPENAT  229*           Imaging  DX-CHEST-PORTABLE (1 VIEW)   Final Result      Patchy bibasilar airspace opacities, right greater than left may represent atelectasis or pneumonitis.      Stable cardiomegaly.      Atherosclerotic plaque.              Assessment/Plan  Hypertensive urgency- (present on admission)   Assessment & Plan    Pressures labile, patient states if 120's she is dizzy, severe dizziness this AM and BP was 160's but was 120's prior  Aiming for 150's  Reduce Lotinen and give at night, change metolazone to QOD     Sinus bradycardia- (present on admission)   Assessment & Plan    Unclear if Med related has not significantly improved     Dyslipidemia- (present on admission)   Assessment & Plan    Last LDL was 90 earlier in June  Was not on meds outpatient     Hypokalemia- (present on admission)   Assessment & Plan     resolved     DNR (do not resuscitate)- (present on admission)   Assessment & Plan    Confirmed with patient          VTE prophylaxis: heparin

## 2019-06-30 NOTE — PROGRESS NOTES
Received bedside report from SUMIT Nelson. Plan of care discussed. Safety precautions in place. Call light and personal belongings within reach. Patient has no needs at this time.

## 2019-06-30 NOTE — PROGRESS NOTES
Pt is still hypertensive and symptomatic with lightheadedness. MD Bautista at bedside and is aware. Cardiology consulted. [employer] was given per MAR.

## 2019-06-30 NOTE — PROGRESS NOTES
Hospital Medicine Daily Progress Note    Date of Service  6/30/2019    Chief Complaint  Weakness, hypertensive urgency    Hospital Course    *Very pleasant spry 89-year-old female with a history of hypertension, recently her blood pressures been uncontrolled for the last 6 months and they have tried many medications which have been poorly tolerated in the past.  She was recently placed back on HCTZ as well as clonidine.  She presents with hypertensive urgency and bradycardia*      Interval Problem Update  Patient distressed because she feels terrible  Dizzy but denies chest pain  Seen with RN  Blood pressure 192 systolic  Discussed with cardiology    Consultants/Specialty  Cardiology    Code Status  dnr    Disposition  home    Review of Systems  Review of Systems   Constitutional: Negative for chills, fever, malaise/fatigue and weight loss.   HENT: Negative for congestion and sore throat.    Respiratory: Negative for cough and shortness of breath.    Cardiovascular: Negative for chest pain and leg swelling.   Gastrointestinal: Negative for abdominal pain, nausea and vomiting.   Genitourinary: Negative for dysuria.   Musculoskeletal: Negative for joint pain.   Skin: Negative for itching and rash.   Neurological: Positive for dizziness.   Psychiatric/Behavioral: The patient is nervous/anxious.         Physical Exam  Temp:  [36.6 °C (97.9 °F)-36.9 °C (98.4 °F)] 36.9 °C (98.4 °F)  Pulse:  [56-84] 61  Resp:  [18-22] 20  BP: (121-194)/(40-87) 129/62  SpO2:  [94 %-97 %] 95 %    Physical Exam   Constitutional: She is oriented to person, place, and time. She appears well-developed and well-nourished. She appears distressed.   HENT:   Head: Normocephalic and atraumatic.   Mouth/Throat: Oropharynx is clear and moist.   Eyes: Pupils are equal, round, and reactive to light. Conjunctivae and EOM are normal. Right eye exhibits no discharge. Left eye exhibits no discharge. No scleral icterus.   Neck: Neck supple.   Cardiovascular:  Normal rate and regular rhythm.    Pulmonary/Chest: Effort normal and breath sounds normal.   Abdominal: Soft. Bowel sounds are normal. She exhibits no distension. There is no tenderness.   Musculoskeletal: She exhibits no edema or tenderness.   Neurological: She is alert and oriented to person, place, and time. No cranial nerve deficit.   Skin: Skin is warm and dry. She is not diaphoretic.   Psychiatric:   Very anxious   Nursing note and vitals reviewed.      Fluids    Intake/Output Summary (Last 24 hours) at 06/30/19 1558  Last data filed at 06/30/19 1342   Gross per 24 hour   Intake              580 ml   Output                0 ml   Net              580 ml       Laboratory  Recent Labs      06/27/19   1801   WBC  4.2*   RBC  4.28   HEMOGLOBIN  13.0   HEMATOCRIT  38.8   MCV  90.7   MCH  30.4   MCHC  33.5*   RDW  46.5   PLATELETCT  220   MPV  9.8     Recent Labs      06/27/19   1801  06/29/19   0440  06/30/19   0445   SODIUM  133*  137  137   POTASSIUM  3.3*  4.3  3.8   CHLORIDE  97  106  104   CO2  25  21  23   GLUCOSE  100*  93  102*   BUN  16  18  19   CREATININE  1.17  1.04  1.16   CALCIUM  8.8  9.1  9.0         Recent Labs      06/27/19   1801   BNPBTYPENAT  229*           Imaging  DX-CHEST-PORTABLE (1 VIEW)   Final Result      Patchy bibasilar airspace opacities, right greater than left may represent atelectasis or pneumonitis.      Stable cardiomegaly.      Atherosclerotic plaque.              Assessment/Plan  Hypertensive urgency- (present on admission)   Assessment & Plan    Pressures are either too low in the 110's>120s or too high in the 180s > 190s  She is symptomatic on both ends of the spectrum  Unable to stabilize  Cardiology input sought and appreciated     Sinus bradycardia- (present on admission)   Assessment & Plan    Unclear if Med related has not improved     Dyslipidemia- (present on admission)   Assessment & Plan    Last LDL was 90 earlier in June  Was not on meds outpatient     Hypokalemia-  (present on admission)   Assessment & Plan    resolved     DNR (do not resuscitate)- (present on admission)   Assessment & Plan    Confirmed with patient          VTE prophylaxis: heparin

## 2019-06-30 NOTE — PROGRESS NOTES
Telemetry Shift Summary    Rhythm SR/SB  HR Range 52-73  Ectopy oPVC, rPAC, rBi, Coup  Measurements 0.20/0.16/0.40        Normal Values  Rhythm SR  HR Range    Measurements 0.12-0.20 / 0.06-0.10  / 0.30-0.52

## 2019-06-30 NOTE — CARE PLAN
Problem: Safety  Goal: Will remain free from falls  Outcome: PROGRESSING AS EXPECTED  Remind patient to use call light and provide assistance. Bed in low position, bed locked, and appropriate alarms set. Patient wearing non-slip socks. Call light and personal belongings are within reach.    Problem: Knowledge Deficit  Goal: Knowledge of the prescribed therapeutic regimen will improve  Outcome: PROGRESSING AS EXPECTED  Encourage patient to ask questions and be involved in plan of care. Provide education on treatment plan, diagnostic testing, and medications; have patient verbalize understanding.

## 2019-06-30 NOTE — PROGRESS NOTES
Pt had 6 beat run of SVT at 0805 AM. Pt then back to her usual SB rhythm in the 50s. Pt assessed and was completely asymptomatic. Denies chest pain or palpitation.

## 2019-06-30 NOTE — PROGRESS NOTES
Discussed the hydralazine order with patient. She states she was NOT taking clonidine at the time she had the reaction to the hydralazine. She refuses to take hydralazine. MD Bautista notified.

## 2019-06-30 NOTE — PROGRESS NOTES
Discussed hydralazine order with pharmacy because patient has allergy listed to hydralazine. Pharmacist is contacting cardiology MD.

## 2019-06-30 NOTE — PROGRESS NOTES
Pt states her symptoms have resolved. Ambulated to bathroom with not difficulty. No dizziness. BP still elevated at 188/73

## 2019-06-30 NOTE — PROGRESS NOTES
Gave bedside report to SUMIT Mchugh. Plan of care discussed. Safety precautions in place. Personal belongings and call light are with in reach. Patient has blankets over head. Door closed so patient can have get additional rest.

## 2019-06-30 NOTE — PROGRESS NOTES
Per MD To of cardiology, MD wishes to retrial patient on hydralazine now that she has stopped her clonidine. He thinks her reaction to hydralazine in the past was due to being on both medications at the same time. Patient has stopped taking clonidine. Will explain to patient.

## 2019-06-30 NOTE — PROGRESS NOTES
Telemetry Shift Summary     Rhythm SB-NSR c LBBB  HR Range 50-60s  Ectopy occasional PVC, PAC  Measurements .18/.12/.44

## 2019-07-01 ENCOUNTER — PATIENT OUTREACH (OUTPATIENT)
Dept: HEALTH INFORMATION MANAGEMENT | Facility: OTHER | Age: 84
End: 2019-07-01

## 2019-07-01 VITALS
HEIGHT: 65 IN | HEART RATE: 57 BPM | OXYGEN SATURATION: 98 % | RESPIRATION RATE: 18 BRPM | WEIGHT: 125.66 LBS | TEMPERATURE: 98.1 F | DIASTOLIC BLOOD PRESSURE: 70 MMHG | SYSTOLIC BLOOD PRESSURE: 153 MMHG | BODY MASS INDEX: 20.94 KG/M2

## 2019-07-01 PROCEDURE — 99222 1ST HOSP IP/OBS MODERATE 55: CPT | Performed by: INTERNAL MEDICINE

## 2019-07-01 PROCEDURE — 700102 HCHG RX REV CODE 250 W/ 637 OVERRIDE(OP): Performed by: INTERNAL MEDICINE

## 2019-07-01 PROCEDURE — A9270 NON-COVERED ITEM OR SERVICE: HCPCS | Performed by: INTERNAL MEDICINE

## 2019-07-01 PROCEDURE — 99239 HOSP IP/OBS DSCHRG MGMT >30: CPT | Performed by: INTERNAL MEDICINE

## 2019-07-01 PROCEDURE — A9270 NON-COVERED ITEM OR SERVICE: HCPCS | Performed by: HOSPITALIST

## 2019-07-01 PROCEDURE — 700102 HCHG RX REV CODE 250 W/ 637 OVERRIDE(OP): Performed by: HOSPITALIST

## 2019-07-01 PROCEDURE — 700111 HCHG RX REV CODE 636 W/ 250 OVERRIDE (IP): Performed by: INTERNAL MEDICINE

## 2019-07-01 RX ORDER — LOSARTAN POTASSIUM 25 MG/1
50 TABLET ORAL 2 TIMES DAILY
Status: DISCONTINUED | OUTPATIENT
Start: 2019-07-01 | End: 2019-07-01 | Stop reason: HOSPADM

## 2019-07-01 RX ORDER — LOSARTAN POTASSIUM 25 MG/1
100 TABLET ORAL 2 TIMES DAILY
Status: DISCONTINUED | OUTPATIENT
Start: 2019-07-01 | End: 2019-07-01

## 2019-07-01 RX ORDER — LOSARTAN POTASSIUM 50 MG/1
50 TABLET ORAL 2 TIMES DAILY
Qty: 60 TAB | Refills: 1 | Status: SHIPPED | OUTPATIENT
Start: 2019-07-01 | End: 2019-07-11

## 2019-07-01 RX ORDER — ISOSORBIDE MONONITRATE 30 MG/1
30 TABLET, EXTENDED RELEASE ORAL DAILY
Qty: 30 TAB | Refills: 1 | Status: SHIPPED | OUTPATIENT
Start: 2019-07-02 | End: 2019-07-11 | Stop reason: SDUPTHER

## 2019-07-01 RX ORDER — ISOSORBIDE MONONITRATE 30 MG/1
30 TABLET, EXTENDED RELEASE ORAL
Status: DISCONTINUED | OUTPATIENT
Start: 2019-07-01 | End: 2019-07-01 | Stop reason: HOSPADM

## 2019-07-01 RX ADMIN — ISOSORBIDE MONONITRATE 30 MG: 30 TABLET, EXTENDED RELEASE ORAL at 09:16

## 2019-07-01 RX ADMIN — CYANOCOBALAMIN TAB 500 MCG 500 MCG: 500 TAB at 05:22

## 2019-07-01 RX ADMIN — LOSARTAN POTASSIUM 50 MG: 25 TABLET, FILM COATED ORAL at 05:22

## 2019-07-01 RX ADMIN — ENALAPRILAT 1.25 MG: 2.5 INJECTION INTRAVENOUS at 00:39

## 2019-07-01 NOTE — PROGRESS NOTES
0700: BP retaken, came down to 153/70.    Report given to Osmani ARANA. Plan of care discussed. Safety precautions in place.

## 2019-07-01 NOTE — PROGRESS NOTES
Patient's BP at 171/64, patient states she feels fine and requests to have not have it corrected. Patient agreed to take 50 mg of losaartan and no other BP medication. Patient also stated if she doesn't see cardiologist today by 1pm she will leave AMA. Will notify Day RN of patient's wants.

## 2019-07-01 NOTE — PROGRESS NOTES
Telemetry Shift Summary    Rhythm SR, SB, LBBB  HR Range 40-100s low: 42  Ectopy r-o-f PVC, r-bigeminy  Measurements 0.20/0.16/0.44        Normal Values  Rhythm SR  HR Range    Measurements 0.12-0.20 / 0.06-0.10  / 0.30-0.52

## 2019-07-01 NOTE — PROGRESS NOTES
Report received from Bruce, pt very anxious to go home and hoping to discharge this morning. She also asked that her bp be taken. She states that she lives alone, still drives and uses her walker at home.    Cardiology rounded and changed her medications.    Morning assessment done about 0920.   Verified pharmacy of [facility] on [address] as pt asking about prescriptions for new medications/doses.  Pt has her walker at bedside and has a friend coming to pick her up; she is hoping to be out by 1300.

## 2019-07-01 NOTE — CARE PLAN
Problem: Knowledge Deficit  Goal: Knowledge of disease process/condition, treatment plan, diagnostic tests, and medications will improve    Intervention: Assess knowledge level of disease process/condition, treatment plan, diagnostic tests, and medications  Cardiology rounded this morning and changed her medications.  First dose of given.        Problem: Psychosocial Needs:  Goal: Level of anxiety will decrease    Intervention: Identify and develop with patient strategies to cope with anxiety triggers  Pt very anxious to discharge today; she has arranged a ride as she came in ambulance.

## 2019-07-01 NOTE — PROGRESS NOTES
"1900: Report received from Susanna ARANA. Plan of care discussed. Safety precautions in place.     2055: Patient anxious about BP, BP taken by CNA at 168/87. Patient requests to not take Minoxidil, patient states \"I still feel dizzy\" from evening dose of losartaan.     2154: BP retaken at 137/66. Patient denies dizziness. Will continue to monitor.     "

## 2019-07-01 NOTE — DISCHARGE SUMMARY
Discharge Summary    CHIEF COMPLAINT ON ADMISSION  Chief Complaint   Patient presents with   • Hypertension   • Weakness       Reason for Admission  Hypertension     Admission Date  6/27/2019    CODE STATUS  Prior    HPI & HOSPITAL COURSE     *Very pleasant spry 89-year-old female with a history of hypertension, recently her blood pressures been uncontrolled for the last 6 months and they have tried many medications which have been poorly tolerated in the past.  She was recently placed back on HCTZ as well as clonidine.  She presents with hypertensive urgency and bradycardia* Patient's bradycardia did not improve significantly during her stay so unclear if related to clonidine use.  Her blood pressure proved to be very labile frequently exceeding 190 then sometimes 110s to 120s.  Patient only felt well if her blood pressure was between 140 and 170.  She would feel very dizzy if it went lower than this or higher than this.  She would become very anxious during these episodes.  Cardiology was consulted and they recommended long-acting nitrates along with increase in losartan.  Patient was agreeable with this plan of care was feeling better and anxious to go home.    Therefore, she is discharged in good and stable condition to home with close outpatient follow-up.    The patient met 2-midnight criteria for an inpatient stay at the time of discharge.    Discharge Date  7/1/2019    FOLLOW UP ITEMS POST DISCHARGE  PCP  Cardiology    DISCHARGE DIAGNOSES  Active Problems:    Hypertensive urgency POA: Yes    DNR (do not resuscitate) (Chronic) POA: Yes    Hypokalemia POA: Yes    Dyslipidemia POA: Yes    Sinus bradycardia POA: Yes  Resolved Problems:    * No resolved hospital problems. *      FOLLOW UP  Future Appointments  Date Time Provider Department Center   8/1/2019 2:15 PM JEN Jauregui None   8/22/2019 10:30 AM JEN Jauregui None     Lisa Phillips M.D.  [address]  [id number]  [place] [id number]  [phone]    Go on 7/11/2019  Please arrive at 12:45 pm for your hospital follow up  appointment at 1 pm . Thank you       MEDICATIONS ON DISCHARGE     Medication List      START taking these medications      Instructions   isosorbide mononitrate SR 30 MG Tb24  Start taking on:  7/2/2019  Commonly known as:  IMDUR   Take 1 Tab by mouth every day.  Dose:  30 mg        CHANGE how you take these medications      Instructions   losartan 50 MG Tabs  What changed:  · medication strength  · how much to take  · when to take this  Commonly known as:  COZAAR   Take 1 Tab by mouth 2 Times a Day.  Dose:  50 mg        CONTINUE taking these medications      Instructions   ascorbic acid 500 MG Tabs  Commonly known as:  ascorbic acid   Take 500 mg by mouth every day.  Dose:  500 mg     aspirin EC 81 MG Tbec  Commonly known as:  ECOTRIN   Take 81 mg by mouth every day.  Dose:  81 mg     cyanocobalamin 500 MCG Tabs  Commonly known as:  VITAMIN B-12   Take 500 mcg by mouth every day.  Dose:  500 mcg     fish oil 1000 MG Caps capsule   Take 1,000 mg by mouth every evening.  Dose:  1000 mg     GARLIC PO   Take 2 Tabs by mouth every evening.  Dose:  2 Tab        STOP taking these medications    amLODIPine 5 MG Tabs  Commonly known as:  NORVASC     cloNIDine 0.1 MG Tabs  Commonly known as:  CATAPRES     hydroCHLOROthiazide 12.5 MG tablet  Commonly known as:  HYDRODIURIL            Allergies  Allergies   Allergen Reactions   • Chlorthalidone Unspecified     Headaches severe. Heart racing    • Clonidine      Hypotension. Bradycardia.   • Hydralazine Unspecified     Pt states she became very hypotensive and had to be \"revived\"   • Lisinopril Swelling     Of lower extremities        DIET  Low sodium    ACTIVITY  As tolerated    CONSULTATIONS  cardiology    PROCEDURES  none    LABORATORY  Lab Results   Component Value Date    SODIUM 137 06/30/2019    POTASSIUM 3.8 06/30/2019    CHLORIDE 104 06/30/2019    CO2 23 " 06/30/2019    GLUCOSE 102 (H) 06/30/2019    BUN 19 06/30/2019    CREATININE 1.16 06/30/2019        Lab Results   Component Value Date    WBC 4.2 (L) 06/27/2019    HEMOGLOBIN 13.0 06/27/2019    HEMATOCRIT 38.8 06/27/2019    PLATELETCT 220 06/27/2019        Total time of the discharge process exceeds 32 minutes.

## 2019-07-01 NOTE — PROGRESS NOTES
Discharging Patient home per physician order.  Discharged with friend to home at 1235.  Demonstrated understanding of discharge instructions, follow up appointments, home medications, prescriptions sent to [facility], and nursing care instructions for hypertension.  Ambulating with assistance of her walker, voiding without difficulty, pain well controlled, tolerating oral medications, oxygen saturation greater than 90% , tolerating diet.   Educational handouts given and discussed.  Verbalized understanding of discharge instructions and educational handouts.  All questions answered.  Belongings with patient at time of discharge. Pt was sent home with walker.

## 2019-07-01 NOTE — CONSULTS
Cardiology Consult Note:    Arianna To  Date & Time note created:    7/1/2019   7:51 AM     Referring MD:  Dr. Bautista    Patient ID:   Name:             Livia Corea   YOB: 1926  Age:                 89 y.o.  female   MRN:               [id number]                                                             Chief Complaint / Reason for consult:  HTN urgency with intolerance to multiple medications.    History of Present Illness:    This is a 89 years old women with hard to control hypertension, dyslipidemia and carotid plaque, presented to the hospital with hypertensive urgency and labile blood pressure.  Patient does not feel well when her blood pressure is elevated.  Per her report, she feels well when her blood pressure is between 140 and 160.  Patient has tried multiple regimen in the past.  She does not like to take hydrochlorothiazide because of renal injury, hydralazine because of hypotension, clonidine because of bradycardia, beta-blockers because of bradycardia.  She was on amlodipine in the past for many years but the effect worn off.  Upon review her charts, blood pressure has been sporadic.  Ranging from 150-170s.  No chest pain.  No shortness of breath.    I have independently interpreted and reviewed patient's ECG with patient today, which showed normal sinus rhythm, LBBB. No evidence of acute coronary syndrome.    Review of Systems:      Constitutional: Denies fevers, Denies weight changes  Eyes: Denies changes in vision, no eye pain  Ears/Nose/Throat/Mouth: Denies nasal congestion or sore throat   Cardiovascular: no chest pain, no palpitations   Respiratory: no shortness of breath , Denies cough  Gastrointestinal/Hepatic: Denies abdominal pain, nausea, vomiting, diarrhea, constipation or GI bleeding   Genitourinary: Denies dysuria or frequency  Musculoskeletal/Rheum: Denies  joint pain and swelling   Skin: Denies rash  Neurological: Denies headache, confusion,  memory loss or focal weakness/parasthesias  Psychiatric: denies mood disorder   Endocrine: [place] thyroid problems  Heme/Oncology/Lymph Nodes: Denies enlarged lymph nodes, denies brusing or known bleeding disorder  All other systems were reviewed and are negative (AMA/CMS criteria)                Past Medical History:   Past Medical History:   Diagnosis Date   • Circulation problem 01/2018   • Hyperlipidemia    • Hypertension      Active Hospital Problems    Diagnosis   • Hypertensive urgency [I16.0]     Priority: High   • DNR (do not resuscitate) [Z66]     Priority: Low   • Sinus bradycardia [R00.1]   • Dyslipidemia [E78.5]   • Hypokalemia [E87.6]       Past Surgical History:  Past Surgical History:   Procedure Laterality Date   • CAROTID ENDARTERECTOMY Right 2000   • GYN SURGERY      hysterectomy       Hospital Medications:    Current Facility-Administered Medications:   •  isosorbide mononitrate SR (IMDUR) tablet 30 mg, 30 mg, Oral, Q DAY, Arianna Bernabe M.D.  •  losartan (COZAAR) tablet 50 mg, 50 mg, Oral, BID, Arianna Bernabe M.D.  •  enalaprilat (VASOTEC) injection 1.25 mg, 1.25 mg, Intravenous, Q6HRS PRN, Phillip Fontanez D.O., 1.25 mg at 07/01/19 0039  •  minoxidil (LONITEN) tablet 2.5 mg, 2.5 mg, Oral, QHS, Hannah Bautista M.D., 2.5 mg at 06/30/19 0221  •  fish oil capsule 1,000 mg, 1,000 mg, Oral, Q EVENING, Ok Kunz M.D., 1,000 mg at 06/30/19 1800  •  cyanocobalamin (VITAMIN B-12) tablet 500 mcg, 500 mcg, Oral, DAILY, Ok Kunz M.D., 500 mcg at 07/01/19 0522  •  aspirin EC (ECOTRIN) tablet 81 mg, 81 mg, Oral, DAILY, Ok Kunz M.D., 81 mg at 06/30/19 0554  •  senna-docusate (PERICOLACE or SENOKOT S) 8.6-50 MG per tablet 2 Tab, 2 Tab, Oral, BID, Stopped at 06/29/19 0600 **AND** polyethylene glycol/lytes (MIRALAX) PACKET 1 Packet, 1 Packet, Oral, DAILY, Stopped at 06/29/19 0600 **AND** magnesium hydroxide (MILK OF MAGNESIA) suspension 30 mL, 30 mL, Oral, QDAY PRN **AND** bisacodyl  "(DULCOLAX) suppository 10 mg, 10 mg, Rectal, QDAY PRN, Ok Kunz M.D.  •  heparin injection 5,000 Units, 5,000 Units, Subcutaneous, Q8HRS, Ok Kunz M.D., 5,000 Units at 06/30/19 0554  •  acetaminophen (TYLENOL) tablet 650 mg, 650 mg, Oral, Q6HRS PRN, Ok Kunz M.D.  •  ondansetron (ZOFRAN) syringe/vial injection 4 mg, 4 mg, Intravenous, Q4HRS PRN, Ok Kunz M.D.  •  ondansetron (ZOFRAN ODT) dispertab 4 mg, 4 mg, Oral, Q4HRS PRN, Ok Kunz M.D.    Current Outpatient Medications:  Prescriptions Prior to Admission   Medication Sig Dispense Refill Last Dose   • cloNIDine (CATAPRES) 0.1 MG Tab Take 2 Tabs by mouth 2 times a day. 120 Tab 2 6/27/2019 at 1230   • losartan (COZAAR) 25 MG Tab Take 1 Tab by mouth every day. 30 Tab 2 6/27/2019 at 1230   • hydroCHLOROthiazide (HYDRODIURIL) 12.5 MG tablet Take 1 Tab by mouth every day. 30 Tab 11 6/27/2019 at 0600   • amLODIPine (NORVASC) 5 MG Tab Take 5 mg by mouth 2 Times a Day.   6/26/2019 at 0800   • GARLIC PO Take 2 Tabs by mouth every evening.   6/20/2019 at 0900   • Omega-3 Fatty Acids (FISH OIL) 1000 MG Cap capsule Take 1,000 mg by mouth every evening.   6/26/2019 at 1600   • ascorbic acid (ASCORBIC ACID) 500 MG Tab Take 500 mg by mouth every day.   6/26/2019 at 1400   • cyanocobalamin (VITAMIN B-12) 500 MCG Tab Take 500 mcg by mouth every day.   6/27/2019 at 1200   • aspirin EC (ECOTRIN) 81 MG Tablet Delayed Response Take 81 mg by mouth every day.   6/25/2019 at 1400       Medication Allergy:  Allergies   Allergen Reactions   • Chlorthalidone Unspecified     Headaches severe. Heart racing    • Clonidine      Hypotension. Bradycardia.   • Hydralazine Unspecified     Pt states she became very hypotensive and had to be \"revived\"   • Lisinopril Swelling     Of lower extremities        Family History:  Family History   Problem Relation Age of Onset   • Heart Attack Mother 64   • Heart Attack Father 72       Social History:  Social History     Social History   • " "Marital status: Single     Spouse name: N/A   • Number of children: N/A   • Years of education: N/A     Occupational History   • Not on file.     Social History Main Topics   • Smoking status: Former Smoker     Quit date: 2/25/1988   • Smokeless tobacco: Never Used   • Alcohol use Yes      Comment: 1 glass of wine x 2 days a week   • Drug use: No   • Sexual activity: Not on file     Other Topics Concern   • Not on file     Social History Narrative   • No narrative on file         Physical Exam:  Vitals/ General Appearance:   Weight/BMI: Body mass index is 21.24 kg/m².  /70   Pulse (!) 57   Temp 36.7 °C (98.1 °F) (Oral)   Resp 18   Ht 1.638 m (5' 4.5\")   Wt 57 kg (125 lb 10.6 oz)   SpO2 98%   Vitals:    07/01/19 0034 07/01/19 0123 07/01/19 0400 07/01/19 0700   BP: (!) 188/78 156/64 (!) 171/64 153/70   Pulse: 68 68 (!) 57    Resp:   18    Temp:   36.7 °C (98.1 °F)    TempSrc:   Oral    SpO2: 95%  98%    Weight:       Height:         Oxygen Therapy:  Pulse Oximetry: 98 %, O2 (LPM): 0, O2 Delivery: None (Room Air)    Constitutional:   Well developed, Well nourished, No acute distress  HENMT:  Normocephalic, Atraumatic, Oropharynx moist mucous membranes, No oral exudates, Nose normal.  No thyromegaly.  Eyes:  EOMI, Conjunctiva normal, No discharge.  Neck:  Normal range of motion, No cervical tenderness,  no JVD.  Cardiovascular:  Normal heart rate, Normal rhythm, No murmurs, No rubs, No gallops.   Extremitites with intact distal pulses, no cyanosis, or edema.  Lungs:  Normal breath sounds, breath sounds clear to auscultation bilaterally,  no rales, no rhonchi, no wheezing.   Abdomen: Bowel sounds normal, Soft, No tenderness, No guarding, No rebound, No masses, No hepatosplenomegaly.  Skin: Warm, Dry, No erythema, No rash, no induration.  Neurologic: Alert & oriented x 3, No focal deficits noted, cranial nerves II through X are intact.  Psychiatric: Affect normal, Judgment normal, Mood normal.      [facility] (Data " Review):     Records reviewed and summarized in current documentation    Lab Data Review:  No results found for this or any previous visit (from the past 24 hour(s)).    Imaging/Procedures Review:    Chest Xray:  Reviewed    EKG:   As in HPI.     MDM (Assessment and Plan):     Active Hospital Problems    Diagnosis   • Hypertensive urgency [I16.0]     Priority: High   • DNR (do not resuscitate) [Z66]     Priority: Low   • Sinus bradycardia [R00.1]   • Dyslipidemia [E78.5]   • Hypokalemia [E87.6]         After having a long discussion with patient about medical regimen, she is comfortable of trying losartan 50 mg twice a day in the morning and at dinner.  We will also add Imdur 60 mg p.o. once a day.  She is eager to go home.  I do think that it is okay for her to go home and we will closely monitor her in the outpatient setting.  She is well-known to Dr. Concepcion and are per nurse practitioner.      Thank you for referring this patient to our cardiology service.    Arianna Bernabe MD.   Cardiology Inpatient Service.  [facility].  [phone].  [place], [place].

## 2019-07-01 NOTE — DISCHARGE INSTRUCTIONS
Discharge Instructions    Discharged to home by car with friend. Discharged via wheelchair, hospital escort: Yes.  Special equipment needed: Not Applicable    Be sure to schedule a follow-up appointment with your primary care doctor or any specialists as instructed.     Discharge Plan:   Influenza Vaccine Indication: Not indicated: Previously immunized this influenza season and > 8 years of age    I understand that a diet low in cholesterol, fat, and sodium is recommended for good health. Unless I have been given specific instructions below for another diet, I accept this instruction as my diet prescription.      Special Instructions:   Discharge Instructions per Hannah Bautista M.D.     Follow up with primary care and cardiology     DIET: low sodium     ACTIVITY: regular     DIAGNOSIS: hypertensive urgency     Return to ER if symptoms worsen   Question: Against Medical Advice Answer: No           Isosorbide Mononitrate extended-release tablets  What is this medicine?  ISOSORBIDE MONONITRATE (eye catalina SOR bide mon oh DARIO trate) is a vasodilator. It relaxes blood vessels, increasing the blood and oxygen supply to your heart. This medicine is used to prevent chest pain caused by angina. It will not help to stop an episode of chest pain.  This medicine may be used for other purposes; ask your health care provider or pharmacist if you have questions.  COMMON BRAND NAME(S): Imdur, Isotrate ER  What should I tell my health care provider before I take this medicine?  They need to know if you have any of these conditions:  -previous heart attack or heart failure  -an unusual or allergic reaction to isosorbide mononitrate, nitrates, other medicines, foods, dyes, or preservatives  -pregnant or trying to get pregnant  -breast-feeding  How should I use this medicine?  Take this medicine by mouth with a glass of water. Follow the directions on the prescription label. Do not crush or chew. Take your medicine at regular  intervals. Do not take your medicine more often than directed. Do not stop taking this medicine except on the advice of your doctor or health care professional.  Talk to your pediatrician regarding the use of this medicine in children. Special care may be needed.  Overdosage: If you think you have taken too much of this medicine contact a poison control center or emergency room at once.  NOTE: This medicine is only for you. Do not share this medicine with others.  What if I miss a dose?  If you miss a dose, take it as soon as you can. If it is almost time for your next dose, take only that dose. Do not take double or extra doses.  What may interact with this medicine?  Do not take this medicine with any of the following medications:  -medicines used to treat erectile dysfunction (ED) like avanafil, sildenafil, tadalafil, and vardenafil  -riociguat  This medicine may also interact with the following medications:  -medicines for high blood pressure  -other medicines for angina or heart failure  This list may not describe all possible interactions. Give your health care provider a list of all the medicines, herbs, non-prescription drugs, or dietary supplements you use. Also tell them if you smoke, drink alcohol, or use illegal drugs. Some items may interact with your medicine.  What should I watch for while using this medicine?  Check your heart rate and blood pressure regularly while you are taking this medicine. Ask your doctor or health care professional what your heart rate and blood pressure should be and when you should contact him or her. Tell your doctor or health care professional if you feel your medicine is no longer working.  You may get dizzy. Do not drive, use machinery, or do anything that needs mental alertness until you know how this medicine affects you. To reduce the risk of dizzy or fainting spells, do not sit or stand up quickly, especially if you are an older patient. Alcohol can make you more  dizzy, and increase flushing and rapid heartbeats. Avoid alcoholic drinks.  Do not treat yourself for coughs, colds, or pain while you are taking this medicine without asking your doctor or health care professional for advice. Some ingredients may increase your blood pressure.  What side effects may I notice from receiving this medicine?  Side effects that you should report to your doctor or health care professional as soon as possible:  -bluish discoloration of lips, fingernails, or palms of hands  -irregular heartbeat, palpitations  -low blood pressure  -nausea, vomiting  -persistent headache  -unusually weak or tired  Side effects that usually do not require medical attention (report to your doctor or health care professional if they continue or are bothersome):  -flushing of the face or neck  -rash  This list may not describe all possible side effects. Call your doctor for medical advice about side effects. You may report side effects to FDA at 4-818-FDA-9466.  Where should I keep my medicine?  Keep out of the reach of children.  Store between 15 and 30 degrees C (59 and 86 degrees F). Keep container tightly closed. Throw away any unused medicine after the expiration date.  NOTE: This sheet is a summary. It may not cover all possible information. If you have questions about this medicine, talk to your doctor, pharmacist, or health care provider.  © 2018 Elsevier/Gold Standard (2014-10-17 14:48:19)      Hypertension  Hypertension is another name for high blood pressure. High blood pressure forces your heart to work harder to pump blood. A blood pressure reading has two numbers, which includes a higher number over a lower number (example: 110/72).  Follow these instructions at home:  · Have your blood pressure rechecked by your doctor.  · Only take medicine as told by your doctor. Follow the directions carefully. The medicine does not work as well if you skip doses. Skipping doses also puts you at risk for  problems.  · Do not smoke.  · Monitor your blood pressure at home as told by your doctor.  Contact a doctor if:  · You think you are having a reaction to the medicine you are taking.  · You have repeat headaches or feel dizzy.  · You have puffiness (swelling) in your ankles.  · You have trouble with your vision.  Get help right away if:  · You get a very bad headache and are confused.  · You feel weak, numb, or faint.  · You get chest or belly (abdominal) pain.  · You throw up (vomit).  · You cannot breathe very well.  This information is not intended to replace advice given to you by your health care provider. Make sure you discuss any questions you have with your health care provider.  Document Released: 06/05/2009 Document Revised: 05/25/2017 Document Reviewed: 10/10/2014  [employer] Interactive Patient Education © 2017 [employer]        · Is patient discharged on Warfarin / Coumadin?   No     Depression / Suicide Risk    As you are discharged from this [facility] facility, it is important to learn how to keep safe from harming yourself.    Recognize the warning signs:  · Abrupt changes in personality, positive or negative- including increase in energy   · Giving away possessions  · Change in eating patterns- significant weight changes-  positive or negative  · Change in sleeping patterns- unable to sleep or sleeping all the time   · Unwillingness or inability to communicate  · Depression  · Unusual sadness, discouragement and loneliness  · Talk of wanting to die  · Neglect of personal appearance   · Rebelliousness- reckless behavior  · Withdrawal from people/activities they love  · Confusion- inability to concentrate     If you or a loved one observes any of these behaviors or has concerns about self-harm, here's what you can do:  · Talk about it- your feelings and reasons for harming yourself  · Remove any means that you might use to hurt yourself (examples: pills, rope, extension cords, firearm)  · Get  professional help from the community (Mental Health, Substance Abuse, psychological counseling)  · Do not be alone:Call your Safe Contact- someone whom you trust who will be there for you.  · Call your local CRISIS HOTLINE 932-3160 or 087-175-7694  · Call your local Children's Mobile Crisis Response Team [place] (412) 660-4392 or www.MegaHoot  · Call the toll free National Suicide Prevention Hotlines   · National Suicide Prevention Lifeline 259-163-WHKE (3061)  · National Hope Line Network 800-SUICIDE (183-1940)

## 2019-07-01 NOTE — CARE PLAN
Problem: Safety  Goal: Will remain free from falls  Outcome: PROGRESSING AS EXPECTED  Keep call light within reach. Ensure environment is clutter free. Have patient wear treaded socks.    Problem: Knowledge Deficit  Goal: Knowledge of disease process/condition, treatment plan, diagnostic tests, and medications will improve  Outcome: PROGRESSING AS EXPECTED  Allow time for patient to ask questions. Answer questions to best of ability. Update patient on plan of care.

## 2019-07-01 NOTE — PROGRESS NOTES
BP taken by CNA at 188/78. Patient asymptomatic. PRN vasotec given, see MAR. Will continue to monitor.

## 2019-07-02 ENCOUNTER — HOSPITAL ENCOUNTER (EMERGENCY)
Facility: MEDICAL CENTER | Age: 84
End: 2019-07-02
Attending: EMERGENCY MEDICINE
Payer: MEDICARE

## 2019-07-02 ENCOUNTER — APPOINTMENT (OUTPATIENT)
Dept: RADIOLOGY | Facility: MEDICAL CENTER | Age: 84
End: 2019-07-02
Attending: EMERGENCY MEDICINE
Payer: MEDICARE

## 2019-07-02 ENCOUNTER — PATIENT OUTREACH (OUTPATIENT)
Dept: HEALTH INFORMATION MANAGEMENT | Facility: OTHER | Age: 84
End: 2019-07-02

## 2019-07-02 VITALS
OXYGEN SATURATION: 93 % | BODY MASS INDEX: 21.08 KG/M2 | RESPIRATION RATE: 18 BRPM | TEMPERATURE: 98.2 F | WEIGHT: 123.46 LBS | HEART RATE: 65 BPM | DIASTOLIC BLOOD PRESSURE: 89 MMHG | SYSTOLIC BLOOD PRESSURE: 183 MMHG | HEIGHT: 64 IN

## 2019-07-02 DIAGNOSIS — I10 HYPERTENSION, UNSPECIFIED TYPE: ICD-10-CM

## 2019-07-02 DIAGNOSIS — R06.01 ORTHOPNEA: ICD-10-CM

## 2019-07-02 DIAGNOSIS — M79.602 PAIN OF LEFT UPPER EXTREMITY: ICD-10-CM

## 2019-07-02 LAB
ALBUMIN SERPL BCP-MCNC: 4 G/DL (ref 3.2–4.9)
ALBUMIN/GLOB SERPL: 1.1 G/DL
ALP SERPL-CCNC: 93 U/L (ref 30–99)
ALT SERPL-CCNC: 8 U/L (ref 2–50)
ANION GAP SERPL CALC-SCNC: 11 MMOL/L (ref 0–11.9)
AST SERPL-CCNC: 19 U/L (ref 12–45)
BASOPHILS # BLD AUTO: 0.7 % (ref 0–1.8)
BASOPHILS # BLD: 0.04 K/UL (ref 0–0.12)
BILIRUB SERPL-MCNC: 1 MG/DL (ref 0.1–1.5)
BUN SERPL-MCNC: 24 MG/DL (ref 8–22)
CALCIUM SERPL-MCNC: 9.5 MG/DL (ref 8.5–10.5)
CHLORIDE SERPL-SCNC: 94 MMOL/L (ref 96–112)
CO2 SERPL-SCNC: 24 MMOL/L (ref 20–33)
CREAT SERPL-MCNC: 1.11 MG/DL (ref 0.5–1.4)
EKG IMPRESSION: NORMAL
EOSINOPHIL # BLD AUTO: 0.09 K/UL (ref 0–0.51)
EOSINOPHIL NFR BLD: 1.6 % (ref 0–6.9)
ERYTHROCYTE [DISTWIDTH] IN BLOOD BY AUTOMATED COUNT: 44.3 FL (ref 35.9–50)
GLOBULIN SER CALC-MCNC: 3.8 G/DL (ref 1.9–3.5)
GLUCOSE SERPL-MCNC: 97 MG/DL (ref 65–99)
HCT VFR BLD AUTO: 39.9 % (ref 37–47)
HGB BLD-MCNC: 13.2 G/DL (ref 12–16)
IMM GRANULOCYTES # BLD AUTO: 0.02 K/UL (ref 0–0.11)
IMM GRANULOCYTES NFR BLD AUTO: 0.4 % (ref 0–0.9)
LYMPHOCYTES # BLD AUTO: 1.32 K/UL (ref 1–4.8)
LYMPHOCYTES NFR BLD: 24 % (ref 22–41)
MCH RBC QN AUTO: 29.9 PG (ref 27–33)
MCHC RBC AUTO-ENTMCNC: 33.1 G/DL (ref 33.6–35)
MCV RBC AUTO: 90.5 FL (ref 81.4–97.8)
MONOCYTES # BLD AUTO: 0.59 K/UL (ref 0–0.85)
MONOCYTES NFR BLD AUTO: 10.7 % (ref 0–13.4)
NEUTROPHILS # BLD AUTO: 3.45 K/UL (ref 2–7.15)
NEUTROPHILS NFR BLD: 62.6 % (ref 44–72)
NRBC # BLD AUTO: 0 K/UL
NRBC BLD-RTO: 0 /100 WBC
PLATELET # BLD AUTO: 209 K/UL (ref 164–446)
PMV BLD AUTO: 10.1 FL (ref 9–12.9)
POTASSIUM SERPL-SCNC: 3.8 MMOL/L (ref 3.6–5.5)
PROT SERPL-MCNC: 7.8 G/DL (ref 6–8.2)
RBC # BLD AUTO: 4.41 M/UL (ref 4.2–5.4)
SODIUM SERPL-SCNC: 129 MMOL/L (ref 135–145)
TROPONIN I SERPL-MCNC: 0.04 NG/ML (ref 0–0.04)
WBC # BLD AUTO: 5.5 K/UL (ref 4.8–10.8)

## 2019-07-02 PROCEDURE — 36415 COLL VENOUS BLD VENIPUNCTURE: CPT

## 2019-07-02 PROCEDURE — 80053 COMPREHEN METABOLIC PANEL: CPT

## 2019-07-02 PROCEDURE — 93005 ELECTROCARDIOGRAM TRACING: CPT

## 2019-07-02 PROCEDURE — 85025 COMPLETE CBC W/AUTO DIFF WBC: CPT

## 2019-07-02 PROCEDURE — 93005 ELECTROCARDIOGRAM TRACING: CPT | Performed by: EMERGENCY MEDICINE

## 2019-07-02 PROCEDURE — 71045 X-RAY EXAM CHEST 1 VIEW: CPT

## 2019-07-02 PROCEDURE — 99285 EMERGENCY DEPT VISIT HI MDM: CPT

## 2019-07-02 PROCEDURE — 84484 ASSAY OF TROPONIN QUANT: CPT

## 2019-07-02 PROCEDURE — 73060 X-RAY EXAM OF HUMERUS: CPT | Mod: LT

## 2019-07-02 ASSESSMENT — LIFESTYLE VARIABLES: DO YOU DRINK ALCOHOL: NO

## 2019-07-02 NOTE — DISCHARGE INSTRUCTIONS
If your systolic blood pressure (higher number) is consistently (more than 2 times) over 150, double your IMDUR dose     Hypertension  Hypertension is another name for high blood pressure. High blood pressure forces your heart to work harder to pump blood. A blood pressure reading has two numbers, which includes a higher number over a lower number (example: 110/72).  Follow these instructions at home:  · Have your blood pressure rechecked by your doctor.  · Only take medicine as told by your doctor. Follow the directions carefully. The medicine does not work as well if you skip doses. Skipping doses also puts you at risk for problems.  · Do not smoke.  · Monitor your blood pressure at home as told by your doctor.  Contact a doctor if:  · You think you are having a reaction to the medicine you are taking.  · You have repeat headaches or feel dizzy.  · You have puffiness (swelling) in your ankles.  · You have trouble with your vision.  Get help right away if:  · You get a very bad headache and are confused.  · You feel weak, numb, or faint.  · You get chest or belly (abdominal) pain.  · You throw up (vomit).  · You cannot breathe very well.  This information is not intended to replace advice given to you by your health care provider. Make sure you discuss any questions you have with your health care provider.  Document Released: 06/05/2009 Document Revised: 05/25/2017 Document Reviewed: 10/10/2014  [employer] Interactive Patient Education © 2017 Elsevier Inc.

## 2019-07-02 NOTE — ED NOTES
Pt to rm 16. Agree with triage note. Pt changed into gown and connected to monitor. Call light in reach.

## 2019-07-02 NOTE — ED NOTES
Report received from Ramiro ARANA, assumed care of patient.  Call light and belongings within reach.  Bed in lowest position.

## 2019-07-02 NOTE — ED PROVIDER NOTES
ED Provider Note    Scribed for Mika Cuevas M.D. by Christian Hurtado. 7/2/2019, 10:39 AM.    Primary care provider: Perlita Mcadams M.D.  Means of arrival: walk in  History obtained from: patient  History limited by: none    CHIEF COMPLAINT  Chief Complaint   Patient presents with   • Arm Pain     left upper arm, x2hrs, intermittent, described as sharp and stabbing       HPI  Livia Corea is a 89 y.o. female who presents to the Emergency Department complaining of intermittent left upper arm pain for the last 2 hours. She localizes her pain to the left bicep area that is described as sharp and not exacerbated with movement. Patient states that her pain came on suddenly 2 hours ago and has been intermittent since. She states that the arm pain is resolved at this time and last had an episode 10 minutes ago. Patient came to the ED for evaluation of this pain and rule out of emergent processes. She reports a history of hypertension, hyperlipidemia and last had a stress test 6 months ago. Patient denies chest pain, shortness of breath.      REVIEW OF SYSTEMS  Pertinent positives include arm pain. Pertinent negatives include no chest pain, shortness of breath. As above, all other systems reviewed and are negative.   See HPI for further details.     PAST MEDICAL HISTORY   has a past medical history of Circulation problem (01/2018); Hyperlipidemia; and Hypertension.    SURGICAL HISTORY   has a past surgical history that includes gyn surgery and carotid endarterectomy (Right, 2000).    SOCIAL HISTORY  Social History   Substance Use Topics   • Smoking status: Former Smoker     Quit date: 2/25/1988   • Smokeless tobacco: Never Used   • Alcohol use Yes      Comment: 1 glass of wine x 2 days a week      History   Drug Use No       FAMILY HISTORY  Family History   Problem Relation Age of Onset   • Heart Attack Mother 64   • Heart Attack Father 72       CURRENT MEDICATIONS  Current Outpatient Prescriptions:  "  •  isosorbide mononitrate SR (IMDUR) 30 MG TABLET SR 24 HR, Take 1 Tab by mouth every day., Disp: 30 Tab, Rfl: 1  •  losartan (COZAAR) 50 MG Tab, Take 1 Tab by mouth 2 Times a Day., Disp: 60 Tab, Rfl: 1  •  GARLIC PO, Take 2 Tabs by mouth every evening., Disp: , Rfl:   •  Omega-3 Fatty Acids (FISH OIL) 1000 MG Cap capsule, Take 1,000 mg by mouth every evening., Disp: , Rfl:   •  ascorbic acid (ASCORBIC ACID) 500 MG Tab, Take 500 mg by mouth every day., Disp: , Rfl:   •  cyanocobalamin (VITAMIN B-12) 500 MCG Tab, Take 500 mcg by mouth every day., Disp: , Rfl:   •  aspirin EC (ECOTRIN) 81 MG Tablet Delayed Response, Take 81 mg by mouth every day., Disp: , Rfl:     ALLERGIES  Allergies   Allergen Reactions   • Chlorthalidone Unspecified     Headaches severe. Heart racing    • Clonidine      Hypotension. Bradycardia.   • Hydralazine Unspecified     Pt states she became very hypotensive and had to be \"revived\"   • Lisinopril Swelling     Of lower extremities        PHYSICAL EXAM  VITAL SIGNS: BP (!) 183/89   Pulse 74   Temp 36.8 °C (98.2 °F) (Temporal)   Resp 16   Ht 1.626 m (5' 4\")   Wt 56 kg (123 lb 7.3 oz)   SpO2 95%   BMI 21.19 kg/m²   Vitals reviewed.  Constitutional: Alert in no apparent distress.  HENT: No signs of trauma, Bilateral external ears normal, Nose normal.   Eyes: Pupils are equal and reactive, Conjunctiva normal, Non-icteric.   Neck: Normal range of motion, No tenderness, Supple, No stridor.   Lymphatic: No lymphadenopathy noted.   Cardiovascular: Regular rate and rhythm, no murmurs.   Thorax & Lungs: Normal breath sounds, No respiratory distress, No wheezing, No chest tenderness.   Abdomen: Bowel sounds normal, Soft, No tenderness, No peritoneal signs, No masses, No pulsatile masses.   Skin: Warm, Dry, No erythema, No rash.   Back: No bony tenderness, No CVA tenderness.   Extremities: Intact distal pulses, No edema, No tenderness, No cyanosis. No reproducible pain.   Musculoskeletal: Good " range of motion in all major joints. No tenderness to palpation or major deformities noted.   Neurologic: Alert , Normal motor function, Normal sensory function, No focal deficits noted.   Psychiatric: Affect normal, Judgment normal, Mood normal.     DIAGNOSTIC STUDIES / PROCEDURES    LABS  Labs Reviewed   CBC WITH DIFFERENTIAL - Abnormal; Notable for the following:        Result Value    MCHC 33.1 (*)     All other components within normal limits   COMP METABOLIC PANEL - Abnormal; Notable for the following:     Sodium 129 (*)     Chloride 94 (*)     Bun 24 (*)     Globulin 3.8 (*)     All other components within normal limits   ESTIMATED GFR - Abnormal; Notable for the following:     GFR If  56 (*)     GFR If Non  46 (*)     All other components within normal limits   TROPONIN   All labs reviewed by me.    EKG Interpretation:  Interpreted by me    12 Lead EKG interpreted by me to show:  Inus moisescadria  Rate 57  Axis: Normal  Intervals: Prolonged   Normal T waves  Normal ST segments  My impression of this EKG: By nat briceñoteria, no evidence of ischemia. When compared to EKG from 6/29/17, no significant change. Left bundle branch block. Does not meet STEMI criteria at this time.     RADIOLOGY  DX-HUMERUS 2+ LEFT   Final Result      No evidence of acute fracture or dislocation.      DX-CHEST-PORTABLE (1 VIEW)   Final Result      No acute cardiopulmonary process is seen.      Atherosclerotic plaque.         The radiologist's interpretation of all radiological studies have been reviewed by me.    COURSE & MEDICAL DECISION MAKING  Nursing notes, VS, PMSFHx reviewed in chart.  Differential diagnoses include but not limited to: ACS, musculoskeletal pain, bony lesion     10:39 AM - Patient seen and examined at bedside. Discussed her evaluation in the ED to rule out cardiac abnormalities. Patient agrees to this course. Ordered for DX chest, DX humerus, CBC with differential, CMP,  "Troponin to evaluate.     Review of past medical records from 3/18/18 shows the patient has a fixed inferior wall photopenia.      12:21 PM - Paged cardiology.     12:40 PM - I discussed the patient's case and the above findings with Dr. Barrera (cardiologist) who informs that the patient was admitted yesterday, evaluated by cardiology, and discharged. He recommends that the patient be discharged with instructions to monitor her BP and if it remains consistently above 150 systolic, to double her prescribed Imdur.      12:47 PM - Recheck: Patient re-evaluated at [facility]. Patient's diagnostic results discussed. Discussed patient's condition and treatment plan and instructions from Dr. Barrera. Patient will be discharged with instructions and provided with strict return precautions. Instructed to return to Emergency Department immediately if any new or worsening symptoms. I will contact  to help the patient obtain a sooner appointment.     Discharge vitals: BP (!) 183/89   Pulse 66   Temp 36.8 °C (98.2 °F) (Temporal)   Resp 16   Ht 1.626 m (5' 4\")   Wt 56 kg (123 lb 7.3 oz)   SpO2 96%   BMI 21.19 kg/m²      The patient will return for new or worsening symptoms and is stable at the time of discharge.    The patient is referred to a primary physician for blood pressure management, diabetic screening, and for all other preventative health concerns.    DISPOSITION:  Patient will be discharged home in stable condition.    FOLLOW UP:  [facility], Emergency Dept  [address] [id number] [phone]    If symptoms worsen    Perlita Mcadams M.D.  [address] [facility] [address]  [facility] [id number]  [phone]      As needed    [facility] Cardiology    follow up as scheduled      FINAL IMPRESSION      1. Pain of left upper extremity    2. Hypertension, unspecified type          I, Christian Hurtado (Scribe), am scribing for, and in the presence of, Mika Cuevas, " M.D..    Electronically signed by: Christian Hurtado (Scribe), 7/2/2019    I, Mika Cuevas M.D. personally performed the services described in this documentation, as scribed by Christian Hurtado in my presence, and it is both accurate and complete. C    The note accurately reflects work and decisions made by me.  Mika Cuevas  7/2/2019  1:02 PM

## 2019-07-02 NOTE — ED TRIAGE NOTES
Pt to triage in WC.  Chief Complaint   Patient presents with   • Arm Pain     left upper arm, x2hrs, intermittent, described as sharp and stabbing     States she was dc'd from [facility] yesterday after being admitted for blood pressure control.  Pt hypertensive in triage.

## 2019-07-03 ENCOUNTER — PATIENT OUTREACH (OUTPATIENT)
Dept: HEALTH INFORMATION MANAGEMENT | Facility: OTHER | Age: 84
End: 2019-07-03

## 2019-07-11 ENCOUNTER — OFFICE VISIT (OUTPATIENT)
Dept: CARDIOLOGY | Facility: MEDICAL CENTER | Age: 84
End: 2019-07-11
Payer: MEDICARE

## 2019-07-11 VITALS
HEART RATE: 76 BPM | OXYGEN SATURATION: 97 % | BODY MASS INDEX: 20.99 KG/M2 | SYSTOLIC BLOOD PRESSURE: 172 MMHG | DIASTOLIC BLOOD PRESSURE: 100 MMHG | HEIGHT: 65 IN | WEIGHT: 126 LBS

## 2019-07-11 DIAGNOSIS — I10 ESSENTIAL HYPERTENSION: ICD-10-CM

## 2019-07-11 DIAGNOSIS — I16.0 HYPERTENSIVE URGENCY: ICD-10-CM

## 2019-07-11 PROCEDURE — 99214 OFFICE O/P EST MOD 30 MIN: CPT | Performed by: NURSE PRACTITIONER

## 2019-07-11 RX ORDER — ISOSORBIDE MONONITRATE 30 MG/1
30 TABLET, EXTENDED RELEASE ORAL EVERY MORNING
Qty: 30 TAB | Refills: 11 | Status: SHIPPED | OUTPATIENT
Start: 2019-07-11 | End: 2019-08-01

## 2019-07-11 RX ORDER — CLONIDINE HYDROCHLORIDE 0.1 MG/1
0.1 TABLET ORAL 2 TIMES DAILY PRN
Qty: 30 TAB | Refills: 3 | Status: SHIPPED | OUTPATIENT
Start: 2019-07-11 | End: 2019-09-10

## 2019-07-11 RX ORDER — AMLODIPINE BESYLATE 10 MG/1
10 TABLET ORAL DAILY
COMMUNITY
End: 2019-07-11 | Stop reason: SDUPTHER

## 2019-07-11 RX ORDER — AMLODIPINE BESYLATE 10 MG/1
10 TABLET ORAL EVERY EVENING
Qty: 30 TAB | Refills: 11 | Status: SHIPPED | OUTPATIENT
Start: 2019-07-11 | End: 2020-01-07 | Stop reason: SDUPTHER

## 2019-07-11 ASSESSMENT — ENCOUNTER SYMPTOMS
MYALGIAS: 0
DIZZINESS: 0
CLAUDICATION: 0
WEAKNESS: 0
ORTHOPNEA: 0
PALPITATIONS: 0
COUGH: 0
SHORTNESS OF BREATH: 0
PND: 0
NERVOUS/ANXIOUS: 1
ABDOMINAL PAIN: 0

## 2019-07-11 NOTE — PROGRESS NOTES
Chief Complaint   Patient presents with   • Hypertension       Subjective:   Livia Corea is a 89 y.o. -American female who presents today to follow-up on hypertension.  She has been to the hospital several times for hypertensive urgency.  There is been many adjustments in her medications can be reviewed and previous notes.    She was in the emergency room most recently on July 2, 2019.  Her current regimen of blood pressure medication is amlodipine 10 mg.  She has discontinued losartan and has not been taking isosorbide which was started at her last ER visit.    She tells me her blood pressure has been running 130-140 millimeters of mercury systolic at home for the last few days.  She is sitting and resting before checking her blood pressure with an arm cuff.  She states she has been limiting sodium in her diet.    At 89 she is living alone and doing her own meals.  She states that she cooks and does not use packaged food.  She worries about her blood pressure and her dog.    She states she feels best when her blood pressure is between 140 and 165 stop.  She feels lightheaded when her blood pressure is lower.  He comes anxious when it is higher.    Past Medical History:   Diagnosis Date   • Circulation problem 01/2018   • Hyperlipidemia    • Hypertension      Past Surgical History:   Procedure Laterality Date   • CAROTID ENDARTERECTOMY Right 2000   • GYN SURGERY      hysterectomy     Family History   Problem Relation Age of Onset   • Heart Attack Mother 64   • Heart Attack Father 72     Social History     Social History   • Marital status: Single     Spouse name: N/A   • Number of children: N/A   • Years of education: N/A     Occupational History   • Not on file.     Social History Main Topics   • Smoking status: Former Smoker     Quit date: 2/25/1988   • Smokeless tobacco: Never Used   • Alcohol use Yes      Comment: 1 glass of wine x 2 days a week   • Drug use: No   • Sexual activity: Not on  "file     Other Topics Concern   • Not on file     Social History Narrative   • No narrative on file     Allergies   Allergen Reactions   • Chlorthalidone Unspecified     Headaches severe. Heart racing    • Clonidine      Hypotension. Bradycardia.   • Hydralazine Unspecified     Pt states she became very hypotensive and had to be \"revived\"   • Lisinopril Swelling     Of lower extremities    • Losartan Unspecified     Causes vision problems     Outpatient Encounter Prescriptions as of 7/11/2019   Medication Sig Dispense Refill   • cloNIDine (CATAPRES) 0.1 MG Tab Take 1 Tab by mouth 2 times a day as needed. For SBP> 180. 30 Tab 3   • isosorbide mononitrate SR (IMDUR) 30 MG TABLET SR 24 HR Take 1 Tab by mouth every morning. 30 Tab 11   • amLODIPine (NORVASC) 10 MG Tab Take 1 Tab by mouth every evening. 30 Tab 11   • Omega-3 Fatty Acids (FISH OIL) 1000 MG Cap capsule Take 1,000 mg by mouth every evening.     • ascorbic acid (ASCORBIC ACID) 500 MG Tab Take 500 mg by mouth every day.     • cyanocobalamin (VITAMIN B-12) 500 MCG Tab Take 500 mcg by mouth every day.     • aspirin EC (ECOTRIN) 81 MG Tablet Delayed Response Take 81 mg by mouth every day.     • [DISCONTINUED] amLODIPine (NORVASC) 10 MG Tab Take 10 mg by mouth every day.     • [DISCONTINUED] isosorbide mononitrate SR (IMDUR) 30 MG TABLET SR 24 HR Take 1 Tab by mouth every day. 30 Tab 1   • [DISCONTINUED] losartan (COZAAR) 50 MG Tab Take 1 Tab by mouth 2 Times a Day. (Patient not taking: Reported on 7/11/2019) 60 Tab 1   • [DISCONTINUED] GARLIC PO Take 2 Tabs by mouth every evening.       No facility-administered encounter medications on file as of 7/11/2019.      Review of Systems   Constitutional: Negative for malaise/fatigue.   Respiratory: Negative for cough and shortness of breath.    Cardiovascular: Negative for chest pain, palpitations, orthopnea, claudication, leg swelling and PND.   Gastrointestinal: Negative for abdominal pain.   Musculoskeletal: " "Negative for myalgias.   Neurological: Negative for dizziness and weakness.   Psychiatric/Behavioral: The patient is nervous/anxious (when blood pressure elevated.).         Objective:   BP (!) 172/100 (BP Location: Left arm, Patient Position: Sitting, BP Cuff Size: Adult)   Pulse 76   Ht 1.638 m (5' 4.5\")   Wt 57.2 kg (126 lb)   SpO2 97%   BMI 21.29 kg/m²     Physical Exam   Constitutional: She is oriented to person, place, and time. She appears well-developed and well-nourished.   Elderly, alert -American female who uses walker.  Appropriate and engaging.   HENT:   Head: Normocephalic.   Eyes: EOM are normal.   Neck: No JVD present.   Cardiovascular: Normal rate, regular rhythm and normal heart sounds.    Pulmonary/Chest: Effort normal and breath sounds normal.   Musculoskeletal: She exhibits no edema.   Neurological: She is alert and oriented to person, place, and time.   Skin: Skin is warm and dry.   Psychiatric: She has a normal mood and affect.     Results for GEO WHITESIDE (MRN [id number])    Ref. Range 7/2/2019 11:25   WBC Latest Ref Range: 4.8 - 10.8 K/uL 5.5   RBC Latest Ref Range: 4.20 - 5.40 M/uL 4.41   Hemoglobin Latest Ref Range: 12.0 - 16.0 g/dL 13.2   Hematocrit Latest Ref Range: 37.0 - 47.0 % 39.9   MCV Latest Ref Range: 81.4 - 97.8 fL 90.5   MCH Latest Ref Range: 27.0 - 33.0 pg 29.9   MCHC Latest Ref Range: 33.6 - 35.0 g/dL 33.1 (L)   RDW Latest Ref Range: 35.9 - 50.0 fL 44.3   Platelet Count Latest Ref Range: 164 - 446 K/uL 209   MPV Latest Ref Range: 9.0 - 12.9 fL 10.1   Neutrophils-Polys Latest Ref Range: 44.00 - 72.00 % 62.60   Neutrophils (Absolute) Latest Ref Range: 2.00 - 7.15 K/uL 3.45   Lymphocytes Latest Ref Range: 22.00 - 41.00 % 24.00   Lymphs (Absolute) Latest Ref Range: 1.00 - 4.80 K/uL 1.32   Monocytes Latest Ref Range: 0.00 - 13.40 % 10.70   Monos (Absolute) Latest Ref Range: 0.00 - 0.85 K/uL 0.59   Eosinophils Latest Ref Range: 0.00 - 6.90 % 1.60   Eos " (Absolute) Latest Ref Range: 0.00 - 0.51 K/uL 0.09   Basophils Latest Ref Range: 0.00 - 1.80 % 0.70   Baso (Absolute) Latest Ref Range: 0.00 - 0.12 K/uL 0.04   Immature Granulocytes Latest Ref Range: 0.00 - 0.90 % 0.40   Immature Granulocytes (abs) Latest Ref Range: 0.00 - 0.11 K/uL 0.02   Nucleated RBC Latest Units: /100 WBC 0.00   NRBC (Absolute) Latest Units: K/uL 0.00   Sodium Latest Ref Range: 135 - 145 mmol/L 129 (L)   Potassium Latest Ref Range: 3.6 - 5.5 mmol/L 3.8   Chloride Latest Ref Range: 96 - 112 mmol/L 94 (L)   Co2 Latest Ref Range: 20 - 33 mmol/L 24   Anion Gap Latest Ref Range: 0.0 - 11.9  11.0   Glucose Latest Ref Range: 65 - 99 mg/dL 97   Bun Latest Ref Range: 8 - 22 mg/dL 24 (H)   Creatinine Latest Ref Range: 0.50 - 1.40 mg/dL 1.11   GFR If  Latest Ref Range: >60 mL/min/1.73 m 2 56 (A)   GFR If Non  Latest Ref Range: >60 mL/min/1.73 m 2 46 (A)   Calcium Latest Ref Range: 8.5 - 10.5 mg/dL 9.5   AST(SGOT) Latest Ref Range: 12 - 45 U/L 19   ALT(SGPT) Latest Ref Range: 2 - 50 U/L 8   Alkaline Phosphatase Latest Ref Range: 30 - 99 U/L 93   Total Bilirubin Latest Ref Range: 0.1 - 1.5 mg/dL 1.0   Albumin Latest Ref Range: 3.2 - 4.9 g/dL 4.0   Total Protein Latest Ref Range: 6.0 - 8.2 g/dL 7.8   Globulin Latest Ref Range: 1.9 - 3.5 g/dL 3.8 (H)   A-G Ratio Latest Units: g/dL 1.1   Troponin I Latest Ref Range: 0.00 - 0.04 ng/mL 0.04     Assessment:     1. Essential hypertension  isosorbide mononitrate SR (IMDUR) 30 MG TABLET SR 24 HR    amLODIPine (NORVASC) 10 MG Tab   2. Hypertensive urgency  cloNIDine (CATAPRES) 0.1 MG Tab       Medical Decision Making:  Today's Assessment / Status / Plan:     Hypertension: Her blood pressure has been labile in the past but appears to be controlled currently.  It was slightly higher in the office today.  I am not overly concerned.  I feel that we can run her blood pressure between 140 and 170 systolic.  She has been on clonidine  previously and was told that she could take clonidine when her blood pressure is greater than 180 systolic.  I am agreeable with this plan.    She feels amlodipine is managing her blood pressure.  She has not been taking the isosorbide which was added at her last ER visit.  I have suggested she take isosorbide in the morning and amlodipine in the evening.  I would like her to monitor her blood pressure only morning and evening.    Previously she was worried about her kidney function which is actually pretty good for an 89-year-old woman.  I do not think kidney function is an issue at this point in her life.    She clearly has anxiety around her blood pressure when it is high.  We will treat the high blood pressures with the clonidine as needed.  If she needs something for anxiety she may benefit from some Xanax.    She has been following with SALAZAR Bianchi and is scheduled for follow-up with her.  We will have her keep these appointments and contact us sooner if problems.    Collaborating Provider: Dr. Lucy Rodriguez.    Please note that this dictation was created using voice recognition software. I have made every reasonable attempt to correct obvious errors, but it is possible there are errors of grammar and possibly content that I did not discover before finalizing the note.

## 2019-07-11 NOTE — LETTER
[facility]   [address] [place] [address]  SHAWN [place]ario [address]  Phone: [phone]  Fax: [phone]              Livia Corea  11/21/1926    Encounter Date: 7/11/2019    KATHY Feliciano          PROGRESS NOTE:  Chief Complaint   Patient presents with   • Hypertension       Subjective:   Livia Corea is a 89 y.o. -American female who presents today to follow-up on hypertension.  She has been to the hospital several times for hypertensive urgency.  There is been many adjustments in her medications can be reviewed and previous notes.    She was in the emergency room most recently on July 2, 2019.  Her current regimen of blood pressure medication is amlodipine 10 mg.  She has discontinued losartan and has not been taking isosorbide which was started at her last ER visit.    She tells me her blood pressure has been running 130-140 millimeters of mercury systolic at home for the last few days.  She is sitting and resting before checking her blood pressure with an arm cuff.  She states she has been limiting sodium in her diet.    At 89 she is living alone and doing her own meals.  She states that she cooks and does not use packaged food.  She worries about her blood pressure and her dog.    She states she feels best when her blood pressure is between 140 and 165 stop.  She feels lightheaded when her blood pressure is lower.  He comes anxious when it is higher.    Past Medical History:   Diagnosis Date   • Circulation problem 01/2018   • Hyperlipidemia    • Hypertension      Past Surgical History:   Procedure Laterality Date   • CAROTID ENDARTERECTOMY Right 2000   • GYN SURGERY      hysterectomy     Family History   Problem Relation Age of Onset   • Heart Attack Mother 64   • Heart Attack Father 72     Social History     Social History   • Marital status: Single     Spouse name: N/A   • Number of children: N/A   • Years of education: N/A      "    Occupational History   • Not on file.     Social History Main Topics   • Smoking status: Former Smoker     Quit date: 2/25/1988   • Smokeless tobacco: Never Used   • Alcohol use Yes      Comment: 1 glass of wine x 2 days a week   • Drug use: No   • Sexual activity: Not on file     Other Topics Concern   • Not on file     Social History Narrative   • No narrative on file     Allergies   Allergen Reactions   • Chlorthalidone Unspecified     Headaches severe. Heart racing    • Clonidine      Hypotension. Bradycardia.   • Hydralazine Unspecified     Pt states she became very hypotensive and had to be \"revived\"   • Lisinopril Swelling     Of lower extremities    • Losartan Unspecified     Causes vision problems     Outpatient Encounter Prescriptions as of 7/11/2019   Medication Sig Dispense Refill   • cloNIDine (CATAPRES) 0.1 MG Tab Take 1 Tab by mouth 2 times a day as needed. For SBP> 180. 30 Tab 3   • isosorbide mononitrate SR (IMDUR) 30 MG TABLET SR 24 HR Take 1 Tab by mouth every morning. 30 Tab 11   • amLODIPine (NORVASC) 10 MG Tab Take 1 Tab by mouth every evening. 30 Tab 11   • Omega-3 Fatty Acids (FISH OIL) 1000 MG Cap capsule Take 1,000 mg by mouth every evening.     • ascorbic acid (ASCORBIC ACID) 500 MG Tab Take 500 mg by mouth every day.     • cyanocobalamin (VITAMIN B-12) 500 MCG Tab Take 500 mcg by mouth every day.     • aspirin EC (ECOTRIN) 81 MG Tablet Delayed Response Take 81 mg by mouth every day.     • [DISCONTINUED] amLODIPine (NORVASC) 10 MG Tab Take 10 mg by mouth every day.     • [DISCONTINUED] isosorbide mononitrate SR (IMDUR) 30 MG TABLET SR 24 HR Take 1 Tab by mouth every day. 30 Tab 1   • [DISCONTINUED] losartan (COZAAR) 50 MG Tab Take 1 Tab by mouth 2 Times a Day. (Patient not taking: Reported on 7/11/2019) 60 Tab 1   • [DISCONTINUED] GARLIC PO Take 2 Tabs by mouth every evening.       No facility-administered encounter medications on file as of 7/11/2019.      Review of Systems " "  Constitutional: Negative for malaise/fatigue.   Respiratory: Negative for cough and shortness of breath.    Cardiovascular: Negative for chest pain, palpitations, orthopnea, claudication, leg swelling and PND.   Gastrointestinal: Negative for abdominal pain.   Musculoskeletal: Negative for myalgias.   Neurological: Negative for dizziness and weakness.   Psychiatric/Behavioral: The patient is nervous/anxious (when blood pressure elevated.).         Objective:   BP (!) 172/100 (BP Location: Left arm, Patient Position: Sitting, BP Cuff Size: Adult)   Pulse 76   Ht 1.638 m (5' 4.5\")   Wt 57.2 kg (126 lb)   SpO2 97%   BMI 21.29 kg/m²      Physical Exam   Constitutional: She is oriented to person, place, and time. She appears well-developed and well-nourished.   Elderly, alert -American female who uses walker.  Appropriate and engaging.   HENT:   Head: Normocephalic.   Eyes: EOM are normal.   Neck: No JVD present.   Cardiovascular: Normal rate, regular rhythm and normal heart sounds.    Pulmonary/Chest: Effort normal and breath sounds normal.   Musculoskeletal: She exhibits no edema.   Neurological: She is alert and oriented to person, place, and time.   Skin: Skin is warm and dry.   Psychiatric: She has a normal mood and affect.     Results for GEO WHITESIDE (MRN [id number])    Ref. Range 7/2/2019 11:25   WBC Latest Ref Range: 4.8 - 10.8 K/uL 5.5   RBC Latest Ref Range: 4.20 - 5.40 M/uL 4.41   Hemoglobin Latest Ref Range: 12.0 - 16.0 g/dL 13.2   Hematocrit Latest Ref Range: 37.0 - 47.0 % 39.9   MCV Latest Ref Range: 81.4 - 97.8 fL 90.5   MCH Latest Ref Range: 27.0 - 33.0 pg 29.9   MCHC Latest Ref Range: 33.6 - 35.0 g/dL 33.1 (L)   RDW Latest Ref Range: 35.9 - 50.0 fL 44.3   Platelet Count Latest Ref Range: 164 - 446 K/uL 209   MPV Latest Ref Range: 9.0 - 12.9 fL 10.1   Neutrophils-Polys Latest Ref Range: 44.00 - 72.00 % 62.60   Neutrophils (Absolute) Latest Ref Range: 2.00 - 7.15 K/uL 3.45 "   Lymphocytes Latest Ref Range: 22.00 - 41.00 % 24.00   Lymphs (Absolute) Latest Ref Range: 1.00 - 4.80 K/uL 1.32   Monocytes Latest Ref Range: 0.00 - 13.40 % 10.70   Monos (Absolute) Latest Ref Range: 0.00 - 0.85 K/uL 0.59   Eosinophils Latest Ref Range: 0.00 - 6.90 % 1.60   Eos (Absolute) Latest Ref Range: 0.00 - 0.51 K/uL 0.09   Basophils Latest Ref Range: 0.00 - 1.80 % 0.70   Baso (Absolute) Latest Ref Range: 0.00 - 0.12 K/uL 0.04   Immature Granulocytes Latest Ref Range: 0.00 - 0.90 % 0.40   Immature Granulocytes (abs) Latest Ref Range: 0.00 - 0.11 K/uL 0.02   Nucleated RBC Latest Units: /100 WBC 0.00   NRBC (Absolute) Latest Units: K/uL 0.00   Sodium Latest Ref Range: 135 - 145 mmol/L 129 (L)   Potassium Latest Ref Range: 3.6 - 5.5 mmol/L 3.8   Chloride Latest Ref Range: 96 - 112 mmol/L 94 (L)   Co2 Latest Ref Range: 20 - 33 mmol/L 24   Anion Gap Latest Ref Range: 0.0 - 11.9  11.0   Glucose Latest Ref Range: 65 - 99 mg/dL 97   Bun Latest Ref Range: 8 - 22 mg/dL 24 (H)   Creatinine Latest Ref Range: 0.50 - 1.40 mg/dL 1.11   GFR If  Latest Ref Range: >60 mL/min/1.73 m 2 56 (A)   GFR If Non  Latest Ref Range: >60 mL/min/1.73 m 2 46 (A)   Calcium Latest Ref Range: 8.5 - 10.5 mg/dL 9.5   AST(SGOT) Latest Ref Range: 12 - 45 U/L 19   ALT(SGPT) Latest Ref Range: 2 - 50 U/L 8   Alkaline Phosphatase Latest Ref Range: 30 - 99 U/L 93   Total Bilirubin Latest Ref Range: 0.1 - 1.5 mg/dL 1.0   Albumin Latest Ref Range: 3.2 - 4.9 g/dL 4.0   Total Protein Latest Ref Range: 6.0 - 8.2 g/dL 7.8   Globulin Latest Ref Range: 1.9 - 3.5 g/dL 3.8 (H)   A-G Ratio Latest Units: g/dL 1.1   Troponin I Latest Ref Range: 0.00 - 0.04 ng/mL 0.04     Assessment:     1. Essential hypertension  isosorbide mononitrate SR (IMDUR) 30 MG TABLET SR 24 HR    amLODIPine (NORVASC) 10 MG Tab   2. Hypertensive urgency  cloNIDine (CATAPRES) 0.1 MG Tab       Medical Decision Making:  Today's Assessment / Status / Plan:      Hypertension: Her blood pressure has been labile in the past but appears to be controlled currently.  It was slightly higher in the office today.  I am not overly concerned.  I feel that we can run her blood pressure between 140 and 170 systolic.  She has been on clonidine previously and was told that she could take clonidine when her blood pressure is greater than 180 systolic.  I am agreeable with this plan.    She feels amlodipine is managing her blood pressure.  She has not been taking the isosorbide which was added at her last ER visit.  I have suggested she take isosorbide in the morning and amlodipine in the evening.  I would like her to monitor her blood pressure only morning and evening.    Previously she was worried about her kidney function which is actually pretty good for an 89-year-old woman.  I do not think kidney function is an issue at this point in her life.    She clearly has anxiety around her blood pressure when it is high.  We will treat the high blood pressures with the clonidine as needed.  If she needs something for anxiety she may benefit from some Xanax.    She has been following with SALAZAR Bianchi and is scheduled for follow-up with her.  We will have her keep these appointments and contact us sooner if problems.    Collaborating Provider: Dr. Lucy Rodriguez.    Please note that this dictation was created using voice recognition software. I have made every reasonable attempt to correct obvious errors, but it is possible there are errors of grammar and possibly content that I did not discover before finalizing the note.          No Recipients

## 2019-07-29 ENCOUNTER — HOSPITAL ENCOUNTER (OUTPATIENT)
Dept: LAB | Facility: MEDICAL CENTER | Age: 84
End: 2019-07-29
Attending: NURSE PRACTITIONER
Payer: MEDICARE

## 2019-07-29 DIAGNOSIS — I10 ESSENTIAL HYPERTENSION, BENIGN: ICD-10-CM

## 2019-07-29 LAB
ANION GAP SERPL CALC-SCNC: 11 MMOL/L (ref 0–11.9)
BUN SERPL-MCNC: 18 MG/DL (ref 8–22)
CALCIUM SERPL-MCNC: 9.5 MG/DL (ref 8.5–10.5)
CHLORIDE SERPL-SCNC: 106 MMOL/L (ref 96–112)
CO2 SERPL-SCNC: 23 MMOL/L (ref 20–33)
CREAT SERPL-MCNC: 1.03 MG/DL (ref 0.5–1.4)
GLUCOSE SERPL-MCNC: 90 MG/DL (ref 65–99)
POTASSIUM SERPL-SCNC: 4 MMOL/L (ref 3.6–5.5)
SODIUM SERPL-SCNC: 140 MMOL/L (ref 135–145)

## 2019-07-29 PROCEDURE — 80048 BASIC METABOLIC PNL TOTAL CA: CPT

## 2019-07-29 PROCEDURE — 36415 COLL VENOUS BLD VENIPUNCTURE: CPT

## 2019-07-29 NOTE — PROGRESS NOTES
Chief Complaint   Patient presents with   • HTN (Controlled)     follow up        Subjective:   Livia Corea is a very pleasant 89 y.o. retired  for [employer] who presents today for hospital follow-up.    Patient was last seen by China LINCOLN on 19 for hospital follow-up, patient was doing fairly well at that time and no significant changes were made to her cardiovascular regimen.     Past medical history significant for difficult to control hypertension, dyslipidemia, artificial left eye and carotid artery disease.     Today patient states that she is feeling really well.  She brings in a blood pressure log showing BP averaging 130s-140s/70's-90's, states that she stopped taking all previous Ze prescribed blood pressure medications and is only taking 10 mg of amlodipine daily.     She denies chest pain, palpitations, shortness of breath, lower extremity edema or dizziness.      Past Medical History:   Diagnosis Date   • Circulation problem 2018   • Hyperlipidemia    • Hypertension      Past Surgical History:   Procedure Laterality Date   • CAROTID ENDARTERECTOMY Right    • GYN SURGERY      hysterectomy     Family History   Problem Relation Age of Onset   • Heart Attack Mother 64   • Heart Attack Father 72     Social History     Socioeconomic History   • Marital status: Single     Spouse name: Not on file   • Number of children: Not on file   • Years of education: Not on file   • Highest education level: Not on file   Occupational History   • Not on file   Social Needs   • Financial resource strain: Not on file   • Food insecurity:     Worry: Not on file     Inability: Not on file   • Transportation needs:     Medical: Not on file     Non-medical: Not on file   Tobacco Use   • Smoking status: Former Smoker     Last attempt to quit: 1988     Years since quittin.4   • Smokeless tobacco: Never Used   Substance and Sexual Activity   • Alcohol use: Yes      "Comment: 1 glass of wine x 2 days a week   • Drug use: No   • Sexual activity: Not on file   Lifestyle   • Physical activity:     Days per week: Not on file     Minutes per session: Not on file   • Stress: Not on file   Relationships   • Social connections:     Talks on phone: Not on file     Gets together: Not on file     Attends Buddhism service: Not on file     Active member of club or organization: Not on file     Attends meetings of clubs or organizations: Not on file     Relationship status: Not on file   • Intimate partner violence:     Fear of current or ex partner: Not on file     Emotionally abused: Not on file     Physically abused: Not on file     Forced sexual activity: Not on file   Other Topics Concern   • Not on file   Social History Narrative   • Not on file     Allergies   Allergen Reactions   • Chlorthalidone Unspecified     Headaches severe. Heart racing    • Clonidine      Hypotension. Bradycardia.   • Hydralazine Unspecified     Pt states she became very hypotensive and had to be \"revived\"   • Lisinopril Swelling     Of lower extremities    • Losartan Unspecified     Causes vision problems     Outpatient Encounter Medications as of 8/1/2019   Medication Sig Dispense Refill   • cloNIDine (CATAPRES) 0.1 MG Tab Take 1 Tab by mouth 2 times a day as needed. For SBP> 180. 30 Tab 3   • amLODIPine (NORVASC) 10 MG Tab Take 1 Tab by mouth every evening. 30 Tab 11   • Omega-3 Fatty Acids (FISH OIL) 1000 MG Cap capsule Take 1,000 mg by mouth every evening.     • ascorbic acid (ASCORBIC ACID) 500 MG Tab Take 500 mg by mouth every day.     • cyanocobalamin (VITAMIN B-12) 500 MCG Tab Take 500 mcg by mouth every day.     • aspirin EC (ECOTRIN) 81 MG Tablet Delayed Response Take 81 mg by mouth every day.     • [DISCONTINUED] isosorbide mononitrate SR (IMDUR) 30 MG TABLET SR 24 HR Take 1 Tab by mouth every morning. (Patient not taking: Reported on 8/1/2019) 30 Tab 11     No facility-administered encounter " "medications on file as of 8/1/2019.      Review of Systems   Constitutional: Negative for malaise/fatigue and weight loss.   Respiratory: Negative for shortness of breath.    Cardiovascular: Negative for chest pain, palpitations, orthopnea, claudication, leg swelling and PND.   Gastrointestinal: Negative for abdominal pain and blood in stool.   Genitourinary: Negative for hematuria.   Neurological: Negative for dizziness and weakness.   All other systems reviewed and are negative.       Objective:   /62 (BP Location: Left arm, Patient Position: Sitting)   Pulse 82   Ht 1.626 m (5' 4\")   Wt 58.1 kg (128 lb)   SpO2 96%   BMI 21.97 kg/m²     Physical Exam   Constitutional: She is oriented to person, place, and time. She appears well-developed and well-nourished. No distress.   HENT:   Head: Normocephalic.   Eyes: EOM are normal.   Neck: No JVD present.   Cardiovascular: Normal rate, regular rhythm and normal heart sounds.   Pulmonary/Chest: Breath sounds normal. No respiratory distress.   Abdominal: Soft. There is no tenderness.   Musculoskeletal: She exhibits no edema.   Neurological: She is alert and oriented to person, place, and time.   Skin: Skin is warm and dry.   Psychiatric: She has a normal mood and affect. Her behavior is normal. Thought content normal.   Vitals reviewed.       TTE 3/10/18:  No prior study is available for comparison.   Normal left ventricular systolic function.  Left ventricular ejection fraction is visually estimated to be 65%.  Mild left ventricular hypertrophy.  Mild aortic stenosis.  Right heart pressures are consistent with moderate pulmonary   hypertension.    Left Ventricle  Left ventricle is small in size. Mild left ventricular hypertrophy. Sigmoid septum. Normal left ventricular systolic function. Left ventricular ejection fraction is visually estimated to be 65%. Normal regional wall motion. Grade I diastolic dysfunction.    Chest X-Ray " 9/15/18:  FINDINGS:  Cardiomediastinal contour is within normal limits.  Atherosclerotic calcification and tortuosity of thoracic aorta.  Minimal curvilinear opacity RIGHT lung base.  No pleural fluid collection or pneumothorax.  S-shaped curvature of thoracic spine    Carotid US 6/12/18:  CONCLUSIONS  Mild bilateral internal carotid artery stenosis (<50%).     Lab Results   Component Value Date/Time    CHOLSTRLTOT 147 06/13/2019 04:45 AM    LDL 90 06/13/2019 04:45 AM    HDL 42 06/13/2019 04:45 AM    TRIGLYCERIDE 73 06/13/2019 04:45 AM       Lab Results   Component Value Date/Time    SODIUM 140 07/29/2019 08:15 AM    POTASSIUM 4.0 07/29/2019 08:15 AM    CHLORIDE 106 07/29/2019 08:15 AM    CO2 23 07/29/2019 08:15 AM    GLUCOSE 90 07/29/2019 08:15 AM    BUN 18 07/29/2019 08:15 AM    CREATININE 1.03 07/29/2019 08:15 AM     Lab Results   Component Value Date/Time    ALKPHOSPHAT 93 07/02/2019 11:25 AM    ASTSGOT 19 07/02/2019 11:25 AM    ALTSGPT 8 07/02/2019 11:25 AM    TBILIRUBIN 1.0 07/02/2019 11:25 AM          Assessment:     1. Essential hypertension     2. Abnormal myocardial perfusion study: Small reversible apical abnormality     3. Atherosclerosis of both carotid arteries: Mild in 2018     4. Dyslipidemia     5. DNR (do not resuscitate)         Medical Decision Making:  Today's Assessment / Status / Plan:     HTN:  -Significantly improved.  -BMP on 7/29/2019 showed stable renal function.  -Continue Norvasc 10 mg daily.    Abnormal MPI:  -Mildly abnormal MPI in 3/18/2018, Dr. Concepcion recommended managing with ASA and statin therapy.  -Normal regional wall motion on noted on echo completed on 3/10/18.  -Denies any episodes of chest pain.  -Continue ASA 81 mg daily.     Carotid Stenosis:  -Carotid US on 6/12/18 showed < 50% stenosis bilaterally.  -Denies dizziness/presynsope/syncope.   -Continue ASA 81 mg daily.  -Unable to tolerate statin medication.    HLD:  -Also significantly improved with lifestyle modification  only as patient did not tolerate Crestor well.  -Unable to tolerate statin therapy.    Patient will establish care with Dr. Fermin in approximately 6 weeks or follow-up earlier should any questions or concerns arise in the meantime.  Encouraged patient to contact office should any questions or concerns arise meantime.  Patient understands and agrees plan of care.    Future Appointments   Date Time Provider Department Center   9/12/2019 11:00 AM Julio Cesar Fermin M.D. [facility] None       Collaborating Provider: Dr. Napoleon Eden     Please note that this dictation was created using voice recognition software. I have made every reasonable attempt to correct obvious errors, but I expect that there are errors of grammar and possibly content I did not discover before finalizing the note.

## 2019-08-01 ENCOUNTER — OFFICE VISIT (OUTPATIENT)
Dept: CARDIOLOGY | Facility: MEDICAL CENTER | Age: 84
End: 2019-08-01
Payer: MEDICARE

## 2019-08-01 VITALS
HEIGHT: 64 IN | SYSTOLIC BLOOD PRESSURE: 156 MMHG | HEART RATE: 82 BPM | DIASTOLIC BLOOD PRESSURE: 62 MMHG | OXYGEN SATURATION: 96 % | BODY MASS INDEX: 21.85 KG/M2 | WEIGHT: 128 LBS

## 2019-08-01 DIAGNOSIS — Z66 DNR (DO NOT RESUSCITATE): Chronic | ICD-10-CM

## 2019-08-01 DIAGNOSIS — R94.39 ABNORMAL MYOCARDIAL PERFUSION STUDY: ICD-10-CM

## 2019-08-01 DIAGNOSIS — I10 ESSENTIAL HYPERTENSION: ICD-10-CM

## 2019-08-01 DIAGNOSIS — I65.23 ATHEROSCLEROSIS OF BOTH CAROTID ARTERIES: ICD-10-CM

## 2019-08-01 DIAGNOSIS — E78.5 DYSLIPIDEMIA: ICD-10-CM

## 2019-08-01 PROBLEM — N17.9 AKI (ACUTE KIDNEY INJURY) (HCC): Status: RESOLVED | Noted: 2019-06-13 | Resolved: 2019-08-01

## 2019-08-01 PROBLEM — I16.0 HYPERTENSIVE URGENCY: Status: RESOLVED | Noted: 2018-03-10 | Resolved: 2019-08-01

## 2019-08-01 PROCEDURE — 99214 OFFICE O/P EST MOD 30 MIN: CPT | Performed by: NURSE PRACTITIONER

## 2019-08-01 ASSESSMENT — ENCOUNTER SYMPTOMS
DIZZINESS: 0
SHORTNESS OF BREATH: 0
ABDOMINAL PAIN: 0
ORTHOPNEA: 0
BLOOD IN STOOL: 0
PND: 0
WEIGHT LOSS: 0
PALPITATIONS: 0
WEAKNESS: 0
CLAUDICATION: 0

## 2019-08-12 ENCOUNTER — PATIENT OUTREACH (OUTPATIENT)
Dept: HEALTH INFORMATION MANAGEMENT | Facility: OTHER | Age: 84
End: 2019-08-12

## 2019-08-12 NOTE — PROGRESS NOTES
A 89-year-old female was an emergent readmission to [facility] from 6/27/2019 to 7/1/2019 to treat Hypertensive urgency. IHD visited the patient bedside. The patient was discharged Home. The patient was not under clinical case management.     The patient was ordered to start/continue to take the following medications: Losartan Potassium (Cozaar) and ISOSORBIDE MONONITRATE. The patient successfully filled all medications.     The patient was ordered to follow-up with Specialist and PCP. The patient had the following appointments:     1) 7/11/2019 @ 1:00 Primary Care Physician, general/family practice - CONFIRMED AS KEPT     2) 7/11/2019 @ 1:15 hCina Rosario certified nurse practitioner - CONFIRMED AS KEPT  The patient has future appointments scheduled for:     1) 8/1/2019 @ 2:15 Breanna Andres certified nurse practitioner - SCHEDULED     2) 8/22/2019 @ 10:30 Breanna Andres certified nurse practitioner - SCHEDULED     3) 8/29/2019 @ 10:00 Primary Care Physician, general/family practice - SCHEDULED    IHD followed the patient for a total of 34 days and Patient  was receptive to services. In summary, IHD Coordinated physician appointments. As a result, IHD redirected patient to a more appropriate level of care and IHD secured a sooner appointment with provider(s). PPS conducted at 80%.

## 2019-08-20 ENCOUNTER — APPOINTMENT (RX ONLY)
Dept: URBAN - METROPOLITAN AREA CLINIC 4 | Facility: CLINIC | Age: 84
Setting detail: DERMATOLOGY
End: 2019-08-20

## 2019-08-20 DIAGNOSIS — L82.1 OTHER SEBORRHEIC KERATOSIS: ICD-10-CM

## 2019-08-20 PROBLEM — I10 ESSENTIAL (PRIMARY) HYPERTENSION: Status: ACTIVE | Noted: 2019-08-20

## 2019-08-20 PROBLEM — D48.5 NEOPLASM OF UNCERTAIN BEHAVIOR OF SKIN: Status: ACTIVE | Noted: 2019-08-20

## 2019-08-20 PROCEDURE — ? COUNSELING

## 2019-08-20 PROCEDURE — 11102 TANGNTL BX SKIN SINGLE LES: CPT

## 2019-08-20 PROCEDURE — ? ADDITIONAL NOTES

## 2019-08-20 PROCEDURE — ? BIOPSY BY SHAVE METHOD

## 2019-08-20 ASSESSMENT — LOCATION SIMPLE DESCRIPTION DERM: LOCATION SIMPLE: RIGHT BREAST

## 2019-08-20 ASSESSMENT — LOCATION DETAILED DESCRIPTION DERM: LOCATION DETAILED: RIGHT MEDIAL BREAST 3-4:00 REGION

## 2019-08-20 ASSESSMENT — LOCATION ZONE DERM: LOCATION ZONE: TRUNK

## 2019-08-20 NOTE — PROCEDURE: ADDITIONAL NOTES
Additional Notes: Lesion of concern on the right breast is clinically consistent with a benign seborrheic keratosis.\\nOffered biopsy today vs monitoring.\\nPatient elects for a biopsy today.
Detail Level: Zone

## 2019-08-20 NOTE — PROCEDURE: BIOPSY BY SHAVE METHOD
Post-Care Instructions: I reviewed with the patient in detail post-care instructions. Patient is to keep the biopsy site dry overnight, and then apply bacitracin twice daily until healed. Patient may apply hydrogen peroxide soaks to remove any crusting.
Anesthesia Type: 1% lidocaine with 1:100,000 epinephrine and 408mcg clindamycin/ml and a 1:10 solution of 8.4% sodium bicarbonate
Destruction After The Procedure: No
Cryotherapy Text: The wound bed was treated with cryotherapy after the biopsy was performed.
Additional Anesthesia Volume In Cc (Will Not Render If 0): 0
Wound Care: Petrolatum
Lab Facility: 
Notification Instructions: Patient will be notified of biopsy results. However, patient instructed to call the office if not contacted within 2 weeks.
Electrodesiccation Text: The wound bed was treated with electrodesiccation after the biopsy was performed.
Depth Of Biopsy: dermis
Consent: Written consent was obtained and risks were reviewed including but not limited to scarring, infection, bleeding, scabbing, incomplete removal, nerve damage and allergy to anesthesia.
Biopsy Type: H and E
Type Of Destruction Used: Curettage
Electrodesiccation And Curettage Text: The wound bed was treated with electrodesiccation and curettage after the biopsy was performed.
Dressing: bandage
Silver Nitrate Text: The wound bed was treated with silver nitrate after the biopsy was performed.
Hemostasis: Pressure
Was A Bandage Applied: Yes
Biopsy Method: Dermablade
Billing Type: Third-Party Bill
Curettage Text: The wound bed was treated with curettage after the biopsy was performed.
Lab: [id number]
Detail Level: Detailed

## 2019-09-10 ENCOUNTER — OFFICE VISIT (OUTPATIENT)
Dept: CARDIOLOGY | Facility: MEDICAL CENTER | Age: 84
End: 2019-09-10
Payer: MEDICARE

## 2019-09-10 VITALS
HEIGHT: 64 IN | SYSTOLIC BLOOD PRESSURE: 158 MMHG | WEIGHT: 125.2 LBS | OXYGEN SATURATION: 95 % | BODY MASS INDEX: 21.37 KG/M2 | HEART RATE: 77 BPM | DIASTOLIC BLOOD PRESSURE: 82 MMHG

## 2019-09-10 DIAGNOSIS — I65.23 ATHEROSCLEROSIS OF BOTH CAROTID ARTERIES: ICD-10-CM

## 2019-09-10 DIAGNOSIS — Z66 DNR (DO NOT RESUSCITATE): Chronic | ICD-10-CM

## 2019-09-10 DIAGNOSIS — I10 ESSENTIAL HYPERTENSION: ICD-10-CM

## 2019-09-10 DIAGNOSIS — E78.5 DYSLIPIDEMIA: ICD-10-CM

## 2019-09-10 DIAGNOSIS — I10 ESSENTIAL HYPERTENSION, BENIGN: ICD-10-CM

## 2019-09-10 DIAGNOSIS — R94.39 ABNORMAL MYOCARDIAL PERFUSION STUDY: ICD-10-CM

## 2019-09-10 PROCEDURE — 99213 OFFICE O/P EST LOW 20 MIN: CPT | Performed by: NURSE PRACTITIONER

## 2019-09-10 RX ORDER — HYDROCHLOROTHIAZIDE 12.5 MG/1
12.5 TABLET ORAL DAILY
Qty: 90 TAB | Refills: 1 | Status: SHIPPED | OUTPATIENT
Start: 2019-09-10

## 2019-09-10 ASSESSMENT — ENCOUNTER SYMPTOMS
ORTHOPNEA: 0
WEAKNESS: 0
PALPITATIONS: 0
NERVOUS/ANXIOUS: 1
CLAUDICATION: 0
WEIGHT LOSS: 0
PND: 0
DIZZINESS: 0
SHORTNESS OF BREATH: 0

## 2019-09-10 NOTE — PROGRESS NOTES
Chief Complaint   Patient presents with   • Hypertension       Subjective:   Livia Corea is a very pleasant 89 y.o. retired  for [employer] who presents today for hospital follow-up.    Patient was last seen by myself on 8/1/19. Her BP was well controlled at that time and no significant changes were made to her cardiovascular regimen.     Past medical history significant for difficult to control hypertension, dyslipidemia, artificial left eye and carotid artery disease.     Today patient states that she has had a few health issues since her last visit which has caused some anxiety and her blood pressure has been elevated as a result.  She had some 12.5 mg hydrochlorothiazide tablets at home which she has taken twice for SBP in the 180s.  This medication was discontinued during a hospitalization a few months ago due to some renal insufficiency that developed as a result of significant hypotension after a large dose of hydralazine was given.     She denies chest pain, palpitations, shortness of breath, lower extremity edema or dizziness.      Past Medical History:   Diagnosis Date   • Circulation problem 01/2018   • Hyperlipidemia    • Hypertension      Past Surgical History:   Procedure Laterality Date   • CAROTID ENDARTERECTOMY Right 2000   • GYN SURGERY      hysterectomy     Family History   Problem Relation Age of Onset   • Heart Attack Mother 64   • Heart Attack Father 72     Social History     Socioeconomic History   • Marital status: Single     Spouse name: Not on file   • Number of children: Not on file   • Years of education: Not on file   • Highest education level: Not on file   Occupational History   • Not on file   Social Needs   • Financial resource strain: Not on file   • Food insecurity:     Worry: Not on file     Inability: Not on file   • Transportation needs:     Medical: Not on file     Non-medical: Not on file   Tobacco Use   • Smoking status: Former Smoker     Last  "attempt to quit: 1988     Years since quittin.5   • Smokeless tobacco: Never Used   Substance and Sexual Activity   • Alcohol use: Yes     Comment: 1 glass of wine x 2 days a week   • Drug use: No   • Sexual activity: Not on file   Lifestyle   • Physical activity:     Days per week: Not on file     Minutes per session: Not on file   • Stress: Not on file   Relationships   • Social connections:     Talks on phone: Not on file     Gets together: Not on file     Attends Cheondoism service: Not on file     Active member of club or organization: Not on file     Attends meetings of clubs or organizations: Not on file     Relationship status: Not on file   • Intimate partner violence:     Fear of current or ex partner: Not on file     Emotionally abused: Not on file     Physically abused: Not on file     Forced sexual activity: Not on file   Other Topics Concern   • Not on file   Social History Narrative   • Not on file     Allergies   Allergen Reactions   • Chlorthalidone Unspecified     Headaches severe. Heart racing    • Clonidine      Hypotension. Bradycardia.   • Hydralazine Unspecified     Pt states she became very hypotensive and had to be \"revived\"   • Lisinopril Swelling     Of lower extremities    • Losartan Unspecified     Causes vision problems     Outpatient Encounter Medications as of 9/10/2019   Medication Sig Dispense Refill   • hydroCHLOROthiazide (HYDRODIURIL) 12.5 MG tablet Take 1 Tab by mouth every day. 90 Tab 1   • amLODIPine (NORVASC) 10 MG Tab Take 1 Tab by mouth every evening. 30 Tab 11   • Omega-3 Fatty Acids (FISH OIL) 1000 MG Cap capsule Take 1,000 mg by mouth every evening.     • ascorbic acid (ASCORBIC ACID) 500 MG Tab Take 500 mg by mouth every day.     • cyanocobalamin (VITAMIN B-12) 500 MCG Tab Take 500 mcg by mouth every day.     • aspirin EC (ECOTRIN) 81 MG Tablet Delayed Response Take 81 mg by mouth every day.     • [DISCONTINUED] cloNIDine (CATAPRES) 0.1 MG Tab Take 1 Tab by " "mouth 2 times a day as needed. For SBP> 180. (Patient not taking: Reported on 9/10/2019) 30 Tab 3     No facility-administered encounter medications on file as of 9/10/2019.      Review of Systems   Constitutional: Negative for malaise/fatigue and weight loss.   Respiratory: Negative for shortness of breath.    Cardiovascular: Negative for chest pain, palpitations, orthopnea, claudication, leg swelling and PND.   Neurological: Negative for dizziness and weakness.   Psychiatric/Behavioral: The patient is nervous/anxious.    All other systems reviewed and are negative.       Objective:   /82 (BP Location: Left arm, Patient Position: Sitting, BP Cuff Size: Adult)   Pulse 77   Ht 1.626 m (5' 4\")   Wt 56.8 kg (125 lb 3.2 oz)   SpO2 95%   BMI 21.49 kg/m²     Physical Exam   Constitutional: She is oriented to person, place, and time. She appears well-developed and well-nourished. No distress.   HENT:   Head: Normocephalic.   Eyes: EOM are normal.   Neck: No JVD present.   Cardiovascular: Normal rate, regular rhythm and normal heart sounds.   Pulmonary/Chest: Breath sounds normal. No respiratory distress.   Abdominal: Soft. There is no tenderness.   Musculoskeletal: She exhibits no edema.   Neurological: She is alert and oriented to person, place, and time.   Skin: Skin is warm and dry.   Psychiatric: She has a normal mood and affect. Her behavior is normal.        TTE 3/10/18:  No prior study is available for comparison.   Normal left ventricular systolic function.  Left ventricular ejection fraction is visually estimated to be 65%.  Mild left ventricular hypertrophy.  Mild aortic stenosis.  Right heart pressures are consistent with moderate pulmonary   hypertension.    Left Ventricle  Left ventricle is small in size. Mild left ventricular hypertrophy. Sigmoid septum. Normal left ventricular systolic function. Left ventricular ejection fraction is visually estimated to be 65%. Normal regional wall motion. Grade " I diastolic dysfunction.    Chest X-Ray 9/15/18:  FINDINGS:  Cardiomediastinal contour is within normal limits.  Atherosclerotic calcification and tortuosity of thoracic aorta.  Minimal curvilinear opacity RIGHT lung base.  No pleural fluid collection or pneumothorax.  S-shaped curvature of thoracic spine    Carotid US 6/12/18:  CONCLUSIONS  Mild bilateral internal carotid artery stenosis (<50%).     Lab Results   Component Value Date/Time    SODIUM 140 07/29/2019 08:15 AM    POTASSIUM 4.0 07/29/2019 08:15 AM    CHLORIDE 106 07/29/2019 08:15 AM    CO2 23 07/29/2019 08:15 AM    GLUCOSE 90 07/29/2019 08:15 AM    BUN 18 07/29/2019 08:15 AM    CREATININE 1.03 07/29/2019 08:15 AM     .    Assessment:     1. Essential hypertension, benign  Basic Metabolic Panel   2. Essential hypertension     3. Abnormal myocardial perfusion study: Small reversible apical abnormality     4. Atherosclerosis of both carotid arteries: Mild in 2018     5. Dyslipidemia     6. DNR (do not resuscitate)         Medical Decision Making:  Today's Assessment / Status / Plan:     HTN:  -Significantly improved.  -Renal function was stable on 7/29/19  -Continue Norvasc 10 mg daily.  -Prn daily HCTZ 12.5 mg SBP >170.   -BMP in 7-10 days.     Abnormal MPI:  -Mildly abnormal, Dr. Concepcion recommended managing with ASA and statin therapy.  -Normal regional wall motion per TTE on 3/10/18.  -Denies any occurrence of chest discomfort.   -Continue ASA 81 mg daily.     Carotid Stenosis:  -Carotid US on 6/12/18 showed < 50% stenosis bilaterally.  -Denies dizziness/presynsope/syncope.   -Continue ASA 81 mg daily.  -Patient refuses statin medication due to intolerance.     HLD:  -Improved and stable with lifestyle modification.  -Statin intolerance as above.     Patient will establish care with Dr. Fermin in approximately 6 weeks or follow-up earlier should any questions or concerns arise in the meantime.  Encouraged patient to contact office should any questions or  concerns arise meantime.  Patient understands and agrees plan of care.    Future Appointments   Date Time Provider Department Center   9/20/2019  1:15 PM [address] [facility] [address]   10/7/2019 11:20 AM Julio Cesar Fermin M.D. [facility] None       Collaborating Provider: Dr. Napoleon Eden     Please note that this dictation was created using voice recognition software. I have made every reasonable attempt to correct obvious errors, but I expect that there are errors of grammar and possibly content I did not discover before finalizing the note.

## 2019-09-20 ENCOUNTER — APPOINTMENT (OUTPATIENT)
Dept: RADIOLOGY | Facility: MEDICAL CENTER | Age: 84
End: 2019-09-20
Attending: PSYCHIATRY & NEUROLOGY
Payer: MEDICARE

## 2019-09-25 ENCOUNTER — HOSPITAL ENCOUNTER (OUTPATIENT)
Dept: LAB | Facility: MEDICAL CENTER | Age: 84
End: 2019-09-25
Attending: NURSE PRACTITIONER
Payer: MEDICARE

## 2019-09-25 DIAGNOSIS — I10 ESSENTIAL HYPERTENSION, BENIGN: ICD-10-CM

## 2019-09-25 LAB
ANION GAP SERPL CALC-SCNC: 11 MMOL/L (ref 0–11.9)
BUN SERPL-MCNC: 22 MG/DL (ref 8–22)
CALCIUM SERPL-MCNC: 9.9 MG/DL (ref 8.5–10.5)
CHLORIDE SERPL-SCNC: 103 MMOL/L (ref 96–112)
CO2 SERPL-SCNC: 27 MMOL/L (ref 20–33)
CREAT SERPL-MCNC: 1.03 MG/DL (ref 0.5–1.4)
GLUCOSE SERPL-MCNC: 104 MG/DL (ref 65–99)
POTASSIUM SERPL-SCNC: 3.6 MMOL/L (ref 3.6–5.5)
SODIUM SERPL-SCNC: 141 MMOL/L (ref 135–145)

## 2019-09-25 PROCEDURE — 80048 BASIC METABOLIC PNL TOTAL CA: CPT

## 2019-09-25 PROCEDURE — 36415 COLL VENOUS BLD VENIPUNCTURE: CPT

## 2019-09-26 ENCOUNTER — TELEPHONE (OUTPATIENT)
Dept: CARDIOLOGY | Facility: MEDICAL CENTER | Age: 84
End: 2019-09-26

## 2019-09-26 NOTE — TELEPHONE ENCOUNTER
Patient called back stating that her BP went down to 140/72 then high again (up to 170/90) and would like to know what she should do. Please call them back at [phone].    Thank you,  Benita VIDAL

## 2019-09-26 NOTE — TELEPHONE ENCOUNTER
Called pt, pt reports her BP this morning when she woke up 170/85, she took her Norvasc 10mg and she rechecked her BP and it remained SBP-170 so she took her HCTZ which she normally takes at night, it did dropped it down to SBP 140s but now it is back up again to 170. Denies in changed in her diet, admits to some stressful events. She would like to know if we need to adjust her medications, she reports her BP are mostly running -150s     To JS, please advice, Thank You!

## 2019-09-26 NOTE — TELEPHONE ENCOUNTER
JS      Patient said her blood pressure is running high at 170/80 and she can't get it lowered. She can be reached at [phone].

## 2019-09-27 NOTE — TELEPHONE ENCOUNTER
Called Livia back on her cell phone. States that she has had some recent emotional stressors regarding a close friend who is terminally ill and that she found a lump on her dogs stomach that has her very worried. She increased her water intake and rechecked her BP and it was improved at 150/72, pulse 74. I discussed with you that if your SBP remains above 160 4-6 hours after your morning medications that she can take an additional HCTZ. Livia asked to please call office and let us know if she is having to take extra HCTZ regularly so we can check her kidney function. Patient will follow up with Dr. Fermin as scheduled on 10/7/19.

## 2019-10-04 ENCOUNTER — HOSPITAL ENCOUNTER (OUTPATIENT)
Dept: LAB | Facility: MEDICAL CENTER | Age: 84
End: 2019-10-04
Attending: OPHTHALMOLOGY
Payer: MEDICARE

## 2019-10-04 LAB
ANION GAP SERPL CALC-SCNC: 11 MMOL/L (ref 0–11.9)
BUN SERPL-MCNC: 23 MG/DL (ref 8–22)
CALCIUM SERPL-MCNC: 9.3 MG/DL (ref 8.5–10.5)
CHLORIDE SERPL-SCNC: 103 MMOL/L (ref 96–112)
CO2 SERPL-SCNC: 27 MMOL/L (ref 20–33)
CREAT SERPL-MCNC: 1.09 MG/DL (ref 0.5–1.4)
GLUCOSE SERPL-MCNC: 76 MG/DL (ref 65–99)
POTASSIUM SERPL-SCNC: 3.7 MMOL/L (ref 3.6–5.5)
SODIUM SERPL-SCNC: 141 MMOL/L (ref 135–145)

## 2019-10-04 PROCEDURE — 36415 COLL VENOUS BLD VENIPUNCTURE: CPT

## 2019-10-04 PROCEDURE — 80048 BASIC METABOLIC PNL TOTAL CA: CPT

## 2019-10-07 ENCOUNTER — OFFICE VISIT (OUTPATIENT)
Dept: CARDIOLOGY | Facility: MEDICAL CENTER | Age: 84
End: 2019-10-07
Payer: MEDICARE

## 2019-10-07 VITALS
SYSTOLIC BLOOD PRESSURE: 132 MMHG | HEIGHT: 65 IN | OXYGEN SATURATION: 95 % | HEART RATE: 66 BPM | DIASTOLIC BLOOD PRESSURE: 78 MMHG | WEIGHT: 124.34 LBS | BODY MASS INDEX: 20.72 KG/M2

## 2019-10-07 DIAGNOSIS — I10 ESSENTIAL HYPERTENSION: ICD-10-CM

## 2019-10-07 DIAGNOSIS — Z86.73 HISTORY OF STROKE: ICD-10-CM

## 2019-10-07 PROCEDURE — 99214 OFFICE O/P EST MOD 30 MIN: CPT | Performed by: INTERNAL MEDICINE

## 2019-10-07 RX ORDER — EPLERENONE 25 MG/1
12.5 TABLET, FILM COATED ORAL DAILY
Qty: 90 TAB | Refills: 3 | Status: SHIPPED | OUTPATIENT
Start: 2019-10-07 | End: 2020-01-07

## 2019-10-07 NOTE — PROGRESS NOTES
"CARDIOLOGY OUTPATIENT FOLLOWUP    PCP: Perlita Mcadams M.D.    1. Essential hypertension  Poorly controlled at home and with multiple medication intolerances- including fainting after taking spironolactone. I started eplerenone 12.5 mg daily and continued the other medications targeting a BP of <140/90.     2. History of stroke  No residual deficits    Follow up with LOLY Bianchi in 2 months     Chief Complaint   Patient presents with   • Hypertension       History: Livia Corea is a 89 y.o. female with a past medical history of hypertension and CVA presenting for follow up of hypertension.  She has had 3 trips to the hospital with elevated blood pressures in the past year.  Average home blood pressure runs 150-160 systolic.  It climbs higher during times of physical or emotional strain.  She measures her blood pressure multiple times daily.  She reports many intolerances to medications in the past including a fainting spell with spironolactone.    She is a fairly sophisticated woman who continues to live independently.  She continues to drive.  She is sitting in a eye physician about diplopia    ROS:   All other systems reviewed and negative except as per the HPI    PE:  /78 (BP Location: Left arm, Patient Position: Sitting, BP Cuff Size: Adult)   Pulse 66   Ht 1.638 m (5' 4.5\")   Wt 56.4 kg (124 lb 5.4 oz)   SpO2 95%   BMI 21.01 kg/m²   Gen: Well appearing  HEENT: Symmetric face. Anicteric sclerae. Moist mucus membranes  NECK: NoJVD. No lymphadenopathy  CARDIAC: Normal PMI, regular, normal S1, S2.    VASCULATURE: Normal carotid amplitude without bruit.   RESP: Clear to auscultation bilaterally  ABD: Soft, non-tender, non-distended  EXT: No edema, no clubbing or cyanosis  SKIN: Warm and dry  NEURO: No gross deficits   PSYCH: Appropriate affect, participates in conversation    Past Medical History:   Diagnosis Date   • Circulation problem 01/2018   • Hyperlipidemia    • " "Hypertension      Allergies   Allergen Reactions   • Chlorthalidone Unspecified     Headaches severe. Heart racing    • Clonidine      Hypotension. Bradycardia.   • Hydralazine Unspecified     Pt states she became very hypotensive and had to be \"revived\"   • Lisinopril Swelling     Of lower extremities    • Losartan Unspecified     Causes vision problems     Outpatient Encounter Medications as of 10/7/2019   Medication Sig Dispense Refill   • eplerenone (INSPRA) 25 MG Tab Take 0.5 Tabs by mouth every day. 90 Tab 3   • hydroCHLOROthiazide (HYDRODIURIL) 12.5 MG tablet Take 1 Tab by mouth every day. 90 Tab 1   • amLODIPine (NORVASC) 10 MG Tab Take 1 Tab by mouth every evening. 30 Tab 11   • Omega-3 Fatty Acids (FISH OIL) 1000 MG Cap capsule Take 1,000 mg by mouth every evening.     • ascorbic acid (ASCORBIC ACID) 500 MG Tab Take 500 mg by mouth every day.     • cyanocobalamin (VITAMIN B-12) 500 MCG Tab Take 500 mcg by mouth every day.     • aspirin EC (ECOTRIN) 81 MG Tablet Delayed Response Take 81 mg by mouth every day.       No facility-administered encounter medications on file as of 10/7/2019.      Social History     Socioeconomic History   • Marital status: Single     Spouse name: Not on file   • Number of children: Not on file   • Years of education: Not on file   • Highest education level: Not on file   Occupational History   • Not on file   Social Needs   • Financial resource strain: Not on file   • Food insecurity:     Worry: Not on file     Inability: Not on file   • Transportation needs:     Medical: Not on file     Non-medical: Not on file   Tobacco Use   • Smoking status: Former Smoker     Last attempt to quit: 1988     Years since quittin.6   • Smokeless tobacco: Never Used   Substance and Sexual Activity   • Alcohol use: Yes     Comment: 1 glass of wine x 2 days a week   • Drug use: No   • Sexual activity: Not on file   Lifestyle   • Physical activity:     Days per week: Not on file     Minutes " per session: Not on file   • Stress: Not on file   Relationships   • Social connections:     Talks on phone: Not on file     Gets together: Not on file     Attends [employer] service: Not on file     Active member of club or organization: Not on file     Attends meetings of clubs or organizations: Not on file     Relationship status: Not on file   • Intimate partner violence:     Fear of current or ex partner: Not on file     Emotionally abused: Not on file     Physically abused: Not on file     Forced sexual activity: Not on file   Other Topics Concern   • Not on file   Social History Narrative   • Not on file       Studies  Lab Results   Component Value Date/Time    CHOLSTRLTOT 147 06/13/2019 04:45 AM    LDL 90 06/13/2019 04:45 AM    HDL 42 06/13/2019 04:45 AM    TRIGLYCERIDE 73 06/13/2019 04:45 AM       Lab Results   Component Value Date/Time    SODIUM 141 10/04/2019 08:26 AM    POTASSIUM 3.7 10/04/2019 08:26 AM    CHLORIDE 103 10/04/2019 08:26 AM    CO2 27 10/04/2019 08:26 AM    GLUCOSE 76 10/04/2019 08:26 AM    BUN 23 (H) 10/04/2019 08:26 AM    CREATININE 1.09 10/04/2019 08:26 AM     Lab Results   Component Value Date/Time    ALKPHOSPHAT 93 07/02/2019 11:25 AM    ASTSGOT 19 07/02/2019 11:25 AM    ALTSGPT 8 07/02/2019 11:25 AM    TBILIRUBIN 1.0 07/02/2019 11:25 AM        For this encounter I directly reviewed medical records

## 2019-11-07 ENCOUNTER — TELEPHONE (OUTPATIENT)
Dept: CARDIOLOGY | Facility: MEDICAL CENTER | Age: 84
End: 2019-11-07

## 2019-11-07 NOTE — TELEPHONE ENCOUNTER
"Received walk-in form.  Patient reported taking a second dose of her Amlodipine and had concerns of \"overdosing\".  Second dose of Amlodipine taken around 11:00 a.m. per patient report.  Performed blood pressure check = 130/78.  Patient stated she was asymptomatic.  Consulted with Dr. Fermin who stated it was okay to send her home and to advise her to lay down if she felt lightheaded or dizzy.  Patient was very appreciative of the attention given to this matter.  "

## 2019-11-09 ENCOUNTER — HOSPITAL ENCOUNTER (OUTPATIENT)
Dept: LAB | Facility: MEDICAL CENTER | Age: 84
End: 2019-11-09
Attending: OPHTHALMOLOGY
Payer: MEDICARE

## 2019-11-09 ENCOUNTER — HOSPITAL ENCOUNTER (OUTPATIENT)
Dept: RADIOLOGY | Facility: MEDICAL CENTER | Age: 84
End: 2019-11-09
Attending: OPHTHALMOLOGY
Payer: MEDICARE

## 2019-11-09 DIAGNOSIS — H20.011 PRIMARY IRIDOCYCLITIS OF RIGHT EYE: ICD-10-CM

## 2019-11-09 LAB — TREPONEMA PALLIDUM IGG+IGM AB [PRESENCE] IN SERUM OR PLASMA BY IMMUNOASSAY: NON REACTIVE

## 2019-11-09 PROCEDURE — 85549 MURAMIDASE: CPT

## 2019-11-09 PROCEDURE — 86480 TB TEST CELL IMMUN MEASURE: CPT

## 2019-11-09 PROCEDURE — 36415 COLL VENOUS BLD VENIPUNCTURE: CPT

## 2019-11-09 PROCEDURE — 71046 X-RAY EXAM CHEST 2 VIEWS: CPT

## 2019-11-09 PROCEDURE — 86780 TREPONEMA PALLIDUM: CPT

## 2019-11-11 LAB
GAMMA INTERFERON BACKGROUND BLD IA-ACNC: 0.08 IU/ML
M TB IFN-G BLD-IMP: NEGATIVE
M TB IFN-G CD4+ BCKGRND COR BLD-ACNC: 0 IU/ML
MITOGEN IGNF BCKGRD COR BLD-ACNC: >10 IU/ML
QFT TB2 - NIL TBQ2: 0.02 IU/ML

## 2019-11-12 LAB — LYSOZYME SERPL-MCNC: 1.45 UG/ML (ref 0–2.75)

## 2019-11-16 ENCOUNTER — OFFICE VISIT (OUTPATIENT)
Dept: URGENT CARE | Facility: CLINIC | Age: 84
End: 2019-11-16
Payer: MEDICARE

## 2019-11-16 VITALS
BODY MASS INDEX: 20.79 KG/M2 | WEIGHT: 124.8 LBS | RESPIRATION RATE: 12 BRPM | DIASTOLIC BLOOD PRESSURE: 80 MMHG | TEMPERATURE: 98.1 F | SYSTOLIC BLOOD PRESSURE: 134 MMHG | OXYGEN SATURATION: 95 % | HEART RATE: 92 BPM | HEIGHT: 65 IN

## 2019-11-16 DIAGNOSIS — H61.23 BILATERAL IMPACTED CERUMEN: Primary | ICD-10-CM

## 2019-11-16 PROCEDURE — 99213 OFFICE O/P EST LOW 20 MIN: CPT | Performed by: PHYSICIAN ASSISTANT

## 2019-11-16 NOTE — PATIENT INSTRUCTIONS
Cerumen Plug  A cerumen plug is having too much wax in your ear canal. The outer ear canal is lined with hairs and glands that secrete wax. This wax is called cerumen. This protects the ear canal. It also helps prevent material from entering the ear. Too much wax can cause a feeling of fullness in the ears, decreased hearing, ringing in the ears, or an earache. Sometimes your caregiver will remove a cerumen plug with an instrument called a curette. Or he/she may flush the ear canal with warm water from a syringe to remove the wax. You may simply be sent home to follow the home care instructions below for wax removal.  Generally ear wax does not have to be removed unless it is causing a problem such as one of those listed above. When too much wax is causing a problem, the following are a few home remedies which can be used to help this problem.  HOME CARE INSTRUCTIONS   · Put a couple drops of glycerin, baby oil, or mineral oil in the ear a couple times of day. Do this every day for several days. After putting the drops in, you will need to lay with the affected ear pointing up for a couple minutes. This allows the drops to remain in the canal and run down to the area of wax blockage. This will soften the wax plug. It may also make your hearing worse as the wax softens and blocks the canal even more.  · After a couple days, you may gently flush the ear canal with warm water from a syringe. Do this by pulling your ear up and back with your head tilted slightly forward and towards a pan to catch the water. This is most easily done with a helper. You can also accomplish the same thing by letting the shower beat into your ear canal to wash the wax out. Sometimes this will not be immediately successful. You will have to return to the first step of using the oil to further soften the wax. Then resume washing the ear canal out with a syringe or shower.  · Following removal of the wax, put ten to twenty drops of rubbing  alcohol into the outer ears. This will dry the canal and prevent an infection.  · Do not irrigate or wash out your ears if you have had a perforated ear drum or mastoid surgery.  SEEK IMMEDIATE MEDICAL CARE IF:   · You are unsuccessful with the above instructions for home care.  · You develop ear pain or drainage from the ear.  MAKE SURE YOU:   · Understand these instructions.  · Will watch your condition.  · Will get help right away if you are not doing well or get worse.  Document Released: 09/12/2002 Document Revised: 03/11/2013 Document Reviewed: 12/09/2009  [employer]® Patient Information ©2014 [employer].

## 2019-11-16 NOTE — PROGRESS NOTES
Subjective:      Pt is a 89 y.o. female who presents with Cerumen Impaction (x 1 day.  Pt. states that her R ear is clogged with wax. )            HPI  This is a new problem. Pt notes about 1 day of right ear hearing loss with left ear fullness and concerned for wax impaction. Pt has not taken any Rx medications for this condition. Pt states the pain is a 1/10, aching in nature and worse at night. Pt denies CP, SOB, NVD, paresthesias, headaches, dizziness, change in vision, hives, or other joint pain. The pt's medication list, problem list, and allergies have been evaluated and reviewed during today's visit.    PMH:  Past Medical History:   Diagnosis Date   • Circulation problem 2018   • Hyperlipidemia    • Hypertension        PSH:  Past Surgical History:   Procedure Laterality Date   • CAROTID ENDARTERECTOMY Right    • GYN SURGERY      hysterectomy       Fam Hx:    family history includes Heart Attack (age of onset: 64) in her mother; Heart Attack (age of onset: 72) in her father.  Family Status   Relation Name Status   • Mo   at age 62        heart attack   • Fa         Soc HX:  Social History     Socioeconomic History   • Marital status: Single     Spouse name: Not on file   • Number of children: Not on file   • Years of education: Not on file   • Highest education level: Not on file   Occupational History   • Not on file   Social Needs   • Financial resource strain: Not on file   • Food insecurity:     Worry: Not on file     Inability: Not on file   • Transportation needs:     Medical: Not on file     Non-medical: Not on file   Tobacco Use   • Smoking status: Former Smoker     Packs/day: 0.00     Last attempt to quit: 1988     Years since quittin.7   • Smokeless tobacco: Never Used   Substance and Sexual Activity   • Alcohol use: Yes     Comment: 1 glass of wine x 2 days a week   • Drug use: No   • Sexual activity: Not on file   Lifestyle   • Physical activity:     Days per week:  Not on file     Minutes per session: Not on file   • Stress: Not on file   Relationships   • Social connections:     Talks on phone: Not on file     Gets together: Not on file     Attends [employer] service: Not on file     Active member of club or organization: Not on file     Attends meetings of clubs or organizations: Not on file     Relationship status: Not on file   • Intimate partner violence:     Fear of current or ex partner: Not on file     Emotionally abused: Not on file     Physically abused: Not on file     Forced sexual activity: Not on file   Other Topics Concern   • Not on file   Social History Narrative   • Not on file         Medications:    Current Outpatient Medications:   •  eplerenone (INSPRA) 25 MG Tab, Take 0.5 Tabs by mouth every day., Disp: 90 Tab, Rfl: 3  •  hydroCHLOROthiazide (HYDRODIURIL) 12.5 MG tablet, Take 1 Tab by mouth every day., Disp: 90 Tab, Rfl: 1  •  amLODIPine (NORVASC) 10 MG Tab, Take 1 Tab by mouth every evening., Disp: 30 Tab, Rfl: 11  •  Omega-3 Fatty Acids (FISH OIL) 1000 MG Cap capsule, Take 1,000 mg by mouth every evening., Disp: , Rfl:   •  ascorbic acid (ASCORBIC ACID) 500 MG Tab, Take 500 mg by mouth every day., Disp: , Rfl:   •  cyanocobalamin (VITAMIN B-12) 500 MCG Tab, Take 500 mcg by mouth every day., Disp: , Rfl:   •  aspirin EC (ECOTRIN) 81 MG Tablet Delayed Response, Take 81 mg by mouth every day., Disp: , Rfl:       Allergies:  Chlorthalidone; Clonidine; Hydralazine; Lisinopril; and Losartan      ROS  Constitutional: Negative for fever, chills and malaise/fatigue.   HENT: Negative for congestion and sore throat.  +Wax impaction B/L ears  Eyes: Negative for blurred vision, double vision and photophobia.   Respiratory: Negative for cough and shortness of breath.  Cardiovascular: Negative for chest pain and palpitations.   Gastrointestinal: Negative for heartburn, nausea, vomiting, abdominal pain, diarrhea and constipation.   Genitourinary: Negative for dysuria  "and flank pain.   Musculoskeletal: Negative for joint pain and myalgias.   Skin: Negative for itching and rash.   Neurological: Negative for dizziness, tingling and headaches.   Endo/Heme/Allergies: Does not bruise/bleed easily.   Psychiatric/Behavioral: Negative for depression. The patient is not nervous/anxious.           Objective:     /80   Pulse 92   Temp 36.7 °C (98.1 °F) (Temporal)   Resp 12   Ht 1.638 m (5' 4.5\")   Wt 56.6 kg (124 lb 12.8 oz)   SpO2 95%   BMI 21.09 kg/m²      Physical Exam      Constitutional: PT is oriented to person, place, and time.   HENT:   Head: Normocephalic and atraumatic.   Right Ear: Initially with partial cerumen plug and after lavage:Hearing, tympanic membrane, external ear and ear canal normal.   Left Ear: Initially with full cerumen plug and after lavage: Hearing, tympanic membrane, external ear and ear canal normal.   Nose: Nose normal.   Mouth/Throat: Oropharynx is clear and moist. No oropharyngeal exudate.   Eyes: Conjunctivae normal and EOM are normal. Pupils are equal, round, and reactive to light.   Neck: Normal range of motion. Neck supple. No thyromegaly present.   Cardiovascular: Normal rate, regular rhythm, normal heart sounds and intact distal pulses.  Exam reveals no gallop and no friction rub.    No murmur heard.  Pulmonary/Chest: Effort normal and breath sounds normal. No respiratory distress. PT has no wheezes. PT has no rales. PT exhibits no tenderness.   Abdominal: Soft. Bowel sounds are normal. PT exhibits no distension and no mass. There is no tenderness. There is no rebound and no guarding.   Musculoskeletal: Normal range of motion. PT exhibits no edema and no tenderness.   Neurological: PT is alert and oriented to person, place, and time. No cranial nerve deficit.   Skin: Skin is warm and dry. No rash noted. No erythema.   Psychiatric: PT has a normal mood and affect. PT behavior is normal. Judgment and thought content normal.        "   Assessment/Plan:       1. Bilateral impacted cerumen    Prescribed debrox for maintenance  Pt tolerated B/L MA ear lavages well and hearing returned  OTC Debrox recommended  Rest, fluids encouraged.  AVS with medical info given.  Pt was in full understanding and agreement with the plan.  Differential diagnosis, natural history, supportive care, and indications for immediate follow-up discussed. All questions answered. Patient agrees with the plan of care.  Follow-up as needed if symptoms worsen or fail to improve to PCP, Urgent care or Emergency Room.

## 2019-11-22 ENCOUNTER — HOSPITAL ENCOUNTER (OUTPATIENT)
Dept: RADIOLOGY | Facility: MEDICAL CENTER | Age: 84
End: 2019-11-22
Attending: STUDENT IN AN ORGANIZED HEALTH CARE EDUCATION/TRAINING PROGRAM
Payer: MEDICARE

## 2019-11-22 DIAGNOSIS — R10.812 LEFT UPPER QUADRANT ABDOMINAL TENDERNESS, REBOUND TENDERNESS PRESENCE NOT SPECIFIED: ICD-10-CM

## 2019-11-22 PROCEDURE — 76705 ECHO EXAM OF ABDOMEN: CPT

## 2019-12-05 ENCOUNTER — OFFICE VISIT (OUTPATIENT)
Dept: URGENT CARE | Facility: CLINIC | Age: 84
End: 2019-12-05
Payer: MEDICARE

## 2019-12-05 VITALS
HEART RATE: 76 BPM | SYSTOLIC BLOOD PRESSURE: 154 MMHG | BODY MASS INDEX: 21.12 KG/M2 | OXYGEN SATURATION: 97 % | RESPIRATION RATE: 16 BRPM | TEMPERATURE: 97.9 F | DIASTOLIC BLOOD PRESSURE: 84 MMHG | WEIGHT: 125 LBS

## 2019-12-05 DIAGNOSIS — R14.0 ABDOMINAL BLOATING: ICD-10-CM

## 2019-12-05 PROCEDURE — 99213 OFFICE O/P EST LOW 20 MIN: CPT | Performed by: PHYSICIAN ASSISTANT

## 2019-12-05 ASSESSMENT — ENCOUNTER SYMPTOMS
HEADACHES: 0
EYE DISCHARGE: 0
EYE REDNESS: 0
COUGH: 0
VOMITING: 0
MYALGIAS: 0
SHORTNESS OF BREATH: 0
NAUSEA: 0
FEVER: 0
ABDOMINAL PAIN: 1
CONSTIPATION: 0
SORE THROAT: 0
DIARRHEA: 0

## 2019-12-05 NOTE — PROGRESS NOTES
Subjective:      Livia Corea is a 89 y.o. female who presents with Gas (x 3 weeks and states she has been having (L) side rib pain from it and state she had a normal ultrasound x 1-2 weeks )        HPI   This is a recurrent problem.  The patient presents to clinic complaining of intermittent gas pain x22 days.  The patient states the pain is located to her epigastric and left upper quadrant regions.  The patient states the pain is intermittent.  The patient denies fever.  No constipation.  No diarrhea.  No bloody stools.  No nausea/vomiting.  The patient reports increased belching.  No increased flatus.  Patient states she has been seen by her PCP for this issue.  The patient states she had a negative left upper quadrant ultrasound x2 weeks ago.  The patient states her PCP recommended the patient change her diet.  The patient states she has attempted to change her diet without improvement of her symptoms.  The patient reports no carbonated beverages.  No alleviating or aggravating factors.  The patient states her last bowel movement was this morning.  The patient is not taking any medications for her current symptoms.    PMH:  has a past medical history of Circulation problem (01/2018), Hyperlipidemia, and Hypertension.  MEDS:   Current Outpatient Medications:   •  amLODIPine (NORVASC) 10 MG Tab, Take 1 Tab by mouth every evening., Disp: 30 Tab, Rfl: 11  •  carbamide peroxide (DEBROX) 6.5 % Solution, Place 6 Drops in ear 2 times a day., Disp: 1 Bottle, Rfl: 0  •  eplerenone (INSPRA) 25 MG Tab, Take 0.5 Tabs by mouth every day., Disp: 90 Tab, Rfl: 3  •  hydroCHLOROthiazide (HYDRODIURIL) 12.5 MG tablet, Take 1 Tab by mouth every day., Disp: 90 Tab, Rfl: 1  •  Omega-3 Fatty Acids (FISH OIL) 1000 MG Cap capsule, Take 1,000 mg by mouth every evening., Disp: , Rfl:   •  ascorbic acid (ASCORBIC ACID) 500 MG Tab, Take 500 mg by mouth every day., Disp: , Rfl:   •  cyanocobalamin (VITAMIN B-12) 500 MCG Tab, Take  "500 mcg by mouth every day., Disp: , Rfl:   •  aspirin EC (ECOTRIN) 81 MG Tablet Delayed Response, Take 81 mg by mouth every day., Disp: , Rfl:   ALLERGIES:   Allergies   Allergen Reactions   • Chlorthalidone Unspecified     Headaches severe. Heart racing    • Clonidine      Hypotension. Bradycardia.   • Hydralazine Unspecified     Pt states she became very hypotensive and had to be \"revived\"   • Lisinopril Swelling     Of lower extremities    • Losartan Unspecified     Causes vision problems     SURGHX:   Past Surgical History:   Procedure Laterality Date   • CAROTID ENDARTERECTOMY Right 2000   • GYN SURGERY      hysterectomy     SOCHX:  reports that she quit smoking about 31 years ago. She smoked 0.00 packs per day. She has never used smokeless tobacco. She reports current alcohol use. She reports that she does not use drugs.  FH: Family history was reviewed, no pertinent findings to report      Review of Systems   Constitutional: Negative for fever.   HENT: Negative for congestion, ear pain and sore throat.    Eyes: Negative for discharge and redness.   Respiratory: Negative for cough and shortness of breath.    Cardiovascular: Negative for chest pain and leg swelling.   Gastrointestinal: Positive for abdominal pain (intermittent gas pains). Negative for constipation, diarrhea, nausea and vomiting.   Musculoskeletal: Negative for myalgias.   Neurological: Negative for headaches.          Objective:     /84 (BP Location: Left arm, Patient Position: Sitting, BP Cuff Size: Adult)   Pulse 76   Temp 36.6 °C (97.9 °F) (Temporal)   Resp 16   Wt 56.7 kg (125 lb)   SpO2 97%   BMI 21.12 kg/m²      Physical Exam  Constitutional:       General: She is not in acute distress.     Appearance: Normal appearance.   HENT:      Head: Normocephalic and atraumatic.      Right Ear: External ear normal.      Left Ear: External ear normal.      Nose: Nose normal.      Mouth/Throat:      Mouth: Mucous membranes are moist.     " " Pharynx: Oropharynx is clear. No posterior oropharyngeal erythema.   Eyes:      Extraocular Movements: Extraocular movements intact.      Conjunctiva/sclera: Conjunctivae normal.   Neck:      Musculoskeletal: Normal range of motion and neck supple.   Cardiovascular:      Rate and Rhythm: Normal rate and regular rhythm.      Heart sounds: Normal heart sounds.   Pulmonary:      Effort: Pulmonary effort is normal. No respiratory distress.      Breath sounds: Normal breath sounds. No wheezing.   Abdominal:      General: Bowel sounds are normal.      Palpations: Abdomen is soft.      Tenderness: There is no tenderness.   Musculoskeletal: Normal range of motion.   Skin:     General: Skin is warm and dry.   Neurological:      Mental Status: She is alert and oriented to person, place, and time.                 Assessment/Plan:     1. Abdominal bloating    The patient's presenting symptoms and physical exam are consistent with intermittent abdominal bloating, which she attributes to \"gas pains\".  The patient had a negative spleenic ultrasound on 11/22.  The patient's physical exam today in clinic was normal.  The patient had no abdominal tenderness and her bowel sounds were normal.  Based on the patient's presenting symptoms and physical exam findings, it is unlikely the patient's symptoms are due to an acute abdominal process.  Recommend OTC medications and supportive care for symptomatic management.  Recommend patient follow-up with her PCP.  Discussed STRICT ED precautions with the patient, she verbalized understanding.    Differential diagnoses, supportive care, and indications for immediate follow-up discussed with patient.   Instructed to return to clinic or nearest emergency department for any change in condition, further concerns, or worsening of symptoms.    OTC Tylenol or Motrin for fever/discomfort.  OTC Gas-X  Drink plenty of fluids  Avoid legumes and leafy greens  Follow-up with PCP  AVS printed  Return to " clinic or go to the ED if symptoms worsen or fail to improve, or if patient develop worsening/increasing/persistent abdominal bloating, abdominal pain, constipation, diarrhea, nausea/vomiting, inability to pass gas/stool, fever/chills, secondary signs of infection, and/or any concerning symptoms.    Discussed plan with the patient, and she agrees to the above.

## 2019-12-05 NOTE — PATIENT INSTRUCTIONS
Flatulence  Burping releases air that you swallow. The bubbles from some drinks may cause burps. There are good germs in your gut to help you digest food. Gas is produced by these germs and released from your bottom. Most people release 3 to 4 quarts of gas every day. This is normal.  HOME CARE  · Eat or drink less of the foods or liquids that give you gas.   · Take the time to chew your food well. Talk less while you eat.   · Do not suck on ice or hard candy.   · Sip slowly. Stir some of the bubbles out of fizzy drinks with a spoon or straw.   · Avoid chewing gum or smoking.   · Ask your doctor about liquids and tablets that may help control burping and gas.   · Only take medicine as told by your doctor.   GET HELP RIGHT AWAY IF:   · There is discomfort when you burp or pass gas.   · You throw up (vomit) when you burp.   · Poop (stool) comes out when you pass gas.   · Your belly is puffy (swollen) and hard.   MAKE SURE YOU:   · Understand these instructions.   · Will watch your condition.   · Will get help right away if you are not doing well or get worse.   Document Released: 10/20/2009 Document Revised: 03/11/2013 Document Reviewed: 10/20/2009  [employer]® Patient Information ©2013 [employer].

## 2019-12-09 ENCOUNTER — HOSPITAL ENCOUNTER (OUTPATIENT)
Dept: LAB | Facility: MEDICAL CENTER | Age: 84
End: 2019-12-09
Attending: INTERNAL MEDICINE
Payer: MEDICARE

## 2019-12-09 DIAGNOSIS — I10 ESSENTIAL HYPERTENSION: ICD-10-CM

## 2019-12-09 DIAGNOSIS — Z86.73 HISTORY OF STROKE: ICD-10-CM

## 2019-12-09 LAB
ANION GAP SERPL CALC-SCNC: 9 MMOL/L (ref 0–11.9)
BUN SERPL-MCNC: 18 MG/DL (ref 8–22)
CALCIUM SERPL-MCNC: 9.7 MG/DL (ref 8.5–10.5)
CHLORIDE SERPL-SCNC: 105 MMOL/L (ref 96–112)
CO2 SERPL-SCNC: 26 MMOL/L (ref 20–33)
CREAT SERPL-MCNC: 1.05 MG/DL (ref 0.5–1.4)
FASTING STATUS PATIENT QL REPORTED: NORMAL
GLUCOSE SERPL-MCNC: 89 MG/DL (ref 65–99)
POTASSIUM SERPL-SCNC: 4.2 MMOL/L (ref 3.6–5.5)
SODIUM SERPL-SCNC: 140 MMOL/L (ref 135–145)

## 2019-12-09 PROCEDURE — 80048 BASIC METABOLIC PNL TOTAL CA: CPT

## 2019-12-09 PROCEDURE — 36415 COLL VENOUS BLD VENIPUNCTURE: CPT

## 2020-01-02 ENCOUNTER — HOSPITAL ENCOUNTER (OUTPATIENT)
Dept: LAB | Facility: MEDICAL CENTER | Age: 85
End: 2020-01-02
Attending: OPHTHALMOLOGY
Payer: MEDICARE

## 2020-01-02 LAB
BASOPHILS # BLD AUTO: 1.3 % (ref 0–1.8)
BASOPHILS # BLD: 0.07 K/UL (ref 0–0.12)
CRP SERPL HS-MCNC: 0.06 MG/DL (ref 0–0.75)
EOSINOPHIL # BLD AUTO: 0.21 K/UL (ref 0–0.51)
EOSINOPHIL NFR BLD: 3.8 % (ref 0–6.9)
ERYTHROCYTE [DISTWIDTH] IN BLOOD BY AUTOMATED COUNT: 51.8 FL (ref 35.9–50)
ERYTHROCYTE [SEDIMENTATION RATE] IN BLOOD BY WESTERGREN METHOD: 27 MM/HOUR (ref 0–30)
HCT VFR BLD AUTO: 44.8 % (ref 37–47)
HGB BLD-MCNC: 14.4 G/DL (ref 12–16)
IMM GRANULOCYTES # BLD AUTO: 0.01 K/UL (ref 0–0.11)
IMM GRANULOCYTES NFR BLD AUTO: 0.2 % (ref 0–0.9)
LYMPHOCYTES # BLD AUTO: 2.27 K/UL (ref 1–4.8)
LYMPHOCYTES NFR BLD: 41.4 % (ref 22–41)
MCH RBC QN AUTO: 30.6 PG (ref 27–33)
MCHC RBC AUTO-ENTMCNC: 32.1 G/DL (ref 33.6–35)
MCV RBC AUTO: 95.1 FL (ref 81.4–97.8)
MONOCYTES # BLD AUTO: 0.61 K/UL (ref 0–0.85)
MONOCYTES NFR BLD AUTO: 11.1 % (ref 0–13.4)
NEUTROPHILS # BLD AUTO: 2.31 K/UL (ref 2–7.15)
NEUTROPHILS NFR BLD: 42.2 % (ref 44–72)
NRBC # BLD AUTO: 0 K/UL
NRBC BLD-RTO: 0 /100 WBC
PLATELET # BLD AUTO: 243 K/UL (ref 164–446)
PMV BLD AUTO: 9.9 FL (ref 9–12.9)
RBC # BLD AUTO: 4.71 M/UL (ref 4.2–5.4)
WBC # BLD AUTO: 5.5 K/UL (ref 4.8–10.8)

## 2020-01-02 PROCEDURE — 85652 RBC SED RATE AUTOMATED: CPT

## 2020-01-02 PROCEDURE — 86140 C-REACTIVE PROTEIN: CPT

## 2020-01-02 PROCEDURE — 85025 COMPLETE CBC W/AUTO DIFF WBC: CPT

## 2020-01-02 PROCEDURE — 36415 COLL VENOUS BLD VENIPUNCTURE: CPT

## 2020-01-06 NOTE — PROGRESS NOTES
Chief Complaint   Patient presents with   • Hypertension     Follow Up       Subjective:   Livia Corea is a very pleasant 89 y.o. retired  for [employer] who presents today for hospital follow-up.    Patient was last seen by Dr. Julio Cesar Fermin on 10/7/19. Her BP remained poorly controlled, she was started on eplerenone 12.5 mg daily.     Past medical history significant for difficult to control hypertension, dyslipidemia, artificial left eye and carotid artery disease.     Today patient states that she was not able to tolerate the eplerenone as it cause her SBP to go down into the 100's and she was symptomatic with weakness and lightheadedness.     She also developed double vision  when she looks to the side with her head straight. She does not experience it if she turns her head to look at something from the side. She is currently be followed Dr. Mccauley for this who has ordered a CT of her head which has not been completed yet.     Otherwise from a cardiology standpoint she is doing well. She has experienced a couple episodes of ANN, she is not sure if it was due to anxiety or not.         Past Medical History:   Diagnosis Date   • Circulation problem 01/2018   • Hyperlipidemia    • Hypertension      Past Surgical History:   Procedure Laterality Date   • CAROTID ENDARTERECTOMY Right 2000   • GYN SURGERY      hysterectomy     Family History   Problem Relation Age of Onset   • Heart Attack Mother 64   • Heart Attack Father 72     Social History     Socioeconomic History   • Marital status: Single     Spouse name: Not on file   • Number of children: Not on file   • Years of education: Not on file   • Highest education level: Not on file   Occupational History   • Not on file   Social Needs   • Financial resource strain: Not on file   • Food insecurity:     Worry: Not on file     Inability: Not on file   • Transportation needs:     Medical: Not on file     Non-medical: Not on file  "  Tobacco Use   • Smoking status: Former Smoker     Packs/day: 0.00     Last attempt to quit: 1988     Years since quittin.8   • Smokeless tobacco: Never Used   Substance and Sexual Activity   • Alcohol use: Yes     Comment: 1 glass of wine x 2 days a week   • Drug use: No   • Sexual activity: Not on file   Lifestyle   • Physical activity:     Days per week: Not on file     Minutes per session: Not on file   • Stress: Not on file   Relationships   • Social connections:     Talks on phone: Not on file     Gets together: Not on file     Attends [employer] service: Not on file     Active member of club or organization: Not on file     Attends meetings of clubs or organizations: Not on file     Relationship status: Not on file   • Intimate partner violence:     Fear of current or ex partner: Not on file     Emotionally abused: Not on file     Physically abused: Not on file     Forced sexual activity: Not on file   Other Topics Concern   • Not on file   Social History Narrative   • Not on file     Allergies   Allergen Reactions   • Chlorthalidone Unspecified     Headaches severe. Heart racing    • Clonidine      Hypotension. Bradycardia.   • Hydralazine Unspecified     Pt states she became very hypotensive and had to be \"revived\"   • Lisinopril Swelling     Of lower extremities    • Losartan Unspecified     Causes vision problems     Outpatient Encounter Medications as of 2020   Medication Sig Dispense Refill   • XIIDRA 5 % Solution      • ILEVRO 0.3 % Suspension      • prednisoLONE acetate (PRED FORTE) 1 % Suspension      • Simethicone ([employer]S GAS RELIEF) 125 MG Cap CVS GAS RELIEF 125 MG CAPS     • amLODIPine (NORVASC) 5 MG Tab Take 2 Tabs by mouth every evening. 180 Tab 3   • carbamide peroxide (DEBROX) 6.5 % Solution Place 6 Drops in ear 2 times a day. 1 Bottle 0   • hydroCHLOROthiazide (HYDRODIURIL) 12.5 MG tablet Take 1 Tab by mouth every day. 90 Tab 1   • Omega-3 Fatty Acids (FISH OIL) 1000 MG Cap capsule " "Take 1,000 mg by mouth every evening.     • ascorbic acid (ASCORBIC ACID) 500 MG Tab Take 500 mg by mouth every day.     • cyanocobalamin (VITAMIN B-12) 500 MCG Tab Take 500 mcg by mouth every day.     • [DISCONTINUED] amLODIPine (NORVASC) 5 MG Tab      • [DISCONTINUED] eplerenone (INSPRA) 25 MG Tab Take 0.5 Tabs by mouth every day. (Patient not taking: Reported on 1/7/2020) 90 Tab 3   • [DISCONTINUED] amLODIPine (NORVASC) 10 MG Tab Take 1 Tab by mouth every evening. 30 Tab 11   • [DISCONTINUED] aspirin EC (ECOTRIN) 81 MG Tablet Delayed Response Take 81 mg by mouth every day.       No facility-administered encounter medications on file as of 1/7/2020.      Review of Systems   Constitutional: Negative for malaise/fatigue and weight loss.   Respiratory: Negative for shortness of breath.    Cardiovascular: Negative for chest pain, palpitations, orthopnea, claudication, leg swelling and PND.   Neurological: Negative for dizziness and weakness.   Psychiatric/Behavioral: The patient is nervous/anxious.    All other systems reviewed and are negative.       Objective:   Pulse 82   Ht 1.638 m (5' 4.5\")   Wt 55.8 kg (123 lb)   SpO2 94%   BMI 20.79 kg/m²     Physical Exam   Constitutional: She is oriented to person, place, and time. She appears well-developed and well-nourished. No distress.   HENT:   Head: Normocephalic.   Eyes: EOM are normal.   Neck: No JVD present.   Cardiovascular: Normal rate, regular rhythm and normal heart sounds.   Pulmonary/Chest: Breath sounds normal. No respiratory distress.   Abdominal: Soft. There is no tenderness.   Musculoskeletal:         General: No edema.   Neurological: She is alert and oriented to person, place, and time.   Skin: Skin is warm and dry.   Psychiatric: She has a normal mood and affect. Her behavior is normal.        TTE 3/10/18:  No prior study is available for comparison.   Normal left ventricular systolic function.  Left ventricular ejection fraction is visually " estimated to be 65%.  Mild left ventricular hypertrophy.  Mild aortic stenosis.  Right heart pressures are consistent with moderate pulmonary   hypertension.    Left Ventricle  Left ventricle is small in size. Mild left ventricular hypertrophy. Sigmoid septum. Normal left ventricular systolic function. Left ventricular ejection fraction is visually estimated to be 65%. Normal regional wall motion. Grade I diastolic dysfunction.    Chest X-Ray 9/15/18:  FINDINGS:  Cardiomediastinal contour is within normal limits.  Atherosclerotic calcification and tortuosity of thoracic aorta.  Minimal curvilinear opacity RIGHT lung base.  No pleural fluid collection or pneumothorax.  S-shaped curvature of thoracic spine    Carotid US 6/12/18:  CONCLUSIONS  Mild bilateral internal carotid artery stenosis (<50%).     Lab Results   Component Value Date/Time    SODIUM 140 12/09/2019 05:43 PM    POTASSIUM 4.2 12/09/2019 05:43 PM    CHLORIDE 105 12/09/2019 05:43 PM    CO2 26 12/09/2019 05:43 PM    GLUCOSE 89 12/09/2019 05:43 PM    BUN 18 12/09/2019 05:43 PM    CREATININE 1.05 12/09/2019 05:43 PM     .    Assessment:     1. Essential hypertension  amLODIPine (NORVASC) 5 MG Tab   2. Dyslipidemia     3. Atherosclerosis of both carotid arteries: Mild in 2018     4. Anxiety         Medical Decision Making:  Today's Assessment / Status / Plan:     HTN:  -Stable (130's-140's/70's) per home BP record.   -Continue Norvasc 10 mg daily.  -Prn daily HCTZ 12.5 mg SBP >170.     Carotid Stenosis:  -Carotid US on 6/12/18 showed < 50% stenosis bilaterally.  -Denies dizziness/presynsope/syncope.   -Patient refuses statin medication due to intolerance and daily ASA as she is afraid of GIB.     HLD:  -Stable with lifestyle modification.  -Refuses statin therapy.     Patient will follow up with Dr. Julio Cesar Fermin in approximately 4 months or earlier if needed. Encouraged patient to contact office should any questions or concerns arise in the mean time.  Patient understands and agrees with the plan of care.     Future Appointments   Date Time Provider Department Center   5/8/2020 11:00 AM Julio Cesar Fermin M.D. [facility] None     Collaborating Provider: Dr. Lance Cantu     Please note that this dictation was created using voice recognition software. I have made every reasonable attempt to correct obvious errors, but I expect that there are errors of grammar and possibly content I did not discover before finalizing the note.

## 2020-01-07 ENCOUNTER — OFFICE VISIT (OUTPATIENT)
Dept: CARDIOLOGY | Facility: MEDICAL CENTER | Age: 85
End: 2020-01-07
Payer: MEDICARE

## 2020-01-07 VITALS — BODY MASS INDEX: 20.49 KG/M2 | HEIGHT: 65 IN | WEIGHT: 123 LBS | OXYGEN SATURATION: 94 % | HEART RATE: 82 BPM

## 2020-01-07 DIAGNOSIS — F41.9 ANXIETY: ICD-10-CM

## 2020-01-07 DIAGNOSIS — E78.5 DYSLIPIDEMIA: ICD-10-CM

## 2020-01-07 DIAGNOSIS — I10 ESSENTIAL HYPERTENSION: ICD-10-CM

## 2020-01-07 DIAGNOSIS — I65.23 ATHEROSCLEROSIS OF BOTH CAROTID ARTERIES: ICD-10-CM

## 2020-01-07 PROCEDURE — 99214 OFFICE O/P EST MOD 30 MIN: CPT | Performed by: NURSE PRACTITIONER

## 2020-01-07 RX ORDER — PREDNISOLONE ACETATE 10 MG/ML
SUSPENSION/ DROPS OPHTHALMIC
COMMUNITY
Start: 2019-11-05

## 2020-01-07 RX ORDER — SIMETHICONE 125 MG
CAPSULE ORAL
COMMUNITY
Start: 2019-12-04

## 2020-01-07 RX ORDER — AMLODIPINE BESYLATE 5 MG/1
TABLET ORAL
COMMUNITY
Start: 2019-12-02 | End: 2020-01-07

## 2020-01-07 RX ORDER — AMLODIPINE BESYLATE 5 MG/1
10 TABLET ORAL EVERY EVENING
Qty: 180 TAB | Refills: 3 | Status: SHIPPED | OUTPATIENT
Start: 2020-01-07

## 2020-01-07 RX ORDER — LIFITEGRAST 50 MG/ML
SOLUTION/ DROPS OPHTHALMIC
COMMUNITY
Start: 2019-11-05

## 2020-01-07 ASSESSMENT — ENCOUNTER SYMPTOMS
DIZZINESS: 0
NERVOUS/ANXIOUS: 1
CLAUDICATION: 0
PND: 0
WEAKNESS: 0
ORTHOPNEA: 0
PALPITATIONS: 0
SHORTNESS OF BREATH: 0
WEIGHT LOSS: 0

## 2020-01-27 ENCOUNTER — HOSPITAL ENCOUNTER (EMERGENCY)
Facility: MEDICAL CENTER | Age: 85
End: 2020-01-27
Attending: EMERGENCY MEDICINE
Payer: MEDICARE

## 2020-01-27 VITALS
OXYGEN SATURATION: 95 % | RESPIRATION RATE: 16 BRPM | WEIGHT: 126.76 LBS | HEIGHT: 64 IN | HEART RATE: 96 BPM | BODY MASS INDEX: 21.64 KG/M2 | SYSTOLIC BLOOD PRESSURE: 191 MMHG | TEMPERATURE: 97.2 F | DIASTOLIC BLOOD PRESSURE: 92 MMHG

## 2020-01-27 DIAGNOSIS — M25.531 RIGHT WRIST PAIN: ICD-10-CM

## 2020-01-27 PROCEDURE — 99283 EMERGENCY DEPT VISIT LOW MDM: CPT

## 2020-01-27 ASSESSMENT — LIFESTYLE VARIABLES: DO YOU DRINK ALCOHOL: NO

## 2020-01-27 NOTE — ED TRIAGE NOTES
"Chief Complaint   Patient presents with   • Wrist Pain     in right wrist radiating all the way up to her upper arm. describes as shooting pain. no redness/edema/tenderness noted. CMS intact. no trauma.     States I wanted to make sure I don't have blood clot. Pt hypertensive in triage, has hx of HTN. States that \"I just took my medication before I came in and it takes 2 hrs before it takes effect. Asymptomatic. No cp/no sob/no dizziness/no blurred vision. NAD. Educated on triage process. Instructed to notify staff for any worsening symptoms. Ambulates w/steady gait using walker.  "

## 2020-01-27 NOTE — ED PROVIDER NOTES
"ED Provider Note    Scribed for Gianfranco Odom M.D. by Jonathan Navarro. 2020  8:54 AM    Primary care provider: Jaye Ibarra M.D.  Means of arrival: Walk in  History obtained from: Patient  History limited by: None    CHIEF COMPLAINT  Chief Complaint   Patient presents with   • Wrist Pain     in right wrist radiating all the way up to her upper arm. describes as shooting pain. no redness/edema/tenderness noted. CMS intact. no trauma.       HPI  Livia Corea is a 89 y.o. female with history of hypertension, hyperlipidemia and circulation problem who presents to the Emergency Department for evaluation of intermittent right wrist pain onset 3 weeks. She says the pain only lasts 5 seconds and experiences it about 3-4 times week. She described the pain as a shooting pain that radiates up her right arm, which is new. She says she additionally feels a round bump on her right wrist. She presented today because she was concerned of a blood clot. She denies chest pain, shortness of breath, or dizziness. She notes alleviation with \"Australian Dream Cream\".     REVIEW OF SYSTEMS  Pertinent positives include right wrist pain, radiating right arm pain, and a round bump on her right wrist. Pertinent negatives include no chest pain, shortness of breath, or dizziness.      PAST MEDICAL HISTORY   has a past medical history of Circulation problem (2018), Hyperlipidemia, and Hypertension.    SURGICAL HISTORY   has a past surgical history that includes gyn surgery and carotid endarterectomy (Right, ).    SOCIAL HISTORY  Social History     Tobacco Use   • Smoking status: Former Smoker     Packs/day: 0.00     Last attempt to quit: 1988     Years since quittin.9   • Smokeless tobacco: Never Used   Substance Use Topics   • Alcohol use: Yes     Comment: 1 glass of wine x 2 days a week   • Drug use: No      Social History     Substance and Sexual Activity   Drug Use No       FAMILY HISTORY  Family " "History   Problem Relation Age of Onset   • Heart Attack Mother 64   • Heart Attack Father 72       CURRENT MEDICATIONS  Home Medications     Reviewed by Vanessa Pitt R.N. (Registered Nurse) on 01/27/20 at 0841  Med List Status: <None>   Medication Last Dose Status   amLODIPine (NORVASC) 5 MG Tab  Active   ascorbic acid (ASCORBIC ACID) 500 MG Tab  Active   carbamide peroxide (DEBROX) 6.5 % Solution  Active   cyanocobalamin (VITAMIN B-12) 500 MCG Tab  Active   hydroCHLOROthiazide (HYDRODIURIL) 12.5 MG tablet  Active   ILEVRO 0.3 % Suspension  Active   Omega-3 Fatty Acids (FISH OIL) 1000 MG Cap capsule  Active   prednisoLONE acetate (PRED FORTE) 1 % Suspension  Active   Simethicone ([employer]S GAS RELIEF) 125 MG Cap  Active   XIIDRA 5 % Solution  Active                ALLERGIES  Allergies   Allergen Reactions   • Chlorthalidone Unspecified     Headaches severe. Heart racing    • Clonidine      Hypotension. Bradycardia.   • Hydralazine Unspecified     Pt states she became very hypotensive and had to be \"revived\"   • Lisinopril Swelling     Of lower extremities    • Losartan Unspecified     Causes vision problems       PHYSICAL EXAM  VITAL SIGNS: BP (!) 191/92   Pulse 96   Temp 36.2 °C (97.2 °F) (Temporal)   Resp 16   Ht 1.626 m (5' 4\")   Wt 57.5 kg (126 lb 12.2 oz)   SpO2 95%   BMI 21.76 kg/m²     Constitutional: Well developed, Well nourished, no acute distress, Non-toxic appearance.   HENT: Normocephalic, Atraumatic.  Oropharynx moist.   Eyes: PERRL, EOMI, Conjunctiva normal, No discharge.   CV: Good pulses  Thorax & Lungs: No respiratory distress.   Skin: Warm, Dry, No erythema, No rash.    Cardiovascular;Circular area of radial artery just proximal to the flexor crease, with good pulse. Good blood flow distally.   Musculoskeletal: Slight decreased range of motion to right wrist secondary to arthritic changes.   Neurologic: Normal sensation. Awake, alert. Moves all extremities spontaneously.  Psychiatric: Affect " normal, Mood normal.    COURSE & MEDICAL DECISION MAKING  Nursing notes, VS, PMSFHx reviewed in chart.    8:54 AM - Patient seen and examined at bedside. I discussed that I do not suspect a blood clot because she has strong radial and ulnar pulses. I discussed that her pain may be arthritic or nerve pain. She will be discharged with a right wrist Velcro splint. The patient verbalizes they feel comfortable going home. The patient is stable for discharge at this time and will return for any new or worsening symptoms. I discussed plan for discharge and follow up as outlined below. Patient verbalizes understanding and support with my plan for discharge.      Decision Making:  Patient with wrist pain, I do not see any evidence of DVT or aneurysm, though we will put the patient in a Velcro wrist splint, have the patient follow-up with a primary care physician, have the patient return with worsening symptoms.    DISPOSITION:  Patient will be discharged home in stable condition.    FOLLOW UP:  [facility], Emergency Dept  [address] [place] [id number] [phone]    If symptoms worsen    FINAL IMPRESSION  1. Right wrist pain          Jonathan BARBOZA (Kevin), am scribing for, and in the presence of, Gianfranco Odom M.D..    Electronically signed by: Jonathan Navarro (Kevin), 1/27/2020. Gianfranco MILLER M.D. personally performed the services described in this documentation, as scribed by Jonathan Navarro in my presence, and it is both accurate and complete.    The note accurately reflects work and decisions made by me.  Gianfranco Odom M.D.  1/27/2020  3:03 PM

## 2020-01-27 NOTE — DISCHARGE INSTRUCTIONS
Usual Velcro wrist splint as needed for comfort, you can take Tylenol for the pain, do not wear the wrist splint for an extended period of time.

## 2020-02-11 ENCOUNTER — TELEPHONE (OUTPATIENT)
Dept: CARDIOLOGY | Facility: MEDICAL CENTER | Age: 85
End: 2020-02-11

## 2020-02-11 NOTE — TELEPHONE ENCOUNTER
"Received telephone call transfer from operators. Pt's friend Charis Modi was calling to give update on pt and was seeking information. Per Charis, pt had an emergency and was sent to [facility] \"accidentally\". Pt is now in the ICU at [facility]. She has no advanced directives on file and no POA listed. Charis wanted to know if [facility] has any information on this. Advised Charis pt has no advanced directives, POA or Living Will on file as well. Pt's listed PCP is Dr. Jaye Ibarra, contact information given to Charis. Per Charis, pt has no known family and she is one of 2 friends that are listed as pt's emergency contact. Advised she should get assistance from case management at [facility] to help with this information. Verbalized understanding and appreciative of information.  "

## 2020-02-13 ENCOUNTER — HOME HEALTH ADMISSION (OUTPATIENT)
Dept: HOME HEALTH SERVICES | Facility: HOME HEALTHCARE | Age: 85
End: 2020-02-13
Payer: MEDICARE

## 2020-03-23 NOTE — TELEPHONE ENCOUNTER
"Fiona Paulino, Med Ass't  Leslie Braga, R.N.   Phone Number: [phone]             Patient called she is still having issues with her BP medication. Yesterday she stated her BP was 208/98 and today it was high again and the medication only brought it down to 170-179, didn't state bottom number.      Osiel Virk, [facility]  Leslie Braga, R.N.             RODRIGO Pollard pt states that she called earlier today but there are some updates as she has started getting a bloody nose and her bp is now 190. She is getting increasingly worried and is not sure what to do as the medication does not seem to be working. Patient would like to request a call back ASAP.      Pt called back a 3rd time. Pt reports that yesterday she was running errands and she felt \"spacy\". She took her BP when she got home and it was 208/98. She took 1 prn clonidine that she had at home and her BP came down. Today when she took her BP it was 190 systolic and she took clonidine again and her BP is currently 158/82. However, today pt states she also had a nosebleed that has since resolved. She has an upcoming appt w/Dr. Eden scheduled for Thurs, 12/13.    To Breanna--please advise.  "
DEVIKA Jauregui.   You 2 hours ago (11:36 AM)      I think that we should re-start the Losartan 50 mg daily and order a BMP. If she continues to have significantly elevated BP not relieved with her clonidine, she should go to the ER. I do not see the clonidine on her med list, can we find out how much and how often she is taking it?      Spoke w/pt. Advised re: medication instructions as per Breanna LINCOLN. Losaratan rx sent to pharmacy. Med list updated w/prn clonidine. Pt verbalized understanding.  
N/A

## 2021-01-14 DIAGNOSIS — Z23 NEED FOR VACCINATION: ICD-10-CM

## 2024-06-20 NOTE — PATIENT INSTRUCTIONS
[facility]  [address]  [place] [id number] [phone]       June 20, 2024    Jenae Jones  [address] [place] [address]    Dear Jenae,    We care about your health and have reviewed your health plan and are making recommendations based on this review, to optimize your health.    You are in particular need of attention regarding:  -Wellness (Physical) Visit     We are recommending that you:  You schedule an appt    In addition, here is a list of due or overdue Health Maintenance reminders.    Health Maintenance Due   Topic Date Due    Blood Sugar Screening  09/13/2019    Cholesterol Lab  05/25/2023    COVID-19 Vaccine (4 - 2023-24 season) 09/01/2023    Annual Wellness Visit  06/07/2024       To address the above recommendations, we encourage you to contact us at [phone], via dooyoo or by contacting Central Scheduling toll free at [phone] 24 hours a day. They will assist you with finding the most convenient time and location.    Thank you for trusting [facility] and we appreciate the opportunity to serve you.  We look forward to supporting your healthcare needs in the future.    Healthy Regards,    Your [facility] Team   Discontinue losartan and potassium supplementation    Start the irbesartan 300 mg daily    Start Spironolactone 12.5 mg daily    Take hydrochlorothiazide 12.5 mg 2 tabs in the morning along with the irbesartan and spironolactone 12.5 mg (1/2 tablet)    Take amlodipine 10 mg at bedtime    Basic metabolic panel in 1 week and prior to follow-up in 3 weeks
